# Patient Record
Sex: FEMALE | Race: AMERICAN INDIAN OR ALASKA NATIVE | NOT HISPANIC OR LATINO | Employment: UNEMPLOYED | URBAN - METROPOLITAN AREA
[De-identification: names, ages, dates, MRNs, and addresses within clinical notes are randomized per-mention and may not be internally consistent; named-entity substitution may affect disease eponyms.]

---

## 2017-03-22 PROBLEM — M23.303 DEGENERATION DISEASE OF MEDIAL MENISCUS OF RIGHT KNEE: Status: ACTIVE | Noted: 2017-03-22

## 2018-01-24 ENCOUNTER — HOSPITAL ENCOUNTER (EMERGENCY)
Facility: HOSPITAL | Age: 53
Discharge: HOME | End: 2018-01-24
Attending: EMERGENCY MEDICINE
Payer: MEDICAID

## 2018-01-24 VITALS
HEIGHT: 67 IN | WEIGHT: 205 LBS | HEART RATE: 95 BPM | OXYGEN SATURATION: 100 % | TEMPERATURE: 98 F | BODY MASS INDEX: 32.18 KG/M2 | SYSTOLIC BLOOD PRESSURE: 130 MMHG | RESPIRATION RATE: 18 BRPM | DIASTOLIC BLOOD PRESSURE: 60 MMHG

## 2018-01-24 DIAGNOSIS — J00 ACUTE NASOPHARYNGITIS: ICD-10-CM

## 2018-01-24 DIAGNOSIS — Z20.828 EXPOSURE TO INFLUENZA: Primary | ICD-10-CM

## 2018-01-24 DIAGNOSIS — R05.9 COUGH: ICD-10-CM

## 2018-01-24 LAB
FLUAV AG SPEC QL IA: NEGATIVE
FLUBV AG SPEC QL IA: NEGATIVE
SPECIMEN SOURCE: NORMAL

## 2018-01-24 PROCEDURE — 99283 EMERGENCY DEPT VISIT LOW MDM: CPT

## 2018-01-24 PROCEDURE — 25000003 PHARM REV CODE 250: Performed by: NURSE PRACTITIONER

## 2018-01-24 PROCEDURE — 87400 INFLUENZA A/B EACH AG IA: CPT

## 2018-01-24 RX ORDER — IBUPROFEN 800 MG/1
800 TABLET ORAL
Status: COMPLETED | OUTPATIENT
Start: 2018-01-24 | End: 2018-01-24

## 2018-01-24 RX ORDER — BENZONATATE 100 MG/1
100 CAPSULE ORAL EVERY 8 HOURS PRN
Qty: 20 CAPSULE | Refills: 0 | Status: SHIPPED | OUTPATIENT
Start: 2018-01-24 | End: 2018-01-29

## 2018-01-24 RX ORDER — OSELTAMIVIR PHOSPHATE 75 MG/1
75 CAPSULE ORAL 2 TIMES DAILY
Qty: 10 CAPSULE | Refills: 0 | Status: SHIPPED | OUTPATIENT
Start: 2018-01-24 | End: 2018-01-29

## 2018-01-24 RX ADMIN — IBUPROFEN 800 MG: 800 TABLET ORAL at 08:01

## 2018-01-25 NOTE — ED PROVIDER NOTES
Encounter Date: 1/24/2018       History     Chief Complaint   Patient presents with    Generalized Body Aches     with cough     The history is provided by the patient.   URI   The primary symptoms include fatigue, ear pain, sore throat, swollen glands, cough and myalgias. Primary symptoms do not include fever, headaches, wheezing, abdominal pain, nausea, vomiting, arthralgias or rash. Illness onset: 3 days ago. This is a new problem. The problem has been gradually worsening.   Both ears are affected.   The sore throat is not accompanied by trouble swallowing, drooling, hoarse voice or stridor.   The swelling is located in the anterior neck. The swelling is not associated with speech difficulty, neck pain or neck stiffness.   The cough is non-productive.   The myalgias are generalized. The myalgias are aching. The myalgias are not associated with weakness, tenderness or swelling.   The onset of the illness is associated with exposure to sick contacts (spouse with the flu). Symptoms associated with the illness include chills, plugged ear sensation, sinus pressure, congestion and rhinorrhea. The illness is not associated with facial pain.   Reports that she took Theraflu with mild relief.       Review of patient's allergies indicates:   Allergen Reactions    Ciprofloxacin Swelling    Codeine Swelling    Pcn [penicillins] Hives    Percocet [oxycodone-acetaminophen] Swelling     Past Medical History:   Diagnosis Date    GERD (gastroesophageal reflux disease)     Heart valve problem     Hypertension     Obesity (BMI 30-39.9) 10/20/2014    Thyroid disease      Past Surgical History:   Procedure Laterality Date    cesaean section  1991, 1993    CHOLECYSTECTOMY  2008    HYSTERECTOMY  age 27    for fibroids    SALPINGECTOMY  1992    for ectopic pregnancy    TOTAL THYROIDECTOMY  2012     Family History   Problem Relation Age of Onset    Hypertension Mother     Diabetes Mother     Hyperlipidemia Mother      Heart disease Mother     Heart disease Father     Hypertension Father     Diabetes Father     Heart disease Brother     Diabetes Brother      Social History   Substance Use Topics    Smoking status: Never Smoker    Smokeless tobacco: Never Used    Alcohol use No     Review of Systems   Constitutional: Positive for chills and fatigue. Negative for fever.   HENT: Positive for congestion, ear pain, rhinorrhea, sinus pressure and sore throat. Negative for dental problem, drooling, hoarse voice and trouble swallowing.    Eyes: Negative for pain, discharge, redness and visual disturbance.   Respiratory: Positive for cough. Negative for chest tightness, shortness of breath, wheezing and stridor.    Cardiovascular: Negative for chest pain, palpitations and leg swelling.   Gastrointestinal: Negative for abdominal pain, constipation, diarrhea, nausea and vomiting.   Genitourinary: Negative for difficulty urinating, dysuria, flank pain, frequency, hematuria and urgency.   Musculoskeletal: Positive for myalgias. Negative for arthralgias, back pain and neck pain.   Skin: Negative for color change, pallor and rash.   Neurological: Negative for seizures, speech difficulty, weakness and headaches.   Psychiatric/Behavioral: Negative.        Physical Exam     Initial Vitals [01/24/18 1946]   BP Pulse Resp Temp SpO2   137/67 96 20 97.2 °F (36.2 °C) 100 %      MAP       90.33         Physical Exam    Nursing note and vitals reviewed.  Constitutional: Vital signs are normal. No distress.   HENT:   Head: Normocephalic and atraumatic.   Right Ear: Tympanic membrane, external ear and ear canal normal.   Left Ear: Tympanic membrane, external ear and ear canal normal.   Nose: Rhinorrhea present. Right sinus exhibits no maxillary sinus tenderness and no frontal sinus tenderness. Left sinus exhibits no maxillary sinus tenderness and no frontal sinus tenderness.   Mouth/Throat: Oropharynx is clear and moist.   Clear post nasal drip  noted. Erythema bilateral nasal mucosa with clear nasal discharge noted.   Eyes: Conjunctivae, EOM and lids are normal. Pupils are equal, round, and reactive to light.   Neck: Normal range of motion. Neck supple.   Cardiovascular: Normal rate, regular rhythm, S1 normal, S2 normal and intact distal pulses.   Pulmonary/Chest: Effort normal and breath sounds normal. No respiratory distress. She has no wheezes. She has no rhonchi.   Abdominal: Soft. Bowel sounds are normal. She exhibits no distension. There is no tenderness.   Musculoskeletal: Normal range of motion.   Lymphadenopathy:     She has no cervical adenopathy.   Neurological: She is alert and oriented to person, place, and time. She has normal strength.   Skin: Skin is warm and dry. Capillary refill takes less than 2 seconds. No rash noted.   Psychiatric: She has a normal mood and affect. Her speech is normal and behavior is normal.         ED Course   Procedures  Labs Reviewed   INFLUENZA A AND B ANTIGEN        Medications   ibuprofen tablet 800 mg (800 mg Oral Given 1/24/18 2058)                          ED Course      Clinical Impression:   The primary encounter diagnosis was Exposure to influenza. Diagnoses of Acute nasopharyngitis and Cough were also pertinent to this visit.    Disposition:   Disposition: Discharged  Condition: Stable     The patient acknowledges that close follow up with medical provider is required. Instructed to follow up with PCP within 2 days. The patient agrees to comply with all instruction and directions given in the ER.     New Prescriptions    BENZONATATE (TESSALON) 100 MG CAPSULE    Take 1 capsule (100 mg total) by mouth every 8 (eight) hours as needed for Cough.    OSELTAMIVIR (TAMIFLU) 75 MG CAPSULE    Take 1 capsule (75 mg total) by mouth 2 (two) times daily.      Educated to promote fluids and rest. Tylenol or motrin as needed for pain and/or fever. Encourage frequent hand washing.                         Ena Price  NP  01/24/18 2128

## 2018-02-20 PROBLEM — R91.8 PULMONARY NODULES: Status: ACTIVE | Noted: 2018-02-20

## 2018-02-20 PROBLEM — R11.0 NAUSEA: Status: ACTIVE | Noted: 2018-02-20

## 2018-02-20 PROBLEM — E78.1 HYPERTRIGLYCERIDEMIA: Status: ACTIVE | Noted: 2018-02-20

## 2018-04-10 PROBLEM — K31.A0 INTESTINAL METAPLASIA OF GASTRIC MUCOSA: Status: ACTIVE | Noted: 2018-04-10

## 2018-04-10 PROBLEM — K29.41 ATROPHIC GASTRITIS WITH HEMORRHAGE: Status: ACTIVE | Noted: 2018-04-10

## 2018-06-25 PROBLEM — G89.29 CHRONIC PAIN OF RIGHT KNEE: Status: ACTIVE | Noted: 2018-06-25

## 2018-06-25 PROBLEM — M25.561 CHRONIC PAIN OF RIGHT KNEE: Status: ACTIVE | Noted: 2018-06-25

## 2019-04-16 PROBLEM — M70.50 PES ANSERINE BURSITIS: Status: ACTIVE | Noted: 2019-04-16

## 2019-04-16 PROBLEM — M79.602 PAIN IN BOTH UPPER EXTREMITIES: Status: ACTIVE | Noted: 2019-04-16

## 2019-04-16 PROBLEM — M79.601 PAIN IN BOTH UPPER EXTREMITIES: Status: ACTIVE | Noted: 2019-04-16

## 2019-09-24 PROBLEM — E53.8 B12 DEFICIENCY: Status: ACTIVE | Noted: 2019-09-24

## 2020-06-02 ENCOUNTER — OFFICE VISIT (OUTPATIENT)
Dept: URGENT CARE | Facility: CLINIC | Age: 55
End: 2020-06-02
Payer: COMMERCIAL

## 2020-06-02 VITALS
HEART RATE: 88 BPM | OXYGEN SATURATION: 98 % | DIASTOLIC BLOOD PRESSURE: 75 MMHG | WEIGHT: 211 LBS | TEMPERATURE: 98 F | HEIGHT: 67 IN | BODY MASS INDEX: 33.12 KG/M2 | SYSTOLIC BLOOD PRESSURE: 136 MMHG

## 2020-06-02 DIAGNOSIS — J02.9 ACUTE PHARYNGITIS, UNSPECIFIED ETIOLOGY: Primary | ICD-10-CM

## 2020-06-02 DIAGNOSIS — H66.91 RIGHT OTITIS MEDIA, UNSPECIFIED OTITIS MEDIA TYPE: ICD-10-CM

## 2020-06-02 PROCEDURE — 99214 PR OFFICE/OUTPT VISIT, EST, LEVL IV, 30-39 MIN: ICD-10-PCS | Mod: S$GLB,,, | Performed by: NURSE PRACTITIONER

## 2020-06-02 PROCEDURE — 99214 OFFICE O/P EST MOD 30 MIN: CPT | Mod: S$GLB,,, | Performed by: NURSE PRACTITIONER

## 2020-06-02 RX ORDER — AZITHROMYCIN 250 MG/1
TABLET, FILM COATED ORAL
Qty: 6 TABLET | Refills: 0 | Status: SHIPPED | OUTPATIENT
Start: 2020-06-02 | End: 2020-07-09

## 2020-06-02 RX ORDER — FLUCONAZOLE 150 MG/1
150 TABLET ORAL DAILY
Qty: 2 TABLET | Refills: 1 | Status: SHIPPED | OUTPATIENT
Start: 2020-06-02 | End: 2020-06-03

## 2020-06-03 NOTE — PROGRESS NOTES
"Subjective:       Patient ID: Marysol Cash is a 54 y.o. female.    Vitals:  height is 5' 7" (1.702 m) and weight is 95.7 kg (211 lb). Her oral temperature is 98.3 °F (36.8 °C). Her blood pressure is 136/75 and her pulse is 88. Her oxygen saturation is 98%.     Chief Complaint: Sinus Problem    Sinus Problem   This is a new problem. The current episode started yesterday. The problem has been gradually worsening since onset. There has been no fever. Her pain is at a severity of 4/10. The pain is mild. Associated symptoms include congestion, ear pain (right), sinus pressure and a sore throat. Pertinent negatives include no chills, coughing, headaches or shortness of breath. Past treatments include acetaminophen (sudafed). The treatment provided no relief.       Constitution: Negative for chills, fatigue and fever.   HENT: Positive for ear pain (right), congestion, postnasal drip, sinus pressure and sore throat.    Neck: Negative for painful lymph nodes.   Cardiovascular: Negative for chest pain, leg swelling and sob on exertion.   Eyes: Negative for double vision and blurred vision.   Respiratory: Negative for cough, shortness of breath and wheezing.    Gastrointestinal: Negative for nausea, vomiting and diarrhea.   Genitourinary: Negative for dysuria, frequency, urgency and history of kidney stones.   Musculoskeletal: Negative for joint pain, joint swelling, muscle cramps and muscle ache.   Skin: Negative for color change, pale, rash and bruising.   Allergic/Immunologic: Negative for seasonal allergies.   Neurological: Negative for dizziness, history of vertigo, light-headedness, passing out and headaches.   Hematologic/Lymphatic: Negative for swollen lymph nodes.   Psychiatric/Behavioral: Negative for nervous/anxious, sleep disturbance and depression. The patient is not nervous/anxious.        Objective:      Physical Exam   Constitutional: She is oriented to person, place, and time. She appears well-developed " and well-nourished. She is cooperative.  Non-toxic appearance. She does not have a sickly appearance. She does not appear ill. No distress.   HENT:   Head: Normocephalic and atraumatic.   Right Ear: Hearing, external ear and ear canal normal. No drainage, swelling or tenderness. No mastoid tenderness. Tympanic membrane is erythematous and bulging. Tympanic membrane is not scarred and not perforated. No decreased hearing is noted.   Left Ear: Hearing, tympanic membrane, external ear and ear canal normal.   Nose: Nose normal. No mucosal edema, rhinorrhea or nasal deformity. No epistaxis. Right sinus exhibits no maxillary sinus tenderness and no frontal sinus tenderness. Left sinus exhibits no maxillary sinus tenderness and no frontal sinus tenderness.   Mouth/Throat: Uvula is midline and mucous membranes are normal. No trismus in the jaw. Normal dentition. No uvula swelling. Posterior oropharyngeal erythema present. No oropharyngeal exudate or posterior oropharyngeal edema.   Eyes: Conjunctivae and lids are normal. No scleral icterus.   Neck: Trachea normal, full passive range of motion without pain and phonation normal. Neck supple. No neck rigidity. No edema and no erythema present.   Cardiovascular: Normal rate, regular rhythm, normal heart sounds, intact distal pulses and normal pulses.   Pulmonary/Chest: Effort normal and breath sounds normal. No respiratory distress. She has no decreased breath sounds. She has no wheezes. She has no rhonchi. She exhibits no tenderness.   Abdominal: Normal appearance.   Musculoskeletal: Normal range of motion. She exhibits no edema or deformity.   Neurological: She is alert and oriented to person, place, and time. She exhibits normal muscle tone. Coordination normal.   Skin: Skin is warm, dry, intact, not diaphoretic and not pale.   Psychiatric: She has a normal mood and affect. Her speech is normal and behavior is normal. Judgment and thought content normal. Cognition and memory  are normal.   Nursing note and vitals reviewed.        Assessment:       1. Acute pharyngitis, unspecified etiology    2. Right otitis media, unspecified otitis media type        Plan:         Acute pharyngitis, unspecified etiology    Right otitis media, unspecified otitis media type  -     azithromycin (ZITHROMAX) 250 MG tablet; Take 2 tablets (500 mg) on  Day 1,  followed by 1 tablet (250 mg) once daily on Days 2 through 5.  Dispense: 6 tablet; Refill: 0  -     fluconazole (DIFLUCAN) 150 MG Tab; Take 1 tablet (150 mg total) by mouth once daily. May repeat in 4 days if necessary. for 1 day  Dispense: 2 tablet; Refill: 1          Patient Instructions     1.  Take all medications as directed. If you have been prescribed antibiotics, make sure to complete them.   2.  Rest and keep yourself/patient well hydrated. For adults, it is recommended to drink at least 8-10 glasses of water daily.   3.  For patients above 6 months of age who are not allergic to and are not on anticoagulants, you can alternate Tylenol and Motrin every 4-6 hours for fever above 100.4F and/or pain.  For patients less than 6 months of age, allergic to or intolerant to NSAIDS, have gastritis, gastric ulcers, or history of GI bleeds, are pregnant, or are on anticoagulant therapy, you can take Tylenol every 4 hours as needed for fever above 100.4F and/or pain.   4. You should schedule a follow-up appointment with your Primary Care Provider/Pediatrician for recheck in 2-3 days or as directed at this visit.   5.  If your condition fails to improve in a timely manner, you should receive another evaluation by your Primary Care Provider/Pediatrician to discuss your concerns or return to urgent care for a recheck.  If your condition worsens at any time, you should report immediately to your nearest Emergency Department for further evaluation. **You must understand that you have received Urgent Care treatment only and that you may be released before all of  your medical problems are known or treated. You, the patient, are responsible to arrange for follow-up care as instructed.         Middle Ear Infection, Wait & See Antibiotic Treatment (Child Over 6 Months)  Your child has an infection of the middle ear (the space behind the eardrum). Sometimes the common cold causes this type of infection. This is because congestion can block the internal passage (eustachian tube) that drains fluid from the middle ear. When the middle ear fills with fluid, bacteria or viruses may grow there, causing an infection. Until recently, antibiotics were used to treat almost all cases of middle ear infection. Doctors now know that most cases of ear infection will get better without antibiotics.     The reasons for not using antibiotics include:  · Antibiotics don't relieve pain in the first 24 hours and only have a minimal effect on pain after that.  · Antibiotics often prescribed for ear infection may cause diarrhea or other side effects.  · Antibiotics don't help with viral infections.  · Antibiotics don't treat middle ear fluid.  · Frequent use of antibiotics cause bacteria to become resistant. This makes the bacteria harder to treat in the future.  · Certain antibiotics are very expensive.  For these reasons, you are being given a wait and see prescription. That means treating your child only with acetaminophen or ibuprofen and pain-relieving ear drops for the first 2 days to see if it improves. Only fill the antibiotic prescription if your child is not better or is getting worse 2 days after todays visit.  Home care  The following are general care guidelines:  · Fluids. Fever increases water loss from the body. For infants under age 1, continue regular formula or breast feedings. Between feedings give an oral rehydration solution. You can buy oral rehydration solution from grocery and drug stores. No prescription is needed. For children over 1 year old, give plenty of fluids like  water, juice, lemon-lime soda, ginger-carmen, lemonade, or popsicles. Sports drinks are also OK. Never give your child energy drinks containing caffeine.  · Eating. If your child doesnt want to eat solid foods, its OK for a few days, as long as the child drinks lots of fluid.  · Rest. Keep children with fever at home resting or playing quietly. Your child may return to  or school when the fever is gone and he or she is eating well and feeling better.  · Fever and pain. Your child may use acetaminophen to control pain. You may give a child over 6 months ibuprofen instead of acetaminophen. If your child has chronic liver or kidney disease or ever had a stomach ulcer or GI bleeding, talk with your doctor before using these medicines. Do not give Aspirin to anyone under 18 years of age who is ill with a fever. It may cause a potentially life-threatening condition called Reye syndrome.  · Ear drops. You may give your child pain-relieving ear drops. These should be used as directed.  · Antibiotics. Only fill the antibiotic prescription if your child is not better or is getting worse 2 days after todays visit. Once you start the antibiotic, finish all of the medicine prescribed, even though your child may feel better after the first few days.  Prevention  To reduce the chance of your child getting an ear infection, follow these tips:  · Breastfeed your child when possible.  · If you give your child a bottle, don't prop the bottle up.  · Keep your child away from secondhand smoke.  Follow-up care  Sometimes the infection does not respond fully to the first antibiotic. A different medicine may be needed. Therefore, make an appointment to have your childs ears rechecked in 2 weeks to be sure the infection has cleared.  Call 911  Call 911 if any of the following occur:  · Unusual fussiness, drowsiness, or confusion  · No wet diapers for 8 hours, no tears when crying, or a dry mouth  · Stiff neck  · Convulsion  (seizure)  When to seek medical advice  Call your healthcare provider right away if any of these occur:  · Symptoms get worse or don't start to get better after 2 days of treatment  · Fever (see Fever and children, below)  · Headache or neck pain  · New rash appears  · Frequent diarrhea or vomiting  · Fluid or bloody drainage from the ear     Fever and children  Always use a digital thermometer to check your childs temperature. Never use a mercury thermometer.  For infants and toddlers, be sure to use a rectal thermometer correctly. A rectal thermometer may accidentally poke a hole in (perforate) the rectum. It may also pass on germs from the stool. Always follow the product makers directions for proper use. If you dont feel comfortable taking a rectal temperature, use another method. When you talk to your childs healthcare provider, tell him or her which method you used to take your childs temperature.  Here are guidelines for fever temperature. Ear temperatures arent accurate before 6 months of age. Dont take an oral temperature until your child is at least 4 years old.  Infant under 3 months old:  · Ask your childs healthcare provider how you should take the temperature.  · Rectal or forehead (temporal artery) temperature of 100.4°F (38°C) or higher, or as directed by the provider  · Armpit temperature of 99°F (37.2°C) or higher, or as directed by the provider  Child age 3 to 36 months:  · Rectal, forehead (temporal artery), or ear temperature of 102°F (38.9°C) or higher, or as directed by the provider  · Armpit temperature of 101°F (38.3°C) or higher, or as directed by the provider  Child of any age:  · Repeated temperature of 104°F (40°C) or higher, or as directed by the provider  · Fever that lasts more than 24 hours in a child under 2 years old. Or a fever that lasts for 3 days in a child 2 years or older.   Date Last Reviewed: 10/1/2016  © 7811-3092 [employer]. [address] [place] [address],  JENNIE Noguera [address]. All rights reserved. This information is not intended as a substitute for professional medical care. Always follow your healthcare professional's instructions.

## 2020-06-03 NOTE — PATIENT INSTRUCTIONS
1.  Take all medications as directed. If you have been prescribed antibiotics, make sure to complete them.   2.  Rest and keep yourself/patient well hydrated. For adults, it is recommended to drink at least 8-10 glasses of water daily.   3.  For patients above 6 months of age who are not allergic to and are not on anticoagulants, you can alternate Tylenol and Motrin every 4-6 hours for fever above 100.4F and/or pain.  For patients less than 6 months of age, allergic to or intolerant to NSAIDS, have gastritis, gastric ulcers, or history of GI bleeds, are pregnant, or are on anticoagulant therapy, you can take Tylenol every 4 hours as needed for fever above 100.4F and/or pain.   4. You should schedule a follow-up appointment with your Primary Care Provider/Pediatrician for recheck in 2-3 days or as directed at this visit.   5.  If your condition fails to improve in a timely manner, you should receive another evaluation by your Primary Care Provider/Pediatrician to discuss your concerns or return to urgent care for a recheck.  If your condition worsens at any time, you should report immediately to your nearest Emergency Department for further evaluation. **You must understand that you have received Urgent Care treatment only and that you may be released before all of your medical problems are known or treated. You, the patient, are responsible to arrange for follow-up care as instructed.         Middle Ear Infection, Wait & See Antibiotic Treatment (Child Over 6 Months)  Your child has an infection of the middle ear (the space behind the eardrum). Sometimes the common cold causes this type of infection. This is because congestion can block the internal passage (eustachian tube) that drains fluid from the middle ear. When the middle ear fills with fluid, bacteria or viruses may grow there, causing an infection. Until recently, antibiotics were used to treat almost all cases of middle ear infection. Doctors now know that  most cases of ear infection will get better without antibiotics.     The reasons for not using antibiotics include:  · Antibiotics don't relieve pain in the first 24 hours and only have a minimal effect on pain after that.  · Antibiotics often prescribed for ear infection may cause diarrhea or other side effects.  · Antibiotics don't help with viral infections.  · Antibiotics don't treat middle ear fluid.  · Frequent use of antibiotics cause bacteria to become resistant. This makes the bacteria harder to treat in the future.  · Certain antibiotics are very expensive.  For these reasons, you are being given a wait and see prescription. That means treating your child only with acetaminophen or ibuprofen and pain-relieving ear drops for the first 2 days to see if it improves. Only fill the antibiotic prescription if your child is not better or is getting worse 2 days after todays visit.  Home care  The following are general care guidelines:  · Fluids. Fever increases water loss from the body. For infants under age 1, continue regular formula or breast feedings. Between feedings give an oral rehydration solution. You can buy oral rehydration solution from grocery and drug stores. No prescription is needed. For children over 1 year old, give plenty of fluids like water, juice, lemon-lime soda, ginger-carmen, lemonade, or popsicles. Sports drinks are also OK. Never give your child energy drinks containing caffeine.  · Eating. If your child doesnt want to eat solid foods, its OK for a few days, as long as the child drinks lots of fluid.  · Rest. Keep children with fever at home resting or playing quietly. Your child may return to  or school when the fever is gone and he or she is eating well and feeling better.  · Fever and pain. Your child may use acetaminophen to control pain. You may give a child over 6 months ibuprofen instead of acetaminophen. If your child has chronic liver or kidney disease or ever had a  stomach ulcer or GI bleeding, talk with your doctor before using these medicines. Do not give Aspirin to anyone under 18 years of age who is ill with a fever. It may cause a potentially life-threatening condition called Reye syndrome.  · Ear drops. You may give your child pain-relieving ear drops. These should be used as directed.  · Antibiotics. Only fill the antibiotic prescription if your child is not better or is getting worse 2 days after todays visit. Once you start the antibiotic, finish all of the medicine prescribed, even though your child may feel better after the first few days.  Prevention  To reduce the chance of your child getting an ear infection, follow these tips:  · Breastfeed your child when possible.  · If you give your child a bottle, don't prop the bottle up.  · Keep your child away from secondhand smoke.  Follow-up care  Sometimes the infection does not respond fully to the first antibiotic. A different medicine may be needed. Therefore, make an appointment to have your childs ears rechecked in 2 weeks to be sure the infection has cleared.  Call 911  Call 911 if any of the following occur:  · Unusual fussiness, drowsiness, or confusion  · No wet diapers for 8 hours, no tears when crying, or a dry mouth  · Stiff neck  · Convulsion (seizure)  When to seek medical advice  Call your healthcare provider right away if any of these occur:  · Symptoms get worse or don't start to get better after 2 days of treatment  · Fever (see Fever and children, below)  · Headache or neck pain  · New rash appears  · Frequent diarrhea or vomiting  · Fluid or bloody drainage from the ear     Fever and children  Always use a digital thermometer to check your childs temperature. Never use a mercury thermometer.  For infants and toddlers, be sure to use a rectal thermometer correctly. A rectal thermometer may accidentally poke a hole in (perforate) the rectum. It may also pass on germs from the stool. Always follow  the product makers directions for proper use. If you dont feel comfortable taking a rectal temperature, use another method. When you talk to your childs healthcare provider, tell him or her which method you used to take your childs temperature.  Here are guidelines for fever temperature. Ear temperatures arent accurate before 6 months of age. Dont take an oral temperature until your child is at least 4 years old.  Infant under 3 months old:  · Ask your childs healthcare provider how you should take the temperature.  · Rectal or forehead (temporal artery) temperature of 100.4°F (38°C) or higher, or as directed by the provider  · Armpit temperature of 99°F (37.2°C) or higher, or as directed by the provider  Child age 3 to 36 months:  · Rectal, forehead (temporal artery), or ear temperature of 102°F (38.9°C) or higher, or as directed by the provider  · Armpit temperature of 101°F (38.3°C) or higher, or as directed by the provider  Child of any age:  · Repeated temperature of 104°F (40°C) or higher, or as directed by the provider  · Fever that lasts more than 24 hours in a child under 2 years old. Or a fever that lasts for 3 days in a child 2 years or older.   Date Last Reviewed: 10/1/2016  © 7780-2741 [employer]. [address] [place] [address], [place] [address]. All rights reserved. This information is not intended as a substitute for professional medical care. Always follow your healthcare professional's instructions.

## 2020-12-28 ENCOUNTER — OFFICE VISIT (OUTPATIENT)
Dept: URGENT CARE | Facility: CLINIC | Age: 55
End: 2020-12-28
Payer: COMMERCIAL

## 2020-12-28 VITALS
HEART RATE: 81 BPM | OXYGEN SATURATION: 97 % | SYSTOLIC BLOOD PRESSURE: 113 MMHG | WEIGHT: 219 LBS | TEMPERATURE: 101 F | HEIGHT: 67 IN | BODY MASS INDEX: 34.37 KG/M2 | DIASTOLIC BLOOD PRESSURE: 69 MMHG

## 2020-12-28 DIAGNOSIS — U07.1 COVID-19 VIRUS DETECTED: ICD-10-CM

## 2020-12-28 DIAGNOSIS — J34.89 SINUS PRESSURE: ICD-10-CM

## 2020-12-28 DIAGNOSIS — U07.1 LAB TEST POSITIVE FOR DETECTION OF COVID-19 VIRUS: ICD-10-CM

## 2020-12-28 DIAGNOSIS — Z11.59 SCREENING FOR VIRAL DISEASE: Primary | ICD-10-CM

## 2020-12-28 DIAGNOSIS — R51.9 GENERALIZED HEADACHE: ICD-10-CM

## 2020-12-28 LAB
CTP QC/QA: YES
SARS-COV-2 RDRP RESP QL NAA+PROBE: POSITIVE

## 2020-12-28 PROCEDURE — 3008F PR BODY MASS INDEX (BMI) DOCUMENTED: ICD-10-PCS | Mod: CPTII,S$GLB,, | Performed by: NURSE PRACTITIONER

## 2020-12-28 PROCEDURE — 99214 PR OFFICE/OUTPT VISIT, EST, LEVL IV, 30-39 MIN: ICD-10-PCS | Mod: S$GLB,,, | Performed by: NURSE PRACTITIONER

## 2020-12-28 PROCEDURE — 3008F BODY MASS INDEX DOCD: CPT | Mod: CPTII,S$GLB,, | Performed by: NURSE PRACTITIONER

## 2020-12-28 PROCEDURE — 99214 OFFICE O/P EST MOD 30 MIN: CPT | Mod: S$GLB,,, | Performed by: NURSE PRACTITIONER

## 2020-12-28 PROCEDURE — U0002 COVID-19 LAB TEST NON-CDC: HCPCS | Mod: QW,S$GLB,, | Performed by: NURSE PRACTITIONER

## 2020-12-28 PROCEDURE — U0002: ICD-10-PCS | Mod: QW,S$GLB,, | Performed by: NURSE PRACTITIONER

## 2020-12-29 NOTE — PROGRESS NOTES
"Subjective:       Patient ID: Marysol Cash is a 55 y.o. female.    Vitals:  height is 5' 7" (1.702 m) and weight is 99.3 kg (219 lb). Her temperature is 100.8 °F (38.2 °C) (abnormal). Her blood pressure is 113/69 and her pulse is 81. Her oxygen saturation is 97%.     Chief Complaint: Sinus Problem    Sinus Problem  This is a new problem. Episode onset: FRIDAT 12/25/2020. The problem has been gradually worsening since onset. Associated symptoms include coughing, headaches, sinus pressure and a sore throat. Pertinent negatives include no chills, congestion, diaphoresis, ear pain or shortness of breath. Treatments tried: NYQUIL, TYLENOL. The treatment provided no relief.       Constitution: Negative for chills, sweating, fatigue and fever.   HENT: Positive for postnasal drip, sinus pain, sinus pressure and sore throat. Negative for ear pain, congestion and voice change.    Neck: Negative for painful lymph nodes.   Eyes: Negative for eye redness.   Respiratory: Positive for cough and sputum production. Negative for chest tightness, bloody sputum, COPD, shortness of breath, stridor, wheezing and asthma.    Gastrointestinal: Negative for nausea and vomiting.   Musculoskeletal: Positive for muscle ache.   Skin: Negative for rash.   Allergic/Immunologic: Positive for immunizations up-to-date. Negative for seasonal allergies, asthma and flu shot.   Neurological: Positive for headaches. Negative for dizziness.   Hematologic/Lymphatic: Negative for swollen lymph nodes.       Objective:      Physical Exam   Constitutional: She is oriented to person, place, and time. She appears well-developed. She is cooperative.  Non-toxic appearance. She does not appear ill. No distress.   HENT:   Head: Normocephalic and atraumatic.   Ears:   Right Ear: Hearing, tympanic membrane, external ear and ear canal normal.   Left Ear: Hearing, tympanic membrane, external ear and ear canal normal.   Nose: Nose normal. No mucosal edema, " rhinorrhea or nasal deformity. No epistaxis. Right sinus exhibits no maxillary sinus tenderness and no frontal sinus tenderness. Left sinus exhibits no maxillary sinus tenderness and no frontal sinus tenderness.   Mouth/Throat: Uvula is midline, oropharynx is clear and moist and mucous membranes are normal. No trismus in the jaw. Normal dentition. No uvula swelling. No oropharyngeal exudate, posterior oropharyngeal edema or posterior oropharyngeal erythema.   Eyes: Conjunctivae and lids are normal. No scleral icterus.   Neck: Trachea normal, full passive range of motion without pain and phonation normal. Neck supple. No neck rigidity. No edema and no erythema present.   Cardiovascular: Normal rate, regular rhythm, normal heart sounds and normal pulses.   Pulmonary/Chest: Effort normal and breath sounds normal. No respiratory distress. She has no decreased breath sounds. She has no rhonchi.   Abdominal: Normal appearance.   Musculoskeletal: Normal range of motion.         General: No deformity.   Neurological: She is alert and oriented to person, place, and time. She exhibits normal muscle tone. Coordination normal.   Skin: Skin is warm, dry, intact, not diaphoretic and not pale. Psychiatric: Her speech is normal and behavior is normal. Judgment and thought content normal.   Nursing note and vitals reviewed.        Assessment:       1. Screening for viral disease    2. Lab test positive for detection of COVID-19 virus    3. Sinus pressure    4. Generalized headache        Results for orders placed or performed in visit on 12/28/20   POCT COVID-19 Rapid Screening   Result Value Ref Range    POC Rapid COVID Positive (A) Negative     Acceptable Yes        Plan:         Screening for viral disease  -     POCT COVID-19 Rapid Screening    Lab test positive for detection of COVID-19 virus    Sinus pressure    Generalized headache

## 2021-05-06 ENCOUNTER — PATIENT MESSAGE (OUTPATIENT)
Dept: RESEARCH | Facility: HOSPITAL | Age: 56
End: 2021-05-06

## 2021-05-10 ENCOUNTER — PATIENT MESSAGE (OUTPATIENT)
Dept: RESEARCH | Facility: HOSPITAL | Age: 56
End: 2021-05-10

## 2021-06-13 ENCOUNTER — HOSPITAL ENCOUNTER (EMERGENCY)
Facility: HOSPITAL | Age: 56
Discharge: HOME | End: 2021-06-13
Attending: SURGERY
Payer: COMMERCIAL

## 2021-06-13 VITALS
DIASTOLIC BLOOD PRESSURE: 70 MMHG | HEIGHT: 66 IN | TEMPERATURE: 98 F | HEART RATE: 90 BPM | SYSTOLIC BLOOD PRESSURE: 140 MMHG | RESPIRATION RATE: 18 BRPM | BODY MASS INDEX: 36.77 KG/M2 | OXYGEN SATURATION: 99 % | WEIGHT: 228.81 LBS

## 2021-06-13 DIAGNOSIS — J02.9 SORE THROAT: Primary | ICD-10-CM

## 2021-06-13 LAB
GROUP A STREP, MOLECULAR: NEGATIVE
SARS-COV-2 RDRP RESP QL NAA+PROBE: NEGATIVE

## 2021-06-13 PROCEDURE — 63600175 PHARM REV CODE 636 W HCPCS: Performed by: SURGERY

## 2021-06-13 PROCEDURE — 96372 THER/PROPH/DIAG INJ SC/IM: CPT

## 2021-06-13 PROCEDURE — U0002 COVID-19 LAB TEST NON-CDC: HCPCS | Performed by: SURGERY

## 2021-06-13 PROCEDURE — 99284 EMERGENCY DEPT VISIT MOD MDM: CPT | Mod: 25

## 2021-06-13 PROCEDURE — 87651 STREP A DNA AMP PROBE: CPT | Performed by: SURGERY

## 2021-06-13 RX ORDER — METHYLPREDNISOLONE 4 MG/1
TABLET ORAL
Qty: 1 PACKAGE | Refills: 0 | Status: SHIPPED | OUTPATIENT
Start: 2021-06-13 | End: 2021-12-07 | Stop reason: ALTCHOICE

## 2021-06-13 RX ORDER — METHYLPREDNISOLONE 4 MG/1
TABLET ORAL
Qty: 1 PACKAGE | Refills: 0 | Status: SHIPPED | OUTPATIENT
Start: 2021-06-13 | End: 2021-06-13 | Stop reason: SDUPTHER

## 2021-06-13 RX ORDER — AZITHROMYCIN 250 MG/1
TABLET, FILM COATED ORAL
Qty: 6 TABLET | Refills: 0 | Status: SHIPPED | OUTPATIENT
Start: 2021-06-13 | End: 2021-06-13 | Stop reason: SDUPTHER

## 2021-06-13 RX ORDER — METHYLPREDNISOLONE SOD SUCC 125 MG
125 VIAL (EA) INJECTION
Status: COMPLETED | OUTPATIENT
Start: 2021-06-13 | End: 2021-06-13

## 2021-06-13 RX ORDER — AZITHROMYCIN 250 MG/1
TABLET, FILM COATED ORAL
Qty: 6 TABLET | Refills: 0 | Status: SHIPPED | OUTPATIENT
Start: 2021-06-13 | End: 2021-12-07 | Stop reason: ALTCHOICE

## 2021-06-13 RX ADMIN — METHYLPREDNISOLONE SODIUM SUCCINATE 125 MG: 125 INJECTION, POWDER, FOR SOLUTION INTRAMUSCULAR; INTRAVENOUS at 01:06

## 2022-05-09 PROBLEM — I89.0 LYMPHEDEMA: Status: ACTIVE | Noted: 2022-05-09

## 2022-10-28 NOTE — PATIENT INSTRUCTIONS
Your test was POSITIVE for COVID-19 (coronavirus).       Please isolate yourself at home.  You may leave home and/or return to work once the following conditions are met:    If you were not hospitalized and are not severely immunocompromised*:   More than 10 days since symptoms first appeared AND   More than 24 hours fever free without medications AND   Symptoms have improved     If you were hospitalized OR are severely immunocompromised*:   More than 20 days since symptoms first appeared   More than 24 hours fever free without medications   Symptoms have improved    If you had no symptoms but tested positive:   More than 10 days since the date of the first positive test (20 days if severely immunocompromised).   If you develop symptoms, then use the guidelines above.     *Definition of severely immunocompromised:  - Current chemotherapy for cancer  - Untreated HIV with CD4 count less than 200  - Combined primary immunodeficiency disorder  - Prednisone more than 20 mg per day for more than 14 days  - Post-transplant patients    Additional instructions:   Separate yourself from other people and animals in your home.   Call ahead before visiting your doctor.   Wear a facemask when around others.   Cover your coughs and sneezes.   Wash your hands often with soap and water; hand  can be used, too.   Avoid sharing personal household items.   Wipe down surfaces used daily.   Monitor your symptoms. Seek prompt medical attention if your illness is worsening (e.g., difficulty breathing).    Before seeking care, call your healthcare provider.   If you have a medical emergency and need to call 911, notify the dispatch personnel that you have, or are being evaluated for COVID-19. If possible, put on a facemask before emergency medical services arrive.        Contact Tracing    As one of the next steps, you will receive a call or text from the [place] Department of Health ([facility]) COVID-19 Tracing Team.  See the contact information below so you know not to ignore the health departments call. It is important that you contact them back immediately so they can help.      Contact Tracer Number:  [phone]  Caller ID for most carriers: [place] Dept Health     What is contact tracing?  · Contact tracing is a process that helps identify everyone who has been in close contact with an infected person. Contact tracers let those people know they may have been exposed and guide them on next steps. Confidentiality is important for everyone; no one will be told who may have exposed them to the virus.  · Your involvement is important. The more we know about where and how this virus is spreading, the better chance we have at stopping it from spreading further.  What does exposure mean?  · Exposure means you have been within 6 feet for more than 15 minutes with a person who has or had COVID-19.  What kind of questions do the contact tracers ask?  · A contact tracer will confirm your basic contact information including name, address, phone number, and next of kin, as well as asking about any symptoms you may have had. Theyll also ask you how you think you may have gotten sick, such as places where you may have been exposed to the virus, and people you were with. Those names will never be shared with anyone outside of that call, and will only be used to help trace and stop the spread of the virus.   I have privacy concerns. How will the state use my information?  · Your privacy about your health is important. All calls are completed using call centers that use the appropriate health privacy protection measures (HIPAA compliance), meaning that your patient information is safe. No one will ever ask you any questions related to immigration status. Your health comes first.   Do I have to participate?  · You do not have to participate, but we strongly encourage you to. Contact tracing can help us catch and control new outbreaks as  theyre developing to keep your friends and family safe.   What if I dont hear from anyone?  · If you dont receive a call within 24 hours, you can call the number above right away to inquire about your status. That line is open from 8:00 am - 8:00 p.m., 7 days a week.  Contact tracing saves lives! Together, we have the power to beat this virus and keep our loved ones and neighbors safe.    For more information see CDC link below.      https://www.cdc.gov/coronavirus/2019-ncov/hcp/guidance-prevent-spread.html#precautions        Sources:  CDC, [place] [facility] and [facility]           Sincerely,     Ramya Hill NP     Opt out

## 2022-11-09 PROBLEM — E66.01 CLASS 2 SEVERE OBESITY DUE TO EXCESS CALORIES WITH SERIOUS COMORBIDITY AND BODY MASS INDEX (BMI) OF 36.0 TO 36.9 IN ADULT: Status: ACTIVE | Noted: 2022-11-09

## 2022-12-07 PROBLEM — M79.602 PAIN IN BOTH UPPER EXTREMITIES: Status: RESOLVED | Noted: 2019-04-16 | Resolved: 2022-12-07

## 2022-12-07 PROBLEM — M79.601 PAIN IN BOTH UPPER EXTREMITIES: Status: RESOLVED | Noted: 2019-04-16 | Resolved: 2022-12-07

## 2023-06-30 ENCOUNTER — PATIENT OUTREACH (OUTPATIENT)
Dept: ADMINISTRATIVE | Facility: HOSPITAL | Age: 58
End: 2023-06-30
Payer: COMMERCIAL

## 2023-08-28 PROBLEM — C02.1: Status: ACTIVE | Noted: 2023-08-28

## 2023-12-18 ENCOUNTER — PATIENT MESSAGE (OUTPATIENT)
Dept: ADMINISTRATIVE | Facility: HOSPITAL | Age: 58
End: 2023-12-18
Payer: COMMERCIAL

## 2024-01-24 ENCOUNTER — PATIENT OUTREACH (OUTPATIENT)
Dept: ADMINISTRATIVE | Facility: HOSPITAL | Age: 59
End: 2024-01-24
Payer: COMMERCIAL

## 2024-01-24 DIAGNOSIS — Z12.11 SCREENING FOR COLORECTAL CANCER: Primary | ICD-10-CM

## 2024-01-24 DIAGNOSIS — Z12.12 SCREENING FOR COLORECTAL CANCER: Primary | ICD-10-CM

## 2024-04-10 PROBLEM — S11.90XA OPEN NECK WOUND: Status: ACTIVE | Noted: 2024-04-10

## 2024-04-10 PROBLEM — A41.9 SEVERE SEPSIS: Status: ACTIVE | Noted: 2024-04-10

## 2024-04-10 PROBLEM — R65.20 SEVERE SEPSIS: Status: ACTIVE | Noted: 2024-04-10

## 2024-04-10 PROBLEM — S11.90XA OPEN NECK WOUND: Status: RESOLVED | Noted: 2024-04-10 | Resolved: 2024-04-10

## 2024-04-11 PROBLEM — E87.6 HYPOKALEMIA: Status: ACTIVE | Noted: 2024-04-11

## 2024-04-11 PROBLEM — B37.0 ORAL THRUSH: Status: ACTIVE | Noted: 2024-04-11

## 2024-04-11 NOTE — ASSESSMENT & PLAN NOTE
Patient taking Lopressor 25 mg BID at home    Plan:  - Hypertensive on arrival  - Had been holding anti-hypertensives; likely resume in AM once bedside swallow passed

## 2024-04-11 NOTE — ASSESSMENT & PLAN NOTE
Patient taking Levothyroxine 175 mcg daily and Liothyronine 5 mcg daily    - FT4, FT3  - Continue Levothyroxine and Liothyronine  - If unable to pass bedside swallow may need IV formulary

## 2024-04-11 NOTE — ASSESSMENT & PLAN NOTE
Called and left message regarding missed appointment today with PA and provided call back number if they wish to reschedule.   Patient takes calcium carbonate, Vitamin D3 at home. Presented with symptomatic hypercalcemia 13.5 on admission with abdominal pain, nausea/vomiting    - Normal saline infusion 125 mg/hr  - Daily CMP to monitor calcium level  - Resume home medications when clinically indicated

## 2024-04-11 NOTE — ASSESSMENT & PLAN NOTE
Oncology History per Chart Review 8/2023:  Patient was evaluated by Dr. Rodrigo BOLDEN for lung lesion.  Biopsy of ventral surface of tongue done on 12/24/2021 has shown atypia with no diagnostic evidence of dysplasia.  Repeat biopsy after failed treatment on 01/24/2022 has shown moderately differentiated squamous cell carcinoma suspicious for perineural invasion.  CT soft tissue neck done on 01/31/2022 showed no evidence of metastatic disease to the neck. PET-CT done on 02/02/2022 showed no abnormal activity within the tongue, no signs of cervical lymph node or distant metastasis.  Declined medical oncology and radiation oncology consultation.  Status post left selective neck dissection levels 1, 2, 3, partial glossectomy with split-thickness skin graft done on 03/03/2022 by Dr. Clif Olson.  Surgical pathology has shown unifocal squamous cell carcinoma on the dorsal surface of the tongue on left side measuring 1 cm, moderately differentiated, 6 mm depth of invasion, no lymphovascular invasion, positive for perineural invasion, margins negative, 0 of 39 lymph nodes with metastasis, pT2 pN0 noted.  Postop course complicated by infection requiring I&D and antibiotics.  Declined adjuvant XRT due to concern for side effect profile [seen by Dr. Rangel].     Due to localized right neck swelling CT soft tissue neck with without contrast ordered on 06/21/2023 has shown 2.2 cm lesion deep to the right sternocleidomastoid suspicious for necrotic lymph node with few adjacent pathological appearing lymph nodes suspicious for metastasis. Right neck FNA done on 07/07/2023 were suspicious for metastatic squamous cell carcinoma.  PET-CT done on 08/21/2023 has shown large necrotic mass the angle of mandible posterior to submandibular gland on the right measuring 4.4 x 4.2 cm with SUV 9.78, just superior to the mass is a 2 cm hypermetabolic lymph node and no evidence of distant metastatic disease    Plan:    -- ENT consulted, plan to  offer biopsy if patient amenable but do not believe surgery an option at this point  -- Will offer palliative therapy, likely chemo possibly radiation  -- Will need to reach out to palliative medicine and oncology once biopsy results available

## 2024-04-11 NOTE — ASSESSMENT & PLAN NOTE
This patient does have evidence of infective focus  My overall impression is sepsis.  Source: Skin and Soft Tissue (location Neck)  Antibiotics given-   Antibiotics (72h ago, onward)      None          Latest lactate reviewed-  Recent Labs   Lab 04/10/24  1917   LACTATE 2.3*     Organ dysfunction indicated by Acute respiratory failure    Fluid challenge Ideal Body Weight- The patient's ideal body weight is Patient weight not recorded which will be used to calculate fluid bolus of 30 ml/kg for treatment of septic shock.      Post- resuscitation assessment Yes Perfusion exam was performed within 6 hours of septic shock presentation after bolus shows Adequate tissue perfusion assessed by non-invasive monitoring     Will Not start Pressors- Levophed for MAP of 65  Source control achieved by: Broad spectrum antibiotics    Plan:  Source likely skin/soft tissue origin secondary to purulent, necrotic neck wound  - Continue broad spectrum antibiotics with Vancomycin, Cefepime  - Received 1.7L Lactated Ringers sepsis fluids at OSH  - Will obtain wound culture  - ENT consulted, f/u recs  - Repeat Lactate  - Repeat and follow up blood cultures through maturation  - CBC daily to trend leukocytosis

## 2024-04-11 NOTE — HPI
Ms. 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 She initially presented to [facility] Emergency Department with six days of epigastric pain with associated nausea, vomiting, and difficulty eating due to pain. However on presentation, she was noted to have a large necrotic and purulent wound located on her right neck with complaints of associated difficulty speaking, swallowing and shortness of breath. She was noted to be hypoxic 88% on room air which improved with 2L NC. Labs were notable for leukocytosis 14, hypokalemia 2.4, hypochloremia 89, CO2 36, hypercalcemia 13.5, Phos 2.5. . Troponin 0.046. Lactate 2.3. CT Soft Tissue Neck was concerning for 9.5 x 8.2 x 10 cm large lobulated mass in the right anterolateral neck extending to the deep spaces of the neck and abutting the right prevertebral space and paravertebral musculature, left midline shift, multiple bilateral necrotic cervical lymph nodes. Case was discussed and [place] [facility] [place].    Of additional note, she recently transitioned her care to [place] and underwent stem-cell transplant approximately 2 weeks ago in [place], has not been seen by a  physician since 2023.     On transfer: she was afebrile, mildly hypertensive /109, HR 98, RR 20, satting 96% on room air. Complaining of mild headache and poor appetite associated with nausea; denies fever, chills, chest pain, palpitations, SOB, abdominal pain, dysuria. States wound has been draining for the past week initially thick white now more liquid. Mild dysphonia is apparently chronic from left vocal cord paralysis and at baseline. Some dysphagia with solids, none with pureed soft or liquids, denies  odynophagia. Dressing on neck CDI, ENT consulted will be here shortly to assess patient.

## 2024-04-11 NOTE — SUBJECTIVE & OBJECTIVE
Past Medical History:   Diagnosis Date    GERD (gastroesophageal reflux disease)     Heart valve problem     Hypertension     Obesity (BMI 30-39.9) 10/20/2014    Thyroid disease        Past Surgical History:   Procedure Laterality Date    cesaean section  1991, 1993    CHOLECYSTECTOMY  2008    COLONOSCOPY  09/2013    ESOPHAGOGASTRODUODENOSCOPY N/A 5/11/2020    Procedure: EGD (ESOPHAGOGASTRODUODENOSCOPY);  Surgeon: Nahed Zabala MD;  Location: [facility];  Service: Endoscopy;  Laterality: N/A;  covid 5/8/2020    HYSTERECTOMY  age 27    for fibroids    SALPINGECTOMY  1992    for ectopic pregnancy    TONGUE SURGERY  03/04/2022    Cancer removed on tongue and lymphs    TOTAL THYROIDECTOMY  2012    UPPER GASTROINTESTINAL ENDOSCOPY  09/2013    gastritis-hp neg       Review of patient's allergies indicates:   Allergen Reactions    Ciprofloxacin Swelling    Codeine Swelling    Pcn [penicillins] Hives    Percocet [oxycodone-acetaminophen] Swelling       Family History       Problem Relation (Age of Onset)    Diabetes Mother, Father, Brother    Heart disease Mother, Father, Brother    Hyperlipidemia Mother    Hypertension Mother, Father          Tobacco Use    Smoking status: Never    Smokeless tobacco: Never   Substance and Sexual Activity    Alcohol use: No     Alcohol/week: 0.0 standard drinks of alcohol    Drug use: No    Sexual activity: Yes     Partners: Male     Birth control/protection: See Surgical Hx      Review of Systems   Constitutional:  Positive for appetite change and fatigue. Negative for chills, diaphoresis and fever.   Respiratory:  Negative for apnea, cough, choking, chest tightness, shortness of breath, wheezing and stridor.    Cardiovascular:  Negative for chest pain and palpitations.   Gastrointestinal:  Positive for nausea. Negative for abdominal pain, blood in stool, constipation, diarrhea, rectal pain and vomiting.   Genitourinary:  Negative for dysuria.   Skin:  Positive for wound (draining  R side neck wound/mass).   Neurological:  Positive for speech difficulty (chronic dysphonia) and headaches.     Objective:     Vital Signs (Most Recent):    Vital Signs (24h Range):  Temp:  [97.8 °F (36.6 °C)] 97.8 °F (36.6 °C)  Pulse:  [] 97  Resp:  [16-26] 23  SpO2:  [89 %-99 %] 99 %  BP: (126-136)/(60-74) 136/62      There is no height or weight on file to calculate BMI.    No intake or output data in the 24 hours ending 04/10/24 9231       Physical Exam  Vitals and nursing note reviewed.   Constitutional:       General: She is not in acute distress.     Appearance: Normal appearance. She is well-developed. She is obese. She is ill-appearing. She is not toxic-appearing or diaphoretic.   HENT:      Head: Normocephalic.      Mouth/Throat:      Mouth: Mucous membranes are moist.      Pharynx: Oropharynx is clear. No oropharyngeal exudate.   Eyes:      Extraocular Movements: Extraocular movements intact.      Pupils: Pupils are equal, round, and reactive to light.   Neck:      Comments: Dressing to neck CDI, unable to visualize or palpate- will wait for ENT to assess  Cardiovascular:      Rate and Rhythm: Normal rate and regular rhythm.      Pulses: Normal pulses.      Heart sounds: No murmur heard.     No friction rub. No gallop.   Pulmonary:      Effort: Pulmonary effort is normal. No respiratory distress.      Breath sounds: No stridor. No wheezing, rhonchi or rales.   Abdominal:      General: Bowel sounds are normal. There is no distension.      Palpations: Abdomen is soft.      Tenderness: There is no abdominal tenderness.   Musculoskeletal:         General: No swelling.      Right lower leg: No edema.      Left lower leg: No edema.   Skin:     General: Skin is warm and dry.   Neurological:      General: No focal deficit present.      Mental Status: She is alert and oriented to person, place, and time. Mental status is at baseline.            Vents:     Lines/Drains/Airways       Peripheral Intravenous  Line  Duration                  Peripheral IV - Single Lumen 04/10/24 1716 20 G Left;Posterior Wrist <1 day         Peripheral IV - Single Lumen 04/10/24 1716 20 G Posterior;Right Wrist <1 day                  Significant Labs:    CBC/Anemia Profile:  Recent Labs   Lab 04/10/24  1637   WBC 14.40*   HGB 15.6   HCT 47.2   *   MCV 81*   RDW 14.2        Chemistries:  Recent Labs   Lab 04/10/24  1637 04/10/24  1741     --    K 2.4*  --    CL 89*  --    CO2 36*  --    BUN 14  --    CREATININE 0.8  --    CALCIUM 13.5*  --    ALBUMIN 3.3*  --    PROT 7.3  --    BILITOT 0.8  --    ALKPHOS 145*  --    ALT 41  --    AST 35  --    MG  --  1.6   PHOS  --  2.5*       All pertinent labs within the past 24 hours have been reviewed.    Significant Imaging: I have reviewed all pertinent imaging results/findings within the past 24 hours.

## 2024-04-12 ENCOUNTER — HOSPITAL ENCOUNTER (INPATIENT)
Facility: HOSPITAL | Age: 59
LOS: 25 days | Discharge: HOSPICE | DRG: 579 | End: 2024-05-07
Attending: INTERNAL MEDICINE | Admitting: INTERNAL MEDICINE
Payer: COMMERCIAL

## 2024-04-12 DIAGNOSIS — C44.42 SCC (SQUAMOUS CELL CARCINOMA), SCALP/NECK: ICD-10-CM

## 2024-04-12 DIAGNOSIS — R00.0 TACHYCARDIA: ICD-10-CM

## 2024-04-12 DIAGNOSIS — C02.1: ICD-10-CM

## 2024-04-12 DIAGNOSIS — S11.90XA OPEN NECK WOUND: ICD-10-CM

## 2024-04-12 DIAGNOSIS — C76.0 HEAD AND NECK CANCER: ICD-10-CM

## 2024-04-12 DIAGNOSIS — R11.0 NAUSEA: ICD-10-CM

## 2024-04-12 DIAGNOSIS — R52 PAIN: ICD-10-CM

## 2024-04-12 DIAGNOSIS — E43 SEVERE MALNUTRITION: ICD-10-CM

## 2024-04-12 DIAGNOSIS — Z51.5 PALLIATIVE CARE ENCOUNTER: Primary | ICD-10-CM

## 2024-04-12 DIAGNOSIS — Z71.89 ACP (ADVANCE CARE PLANNING): ICD-10-CM

## 2024-04-12 DIAGNOSIS — A41.9 SEPSIS: ICD-10-CM

## 2024-04-12 PROBLEM — E78.1 HYPERTRIGLYCERIDEMIA: Status: RESOLVED | Noted: 2018-02-20 | Resolved: 2024-04-12

## 2024-04-12 PROBLEM — J98.8 AIRWAY COMPROMISE: Status: ACTIVE | Noted: 2024-04-12

## 2024-04-12 LAB
ALBUMIN SERPL BCP-MCNC: 2.8 G/DL (ref 3.5–5.2)
ALP SERPL-CCNC: 139 U/L (ref 55–135)
ALT SERPL W/O P-5'-P-CCNC: 35 U/L (ref 10–44)
ANION GAP SERPL CALC-SCNC: 11 MMOL/L (ref 8–16)
AST SERPL-CCNC: 39 U/L (ref 10–40)
BILIRUB SERPL-MCNC: 0.8 MG/DL (ref 0.1–1)
BUN SERPL-MCNC: 6 MG/DL (ref 6–20)
CALCIUM SERPL-MCNC: 11 MG/DL (ref 8.7–10.5)
CHLORIDE SERPL-SCNC: 94 MMOL/L (ref 95–110)
CO2 SERPL-SCNC: 35 MMOL/L (ref 23–29)
CREAT SERPL-MCNC: 0.5 MG/DL (ref 0.5–1.4)
EST. GFR  (NO RACE VARIABLE): >60 ML/MIN/1.73 M^2
GLUCOSE SERPL-MCNC: 101 MG/DL (ref 70–110)
MAGNESIUM SERPL-MCNC: 1.4 MG/DL (ref 1.6–2.6)
PHOSPHATE SERPL-MCNC: 2.6 MG/DL (ref 2.7–4.5)
POTASSIUM SERPL-SCNC: 2.9 MMOL/L (ref 3.5–5.1)
PROT SERPL-MCNC: 6.3 G/DL (ref 6–8.4)
SODIUM SERPL-SCNC: 140 MMOL/L (ref 136–145)

## 2024-04-12 PROCEDURE — 63600175 PHARM REV CODE 636 W HCPCS: Performed by: STUDENT IN AN ORGANIZED HEALTH CARE EDUCATION/TRAINING PROGRAM

## 2024-04-12 PROCEDURE — 88307 TISSUE EXAM BY PATHOLOGIST: CPT | Performed by: DERMATOLOGY

## 2024-04-12 PROCEDURE — 0HB4XZX EXCISION OF NECK SKIN, EXTERNAL APPROACH, DIAGNOSTIC: ICD-10-PCS | Performed by: OTOLARYNGOLOGY

## 2024-04-12 PROCEDURE — 63600175 PHARM REV CODE 636 W HCPCS: Performed by: INTERNAL MEDICINE

## 2024-04-12 PROCEDURE — 25000003 PHARM REV CODE 250: Performed by: STUDENT IN AN ORGANIZED HEALTH CARE EDUCATION/TRAINING PROGRAM

## 2024-04-12 PROCEDURE — 25000003 PHARM REV CODE 250: Performed by: INTERNAL MEDICINE

## 2024-04-12 PROCEDURE — 99223 1ST HOSP IP/OBS HIGH 75: CPT | Mod: ,,, | Performed by: INTERNAL MEDICINE

## 2024-04-12 PROCEDURE — 20000000 HC ICU ROOM

## 2024-04-12 PROCEDURE — 88307 TISSUE EXAM BY PATHOLOGIST: CPT | Mod: 26,,, | Performed by: DERMATOLOGY

## 2024-04-12 PROCEDURE — 63600175 PHARM REV CODE 636 W HCPCS

## 2024-04-12 PROCEDURE — 84100 ASSAY OF PHOSPHORUS: CPT | Mod: 91 | Performed by: INTERNAL MEDICINE

## 2024-04-12 PROCEDURE — 83735 ASSAY OF MAGNESIUM: CPT | Mod: 91 | Performed by: INTERNAL MEDICINE

## 2024-04-12 PROCEDURE — 80053 COMPREHEN METABOLIC PANEL: CPT | Performed by: INTERNAL MEDICINE

## 2024-04-12 PROCEDURE — 80048 BASIC METABOLIC PNL TOTAL CA: CPT | Mod: 91,XB

## 2024-04-12 PROCEDURE — 83735 ASSAY OF MAGNESIUM: CPT | Mod: 91 | Performed by: STUDENT IN AN ORGANIZED HEALTH CARE EDUCATION/TRAINING PROGRAM

## 2024-04-12 RX ORDER — MAGNESIUM SULFATE HEPTAHYDRATE 40 MG/ML
2 INJECTION, SOLUTION INTRAVENOUS ONCE
Status: CANCELLED | OUTPATIENT
Start: 2024-04-12 | End: 2024-04-12

## 2024-04-12 RX ORDER — FAMOTIDINE 10 MG/ML
20 INJECTION INTRAVENOUS 2 TIMES DAILY
Status: DISCONTINUED | OUTPATIENT
Start: 2024-04-12 | End: 2024-04-14

## 2024-04-12 RX ORDER — POTASSIUM CHLORIDE 7.45 MG/ML
10 INJECTION INTRAVENOUS
Status: CANCELLED | OUTPATIENT
Start: 2024-04-12 | End: 2024-04-12

## 2024-04-12 RX ORDER — IBUPROFEN 200 MG
24 TABLET ORAL
Status: DISCONTINUED | OUTPATIENT
Start: 2024-04-12 | End: 2024-05-08 | Stop reason: HOSPADM

## 2024-04-12 RX ORDER — POTASSIUM CHLORIDE 7.45 MG/ML
10 INJECTION INTRAVENOUS
Status: COMPLETED | OUTPATIENT
Start: 2024-04-12 | End: 2024-04-12

## 2024-04-12 RX ORDER — ENOXAPARIN SODIUM 100 MG/ML
40 INJECTION SUBCUTANEOUS EVERY 24 HOURS
Status: DISCONTINUED | OUTPATIENT
Start: 2024-04-12 | End: 2024-05-08 | Stop reason: HOSPADM

## 2024-04-12 RX ORDER — PROCHLORPERAZINE EDISYLATE 5 MG/ML
2.5 INJECTION INTRAMUSCULAR; INTRAVENOUS EVERY 6 HOURS PRN
Status: DISCONTINUED | OUTPATIENT
Start: 2024-04-12 | End: 2024-04-22

## 2024-04-12 RX ORDER — FAMOTIDINE 10 MG/ML
20 INJECTION INTRAVENOUS 2 TIMES DAILY
Status: DISCONTINUED | OUTPATIENT
Start: 2024-04-12 | End: 2024-04-12

## 2024-04-12 RX ORDER — IBUPROFEN 200 MG
16 TABLET ORAL
Status: DISCONTINUED | OUTPATIENT
Start: 2024-04-12 | End: 2024-05-08 | Stop reason: HOSPADM

## 2024-04-12 RX ORDER — HYDROMORPHONE HYDROCHLORIDE 1 MG/ML
0.5 INJECTION, SOLUTION INTRAMUSCULAR; INTRAVENOUS; SUBCUTANEOUS EVERY 4 HOURS PRN
Status: DISCONTINUED | OUTPATIENT
Start: 2024-04-12 | End: 2024-04-19

## 2024-04-12 RX ORDER — GLUCAGON 1 MG
1 KIT INJECTION
Status: DISCONTINUED | OUTPATIENT
Start: 2024-04-12 | End: 2024-04-13

## 2024-04-12 RX ORDER — SODIUM CHLORIDE 0.9 % (FLUSH) 0.9 %
10 SYRINGE (ML) INJECTION EVERY 12 HOURS PRN
Status: DISCONTINUED | OUTPATIENT
Start: 2024-04-12 | End: 2024-04-12

## 2024-04-12 RX ORDER — NALOXONE HCL 0.4 MG/ML
0.02 VIAL (ML) INJECTION
Status: DISCONTINUED | OUTPATIENT
Start: 2024-04-12 | End: 2024-05-08 | Stop reason: HOSPADM

## 2024-04-12 RX ORDER — HYDROXYZINE PAMOATE 25 MG/1
25 CAPSULE ORAL EVERY 8 HOURS PRN
Status: DISCONTINUED | OUTPATIENT
Start: 2024-04-12 | End: 2024-05-08 | Stop reason: HOSPADM

## 2024-04-12 RX ORDER — GLUCAGON 1 MG
1 KIT INJECTION
Status: DISCONTINUED | OUTPATIENT
Start: 2024-04-12 | End: 2024-04-12

## 2024-04-12 RX ORDER — ONDANSETRON HYDROCHLORIDE 2 MG/ML
4 INJECTION, SOLUTION INTRAVENOUS EVERY 4 HOURS PRN
Status: DISCONTINUED | OUTPATIENT
Start: 2024-04-12 | End: 2024-04-22

## 2024-04-12 RX ORDER — FLUCONAZOLE 2 MG/ML
200 INJECTION, SOLUTION INTRAVENOUS
Status: DISCONTINUED | OUTPATIENT
Start: 2024-04-13 | End: 2024-04-22

## 2024-04-12 RX ORDER — SODIUM CHLORIDE 0.9 % (FLUSH) 0.9 %
10 SYRINGE (ML) INJECTION
Status: DISCONTINUED | OUTPATIENT
Start: 2024-04-12 | End: 2024-04-12

## 2024-04-12 RX ORDER — METRONIDAZOLE 500 MG/100ML
500 INJECTION, SOLUTION INTRAVENOUS
Status: DISCONTINUED | OUTPATIENT
Start: 2024-04-12 | End: 2024-04-22

## 2024-04-12 RX ORDER — INSULIN ASPART 100 [IU]/ML
0-5 INJECTION, SOLUTION INTRAVENOUS; SUBCUTANEOUS EVERY 6 HOURS PRN
Status: DISCONTINUED | OUTPATIENT
Start: 2024-04-12 | End: 2024-04-13

## 2024-04-12 RX ORDER — DEXAMETHASONE SODIUM PHOSPHATE 4 MG/ML
8 INJECTION, SOLUTION INTRA-ARTICULAR; INTRALESIONAL; INTRAMUSCULAR; INTRAVENOUS; SOFT TISSUE EVERY 8 HOURS
Status: COMPLETED | OUTPATIENT
Start: 2024-04-12 | End: 2024-04-13

## 2024-04-12 RX ORDER — FAMOTIDINE 20 MG/1
20 TABLET, FILM COATED ORAL 2 TIMES DAILY
Status: DISCONTINUED | OUTPATIENT
Start: 2024-04-12 | End: 2024-04-12

## 2024-04-12 RX ORDER — LIOTHYRONINE SODIUM 5 UG/1
5 TABLET ORAL
Status: DISCONTINUED | OUTPATIENT
Start: 2024-04-13 | End: 2024-04-28

## 2024-04-12 RX ORDER — ACETAMINOPHEN 650 MG/20.3ML
650 LIQUID ORAL EVERY 6 HOURS PRN
Status: DISCONTINUED | OUTPATIENT
Start: 2024-04-12 | End: 2024-04-14

## 2024-04-12 RX ORDER — MAGNESIUM SULFATE HEPTAHYDRATE 40 MG/ML
2 INJECTION, SOLUTION INTRAVENOUS
Status: COMPLETED | OUTPATIENT
Start: 2024-04-12 | End: 2024-04-12

## 2024-04-12 RX ORDER — MUPIROCIN 20 MG/G
OINTMENT TOPICAL 2 TIMES DAILY
Status: COMPLETED | OUTPATIENT
Start: 2024-04-12 | End: 2024-04-16

## 2024-04-12 RX ADMIN — CEFEPIME 2 G: 2 INJECTION, POWDER, FOR SOLUTION INTRAVENOUS at 05:04

## 2024-04-12 RX ADMIN — POTASSIUM BICARBONATE 50 MEQ: 978 TABLET, EFFERVESCENT ORAL at 06:04

## 2024-04-12 RX ADMIN — MAGNESIUM SULFATE HEPTAHYDRATE 2 G: 40 INJECTION, SOLUTION INTRAVENOUS at 07:04

## 2024-04-12 RX ADMIN — METRONIDAZOLE 500 MG: 500 INJECTION, SOLUTION INTRAVENOUS at 05:04

## 2024-04-12 RX ADMIN — HYDROMORPHONE HYDROCHLORIDE 0.5 MG: 1 INJECTION, SOLUTION INTRAMUSCULAR; INTRAVENOUS; SUBCUTANEOUS at 05:04

## 2024-04-12 RX ADMIN — HYDROMORPHONE HYDROCHLORIDE 0.5 MG: 1 INJECTION, SOLUTION INTRAMUSCULAR; INTRAVENOUS; SUBCUTANEOUS at 09:04

## 2024-04-12 RX ADMIN — ONDANSETRON 4 MG: 2 INJECTION INTRAMUSCULAR; INTRAVENOUS at 03:04

## 2024-04-12 RX ADMIN — DEXAMETHASONE SODIUM PHOSPHATE 8 MG: 4 INJECTION INTRA-ARTICULAR; INTRALESIONAL; INTRAMUSCULAR; INTRAVENOUS; SOFT TISSUE at 09:04

## 2024-04-12 RX ADMIN — FAMOTIDINE 20 MG: 10 INJECTION, SOLUTION INTRAVENOUS at 04:04

## 2024-04-12 RX ADMIN — PROCHLORPERAZINE EDISYLATE 2.5 MG: 5 INJECTION INTRAMUSCULAR; INTRAVENOUS at 10:04

## 2024-04-12 RX ADMIN — MAGNESIUM SULFATE HEPTAHYDRATE 2 G: 40 INJECTION, SOLUTION INTRAVENOUS at 05:04

## 2024-04-12 RX ADMIN — POTASSIUM CHLORIDE 10 MEQ: 7.46 INJECTION, SOLUTION INTRAVENOUS at 09:04

## 2024-04-12 RX ADMIN — MUPIROCIN: 20 OINTMENT TOPICAL at 08:04

## 2024-04-12 RX ADMIN — POTASSIUM CHLORIDE 10 MEQ: 7.46 INJECTION, SOLUTION INTRAVENOUS at 10:04

## 2024-04-12 RX ADMIN — HYDROXYZINE PAMOATE 25 MG: 25 CAPSULE ORAL at 09:04

## 2024-04-12 RX ADMIN — VANCOMYCIN HYDROCHLORIDE 2000 MG: 500 INJECTION, POWDER, LYOPHILIZED, FOR SOLUTION INTRAVENOUS at 08:04

## 2024-04-12 RX ADMIN — POTASSIUM CHLORIDE 10 MEQ: 7.46 INJECTION, SOLUTION INTRAVENOUS at 06:04

## 2024-04-12 RX ADMIN — POTASSIUM CHLORIDE 10 MEQ: 7.46 INJECTION, SOLUTION INTRAVENOUS at 08:04

## 2024-04-12 RX ADMIN — ENOXAPARIN SODIUM 40 MG: 40 INJECTION SUBCUTANEOUS at 05:04

## 2024-04-12 RX ADMIN — POTASSIUM CHLORIDE 10 MEQ: 7.46 INJECTION, SOLUTION INTRAVENOUS at 05:04

## 2024-04-12 RX ADMIN — ONDANSETRON 4 MG: 2 INJECTION INTRAMUSCULAR; INTRAVENOUS at 07:04

## 2024-04-12 NOTE — PLAN OF CARE
04/12/24 1620   Readmission   Why were you hospitalized in the last 30 days? Sepsis   Why were you readmitted? New medical problem  (open neck wound)   When you left the hospital how did you feel? not good   When you left the hospital where did you go? Other  (transferred to [facility])   Did patient/caregiver refused recommended DC plan? No   Tell me about what happened between when you left the hospital and the day you returned. transferred to [facility]   When did you start not feeling well? couple of weeks   Did you try to manage your symptoms your self? No   Did you call anyone? No   Did you try to see or did see a doctor or nurse before you came? Yes   Were you seen? Yes   Did you have  a follow-up appointment on discharge? No   Was this a planned readmission? No     Patient transferred from [facility] ICU to [facility].    Discharge Plan A and Plan B have been determined by review of patient's clinical status, future medical and therapeutic needs, and coverage/benefits for post-acute care in coordination with multidisciplinary team members.      Dorene Stoll RN     [phone]

## 2024-04-12 NOTE — PLAN OF CARE
MICU DAILY GOALS     Family/Goals of care/Code Status   Code Status: Full Code    24H Vital Sign Range  Temp:  [97.9 °F (36.6 °C)-98.4 °F (36.9 °C)]   Pulse:  []   Resp:  [13-41]   BP: (107-169)/()   SpO2:  [87 %-100 %]      Shift Events (include procedures and significant events)   Replaced K, Mg. Medications given per eMAR. Frequent cough  with thick sputum. VSS, on 2L of NC. Pt stays NPO. ENT biopsy @ bedside.    AWAKE RASS: Goal -    Actual -      Restraint necessity: Not necessary   BREATHE SBT: Not intubated    Coordinate A & B, analgesics/sedatives Pain: managed   SAT: Not intubated   Delirium CAM-ICU: Overall CAM-ICU: Negative   Early(intubated/ Progressive (non-intubated) Mobility MOVE Screen (INTUBATED ONLY): Not intubated    Activity: Activity Management: Ambulated -L4   Feeding/Nutrition Diet order: Diet/Nutrition Received: NPO,     Thrombus DVT prophylaxis: VTE Required Core Measure: Pharmacological prophylaxis initiated/maintained   HOB Elevation Head of Bed (HOB) Positioning: HOB at 20-30 degrees   Ulcer Prophylaxis GI: yes   Glucose control managed     Skin Skin assessed during: Q Shift Change    Sacrum intact/not altered? Yes  Heels intact/not altered? Yes  Surgical wound? No    CHECK ONE!   (no altered skin or altered skin) and sub boxes:  [] No Altered Skin Integrity Present    []Prevention Measures Documented    [x] Altered Skin Integrity Present or Discovered   [x] LDA present in EPIC, daily doc completed              [] LDA added if not in EPIC (describe wound).                    When describing wound, do not stage, use descriptive words only.    [] Wound Image Taken (required on admit,                   transfer/discharge and every Tuesday)    Wound Care Consulted? Yes    4 EYES:   Attending Nurse (1st set of eyes):     Second RN/Staff Member (2nd set of eyes):    Bowel Function no issues    Indwelling Catheter Necessity            De-escalation Antibiotics No        VS and  assessment per flow sheet, patient progressing towards goals as tolerated, plan of care reviewed with family, all concerns addressed, will continue to monitor.

## 2024-04-12 NOTE — H&P
[facility] - Cardiac Medical ICU  Critical Care Medicine  H&P    Patient Name: Marysol Cash  MRN: [id number]  Admission Date: 4/12/2024  Hospital Length of Stay: 0 days  Code Status: Full Code  Attending Provider: Isaura Sethi MD  Primary Care Provider: James Coronel MD   Principal Problem: Sepsis    Subjective:     HPI:  Ms. 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 She initially presented to [facility] with six days of epigastric pain with associated nausea, vomiting, and difficulty eating due to pain. However on presentation, she was noted to have a large necrotic and purulent wound located on her right neck with complaints of associated difficulty speaking, swallowing and shortness of breath. She was noted to be hypoxic 88% on room air which improved with 2L NC. Labs were notable for leukocytosis 14, hypokalemia 2.4, hypochloremia 89, CO2 36, hypercalcemia 13.5, Phos 2.5. . Troponin 0.046. Lactate 2.3. CT Soft Tissue Neck was concerning for 9.5 x 8.2 x 10 cm large lobulated mass in the right anterolateral neck extending to the deep spaces of the neck and abutting the right prevertebral space and paravertebral musculature, left midline shift, multiple bilateral necrotic cervical lymph nodes. Case was discussed and decision was made to transfer to [facility] [place] higher level of care.    Of additional note, she recently transitioned her care to [place] and underwent stem-cell transplant approximately 2 weeks ago in [place], has not been seen by a [place] physician since 2023.     On transfer: she was afebrile, mildly hypertensive /109, HR 98, RR 20, satting 96% on room air. Complaining of mild headache and poor appetite associated with  Next visit: 10/5/20      ECHO:  Normal left ventricular cavity size. Upper normal left ventricular wall thickness.  Normal left ventricular systolic function. No regional wall motion abnormalities.  Left ventricular ejection fraction, 63 %. LV Global longitudinal strain 19%.  Normal size atrium and right ventricle, with normal RV systolic function.  Moderate aortic valve sclerosis without stenosis, with decreased but adequate cusp separation.  No aortic valve stenosis or regurgitation.  Mild mitral annular calcification. Mild mitral valve sclerosis without stenosis.  Mild to moderate MV regurgitation. Trace to mild TV regurgitation. Mild TV regurgitation.  Normal estimated pulmonary artery systolic pressure 18 mmHg.  Compared with 05/30/2019:  Valvular disease is similar.  Left ventricular systolic function is similar.  Borderline left ventricular diastolic dysfunction is not seen on this study.      LOV:4/17/20  ASSESSMENT/PLAN:   1. Valvular disease   - Echo 5/30/2019  Moderate aortic valve sclerosis without stenosis, with decreased but adequate cusp separation.  No AV regurgitation.  Mild mitral annular calcification. Moderate mitral valve sclerosis without stenosis.  Mild-moderate MV regurgitation. Trace TV regurgitation. Mild PV regurgitation.  Normal estimated pulmonary artery systolic pressure 27 mmHg.  Compared with 4/11/2016:  Borderline evidence for left ventricular diastolic dysfunction now present.  Mitral valve regurgitation appears mildly worse.  Aortic valve is without significant change.  Pulmonary pressure mildly up from 19 mmHg, however still normal.  - Denies symptoms including chest pain, dyspnea on exertion, PND, orthopnea, syncope.  - Cardiac murmur, however no significant AV stenosis or MV regurgitation by exam.  - Good deal of aerobic exercising and strength exercises without symptoms.  - RECOMMEND:  Echocardiogram in 6 months.   Continue healthy lifestyle.  2.  Bilateral arm/hand numbness  -  I doubt this has any relationship to cardiac issues. Positional. Unchanged  - RECOMMEND:  Follow-up through PCP.  3. Dyslipidemia  - Last Lipid panel 4/1/2019: Total Cholesterol: 248, LDL: 134, HDL: 94, triglycerides: 102   - No statin therapy at this time.   - Mildly elevated LDL however excellent HDL.  - No history of known CAD. No diabetes.  - Followed by PCP.  - RECOMMEND:  Low-cholesterol diet. Continue excellent exercise program.  Would favor LDL less than 130 with diet alone. Do not recommend statin at this time.      OVERALL RECOMMENDATIONS:  Continue healthy lifestyle with low-cholesterol diet and aerobic exercising.  Follow-up lipids and bilateral arm/hand numbness through PCP.  Return to clinic in 6 months.  Echo prior to next visit.   nausea; denies fever, chills, chest pain, palpitations, SOB, abdominal pain, dysuria. States wound has been draining for the past week initially thick white now more liquid. Mild dysphonia is apparently chronic from left vocal cord paralysis and at baseline. Some dysphagia with solids, none with pureed soft or liquids, denies odynophagia. Dressing on neck CDI, ENT consulted will be here shortly to assess patient.     Hospital/ICU Course:  No notes on file    Past Medical History:   Diagnosis Date    GERD (gastroesophageal reflux disease)     Heart valve problem     Hypertension     Obesity (BMI 30-39.9) 10/20/2014    Thyroid disease        Past Surgical History:   Procedure Laterality Date    cesaean section  1991, 1993    CHOLECYSTECTOMY  2008    COLONOSCOPY  09/2013    ESOPHAGOGASTRODUODENOSCOPY N/A 5/11/2020    Procedure: EGD (ESOPHAGOGASTRODUODENOSCOPY);  Surgeon: Nahed Zabala MD;  Location: [facility];  Service: Endoscopy;  Laterality: N/A;  covid 5/8/2020    HYSTERECTOMY  age 27    for fibroids    SALPINGECTOMY  1992    for ectopic pregnancy    TONGUE SURGERY  03/04/2022    Cancer removed on tongue and lymphs    TOTAL THYROIDECTOMY  2012    UPPER GASTROINTESTINAL ENDOSCOPY  09/2013    gastritis-hp neg       Review of patient's allergies indicates:   Allergen Reactions    Ciprofloxacin Swelling    Codeine Swelling    Pcn [penicillins] Hives    Percocet [oxycodone-acetaminophen] Swelling       Family History       Problem Relation (Age of Onset)    Diabetes Mother, Father, Brother    Heart disease Mother, Father, Brother    Hyperlipidemia Mother    Hypertension Mother, Father          Tobacco Use    Smoking status: Never    Smokeless tobacco: Never   Substance and Sexual Activity    Alcohol use: No     Alcohol/week: 0.0 standard drinks of alcohol    Drug use: No    Sexual activity: Yes     Partners: Male     Birth control/protection: See Surgical Hx      Review of Systems   Constitutional:  Positive for  appetite change and fatigue. Negative for chills, diaphoresis and fever.   Respiratory:  Negative for apnea, cough, choking, chest tightness, shortness of breath, wheezing and stridor.    Cardiovascular:  Negative for chest pain and palpitations.   Gastrointestinal:  Positive for nausea. Negative for abdominal pain, blood in stool, constipation, diarrhea, rectal pain and vomiting.   Genitourinary:  Negative for dysuria.   Skin:  Positive for wound (draining R side neck wound/mass).   Neurological:  Positive for speech difficulty (chronic dysphonia) and headaches.     Objective:     Vital Signs (Most Recent):    Vital Signs (24h Range):  Temp:  [97.8 °F (36.6 °C)] 97.8 °F (36.6 °C)  Pulse:  [] 97  Resp:  [16-26] 23  SpO2:  [89 %-99 %] 99 %  BP: (126-136)/(60-74) 136/62      There is no height or weight on file to calculate BMI.    No intake or output data in the 24 hours ending 04/10/24 9594       Physical Exam  Vitals and nursing note reviewed.   Constitutional:       General: She is not in acute distress.     Appearance: Normal appearance. She is well-developed. She is obese. She is ill-appearing. She is not toxic-appearing or diaphoretic.   HENT:      Head: Normocephalic.      Mouth/Throat:      Mouth: Mucous membranes are moist.      Pharynx: Oropharynx is clear. No oropharyngeal exudate.   Eyes:      Extraocular Movements: Extraocular movements intact.      Pupils: Pupils are equal, round, and reactive to light.   Neck:      Comments: Dressing to neck CDI, unable to visualize or palpate- will wait for ENT to assess  Cardiovascular:      Rate and Rhythm: Normal rate and regular rhythm.      Pulses: Normal pulses.      Heart sounds: No murmur heard.     No friction rub. No gallop.   Pulmonary:      Effort: Pulmonary effort is normal. No respiratory distress.      Breath sounds: No stridor. No wheezing, rhonchi or rales.   Abdominal:      General: Bowel sounds are normal. There is no distension.       Palpations: Abdomen is soft.      Tenderness: There is no abdominal tenderness.   Musculoskeletal:         General: No swelling.      Right lower leg: No edema.      Left lower leg: No edema.   Skin:     General: Skin is warm and dry.   Neurological:      General: No focal deficit present.      Mental Status: She is alert and oriented to person, place, and time. Mental status is at baseline.            Vents:     Lines/Drains/Airways       Peripheral Intravenous Line  Duration                  Peripheral IV - Single Lumen 04/10/24 1716 20 G Left;Posterior Wrist <1 day         Peripheral IV - Single Lumen 04/10/24 1716 20 G Posterior;Right Wrist <1 day                  Significant Labs:    CBC/Anemia Profile:  Recent Labs   Lab 04/10/24  1637   WBC 14.40*   HGB 15.6   HCT 47.2   *   MCV 81*   RDW 14.2        Chemistries:  Recent Labs   Lab 04/10/24  1637 04/10/24  1741     --    K 2.4*  --    CL 89*  --    CO2 36*  --    BUN 14  --    CREATININE 0.8  --    CALCIUM 13.5*  --    ALBUMIN 3.3*  --    PROT 7.3  --    BILITOT 0.8  --    ALKPHOS 145*  --    ALT 41  --    AST 35  --    MG  --  1.6   PHOS  --  2.5*       All pertinent labs within the past 24 hours have been reviewed.    Significant Imaging: I have reviewed all pertinent imaging results/findings within the past 24 hours.    [facility]  Recent Labs   Lab 04/12/24  0950   BE 15.10*     Assessment/Plan:     Cardiac/Vascular  HTN (hypertension)  Patient taking Lopressor 25 mg BID at home    Plan:  - Hypertensive on arrival  - Had been holding anti-hypertensives; likely resume in AM once bedside swallow passed    Renal/  Hypokalemia  Potassium 2.4 on arrival. Suspect 2/2 decreased PO intake.    --IV and PO replacement  --Replete Mg as needed, goal Mg>2  --BMP and Mg daily    ID  * Sepsis  This patient does have evidence of infective focus  My overall impression is sepsis.  Source: Skin and Soft Tissue (location Neck)  Antibiotics given-   Antibiotics (72h  ago, onward)      None          Latest lactate reviewed-  Recent Labs   Lab 04/10/24  1917   LACTATE 2.3*     Organ dysfunction indicated by Acute respiratory failure    Fluid challenge Ideal Body Weight- The patient's ideal body weight is Patient weight not recorded which will be used to calculate fluid bolus of 30 ml/kg for treatment of septic shock.      Post- resuscitation assessment Yes Perfusion exam was performed within 6 hours of septic shock presentation after bolus shows Adequate tissue perfusion assessed by non-invasive monitoring     Will Not start Pressors- Levophed for MAP of 65  Source control achieved by: Broad spectrum antibiotics    Plan:  Source likely skin/soft tissue origin secondary to purulent, necrotic neck wound  - Continue broad spectrum antibiotics with Vancomycin, Cefepime  - Received 1.7L Lactated Ringers sepsis fluids at OSH  - Will obtain wound culture  - ENT consulted, f/u recs  - Repeat Lactate  - Repeat and follow up blood cultures through maturation  - CBC daily to trend leukocytosis    Oncology  Malignant neoplasm of tip and lateral border of tongue  Oncology History per Chart Review 8/2023:  Patient was evaluated by Dr. Rodrigo BOLDEN for lung lesion.  Biopsy of ventral surface of tongue done on 12/24/2021 has shown atypia with no diagnostic evidence of dysplasia.  Repeat biopsy after failed treatment on 01/24/2022 has shown moderately differentiated squamous cell carcinoma suspicious for perineural invasion.  CT soft tissue neck done on 01/31/2022 showed no evidence of metastatic disease to the neck. PET-CT done on 02/02/2022 showed no abnormal activity within the tongue, no signs of cervical lymph node or distant metastasis.  Declined medical oncology and radiation oncology consultation.  Status post left selective neck dissection levels 1, 2, 3, partial glossectomy with split-thickness skin graft done on 03/03/2022 by Dr. Clif Olson.  Surgical pathology has shown unifocal  squamous cell carcinoma on the dorsal surface of the tongue on left side measuring 1 cm, moderately differentiated, 6 mm depth of invasion, no lymphovascular invasion, positive for perineural invasion, margins negative, 0 of 39 lymph nodes with metastasis, pT2 pN0 noted.  Postop course complicated by infection requiring I&D and antibiotics.  Declined adjuvant XRT due to concern for side effect profile [seen by Dr. Rangel].     Due to localized right neck swelling CT soft tissue neck with without contrast ordered on 06/21/2023 has shown 2.2 cm lesion deep to the right sternocleidomastoid suspicious for necrotic lymph node with few adjacent pathological appearing lymph nodes suspicious for metastasis. Right neck FNA done on 07/07/2023 were suspicious for metastatic squamous cell carcinoma.  PET-CT done on 08/21/2023 has shown large necrotic mass the angle of mandible posterior to submandibular gland on the right measuring 4.4 x 4.2 cm with SUV 9.78, just superior to the mass is a 2 cm hypermetabolic lymph node and no evidence of distant metastatic disease    Plan:    -- ENT consulted, plan to offer biopsy if patient amenable but do not believe surgery an option at this point  -- Will offer palliative therapy, likely chemo possibly radiation  -- Will need to reach out to palliative medicine and oncology once biopsy results available    Endocrine  Postprocedural hypoparathyroidism  Patient takes calcium carbonate, Vitamin D3 at home. Presented with symptomatic hypercalcemia 13.5 on admission with abdominal pain, nausea/vomiting    - Normal saline infusion 125 mg/hr  - Daily CMP to monitor calcium level  - Resume home medications when clinically indicated    Postsurgical hypothyroidism  Patient taking Levothyroxine 175 mcg daily and Liothyronine 5 mcg daily    - FT4, FT3  - Continue Levothyroxine and Liothyronine  - If unable to pass bedside swallow may need IV formulary    GI  GERD (gastroesophageal reflux disease)  Pt  "complaining of reflux, requesting medication for treatment    Plan:  -- Will start on famotidine, was tolerating at OSH  -- Consider deescalating to calcium carbonate if well controlled    Orthopedic  Open neck wound  Pt with hx of head and neck cancer, s/p left selective neck dissection levels 1, 2, 3, partial glossectomy. Presenting with complaints of nausea and epigastric abdominal pain. Also reports worsening drainage from right neck.   Pt with extensive hx of H/N cancer. See Malignant Neoplasm of tongue.   Endorses continued drainage from the neck for months with recent worsening of symptoms for past 2 weeks. Pt returned from [place] where she received antibiotics, "detox" and hyperbaric chamber as treatment.   Denies fevers, chills, diaphoresis, cough, sputum production. Endorses difficulty with swallowing 2/2 to oral trush.   Upon ED evaluation, patient noted to have draining wound to right neck.   - CT soft tissue Large bulky lobulated appearing mass right lateral and anterolateral neck extending to the D spaces of the neck having overall dimensions of approximately 9.5 x 8.2 x 10 cm with large central collection of air which appears to extend superficially to the skin surface right mid neck anterior laterally.  Overall appearance is very concerning for large conglomerate jose mass although superimposed abscess remains a possibility given concern for such.  Associated mass effect results in displacement of midline structures to the patient's left.   Vitals afebrile, tachycardic  (125) and low normotensive BP (131/74) on admission.  Lactic acid 2.0>2.3>1.5  CXR no acute airspace disease noted.   EKG on admission sinus tachycardia  Troponin 0.046 >0.029>0.031  UA pH 7.0, sg >1.030, trace proteins noted    In [facility] MICU, patient handling secretions well, protecting airway.    - Blood Cx pending  - Received IV Vanc/Cefepime while in ED. Case discussed with ENT-otolaryngology, patient accepted for transfer at " [facility], for evaluation of abscess, and debridement. Pt awaiting bed placement.   - 4/12/2024: Afebrile, VS stable. Labs with improvement in leukocytosis. Reports mild improvement in pain symptoms.   - Continue vanc/cefepime/flagyl for broad spectrum coverage, will tailor to wound culture/blood cultures results, may need to consult ID for assistance  - ENT consulted, f/u recs  - NPO except meds/sips of water until ENT clearance       Critical Care Daily Checklist:    A: Awake: RASS Goal/Actual Goal:    Actual:     B: Spontaneous Breathing Trial Performed?     C: SAT & SBT Coordinated?  N/a                      D: Delirium: CAM-ICU Overall CAM-ICU: Negative   E: Early Mobility Performed? No   F: Feeding Goal:    Status:     Current Diet Order   Procedures    Diet NPO Except for: Sips with Medication     Order Specific Question:   Except for     Answer:   Sips with Medication      AS: Analgesia/Sedation Tylenol, dilaudid   T: Thromboembolic Prophylaxis Lovenox   H: HOB > 300 Yes   U: Stress Ulcer Prophylaxis (if needed) Famotidine   G: Glucose Control none   B: Bowel Function  PRN Miralax   I: Indwelling Catheter (Lines & Wallis) Necessity PIV   D: De-escalation of Antimicrobials/Pharmacotherapies Vanc, cefepime, flagyl    Plan for the day/ETD ENT eval    Code Status:  Family/Goals of Care: Full Code  Ongoing       Critical secondary to Patient has a condition that poses threat to life and bodily function: Airway watch given necrotizing mass to R neck.  Likely able to stepdown in AM once evaluated and cleared by ENT.     Critical care was time spent personally by me on the following activities: development of treatment plan with patient or surrogate and bedside caregivers, discussions with consultants, evaluation of patient's response to treatment, examination of patient, ordering and performing treatments and interventions, ordering and review of laboratory studies, ordering and review of radiographic studies, pulse  oximetry, re-evaluation of patient's condition. This critical care time did not overlap with that of any other provider or involve time for any procedures.     Samia Gill MD  [employer]  PGY III  Critical Care Medicine  [facility] - Cardiac Medical ICU

## 2024-04-12 NOTE — CARE UPDATE
Patient evaluated by ENT, discussed with staff. No stridor nor adventitious breath sounds on exam though pt has airway edema noted on ENT bedside scope. Will keep overnight and step-down in AM given recent transfer and recs from ENT, appreciate assistance. Biopsy pending, though very high suspicion this is a malignancy.    Samia Gill MD  [employer] IM PGY III

## 2024-04-12 NOTE — ASSESSMENT & PLAN NOTE
Potassium 2.4 on arrival. Suspect 2/2 decreased PO intake.    --IV and PO replacement  --Replete Mg as needed, goal Mg>2  --BMP and Mg daily

## 2024-04-12 NOTE — PROGRESS NOTES
Pharmacokinetic Initial Assessment: IV Vancomycin    Assessment/Plan:    - Continuing vancomycin from OSH  - Pt was on vancomycin 2000 mg Q12H, with a trough of 13 mcg/mL  - Anticipate accumulation closer to 15 mcg/mL with ongoing dosing  - Continue vanc 2000 mg Q12H  - Surveillance trough is ordered for 4/13 at 18:30    Pharmacy will continue to follow and monitor vancomycin.      Please contact pharmacy at extension [phone] with any questions regarding this assessment.     Thank you for the consult,   Diana Martino, PharmD, [facility]  Neurocritical Care Pharmacist  [phone]         Patient brief summary:  Marysol Cash is a 58 y.o. female initiated on antimicrobial therapy with IV Vancomycin for treatment of suspected skin & soft tissue infection    Drug Allergies:   Review of patient's allergies indicates:   Allergen Reactions    Ciprofloxacin Swelling    Codeine Swelling    Opioids - morphine analogues     Pcn [penicillins] Hives     Tolerated cefepime 04/2024    Percocet [oxycodone-acetaminophen] Swelling       Actual Body Weight:   83.6 kg    Renal Function:   Estimated Creatinine Clearance: 113.8 mL/min (based on SCr of 0.6 mg/dL).,     Dialysis Method (if applicable):  N/A    CBC (last 72 hours):  Recent Labs   Lab Result Units 04/10/24  1637 04/11/24  1623 04/12/24  0432   WBC K/uL 14.40* 14.33* 12.94*   Hemoglobin g/dL 15.6 13.3 13.6   Hematocrit % 47.2 40.9 41.9   Platelets K/uL 464* 349 344   Gran % % 71.7 71.1 67.2   Lymph % % 19.5 19.5 23.4   Mono % % 8.2 8.6 8.6   Eosinophil % % 0.0 0.0 0.0   Basophil % % 0.3 0.2 0.3   Differential Method  Automated Automated Automated       Metabolic Panel (last 72 hours):  Recent Labs   Lab Result Units 04/10/24  1637 04/10/24  1741 04/10/24  1954 04/11/24  0100 04/11/24  1210 04/11/24  1622 04/12/24  0002 04/12/24  0432   Sodium mmol/L 138  --   --  139 139 139 137 139   Potassium mmol/L 2.4*  --   --  2.4* 2.5* 2.3* 2.4* 2.8*   Chloride mmol/L 89*  --   --  93* 94*  94* 94* 95   CO2 mmol/L 36*  --   --  35* 38* 35* 35* 34*   Glucose mg/dL 157*  --   --  134* 147* 113* 111* 105   Glucose, UA   --   --  Negative  --   --   --   --   --    BUN mg/dL 14  --   --  9 7 7 6 6   Creatinine mg/dL 0.8  --   --  0.6 0.6 0.6 0.6 0.6   Albumin g/dL 3.3*  --   --   --   --  2.9*  --   --    Total Bilirubin mg/dL 0.8  --   --   --   --  0.7  --   --    Alkaline Phosphatase U/L 145*  --   --   --   --  123  --   --    AST U/L 35  --   --   --   --  24  --   --    ALT U/L 41  --   --   --   --  32  --   --    Magnesium mg/dL  --  1.6  --   --   --  2.0 1.6 1.6   Phosphorus mg/dL  --  2.5*  --   --   --  1.6* 2.7 2.5*       Drug levels (last 3 results):  Recent Labs   Lab Result Units 04/12/24  0628   Vancomycin-Trough ug/mL 13.0       Microbiologic Results:  Microbiology Results (last 7 days)       ** No results found for the last 168 hours. **

## 2024-04-12 NOTE — CONSULTS
[facility] - Cardiac Medical ICU  Otorhinolaryngology-Head & Neck Surgery  Consult Note    Patient Name: Marysol Cash  MRN: [id number]  Code Status: Full Code  Admission Date: 4/12/2024  Hospital Length of Stay: 0 days  Attending Physician: Isaura Sethi MD  Primary Care Provider: James Coronel MD    Patient information was obtained from patient, past medical records, and ER records.     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 Developed some neck swelling in 2023 and underwent PET/CT which showed concern for metastatic lymphadenopathy. FNA was done 7/2023 which showed metastatic carcinoma. Patient was seen by heme/onc around that time with discussion of initiating chemo/radiation. However, patient declined and instead has been receiving alternative treatment in [place] including stem cell infusions. Patient reports she developed the wound of her R neck in 7/2023 and it has slowly progressed. Developed worsening weakness, pain, and difficulty swallowing which prompted her presentation to ER. Patient denies any difficulty breathing. 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 Reports voice has worsened to her since onset of neck wound/swellIing. CT scan was done.     Medications:  Continuous Infusions:  Scheduled Meds:   ceFEPime IV (PEDS and ADULTS)  2 g Intravenous Q8H    enoxparin  40 mg Subcutaneous Q24H (prophylaxis, 1700)    famotidine (PF)  20 mg Intravenous BID    [START ON 4/13/2024] fluconazole (DIFLUCAN) IV (PEDS and ADULTS)  200 mg Intravenous Q24H    [START ON 4/13/2024] levothyroxine  175 mcg Oral  Before breakfast    [START ON 2024] liothyronine  5 mcg Oral Before breakfast    metronidazole  500 mg Intravenous Q8H    mupirocin   Nasal BID    potassium chloride  10 mEq Intravenous Q1H    vancomycin (VANCOCIN) IV (PEDS and ADULTS)  2,000 mg Intravenous Q12H     PRN Meds:acetaminophen, dextrose 10%, dextrose 10%, glucagon (human recombinant), glucose, glucose, HYDROmorphone, hydrOXYzine pamoate, naloxone, ondansetron, Pharmacy to dose Vancomycin consult **AND** vancomycin - pharmacy to dose     Current Facility-Administered Medications on File Prior to Encounter   Medication    [COMPLETED] magnesium sulfate 2g in water 50mL IVPB (premix)    [COMPLETED] potassium bicarbonate disintegrating tablet 25 mEq    [COMPLETED] potassium bicarbonate disintegrating tablet 25 mEq    [COMPLETED] potassium chloride 10 mEq in 100 mL IVPB    [] potassium chloride 10 mEq in 100 mL IVPB    [COMPLETED] potassium chloride 10 mEq in 100 mL IVPB    [COMPLETED] potassium phosphate 30 mmol in dextrose 5 % (D5W) 500 mL infusion    [DISCONTINUED] ceFEPIme (MAXIPIME) 2 g in dextrose 5 % in water (D5W) 100 mL IVPB (MB+)    [DISCONTINUED] dextrose 10% bolus 125 mL 125 mL    [DISCONTINUED] dextrose 10% bolus 250 mL 250 mL    [DISCONTINUED] fluconazole (DIFLUCAN) IVPB 200 mg    [DISCONTINUED] glucagon (human recombinant) injection 1 mg    [DISCONTINUED] glucose chewable tablet 16 g    [DISCONTINUED] glucose chewable tablet 24 g    [DISCONTINUED] HYDROmorphone (PF) injection 0.5 mg    [DISCONTINUED] hydrOXYzine pamoate capsule 25 mg    [DISCONTINUED] levothyroxine tablet 175 mcg    [DISCONTINUED] liothyronine tablet 5 mcg    [DISCONTINUED] metronidazole IVPB 500 mg    [DISCONTINUED] metronidazole IVPB 500 mg    [DISCONTINUED] morphine injection 4 mg    [DISCONTINUED] mupirocin 2 % ointment    [DISCONTINUED] naloxone 0.4 mg/mL injection 0.02 mg    [DISCONTINUED] nystatin 100,000 unit/mL suspension 500,000 Units    [DISCONTINUED]  ondansetron injection 4 mg    [DISCONTINUED] pantoprazole injection 40 mg    [DISCONTINUED] potassium, sodium phosphates 280-160-250 mg packet 1 packet    [DISCONTINUED] sodium chloride 0.9% flush 10 mL    [DISCONTINUED] vancomycin - pharmacy to dose    [DISCONTINUED] vancomycin 2 g in dextrose 5 % 500 mL IVPB     Current Outpatient Medications on File Prior to Encounter   Medication Sig    calcium carbonate (CALCIUM ANTACID) 300 mg (750 mg) Chew Take 2 tablets by mouth 2 (two) times daily.    cholecalciferol, vitamin D3, (VITAMIN D3) 25 mcg (1,000 unit) capsule Take 1,000 Units by mouth once daily.    flaxseed 1,000 mg Cap Take by mouth Daily.    HYDROcodone-acetaminophen (NORCO) 5-325 mg per tablet     levothyroxine (SYNTHROID) 175 MCG tablet Take 1 tablet (175 mcg total) by mouth before breakfast. Take on an empty stomach. Wait 4 hours after taking LT4 to take any multivitamins or nutritional supplements and wait 1 hour to take other medications.    liothyronine (CYTOMEL) 5 MCG Tab Take 1 tablet (5 mcg total) by mouth before breakfast. Take on an empty stomach. Wait 4 hours after taking T3 to take any multivitamins or nutritional supplements and wait 1 hour to take other medications.    metoprolol tartrate (LOPRESSOR) 25 MG tablet Take 1 tablet (25 mg total) by mouth 2 (two) times daily.    multivitamin capsule Take 1 capsule by mouth once daily.    ondansetron (ZOFRAN-ODT) 4 MG TbDL DISSOLVE 1 TABLET (4 MG TOTAL) BY MOUTH EVERY 8 (EIGHT) HOURS AS NEEDED.       Review of patient's allergies indicates:   Allergen Reactions    Ciprofloxacin Swelling    Codeine Swelling    Opioids - morphine analogues     Pcn [penicillins] Hives     Tolerated cefepime 04/2024    Percocet [oxycodone-acetaminophen] Swelling       Past Medical History:   Diagnosis Date    GERD (gastroesophageal reflux disease)     Heart valve problem     Hypertension     Obesity (BMI 30-39.9) 10/20/2014    Thyroid disease      Past Surgical History:    Procedure Laterality Date    cesaean section  1991, 1993    CHOLECYSTECTOMY  2008    COLONOSCOPY  09/2013    ESOPHAGOGASTRODUODENOSCOPY N/A 5/11/2020    Procedure: EGD (ESOPHAGOGASTRODUODENOSCOPY);  Surgeon: Nahed Zabala MD;  [facility]: [facility];  Service: Endoscopy;  Laterality: N/A;  covid 5/8/2020    HYSTERECTOMY  age 27    for fibroids    SALPINGECTOMY  1992    for ectopic pregnancy    TONGUE SURGERY  03/04/2022    Cancer removed on tongue and lymphs    TOTAL THYROIDECTOMY  2012    UPPER GASTROINTESTINAL ENDOSCOPY  09/2013    gastritis-hp neg     Family History       Problem Relation (Age of Onset)    Diabetes Mother, Father, Brother    Heart disease Mother, Father, Brother    Hyperlipidemia Mother    Hypertension Mother, Father          Tobacco Use    Smoking status: Never    Smokeless tobacco: Never   Substance and Sexual Activity    Alcohol use: No     Alcohol/week: 0.0 standard drinks of alcohol    Drug use: No    Sexual activity: Yes     Partners: Male     Birth control/protection: See Surgical Hx     Review of Systems   Constitutional:  Positive for appetite change and fatigue.   HENT:  Positive for trouble swallowing and voice change.    Respiratory:  Negative for shortness of breath, wheezing and stridor.    Musculoskeletal:  Positive for neck pain and neck stiffness.     Objective:     Vital Signs (Most Recent):  Temp: 98.4 °F (36.9 °C) (04/12/24 1301)  Pulse: 98 (04/12/24 1301)  Resp: (!) 41 (04/12/24 1301)  BP: (!) 155/109 (04/12/24 1301)  SpO2: 96 % (04/12/24 1301) Vital Signs (24h Range):  Temp:  [97.7 °F (36.5 °C)-98.4 °F (36.9 °C)] 98.4 °F (36.9 °C)  Pulse:  [] 98  Resp:  [13-41] 41  SpO2:  [92 %-100 %] 96 %  BP: (107-169)/() 155/109     Weight: 85.3 kg (188 lb 1.6 oz)  Body mass index is 30.36 kg/m².    Date 04/12/24 0700 - 04/13/24 0659   Shift 2690-5847 3941-1448 0990-4645 24 Hour Total   INTAKE   P.O. 0   0   Shift Total(mL/kg) 0(0)   0(0)   OUTPUT   Urine(mL/kg/hr)  0(0)   0   Shift Total(mL/kg) 0(0)   0(0)   Weight (kg) 85.3 85.3 85.3 85.3        Physical Exam  Constitutional:       Appearance: She is ill-appearing.   HENT:      Mouth/Throat:      Mouth: Mucous membranes are moist.   Eyes:      Extraocular Movements: Extraocular movements intact.   Neck:      Comments: Near circumferential neck swelling with overlying skin discoloration, large fistulous tract at midline extending to right side of neck, minimal drainage   No tenderness to palpation, appears insensate   Pulmonary:      Breath sounds: No stridor.       Procedure: Flexible laryngoscopy  After explaining the procedure and obtaining verbal consent, the flexible laryngoscope was inserted into the nare and advanced to visualize the nasal cavity, nasopharynx, the posterior oropharynx, hypopharynx, and the larynx with the findings noted. The scope was removed and the procedure terminated. The patient tolerated this procedure well without apparent complication.     OVERALL FINDINGS  Overall diffuse edema of the R pharyngeal wall including epiglottis, arytenoid, AE fold, obscuring view of TVCs. Unable to clearly visualize R piriform sinus. L AE fold, epiglottis, and piriform sinus with mostly normal appearance. Able to scope past swelling to visualize TVC which appear grossly normal but unable to clearly assess mobility.   Patient appears mostly insensate.      Significant Labs:  CBC:   Recent Labs   Lab 04/12/24  0432   WBC 12.94*   RBC 5.01   HGB 13.6   HCT 41.9      MCV 84   MCH 27.1   MCHC 32.5     CMP:   Recent Labs   Lab 04/11/24  1622 04/12/24  0002 04/12/24  0432   *   < > 105   CALCIUM 11.4*   < > 10.8*   ALBUMIN 2.9*  --   --    PROT 6.0  --   --       < > 139   K 2.3*   < > 2.8*   CO2 35*   < > 34*   CL 94*   < > 95   BUN 7   < > 6   CREATININE 0.6   < > 0.6   ALKPHOS 123  --   --    ALT 32  --   --    AST 24  --   --    BILITOT 0.7  --   --     < > = values in this interval not displayed.        Significant Diagnostics:  CT: I have reviewed all pertinent results/findings within the past 24 hours and my personal findings are:  Large multilobulated mass of R neck which appears to involve circumferential carotid artery and encroach on the prevertebral space.     Procedure:   Biopsy of R neck:     After verbal consent was obtained and all risks discussed, a 3 mm punch biopsy was used to sample the neck at a site which appeared abnormal. Approximately 3 samples were obtained. These were excised with scissors. Hemostasis was achieved with silver nitrate cautery. Of note, patient did not require anesthetic as patient is insensate.     Assessment/Plan:     Open neck wound  58 y.o F with history of R TVC paralysis 2/2 thyroidectomy as well as SCC of tongue s/p L partial glossectomy and selective neck dissection by outside ENT in 2022 with recurrent metastatic disease per FNA of R lymph node in 2023, now with significant progression resulting in open neck wound and unresectable disease based on CT scan. Biopsy taken at bedside today to confirm. FFL showed R diffuse laryngeal and pharyngeal edema and fullness obscuring true vocal cords. Patient does not appear to be in respiratory distress and does not complain of difficulty breathing despite FFL findings.     Had an extensive discussion with family regarding CT findings and that it is at this point considered unresectable disease. Discussed benefit of having PEG placement for nutritional status considering insensate larynx and poor PO intake. Discussed with family possible need for tracheostomy in light of labile airway and possible progression of disease with difficulty establishing safe airway in case of emergency. At this time, the patient and family would like to consider these interventions.     - Recommend palliative consult  - Recommend heme/onc consult for any potential palliative treatments   - F/U biopsy results   - Call/page ENT with  questions/concerns      VTE Risk Mitigation (From admission, onward)           Ordered     enoxaparin injection 40 mg  Every 24 hours         04/12/24 1536     IP VTE HIGH RISK PATIENT  Once         04/12/24 1324     Place sequential compression device  Until discontinued         04/12/24 1324     Place sequential compression device  Until discontinued         04/12/24 1324                    Berna Carlton MD  Otorhinolaryngology-Head & Neck Surgery  [facility] - Cardiac Medical ICU

## 2024-04-12 NOTE — SUBJECTIVE & OBJECTIVE
Medications:  Continuous Infusions:  Scheduled Meds:   ceFEPime IV (PEDS and ADULTS)  2 g Intravenous Q8H    enoxparin  40 mg Subcutaneous Q24H (prophylaxis, 1700)    famotidine (PF)  20 mg Intravenous BID    [START ON 2024] fluconazole (DIFLUCAN) IV (PEDS and ADULTS)  200 mg Intravenous Q24H    [START ON 2024] levothyroxine  175 mcg Oral Before breakfast    [START ON 2024] liothyronine  5 mcg Oral Before breakfast    metronidazole  500 mg Intravenous Q8H    mupirocin   Nasal BID    potassium chloride  10 mEq Intravenous Q1H    vancomycin (VANCOCIN) IV (PEDS and ADULTS)  2,000 mg Intravenous Q12H     PRN Meds:acetaminophen, dextrose 10%, dextrose 10%, glucagon (human recombinant), glucose, glucose, HYDROmorphone, hydrOXYzine pamoate, naloxone, ondansetron, Pharmacy to dose Vancomycin consult **AND** vancomycin - pharmacy to dose     Current Facility-Administered Medications on File Prior to Encounter   Medication    [COMPLETED] magnesium sulfate 2g in water 50mL IVPB (premix)    [COMPLETED] potassium bicarbonate disintegrating tablet 25 mEq    [COMPLETED] potassium bicarbonate disintegrating tablet 25 mEq    [COMPLETED] potassium chloride 10 mEq in 100 mL IVPB    [] potassium chloride 10 mEq in 100 mL IVPB    [COMPLETED] potassium chloride 10 mEq in 100 mL IVPB    [COMPLETED] potassium phosphate 30 mmol in dextrose 5 % (D5W) 500 mL infusion    [DISCONTINUED] ceFEPIme (MAXIPIME) 2 g in dextrose 5 % in water (D5W) 100 mL IVPB (MB+)    [DISCONTINUED] dextrose 10% bolus 125 mL 125 mL    [DISCONTINUED] dextrose 10% bolus 250 mL 250 mL    [DISCONTINUED] fluconazole (DIFLUCAN) IVPB 200 mg    [DISCONTINUED] glucagon (human recombinant) injection 1 mg    [DISCONTINUED] glucose chewable tablet 16 g    [DISCONTINUED] glucose chewable tablet 24 g    [DISCONTINUED] HYDROmorphone (PF) injection 0.5 mg    [DISCONTINUED] hydrOXYzine pamoate capsule 25 mg    [DISCONTINUED] levothyroxine tablet 175 mcg     [DISCONTINUED] liothyronine tablet 5 mcg    [DISCONTINUED] metronidazole IVPB 500 mg    [DISCONTINUED] metronidazole IVPB 500 mg    [DISCONTINUED] morphine injection 4 mg    [DISCONTINUED] mupirocin 2 % ointment    [DISCONTINUED] naloxone 0.4 mg/mL injection 0.02 mg    [DISCONTINUED] nystatin 100,000 unit/mL suspension 500,000 Units    [DISCONTINUED] ondansetron injection 4 mg    [DISCONTINUED] pantoprazole injection 40 mg    [DISCONTINUED] potassium, sodium phosphates 280-160-250 mg packet 1 packet    [DISCONTINUED] sodium chloride 0.9% flush 10 mL    [DISCONTINUED] vancomycin - pharmacy to dose    [DISCONTINUED] vancomycin 2 g in dextrose 5 % 500 mL IVPB     Current Outpatient Medications on File Prior to Encounter   Medication Sig    calcium carbonate (CALCIUM ANTACID) 300 mg (750 mg) Chew Take 2 tablets by mouth 2 (two) times daily.    cholecalciferol, vitamin D3, (VITAMIN D3) 25 mcg (1,000 unit) capsule Take 1,000 Units by mouth once daily.    flaxseed 1,000 mg Cap Take by mouth Daily.    HYDROcodone-acetaminophen (NORCO) 5-325 mg per tablet     levothyroxine (SYNTHROID) 175 MCG tablet Take 1 tablet (175 mcg total) by mouth before breakfast. Take on an empty stomach. Wait 4 hours after taking LT4 to take any multivitamins or nutritional supplements and wait 1 hour to take other medications.    liothyronine (CYTOMEL) 5 MCG Tab Take 1 tablet (5 mcg total) by mouth before breakfast. Take on an empty stomach. Wait 4 hours after taking T3 to take any multivitamins or nutritional supplements and wait 1 hour to take other medications.    metoprolol tartrate (LOPRESSOR) 25 MG tablet Take 1 tablet (25 mg total) by mouth 2 (two) times daily.    multivitamin capsule Take 1 capsule by mouth once daily.    ondansetron (ZOFRAN-ODT) 4 MG TbDL DISSOLVE 1 TABLET (4 MG TOTAL) BY MOUTH EVERY 8 (EIGHT) HOURS AS NEEDED.       Review of patient's allergies indicates:   Allergen Reactions    Ciprofloxacin Swelling    Codeine Swelling     Opioids - morphine analogues     Pcn [penicillins] Hives     Tolerated cefepime 04/2024    Percocet [oxycodone-acetaminophen] Swelling       Past Medical History:   Diagnosis Date    GERD (gastroesophageal reflux disease)     Heart valve problem     Hypertension     Obesity (BMI 30-39.9) 10/20/2014    Thyroid disease      Past Surgical History:   Procedure Laterality Date    cesaean section  1991, 1993    CHOLECYSTECTOMY  2008    COLONOSCOPY  09/2013    ESOPHAGOGASTRODUODENOSCOPY N/A 5/11/2020    Procedure: EGD (ESOPHAGOGASTRODUODENOSCOPY);  Surgeon: Nahed Zabala MD;  Location: [facility];  Service: Endoscopy;  Laterality: N/A;  covid 5/8/2020    HYSTERECTOMY  age 27    for fibroids    SALPINGECTOMY  1992    for ectopic pregnancy    TONGUE SURGERY  03/04/2022    Cancer removed on tongue and lymphs    TOTAL THYROIDECTOMY  2012    UPPER GASTROINTESTINAL ENDOSCOPY  09/2013    gastritis-hp neg     Family History       Problem Relation (Age of Onset)    Diabetes Mother, Father, Brother    Heart disease Mother, Father, Brother    Hyperlipidemia Mother    Hypertension Mother, Father          Tobacco Use    Smoking status: Never    Smokeless tobacco: Never   Substance and Sexual Activity    Alcohol use: No     Alcohol/week: 0.0 standard drinks of alcohol    Drug use: No    Sexual activity: Yes     Partners: Male     Birth control/protection: See Surgical Hx     Review of Systems   Constitutional:  Positive for appetite change and fatigue.   HENT:  Positive for trouble swallowing and voice change.    Respiratory:  Negative for shortness of breath, wheezing and stridor.    Musculoskeletal:  Positive for neck pain and neck stiffness.     Objective:     Vital Signs (Most Recent):  Temp: 98.4 °F (36.9 °C) (04/12/24 1301)  Pulse: 98 (04/12/24 1301)  Resp: (!) 41 (04/12/24 1301)  BP: (!) 155/109 (04/12/24 1301)  SpO2: 96 % (04/12/24 1301) Vital Signs (24h Range):  Temp:  [97.7 °F (36.5 °C)-98.4 °F (36.9 °C)] 98.4 °F  (36.9 °C)  Pulse:  [] 98  Resp:  [13-41] 41  SpO2:  [92 %-100 %] 96 %  BP: (107-169)/() 155/109     Weight: 85.3 kg (188 lb 1.6 oz)  Body mass index is 30.36 kg/m².    Date 04/12/24 0700 - 04/13/24 0659   Shift 5110-0204 4725-1935 6425-5378 24 Hour Total   INTAKE   P.O. 0   0   Shift Total(mL/kg) 0(0)   0(0)   OUTPUT   Urine(mL/kg/hr) 0(0)   0   Shift Total(mL/kg) 0(0)   0(0)   Weight (kg) 85.3 85.3 85.3 85.3        Physical Exam  Constitutional:       Appearance: She is ill-appearing.   HENT:      Mouth/Throat:      Mouth: Mucous membranes are moist.   Eyes:      Extraocular Movements: Extraocular movements intact.   Neck:      Comments: Near circumferential neck swelling with overlying skin discoloration, large fistulous tract at midline extending to right side of neck, minimal drainage   No tenderness to palpation, appears insensate   Pulmonary:      Breath sounds: No stridor.       Procedure: Flexible laryngoscopy  After explaining the procedure and obtaining verbal consent, the flexible laryngoscope was inserted into the nare and advanced to visualize the nasal cavity, nasopharynx, the posterior oropharynx, hypopharynx, and the larynx with the findings noted. The scope was removed and the procedure terminated. The patient tolerated this procedure well without apparent complication.     OVERALL FINDINGS  Overall diffuse edema of the R pharyngeal wall including epiglottis, arytenoid, AE fold, obscuring view of TVCs. Unable to clearly visualize R piriform sinus. L AE fold, epiglottis, and piriform sinus with mostly normal appearance. Able to scope past swelling to visualize TVC which appear grossly normal but unable to clearly assess mobility.   Patient appears mostly insensate.      Significant Labs:  CBC:   Recent Labs   Lab 04/12/24 0432   WBC 12.94*   RBC 5.01   HGB 13.6   HCT 41.9      MCV 84   MCH 27.1   MCHC 32.5     CMP:   Recent Labs   Lab 04/11/24  1622 04/12/24  0002 04/12/24 0432    *   < > 105   CALCIUM 11.4*   < > 10.8*   ALBUMIN 2.9*  --   --    PROT 6.0  --   --       < > 139   K 2.3*   < > 2.8*   CO2 35*   < > 34*   CL 94*   < > 95   BUN 7   < > 6   CREATININE 0.6   < > 0.6   ALKPHOS 123  --   --    ALT 32  --   --    AST 24  --   --    BILITOT 0.7  --   --     < > = values in this interval not displayed.       Significant Diagnostics:  CT: I have reviewed all pertinent results/findings within the past 24 hours and my personal findings are:  Large multilobulated mass of R neck which appears to involve circumferential carotid artery and encroach on the prevertebral space.     Procedure:   Biopsy of R neck:     After verbal consent was obtained and all risks discussed, a 3 mm punch biopsy was used to sample the neck at a site which appeared abnormal. Approximately 3 samples were obtained. These were excised with scissors. Hemostasis was achieved with silver nitrate cautery. Of note, patient did not require anesthetic as patient is insensate.

## 2024-04-12 NOTE — ASSESSMENT & PLAN NOTE
Pt complaining of reflux, requesting medication for treatment    Plan:  -- Will start on famotidine, was tolerating at OSH  -- Consider deescalating to calcium carbonate if well controlled

## 2024-04-12 NOTE — ASSESSMENT & PLAN NOTE
"Pt with hx of head and neck cancer, s/p left selective neck dissection levels 1, 2, 3, partial glossectomy. Presenting with complaints of nausea and epigastric abdominal pain. Also reports worsening drainage from right neck.   Pt with extensive hx of H/N cancer. See Malignant Neoplasm of tongue.   Endorses continued drainage from the neck for months with recent worsening of symptoms for past 2 weeks. Pt returned from [place] where she received antibiotics, "detox" and hyperbaric chamber as treatment.   Denies fevers, chills, diaphoresis, cough, sputum production. Endorses difficulty with swallowing 2/2 to oral trush.   Upon ED evaluation, patient noted to have draining wound to right neck.   - CT soft tissue Large bulky lobulated appearing mass right lateral and anterolateral neck extending to the D spaces of the neck having overall dimensions of approximately 9.5 x 8.2 x 10 cm with large central collection of air which appears to extend superficially to the skin surface right mid neck anterior laterally.  Overall appearance is very concerning for large conglomerate jose mass although superimposed abscess remains a possibility given concern for such.  Associated mass effect results in displacement of midline structures to the patient's left.   Vitals afebrile, tachycardic  (125) and low normotensive BP (131/74) on admission.  Lactic acid 2.0>2.3>1.5  CXR no acute airspace disease noted.   EKG on admission sinus tachycardia  Troponin 0.046 >0.029>0.031  UA pH 7.0, sg >1.030, trace proteins noted    In [facility] MICU, patient handling secretions well, protecting airway.    - Blood Cx pending  - Received IV Vanc/Cefepime while in ED. Case discussed with ENT-otolaryngology, patient accepted for transfer at [facility], for evaluation of abscess, and debridement. Pt awaiting bed placement.   - 4/12/2024: Afebrile, VS stable. Labs with improvement in leukocytosis. Reports mild improvement in pain symptoms.   - Continue " vanc/cefepime/flagyl for broad spectrum coverage, will tailor to wound culture/blood cultures results, may need to consult ID for assistance  - ENT consulted, f/u recs  - NPO except meds/sips of water until ENT clearance

## 2024-04-12 NOTE — HPI
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 Developed some neck swelling in 2023 and underwent PET/CT which showed concern for metastatic lymphadenopathy. FNA was done 7/2023 which showed metastatic carcinoma. Patient was seen by heme/onc around that time with discussion of initiating chemo/radiation. However, patient declined and instead has been receiving alternative treatment in [place] including stem cell infusions. Patient reports she developed the wound of her R neck in 7/2023 and it has slowly progressed. Developed worsening weakness, pain, and difficulty swallowing which prompted her presentation to ER. Patient denies any difficulty breathing. 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 Reports voice has worsened to her since onset of neck wound/swellIing. CT scan was done.

## 2024-04-12 NOTE — PLAN OF CARE
[facility] - Cardiac Medical ICU  Initial Discharge Assessment       Primary Care Provider: James Coronel MD    Admission Diagnosis: Open neck wound [S11.90XA]    Admission Date: 4/12/2024  Expected Discharge Date: 4/16/2024    Transition of Care Barriers: None    Payor: BLUE CROSS BLUE SHIELD / Plan: BCBS [place] HMO / Product Type: HMO /     Extended Emergency Contact Information  Primary Emergency Contact: gabbygenaro  Mobile Phone: [phone]  Relation: Spouse  Secondary Emergency Contact: Katie Thornton  Mobile Phone: [phone]  Relation: Daughter  Preferred language: English   needed? No    Discharge Plan A: Home with family  Discharge Plan B: Home with family      [facility]  [address]  [place] [address]  Phone: [phone] Fax: [phone]    [id number] - [place], [place] - [address]  [address] [facility] [address]  Phone: [phone] Fax: [phone]      Transferred from:     Past Medical History:   Diagnosis Date    GERD (gastroesophageal reflux disease)     Heart valve problem     Hypertension     Obesity (BMI 30-39.9) 10/20/2014    Thyroid disease          CM met with patient and Genaro Cash (spouse) [phone], Katie Thornton (daughter) [phone]  in room for Discharge Planning Assessment.  Patient was unable to answer questions due ultrasound at bedside.  Per Genaro, patient/Genaro live in a raised 1-story home with 17 step(s) to enter. Genaro advised they are temporarily staying with daughter Katie/son-in-law at [address] [place] [address] while their residence is being repaired.   Per Genaro, patient was independent with ADLS and used no DME for ambulation.  Per Genaro, patient in not on dialysis and does not take Coumadin.  PCP and pharmacy verified. Patient does not have home health, and has been hospitalized in past 30 days. Patient will have help from Genaro Cash (spouse) [phone], Katie Thornton  (daughter) [phone]  upon discharge.   Discharge Planning Booklet given to patient/family and discussed.  All questions addressed.  CM will follow for needs.    Discharge Plan A and Plan B have been determined by review of patient's clinical status, future medical and therapeutic needs, and coverage/benefits for post-acute care in coordination with multidisciplinary team members.      Initial Assessment (most recent)       Adult Discharge Assessment - 04/12/24 1611          Discharge Assessment    Assessment Type Discharge Planning Assessment     Confirmed/corrected address, phone number and insurance Yes     Confirmed Demographics Correct on Facesheet     Source of Information family     When was your last doctors appointment? 12/11/23     Does patient/caregiver understand observation status Yes     Communicated ANDRES with patient/caregiver Date not available/Unable to determine     Reason For Admission Sepsis     People in Home spouse     Facility Arrived From: [facility]     Do you expect to return to your current living situation? Yes     Do you have help at home or someone to help you manage your care at home? Yes     Who are your caregiver(s) and their phone number(s)? Genaro Cash (spouse) [phone], Katie Thornton (daughter) [phone]     Prior to hospitilization cognitive status: Alert/Oriented     Current cognitive status: Alert/Oriented     Walking or Climbing Stairs Difficulty no     Dressing/Bathing Difficulty no     Equipment Currently Used at Home none     Readmission within 30 days? Yes     Patient currently being followed by outpatient case management? No     Do you currently have service(s) that help you manage your care at home? No     Do you take prescription medications? Yes     Do you have prescription coverage? Yes     Coverage [facility] SHIELD - BCBS OF [facility]     Do you have any problems affording any of your prescribed medications? No     Is the patient taking medications  as prescribed? yes     Who is going to help you get home at discharge? Genaro Cash (spouse) [phone], Katie Thornton (daughter) [phone]     How do you get to doctors appointments? family or friend will provide     Are you on dialysis? No     Do you take coumadin? No     Discharge Plan A Home with family     Discharge Plan B Home with family     DME Needed Upon Discharge  other (see comments)   TBD    Discharge Plan discussed with: Spouse/sig other;Adult children     Name(s) and Number(s) Genaro Cash (spouse) [phone], Katie Thornton (daughter) [phone]     Transition of Care Barriers None        Physical Activity    On average, how many days per week do you engage in moderate to strenuous exercise (like a brisk walk)? 0 days     On average, how many minutes do you engage in exercise at this level? 0 min        Financial Resource Strain    How hard is it for you to pay for the very basics like food, housing, medical care, and heating? Not very hard        Housing Stability    In the last 12 months, was there a time when you were not able to pay the mortgage or rent on time? No     In the last 12 months, how many places have you lived? 1     In the last 12 months, was there a time when you did not have a steady place to sleep or slept in a shelter (including now)? No        Transportation Needs    In the past 12 months, has lack of transportation kept you from medical appointments or from getting medications? No     In the past 12 months, has lack of transportation kept you from meetings, work, or from getting things needed for daily living? No        Food Insecurity    Within the past 12 months, you worried that your food would run out before you got the money to buy more. Never true     Within the past 12 months, the food you bought just didn't last and you didn't have money to get more. Never true        Stress    Do you feel stress - tense, restless, nervous, or anxious, or unable to sleep at  night because your mind is troubled all the time - these days? Rather much        Social Connections    In a typical week, how many times do you talk on the phone with family, friends, or neighbors? More than three times a week     How often do you get together with friends or relatives? More than three times a week     How often do you attend Anabaptist or Spiritism services? More than 4 times per year     Do you belong to any clubs or organizations such as Anabaptist groups, unions, fraternal or athletic groups, or school groups? No     How often do you attend meetings of the clubs or organizations you belong to? Never     Are you , , , , never , or living with a partner?         Alcohol Use    Q1: How often do you have a drink containing alcohol? Never     Q2: How many drinks containing alcohol do you have on a typical day when you are drinking? Patient does not drink     Q3: How often do you have six or more drinks on one occasion? Never        OTHER    Name(s) of People in Home Genaro Cash (spouse) [phone], Katei Thornton (daughter) [phone]                     Readmission Assessment (most recent)       Readmission Assessment - 04/12/24 1620          Readmission    Why were you hospitalized in the last 30 days? Sepsis     Why were you readmitted? New medical problem   open neck wound    When you left the hospital how did you feel? not good     When you left the hospital where did you go? Other   transferred to [facility]    Did patient/caregiver refused recommended DC plan? No     Tell me about what happened between when you left the hospital and the day you returned. transferred to [facility]     When did you start not feeling well? couple of weeks     Did you try to manage your symptoms your self? No     Did you call anyone? No     Did you try to see or did see a doctor or nurse before you came? Yes     Were you seen? Yes     Did you  have  a follow-up appointment on discharge? No     Was this a planned readmission? No                             PCP:  James Coronel MD  [phone]        Pharmacy:    [facility]  [address] [facility] [address]  Phone: [phone] Fax: [phone]    [id number] - [place], [place] - [address] [address] [facility]  [address] [facility] [address]  Phone: [phone] Fax: [phone]        Emergency Contacts:  Extended Emergency Contact Information  Primary Emergency Contact: cielocecilclare  Mobile Phone: [phone]  Relation: Spouse  Secondary Emergency Contact: Katie Thornton  Mobile Phone: [phone]  Relation: Daughter  Preferred language: English   needed? No      Insurance:    Payor: BLUE CROSS BLUE SHIELD / Plan: [place] HMO / Product Type: HMO /     Dorene Stoll RN     [phone]      04/12/2024  4:31 PM

## 2024-04-12 NOTE — ASSESSMENT & PLAN NOTE
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 Biopsy taken at bedside today to confirm. FFL showed R diffuse laryngeal and pharyngeal edema and fullness obscuring true vocal cords. Patient does not appear to be in respiratory distress and does not complain of difficulty breathing despite FFL findings.     Had an extensive discussion with family regarding CT findings and that it is at this point considered unresectable disease. Discussed benefit of having PEG placement for nutritional status considering insensate larynx and poor PO intake. Discussed with family possible need for tracheostomy in light of labile airway and possible progression of disease with difficulty establishing safe airway in case of emergency. At this time, the patient and family would like to consider these interventions.     - Recommend palliative consult  - Recommend heme/onc consult for any potential palliative treatments   - F/U biopsy results   - Call/page ENT with questions/concerns

## 2024-04-12 NOTE — NURSING
Received pt from EMS into room 6097. Pt ambulated to bed. Cardiac monitoring applied. VSS. NC @ 2L. Pt AAO x4, no acute distress noted. See assessment per flowsheet.

## 2024-04-13 PROBLEM — L03.221 CELLULITIS AND ABSCESS OF NECK: Status: ACTIVE | Noted: 2024-04-13

## 2024-04-13 PROBLEM — L02.11 CELLULITIS AND ABSCESS OF NECK: Status: ACTIVE | Noted: 2024-04-13

## 2024-04-13 PROBLEM — C44.42 SCC (SQUAMOUS CELL CARCINOMA), SCALP/NECK: Status: ACTIVE | Noted: 2024-04-13

## 2024-04-13 PROBLEM — J98.8 PARTIAL OBSTRUCTION OF AIRWAY: Status: ACTIVE | Noted: 2024-04-13

## 2024-04-13 LAB
ALBUMIN SERPL BCP-MCNC: 2.7 G/DL (ref 3.5–5.2)
ALP SERPL-CCNC: 134 U/L (ref 55–135)
ALT SERPL W/O P-5'-P-CCNC: 33 U/L (ref 10–44)
ANION GAP SERPL CALC-SCNC: 10 MMOL/L (ref 8–16)
ANION GAP SERPL CALC-SCNC: 8 MMOL/L (ref 8–16)
AST SERPL-CCNC: 28 U/L (ref 10–40)
BASOPHILS # BLD AUTO: 0.01 K/UL (ref 0–0.2)
BASOPHILS NFR BLD: 0.1 % (ref 0–1.9)
BILIRUB SERPL-MCNC: 0.8 MG/DL (ref 0.1–1)
BUN SERPL-MCNC: 6 MG/DL (ref 6–20)
BUN SERPL-MCNC: 8 MG/DL (ref 6–20)
CALCIUM SERPL-MCNC: 11 MG/DL (ref 8.7–10.5)
CALCIUM SERPL-MCNC: 11.2 MG/DL (ref 8.7–10.5)
CHLORIDE SERPL-SCNC: 94 MMOL/L (ref 95–110)
CHLORIDE SERPL-SCNC: 95 MMOL/L (ref 95–110)
CO2 SERPL-SCNC: 35 MMOL/L (ref 23–29)
CO2 SERPL-SCNC: 35 MMOL/L (ref 23–29)
CREAT SERPL-MCNC: 0.6 MG/DL (ref 0.5–1.4)
CREAT SERPL-MCNC: 0.7 MG/DL (ref 0.5–1.4)
DIFFERENTIAL METHOD BLD: ABNORMAL
EOSINOPHIL # BLD AUTO: 0 K/UL (ref 0–0.5)
EOSINOPHIL NFR BLD: 0 % (ref 0–8)
ERYTHROCYTE [DISTWIDTH] IN BLOOD BY AUTOMATED COUNT: 14.6 % (ref 11.5–14.5)
EST. GFR  (NO RACE VARIABLE): >60 ML/MIN/1.73 M^2
EST. GFR  (NO RACE VARIABLE): >60 ML/MIN/1.73 M^2
GLUCOSE SERPL-MCNC: 118 MG/DL (ref 70–110)
GLUCOSE SERPL-MCNC: 181 MG/DL (ref 70–110)
HCT VFR BLD AUTO: 40.5 % (ref 37–48.5)
HGB BLD-MCNC: 13.2 G/DL (ref 12–16)
IMM GRANULOCYTES # BLD AUTO: 0.09 K/UL (ref 0–0.04)
IMM GRANULOCYTES NFR BLD AUTO: 0.8 % (ref 0–0.5)
LYMPHOCYTES # BLD AUTO: 1 K/UL (ref 1–4.8)
LYMPHOCYTES NFR BLD: 8.5 % (ref 18–48)
MAGNESIUM SERPL-MCNC: 2 MG/DL (ref 1.6–2.6)
MAGNESIUM SERPL-MCNC: 2.2 MG/DL (ref 1.6–2.6)
MCH RBC QN AUTO: 27.2 PG (ref 27–31)
MCHC RBC AUTO-ENTMCNC: 32.6 G/DL (ref 32–36)
MCV RBC AUTO: 84 FL (ref 82–98)
MONOCYTES # BLD AUTO: 0.1 K/UL (ref 0.3–1)
MONOCYTES NFR BLD: 0.9 % (ref 4–15)
NEUTROPHILS # BLD AUTO: 10.5 K/UL (ref 1.8–7.7)
NEUTROPHILS NFR BLD: 89.7 % (ref 38–73)
NRBC BLD-RTO: 0 /100 WBC
OHS QRS DURATION: 82 MS
OHS QTC CALCULATION: 479 MS
PHOSPHATE SERPL-MCNC: 2.4 MG/DL (ref 2.7–4.5)
PLATELET # BLD AUTO: 335 K/UL (ref 150–450)
PMV BLD AUTO: 10.3 FL (ref 9.2–12.9)
POCT GLUCOSE: 120 MG/DL (ref 70–110)
POCT GLUCOSE: 148 MG/DL (ref 70–110)
POCT GLUCOSE: 158 MG/DL (ref 70–110)
POCT GLUCOSE: 203 MG/DL (ref 70–110)
POTASSIUM SERPL-SCNC: 2.8 MMOL/L (ref 3.5–5.1)
POTASSIUM SERPL-SCNC: 3 MMOL/L (ref 3.5–5.1)
PROT SERPL-MCNC: 6 G/DL (ref 6–8.4)
RBC # BLD AUTO: 4.85 M/UL (ref 4–5.4)
SODIUM SERPL-SCNC: 138 MMOL/L (ref 136–145)
SODIUM SERPL-SCNC: 139 MMOL/L (ref 136–145)
WBC # BLD AUTO: 11.66 K/UL (ref 3.9–12.7)

## 2024-04-13 PROCEDURE — 25000003 PHARM REV CODE 250: Performed by: INTERNAL MEDICINE

## 2024-04-13 PROCEDURE — 83735 ASSAY OF MAGNESIUM: CPT | Performed by: STUDENT IN AN ORGANIZED HEALTH CARE EDUCATION/TRAINING PROGRAM

## 2024-04-13 PROCEDURE — 80053 COMPREHEN METABOLIC PANEL: CPT | Performed by: STUDENT IN AN ORGANIZED HEALTH CARE EDUCATION/TRAINING PROGRAM

## 2024-04-13 PROCEDURE — 97535 SELF CARE MNGMENT TRAINING: CPT

## 2024-04-13 PROCEDURE — 84100 ASSAY OF PHOSPHORUS: CPT | Performed by: STUDENT IN AN ORGANIZED HEALTH CARE EDUCATION/TRAINING PROGRAM

## 2024-04-13 PROCEDURE — 20600001 HC STEP DOWN PRIVATE ROOM

## 2024-04-13 PROCEDURE — 25000003 PHARM REV CODE 250: Performed by: STUDENT IN AN ORGANIZED HEALTH CARE EDUCATION/TRAINING PROGRAM

## 2024-04-13 PROCEDURE — 94761 N-INVAS EAR/PLS OXIMETRY MLT: CPT

## 2024-04-13 PROCEDURE — 85025 COMPLETE CBC W/AUTO DIFF WBC: CPT | Performed by: STUDENT IN AN ORGANIZED HEALTH CARE EDUCATION/TRAINING PROGRAM

## 2024-04-13 PROCEDURE — 63600175 PHARM REV CODE 636 W HCPCS: Performed by: INTERNAL MEDICINE

## 2024-04-13 PROCEDURE — 63600175 PHARM REV CODE 636 W HCPCS: Performed by: STUDENT IN AN ORGANIZED HEALTH CARE EDUCATION/TRAINING PROGRAM

## 2024-04-13 PROCEDURE — 63600175 PHARM REV CODE 636 W HCPCS

## 2024-04-13 PROCEDURE — 92610 EVALUATE SWALLOWING FUNCTION: CPT

## 2024-04-13 PROCEDURE — 25000003 PHARM REV CODE 250

## 2024-04-13 RX ORDER — INSULIN ASPART 100 [IU]/ML
0-10 INJECTION, SOLUTION INTRAVENOUS; SUBCUTANEOUS EVERY 6 HOURS PRN
Status: DISCONTINUED | OUTPATIENT
Start: 2024-04-13 | End: 2024-04-14

## 2024-04-13 RX ORDER — METOPROLOL TARTRATE 25 MG/1
25 TABLET, FILM COATED ORAL 2 TIMES DAILY
Status: DISCONTINUED | OUTPATIENT
Start: 2024-04-13 | End: 2024-04-13

## 2024-04-13 RX ORDER — LEVOTHYROXINE SODIUM 20 UG/ML
125 INJECTION, SOLUTION INTRAVENOUS DAILY
Status: DISCONTINUED | OUTPATIENT
Start: 2024-04-14 | End: 2024-04-14

## 2024-04-13 RX ORDER — POTASSIUM CHLORIDE 7.45 MG/ML
10 INJECTION INTRAVENOUS
Status: COMPLETED | OUTPATIENT
Start: 2024-04-13 | End: 2024-04-13

## 2024-04-13 RX ORDER — METOPROLOL TARTRATE 1 MG/ML
10 INJECTION, SOLUTION INTRAVENOUS 2 TIMES DAILY
Status: DISCONTINUED | OUTPATIENT
Start: 2024-04-13 | End: 2024-04-13

## 2024-04-13 RX ORDER — METOPROLOL TARTRATE 1 MG/ML
5 INJECTION, SOLUTION INTRAVENOUS EVERY 6 HOURS
Status: DISCONTINUED | OUTPATIENT
Start: 2024-04-13 | End: 2024-04-23

## 2024-04-13 RX ORDER — GLUCAGON 1 MG
1 KIT INJECTION
Status: DISCONTINUED | OUTPATIENT
Start: 2024-04-13 | End: 2024-04-14

## 2024-04-13 RX ORDER — METOPROLOL TARTRATE 1 MG/ML
5 INJECTION, SOLUTION INTRAVENOUS EVERY 8 HOURS
Status: DISCONTINUED | OUTPATIENT
Start: 2024-04-13 | End: 2024-04-13

## 2024-04-13 RX ADMIN — POTASSIUM CHLORIDE 10 MEQ: 7.46 INJECTION, SOLUTION INTRAVENOUS at 11:04

## 2024-04-13 RX ADMIN — VANCOMYCIN HYDROCHLORIDE 2000 MG: 500 INJECTION, POWDER, LYOPHILIZED, FOR SOLUTION INTRAVENOUS at 07:04

## 2024-04-13 RX ADMIN — ONDANSETRON 4 MG: 2 INJECTION INTRAMUSCULAR; INTRAVENOUS at 07:04

## 2024-04-13 RX ADMIN — METRONIDAZOLE 500 MG: 500 INJECTION, SOLUTION INTRAVENOUS at 02:04

## 2024-04-13 RX ADMIN — LEVOTHYROXINE SODIUM 175 MCG: 75 TABLET ORAL at 06:04

## 2024-04-13 RX ADMIN — POTASSIUM CHLORIDE 10 MEQ: 7.46 INJECTION, SOLUTION INTRAVENOUS at 09:04

## 2024-04-13 RX ADMIN — ENOXAPARIN SODIUM 40 MG: 40 INJECTION SUBCUTANEOUS at 05:04

## 2024-04-13 RX ADMIN — HYDROMORPHONE HYDROCHLORIDE 0.5 MG: 1 INJECTION, SOLUTION INTRAMUSCULAR; INTRAVENOUS; SUBCUTANEOUS at 03:04

## 2024-04-13 RX ADMIN — FAMOTIDINE 20 MG: 10 INJECTION, SOLUTION INTRAVENOUS at 08:04

## 2024-04-13 RX ADMIN — MUPIROCIN: 20 OINTMENT TOPICAL at 08:04

## 2024-04-13 RX ADMIN — FLUCONAZOLE 200 MG: 2 INJECTION, SOLUTION INTRAVENOUS at 11:04

## 2024-04-13 RX ADMIN — POTASSIUM CHLORIDE 10 MEQ: 7.46 INJECTION, SOLUTION INTRAVENOUS at 06:04

## 2024-04-13 RX ADMIN — POTASSIUM PHOSPHATE, MONOBASIC AND POTASSIUM PHOSPHATE, DIBASIC 20 MMOL: 224; 236 INJECTION, SOLUTION, CONCENTRATE INTRAVENOUS at 12:04

## 2024-04-13 RX ADMIN — POTASSIUM CHLORIDE 10 MEQ: 7.46 INJECTION, SOLUTION INTRAVENOUS at 08:04

## 2024-04-13 RX ADMIN — HYDROMORPHONE HYDROCHLORIDE 0.5 MG: 1 INJECTION, SOLUTION INTRAMUSCULAR; INTRAVENOUS; SUBCUTANEOUS at 12:04

## 2024-04-13 RX ADMIN — DEXAMETHASONE SODIUM PHOSPHATE 8 MG: 4 INJECTION INTRA-ARTICULAR; INTRALESIONAL; INTRAMUSCULAR; INTRAVENOUS; SOFT TISSUE at 06:04

## 2024-04-13 RX ADMIN — POTASSIUM CHLORIDE 10 MEQ: 7.46 INJECTION, SOLUTION INTRAVENOUS at 02:04

## 2024-04-13 RX ADMIN — POTASSIUM CHLORIDE 10 MEQ: 7.46 INJECTION, SOLUTION INTRAVENOUS at 01:04

## 2024-04-13 RX ADMIN — DEXAMETHASONE SODIUM PHOSPHATE 8 MG: 4 INJECTION INTRA-ARTICULAR; INTRALESIONAL; INTRAMUSCULAR; INTRAVENOUS; SOFT TISSUE at 01:04

## 2024-04-13 RX ADMIN — CEFEPIME 2 G: 2 INJECTION, POWDER, FOR SOLUTION INTRAVENOUS at 10:04

## 2024-04-13 RX ADMIN — POTASSIUM CHLORIDE 10 MEQ: 7.46 INJECTION, SOLUTION INTRAVENOUS at 10:04

## 2024-04-13 RX ADMIN — POTASSIUM CHLORIDE 10 MEQ: 7.46 INJECTION, SOLUTION INTRAVENOUS at 12:04

## 2024-04-13 RX ADMIN — METOROPROLOL TARTRATE 5 MG: 5 INJECTION, SOLUTION INTRAVENOUS at 05:04

## 2024-04-13 RX ADMIN — CEFEPIME 2 G: 2 INJECTION, POWDER, FOR SOLUTION INTRAVENOUS at 06:04

## 2024-04-13 RX ADMIN — ONDANSETRON 4 MG: 2 INJECTION INTRAMUSCULAR; INTRAVENOUS at 12:04

## 2024-04-13 RX ADMIN — LIOTHYRONINE SODIUM 5 MCG: 5 TABLET ORAL at 06:04

## 2024-04-13 RX ADMIN — PROCHLORPERAZINE EDISYLATE 2.5 MG: 5 INJECTION INTRAMUSCULAR; INTRAVENOUS at 03:04

## 2024-04-13 RX ADMIN — HYDROMORPHONE HYDROCHLORIDE 0.5 MG: 1 INJECTION, SOLUTION INTRAMUSCULAR; INTRAVENOUS; SUBCUTANEOUS at 07:04

## 2024-04-13 RX ADMIN — METRONIDAZOLE 500 MG: 500 INJECTION, SOLUTION INTRAVENOUS at 10:04

## 2024-04-13 RX ADMIN — METRONIDAZOLE 500 MG: 500 INJECTION, SOLUTION INTRAVENOUS at 05:04

## 2024-04-13 RX ADMIN — CEFEPIME 2 G: 2 INJECTION, POWDER, FOR SOLUTION INTRAVENOUS at 02:04

## 2024-04-13 NOTE — ASSESSMENT & PLAN NOTE
Oncology History per Chart Review 8/2023:  Patient was evaluated by Dr. Rodrigo BOLDEN for lung lesion.  Biopsy of ventral surface of tongue done on 12/24/2021 has shown atypia with no diagnostic evidence of dysplasia.  Repeat biopsy after failed treatment on 01/24/2022 has shown moderately differentiated squamous cell carcinoma suspicious for perineural invasion.  CT soft tissue neck done on 01/31/2022 showed no evidence of metastatic disease to the neck. PET-CT done on 02/02/2022 showed no abnormal activity within the tongue, no signs of cervical lymph node or distant metastasis.  Declined medical oncology and radiation oncology consultation.  Status post left selective neck dissection levels 1, 2, 3, partial glossectomy with split-thickness skin graft done on 03/03/2022 by Dr. Clif Olson.  Surgical pathology has shown unifocal squamous cell carcinoma on the dorsal surface of the tongue on left side measuring 1 cm, moderately differentiated, 6 mm depth of invasion, no lymphovascular invasion, positive for perineural invasion, margins negative, 0 of 39 lymph nodes with metastasis, pT2 pN0 noted.  Postop course complicated by infection requiring I&D and antibiotics.  Declined adjuvant XRT due to concern for side effect profile [seen by Dr. aRngel].     Due to localized right neck swelling CT soft tissue neck with without contrast ordered on 06/21/2023 has shown 2.2 cm lesion deep to the right sternocleidomastoid suspicious for necrotic lymph node with few adjacent pathological appearing lymph nodes suspicious for metastasis. Right neck FNA done on 07/07/2023 were suspicious for metastatic squamous cell carcinoma.  PET-CT done on 08/21/2023 has shown large necrotic mass the angle of mandible posterior to submandibular gland on the right measuring 4.4 x 4.2 cm with SUV 9.78, just superior to the mass is a 2 cm hypermetabolic lymph node and no evidence of distant metastatic disease    Plan:    -- ENT consulted, plan to  offer biopsy if patient amenable but do not believe surgery an option at this point  -- Palliative on board, has seen patient  -- Oncology consulted 4/13

## 2024-04-13 NOTE — ASSESSMENT & PLAN NOTE
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 Biopsy taken at bedside today to confirm. FFL showed R diffuse laryngeal and pharyngeal edema and fullness obscuring true vocal cords. Patient does not appear to be in respiratory distress and does not complain of difficulty breathing despite FFL findings. Recommended consideration of trach and PEG to patient and family - discussion detailed in original consult note    -Recommend palliative care consult to discuss goals of care and treatment and code status   -Regarding tracheostomy - would be very challenging technically, due to extent of disease and anatomic distortion. It would present significant wound care issues given proximity of open neck wound to tracheostomy site and significant risk given location of major blood vessels (innominate artery) - in addition to usually morbidity of chronic tracheostomy dependency  -Recommend heme/onc consult  -F/u biopsy  -No acute ENT intervention  -Floor vs. ICU per MICU

## 2024-04-13 NOTE — RESIDENT HANDOFF
Handoff     Primary Team: [facility] CRITICAL CARE MEDICINE Room Number: 6097/6097 A     Patient Name: Marysol Cash MRN: [id number]     Date of Birth: [id number] Allergies: Ciprofloxacin, Codeine, Opioids - morphine analogues, Pcn [penicillins], and Percocet [oxycodone-acetaminophen]     Age: 58 y.o. Admit Date: 4/12/2024     Sex: female  BMI: Body mass index is 30.07 kg/m².     Code Status: Full Code        Illness Level (current clinical status): Watcher - Yes - necrotizing R neck mass    Reason for Admission: Sepsis    Brief HPI (pertinent PMH and diagnosis or differential diagnosis): Ms. 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 She initially presented to [facility] Emergency Department with six days of epigastric pain with associated nausea, vomiting, and difficulty eating due to pain. However on presentation, she was noted to have a large necrotic and purulent wound located on her right neck with complaints of associated difficulty speaking, swallowing and shortness of breath. She was noted to be hypoxic 88% on room air which improved with 2L NC. Labs were notable for leukocytosis 14, hypokalemia 2.4, hypochloremia 89, CO2 36, hypercalcemia 13.5, Phos 2.5. . Troponin 0.046. Lactate 2.3. CT Soft Tissue Neck was concerning for 9.5 x 8.2 x 10 cm large lobulated mass in the right anterolateral neck extending to the deep spaces of the neck and abutting the right prevertebral space and paravertebral musculature, left midline shift, multiple bilateral necrotic cervical lymph nodes. Case was discussed and decision was made to transfer to [facility] for higher level of care.     Of additional note, she recently transitioned her care to [place] and underwent stem-cell  transplant approximately 2 weeks ago in [place], has not been seen by a [place] physician since 2023.      On transfer: she was afebrile, mildly hypertensive /109, HR 98, RR 20, satting 96% on room air. Complaining of mild headache and poor appetite associated with nausea; denies fever, chills, chest pain, palpitations, SOB, abdominal pain, dysuria. States wound has been draining for the past week initially thick white now more liquid. Mild dysphonia is apparently chronic from left vocal cord paralysis and at baseline. Some dysphagia with solids, none with pureed soft or liquids, denies odynophagia. Dressing on neck CDI, ENT consulted will be here shortly to assess patient.     Hospital Course (updated, brief assessment by system or problem, significant events): Evaluated 4/12 in MICU by ENT, who do not feel patient is a candidate for surgical intervention. Palliative care and oncology consulted. Failed swallow study, SLP recommending NPO 4/13. Patient continues to protect airway. Stepped down from MICU on 4/13.     Tasks (specific, using if-then statements): If hgb < 7.0, transfuse 1 unit PRBC  If clears swallow study, resume PO medication    Contingency Plan (special circumstances anticipated and plan): If airway compromise, consult MICU for intubation    Estimated Discharge Date: 4/15/24    Discharge Disposition: Hospice/Home

## 2024-04-13 NOTE — PROGRESS NOTES
[facility] - Cardiac Medical ICU  Critical Care Medicine  Progress Note    Patient Name: Marysol Cash  MRN: [id number]  Admission Date: 4/12/2024  Hospital Length of Stay: 1 days  Code Status: Full Code  Attending Provider: Isaura Sethi MD  Primary Care Provider: James Coronel MD   Principal Problem: Sepsis    Subjective:     HPI:  Ms. 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 She initially presented to [facility] Emergency Department with six days of epigastric pain with associated nausea, vomiting, and difficulty eating due to pain. However on presentation, she was noted to have a large necrotic and purulent wound located on her right neck with complaints of associated difficulty speaking, swallowing and shortness of breath. She was noted to be hypoxic 88% on room air which improved with 2L NC. Labs were notable for leukocytosis 14, hypokalemia 2.4, hypochloremia 89, CO2 36, hypercalcemia 13.5, Phos 2.5. . Troponin 0.046. Lactate 2.3. CT Soft Tissue Neck was concerning for 9.5 x 8.2 x 10 cm large lobulated mass in the right anterolateral neck extending to the deep spaces of the neck and abutting the right prevertebral space and paravertebral musculature, left midline shift, multiple bilateral necrotic cervical lymph nodes. Case was discussed and decision was made to [facility] higher level of care.    Of additional note, she recently transitioned her care to [place] and underwent stem-cell transplant approximately 2 weeks ago in [place], has not been seen by a [place] physician since 2023.     On transfer: she was afebrile, mildly hypertensive /109, HR 98, RR 20, satting 96% on room air. Complaining of mild headache and poor appetite  associated with nausea; denies fever, chills, chest pain, palpitations, SOB, abdominal pain, dysuria. States wound has been draining for the past week initially thick white now more liquid. Mild dysphonia is apparently chronic from left vocal cord paralysis and at baseline. Some dysphagia with solids, none with pureed soft or liquids, denies odynophagia. Dressing on neck CDI, ENT consulted will be here shortly to assess patient.     Hospital/ICU Course:  Evaluated 4/12 in MICU by ENT, who do not feel patient is a candidate for surgical intervention. Palliative care and oncology consulted. Failed swallow study, SLP recommending NPO 4/13. Patient continues to protect airway. Stepped down from MICU on 4/13.    Interval History/Significant Events: NAEO    Review of Systems   Constitutional:  Negative for chills and fever.   Respiratory:  Negative for chest tightness and shortness of breath.    Cardiovascular:  Negative for chest pain and leg swelling.   Gastrointestinal:  Positive for vomiting (vomited Effer-K). Negative for nausea.   Skin:  Positive for wound.   Neurological:  Negative for dizziness, light-headedness and headaches.     Objective:     Vital Signs (Most Recent):  Temp: 97.7 °F (36.5 °C) (04/13/24 0701)  Pulse: 86 (04/13/24 1557)  Resp: 16 (04/13/24 1535)  BP: (!) 186/84 (04/13/24 1300)  SpO2: (!) 92 % (04/13/24 1300) Vital Signs (24h Range):  Temp:  [97.7 °F (36.5 °C)-98.7 °F (37.1 °C)] 97.7 °F (36.5 °C)  Pulse:  [] 86  Resp:  [10-38] 16  SpO2:  [89 %-98 %] 92 %  BP: (131-187)/(59-86) 186/84   Weight: 84.5 kg (186 lb 4.6 oz)  Body mass index is 30.07 kg/m².      Intake/Output Summary (Last 24 hours) at 4/13/2024 1607  Last data filed at 4/13/2024 1200  Gross per 24 hour   Intake 2729.11 ml   Output 2350 ml   Net 379.11 ml          Physical Exam  Vitals and nursing note reviewed.   Constitutional:       Appearance: She is ill-appearing.   HENT:      Mouth/Throat:      Mouth: Mucous membranes are  moist.      Pharynx: No oropharyngeal exudate or posterior oropharyngeal erythema.   Neck:      Comments: Dressing in place over neck wound, c/d/i  Cardiovascular:      Rate and Rhythm: Normal rate and regular rhythm.      Heart sounds: Normal heart sounds.   Pulmonary:      Effort: Pulmonary effort is normal.      Breath sounds: Normal breath sounds. No stridor.   Abdominal:      Palpations: Abdomen is soft.      Tenderness: There is no abdominal tenderness.   Skin:     General: Skin is warm and dry.   Neurological:      General: No focal deficit present.      Mental Status: She is alert and oriented to person, place, and time.            Vents:     Lines/Drains/Airways       Peripheral Intravenous Line  Duration                  Peripheral IV - Single Lumen 04/10/24 1716 20 G Left;Posterior Wrist 2 days         Peripheral IV - Single Lumen 04/10/24 1716 20 G Posterior;Right Wrist 2 days         Peripheral IV - Single Lumen 04/12/24 1900 18 G Anterior;Left Forearm <1 day         Peripheral IV - Single Lumen 04/12/24 1902 18 G Anterior;Right Forearm <1 day                  Significant Labs:    CBC/Anemia Profile:  Recent Labs   Lab 04/11/24  1623 04/12/24  0432 04/13/24  0305   WBC 14.33* 12.94* 11.66   HGB 13.3 13.6 13.2   HCT 40.9 41.9 40.5    344 335   MCV 84 84 84   RDW 14.4 14.3 14.6*        Chemistries:  Recent Labs   Lab 04/11/24  1622 04/12/24  0002 04/12/24  0432 04/12/24  1627 04/12/24  2349 04/13/24  0305      < > 139 140 139 138   K 2.3*   < > 2.8* 2.9* 3.0* 2.8*   CL 94*   < > 95 94* 94* 95   CO2 35*   < > 34* 35* 35* 35*   BUN 7   < > 6 6 6 8   CREATININE 0.6   < > 0.6 0.5 0.6 0.7   CALCIUM 11.4*   < > 10.8* 11.0* 11.0* 11.2*   ALBUMIN 2.9*  --   --  2.8*  --  2.7*   PROT 6.0  --   --  6.3  --  6.0   BILITOT 0.7  --   --  0.8  --  0.8   ALKPHOS 123  --   --  139*  --  134   ALT 32  --   --  35  --  33   AST 24  --   --  39  --  28   MG 2.0   < > 1.6 1.4* 2.2 2.0   PHOS 1.6*   < > 2.5* 2.6*   --  2.4*    < > = values in this interval not displayed.       All pertinent labs within the past 24 hours have been reviewed.    Significant Imaging:  I have reviewed all pertinent imaging results/findings within the past 24 hours.    ABG  Recent Labs   Lab 04/12/24  0950   BE 15.10*     Assessment/Plan:     Cardiac/Vascular  HTN (hypertension)  Patient taking Lopressor 25 mg BID at home    Plan:  - Hypertensive on arrival  - Oral metoprolol switched to IV, 5 mg Q6H    Renal/  Hypokalemia  Potassium 2.4 on arrival. Suspect 2/2 decreased PO intake.    --IV and PO replacement  --Replete Mg as needed, goal Mg>2  --BMP and Mg daily    ID  * Sepsis  This patient does have evidence of infective focus  My overall impression is sepsis.  Source: Skin and Soft Tissue (location Neck)  Antibiotics given-   Antibiotics (72h ago, onward)      None          Latest lactate reviewed-  Recent Labs   Lab 04/10/24  1917   LACTATE 2.3*     Organ dysfunction indicated by Acute respiratory failure    Fluid challenge Ideal Body Weight- The patient's ideal body weight is Patient weight not recorded which will be used to calculate fluid bolus of 30 ml/kg for treatment of septic shock.      Post- resuscitation assessment Yes Perfusion exam was performed within 6 hours of septic shock presentation after bolus shows Adequate tissue perfusion assessed by non-invasive monitoring     Will Not start Pressors- Levophed for MAP of 65  Source control achieved by: Broad spectrum antibiotics    Plan:  Source likely skin/soft tissue origin secondary to purulent, necrotic neck wound  - Continue broad spectrum antibiotics with Vancomycin, Cefepime  - Received 1.7L Lactated Ringers sepsis fluids at OSH  - Will obtain wound culture  - ENT consulted, f/u recs  - Repeat Lactate  - Repeat and follow up blood cultures through maturation  - CBC daily to trend leukocytosis    Oncology  Malignant neoplasm of tip and lateral border of tongue  Oncology History  per Chart Review 8/2023:  Patient was evaluated by Dr. Rodrigo BOLDEN for lung lesion.  Biopsy of ventral surface of tongue done on 12/24/2021 has shown atypia with no diagnostic evidence of dysplasia.  Repeat biopsy after failed treatment on 01/24/2022 has shown moderately differentiated squamous cell carcinoma suspicious for perineural invasion.  CT soft tissue neck done on 01/31/2022 showed no evidence of metastatic disease to the neck. PET-CT done on 02/02/2022 showed no abnormal activity within the tongue, no signs of cervical lymph node or distant metastasis.  Declined medical oncology and radiation oncology consultation.  Status post left selective neck dissection levels 1, 2, 3, partial glossectomy with split-thickness skin graft done on 03/03/2022 by Dr. Clif Olson.  Surgical pathology has shown unifocal squamous cell carcinoma on the dorsal surface of the tongue on left side measuring 1 cm, moderately differentiated, 6 mm depth of invasion, no lymphovascular invasion, positive for perineural invasion, margins negative, 0 of 39 lymph nodes with metastasis, pT2 pN0 noted.  Postop course complicated by infection requiring I&D and antibiotics.  Declined adjuvant XRT due to concern for side effect profile [seen by Dr. Rangel].     Due to localized right neck swelling CT soft tissue neck with without contrast ordered on 06/21/2023 has shown 2.2 cm lesion deep to the right sternocleidomastoid suspicious for necrotic lymph node with few adjacent pathological appearing lymph nodes suspicious for metastasis. Right neck FNA done on 07/07/2023 were suspicious for metastatic squamous cell carcinoma.  PET-CT done on 08/21/2023 has shown large necrotic mass the angle of mandible posterior to submandibular gland on the right measuring 4.4 x 4.2 cm with SUV 9.78, just superior to the mass is a 2 cm hypermetabolic lymph node and no evidence of distant metastatic disease    Plan:    -- ENT consulted, plan to offer biopsy if  "patient amenable but do not believe surgery an option at this point  -- Palliative on board, has seen patient  -- Oncology consulted 4/13    Endocrine  Postprocedural hypoparathyroidism  Patient takes calcium carbonate, Vitamin D3 at home. Presented with symptomatic hypercalcemia 13.5 on admission with abdominal pain, nausea/vomiting    - Normal saline infusion 125 mg/hr  - Daily CMP to monitor calcium level  - Resume home medications when clinically indicated    Postsurgical hypothyroidism  Patient taking Levothyroxine 175 mcg daily and Liothyronine 5 mcg daily    - Continue Levothyroxine IV, 125 mcg daily      GI  GERD (gastroesophageal reflux disease)  Pt complaining of reflux, requesting medication for treatment    Plan:  -- Famotidine  -- Consider deescalating to calcium carbonate if well controlled    Orthopedic  Open neck wound  Pt with hx of head and neck cancer, s/p left selective neck dissection levels 1, 2, 3, partial glossectomy. Presenting with complaints of nausea and epigastric abdominal pain. Also reports worsening drainage from right neck.   Pt with extensive hx of H/N cancer. See Malignant Neoplasm of tongue.   Endorses continued drainage from the neck for months with recent worsening of symptoms for past 2 weeks. Pt returned from [place] where she received antibiotics, "detox" and hyperbaric chamber as treatment.   Denies fevers, chills, diaphoresis, cough, sputum production. Endorses difficulty with swallowing 2/2 to oral trush.   Upon ED evaluation, patient noted to have draining wound to right neck.   - CT soft tissue Large bulky lobulated appearing mass right lateral and anterolateral neck extending to the D spaces of the neck having overall dimensions of approximately 9.5 x 8.2 x 10 cm with large central collection of air which appears to extend superficially to the skin surface right mid neck anterior laterally.  Overall appearance is very concerning for large conglomerate jose mass although " superimposed abscess remains a possibility given concern for such.  Associated mass effect results in displacement of midline structures to the patient's left.   Vitals afebrile, tachycardic  (125) and low normotensive BP (131/74) on admission.  Lactic acid 2.0>2.3>1.5  CXR no acute airspace disease noted.   EKG on admission sinus tachycardia  Troponin 0.046 >0.029>0.031  UA pH 7.0, sg >1.030, trace proteins noted    In [facility] MICU, patient handling secretions well, protecting airway.    - Blood Cx pending  - Received IV Vanc/Cefepime while in ED. Case discussed with ENT-otolaryngology, patient accepted for transfer at [facility], for evaluation of abscess, and debridement. Pt awaiting bed placement.   - 4/12/2024: Afebrile, VS stable. Labs with improvement in leukocytosis. Reports mild improvement in pain symptoms.   - Continue vanc/cefepime/flagyl for broad spectrum coverage, will tailor to wound culture/blood cultures results, may need to consult ID for assistance  - ENT consulted, f/u recs  - NPO except meds/sips of water until ENT clearance       Critical Care Daily Checklist:    A: Awake: RASS Goal/Actual Goal:    Actual:     B: Spontaneous Breathing Trial Performed?     C: SAT & SBT Coordinated?  N/A                      D: Delirium: CAM-ICU Overall CAM-ICU: Negative   E: Early Mobility Performed? Yes   F: Feeding Goal:    Status:     Current Diet Order   Procedures    Diet NPO Except for: Sips with Medication     Order Specific Question:   Except for     Answer:   Sips with Medication      AS: Analgesia/Sedation Tylenol, dilaudid   T: Thromboembolic Prophylaxis Lovenox   H: HOB > 300 Yes   U: Stress Ulcer Prophylaxis (if needed) Famotidine   G: Glucose Control none   B: Bowel Function Stool Occurrence: 1   I: Indwelling Catheter (Lines & Wallis) Necessity PIV   D: De-escalation of Antimicrobials/Pharmacotherapies Vancomycin, cefepime, flagyl    Plan for the day/ETD Step down    Code Status:  Family/Goals of  Care: Full Code  Ongoing       Critical secondary to Patient has a condition that poses threat to life and bodily function: airway watch in setting of R neck mass.      Critical care was time spent personally by me on the following activities: development of treatment plan with patient or surrogate and bedside caregivers, discussions with consultants, evaluation of patient's response to treatment, examination of patient, ordering and performing treatments and interventions, ordering and review of laboratory studies, ordering and review of radiographic studies, pulse oximetry, re-evaluation of patient's condition. This critical care time did not overlap with that of any other provider or involve time for any procedures.     Ky Salamanca MD  Critical Care Medicine  [facility] - Cardiac Medical ICU

## 2024-04-13 NOTE — ASSESSMENT & PLAN NOTE
Patient taking Lopressor 25 mg BID at home    Plan:  - Hypertensive on arrival  - Oral metoprolol switched to IV, 5 mg Q6H

## 2024-04-13 NOTE — PROGRESS NOTES
[facility]  Otorhinolaryngology-Head & Neck Surgery  Progress Note    Subjective:     Post-Op Info:  * No surgery found *      Hospital Day: 2     Interval History: NAEO. Denies issues breathing. Reports improved voice.     Medications:  Continuous Infusions:  Current Facility-Administered Medications   Medication Dose Route Frequency Provider Last Rate Last Admin    acetaminophen oral solution 650 mg  650 mg Oral Q6H PRN Samia Gill MD        ceFEPIme (MAXIPIME) 2 g in dextrose 5 % in water (D5W) 100 mL IVPB (MB+)  2 g Intravenous Q8H Samia Gill MD   Stopped at 04/13/24 0239    dexAMETHasone injection 8 mg  8 mg Intravenous Q8H Samia Gill MD   8 mg at 04/13/24 0652    dextrose 10% bolus 125 mL 125 mL  12.5 g Intravenous PRN Samia Gill MD        dextrose 10% bolus 250 mL 250 mL  25 g Intravenous PRN Samia Gill MD        enoxaparin injection 40 mg  40 mg Subcutaneous Q24H (prophylaxis, 1700) Samia Gill MD   40 mg at 04/12/24 1712    famotidine (PF) injection 20 mg  20 mg Intravenous BID Samia Gill MD   20 mg at 04/13/24 0817    fluconazole (DIFLUCAN) IVPB 200 mg  200 mg Intravenous Q24H Samia Gill MD        glucagon (human recombinant) injection 1 mg  1 mg Intramuscular PRN Samia Gill MD        glucose chewable tablet 16 g  16 g Oral PRN Samia Gill MD        glucose chewable tablet 24 g  24 g Oral PRN Samia Gill MD        HYDROmorphone injection 0.5 mg  0.5 mg Intravenous Q4H PRN Samia Gill MD   0.5 mg at 04/13/24 0353    hydrOXYzine pamoate capsule 25 mg  25 mg Oral Q8H PRN Samia Gill MD   25 mg at 04/12/24 2110    insulin aspart U-100 pen 0-5 Units  0-5 Units Subcutaneous Q6H PRN Samia Gill MD        levothyroxine tablet 175 mcg  175 mcg Oral Before breakfast Samia Gill MD   175 mcg at 04/13/24 0652    liothyronine  tablet 5 mcg  5 mcg Oral Before breakfast Samia Gill MD   5 mcg at 04/13/24 0652    metronidazole IVPB 500 mg  500 mg Intravenous Q8H Samia Gill MD   Stopped at 04/13/24 0308    mupirocin 2 % ointment   Nasal BID Samia Gill MD   Given at 04/13/24 0817    naloxone 0.4 mg/mL injection 0.02 mg  0.02 mg Intravenous PRN Samia Gill MD        ondansetron injection 4 mg  4 mg Intravenous Q4H PRN Samia Gill MD   4 mg at 04/13/24 0026    potassium chloride 10 mEq in 100 mL IVPB  10 mEq Intravenous Q1H Minerva Cardenas  mL/hr at 04/13/24 0802 10 mEq at 04/13/24 0802    prochlorperazine injection Soln 2.5 mg  2.5 mg Intravenous Q6H PRN Minerva Cardenas DO   2.5 mg at 04/13/24 0353    vancomycin - pharmacy to dose   Intravenous pharmacy to manage frequency Samia Gill MD        vancomycin 2 g in dextrose 5 % 500 mL IVPB  2,000 mg Intravenous Q12H Isaura Sethi MD   Stopped at 04/13/24 0908     Scheduled Meds:  Current Facility-Administered Medications   Medication Dose Route Frequency Provider Last Rate Last Admin    acetaminophen oral solution 650 mg  650 mg Oral Q6H PRN Samia Gill MD        ceFEPIme (MAXIPIME) 2 g in dextrose 5 % in water (D5W) 100 mL IVPB (MB+)  2 g Intravenous Q8H Samia Gill MD   Stopped at 04/13/24 0239    dexAMETHasone injection 8 mg  8 mg Intravenous Q8H Samia Gill MD   8 mg at 04/13/24 0652    dextrose 10% bolus 125 mL 125 mL  12.5 g Intravenous PRN Samia Gill MD        dextrose 10% bolus 250 mL 250 mL  25 g Intravenous PRN Samia Gill MD        enoxaparin injection 40 mg  40 mg Subcutaneous Q24H (prophylaxis, 1700) Samia Gill MD   40 mg at 04/12/24 1712    famotidine (PF) injection 20 mg  20 mg Intravenous BID Samai Gill MD   20 mg at 04/13/24 0817    fluconazole (DIFLUCAN) IVPB 200 mg  200 mg Intravenous Q24H Samia Gill MD        glucagon  (human recombinant) injection 1 mg  1 mg Intramuscular PRN Samia Gill MD        glucose chewable tablet 16 g  16 g Oral PRN Samia Gill MD        glucose chewable tablet 24 g  24 g Oral PRN Samia Gill MD        HYDROmorphone injection 0.5 mg  0.5 mg Intravenous Q4H PRN Samia Gill MD   0.5 mg at 04/13/24 0353    hydrOXYzine pamoate capsule 25 mg  25 mg Oral Q8H PRN Samia Gill MD   25 mg at 04/12/24 2110    insulin aspart U-100 pen 0-5 Units  0-5 Units Subcutaneous Q6H PRN Samia Gill MD        levothyroxine tablet 175 mcg  175 mcg Oral Before breakfast Samia Gill MD   175 mcg at 04/13/24 0652    liothyronine tablet 5 mcg  5 mcg Oral Before breakfast Samia Gill MD   5 mcg at 04/13/24 0652    metronidazole IVPB 500 mg  500 mg Intravenous Q8H Samia Gill MD   Stopped at 04/13/24 0308    mupirocin 2 % ointment   Nasal BID Samia Gill MD   Given at 04/13/24 0817    naloxone 0.4 mg/mL injection 0.02 mg  0.02 mg Intravenous PRN Samia Gill MD        ondansetron injection 4 mg  4 mg Intravenous Q4H PRN Samia Gill MD   4 mg at 04/13/24 0026    potassium chloride 10 mEq in 100 mL IVPB  10 mEq Intravenous Q1H Minerva Cardenas  mL/hr at 04/13/24 0802 10 mEq at 04/13/24 0802    prochlorperazine injection Soln 2.5 mg  2.5 mg Intravenous Q6H PRN Minerva Cardenas DO   2.5 mg at 04/13/24 0353    vancomycin - pharmacy to dose   Intravenous pharmacy to manage frequency Samia Gill MD        vancomycin 2 g in dextrose 5 % 500 mL IVPB  2,000 mg Intravenous Q12H Isaura Sethi MD   Stopped at 04/13/24 0908     PRN Meds:  Current Facility-Administered Medications   Medication Dose Route Frequency Provider Last Rate Last Admin    acetaminophen oral solution 650 mg  650 mg Oral Q6H PRN Samia Gill MD        ceFEPIme (MAXIPIME) 2 g in dextrose 5 % in water (D5W) 100 mL IVPB (MB+)  2 g  Intravenous Q8H Samia Gill MD   Stopped at 04/13/24 0239    dexAMETHasone injection 8 mg  8 mg Intravenous Q8H Samia Gill MD   8 mg at 04/13/24 0652    dextrose 10% bolus 125 mL 125 mL  12.5 g Intravenous PRN Samia Gill MD        dextrose 10% bolus 250 mL 250 mL  25 g Intravenous PRN Samia Gill MD        enoxaparin injection 40 mg  40 mg Subcutaneous Q24H (prophylaxis, 1700) Samia Gill MD   40 mg at 04/12/24 1712    famotidine (PF) injection 20 mg  20 mg Intravenous BID Samia Gill MD   20 mg at 04/13/24 0817    fluconazole (DIFLUCAN) IVPB 200 mg  200 mg Intravenous Q24H Samia Gill MD        glucagon (human recombinant) injection 1 mg  1 mg Intramuscular PRN Samia Gill MD        glucose chewable tablet 16 g  16 g Oral PRN Samia Gill MD        glucose chewable tablet 24 g  24 g Oral PRN Samia Gill MD        HYDROmorphone injection 0.5 mg  0.5 mg Intravenous Q4H PRN Samia Gill MD   0.5 mg at 04/13/24 0353    hydrOXYzine pamoate capsule 25 mg  25 mg Oral Q8H PRN Samia Gill MD   25 mg at 04/12/24 2110    insulin aspart U-100 pen 0-5 Units  0-5 Units Subcutaneous Q6H PRN Samia Gill MD        levothyroxine tablet 175 mcg  175 mcg Oral Before breakfast Samia iGll MD   175 mcg at 04/13/24 0652    liothyronine tablet 5 mcg  5 mcg Oral Before breakfast Samia Gill MD   5 mcg at 04/13/24 0652    metronidazole IVPB 500 mg  500 mg Intravenous Q8H Samia Gill MD   Stopped at 04/13/24 0308    mupirocin 2 % ointment   Nasal BID Samia Gill MD   Given at 04/13/24 0817    naloxone 0.4 mg/mL injection 0.02 mg  0.02 mg Intravenous PRN Samia Gill MD        ondansetron injection 4 mg  4 mg Intravenous Q4H PRN Samia Gill MD   4 mg at 04/13/24 0026    potassium chloride 10 mEq in 100 mL IVPB  10 mEq Intravenous Q1H  Minerva Cardenas  mL/hr at 04/13/24 0802 10 mEq at 04/13/24 0802    prochlorperazine injection Soln 2.5 mg  2.5 mg Intravenous Q6H PRN Minerva Cardenas DO   2.5 mg at 04/13/24 0353    vancomycin - pharmacy to dose   Intravenous pharmacy to manage frequency Samia Gill MD        vancomycin 2 g in dextrose 5 % 500 mL IVPB  2,000 mg Intravenous Q12H Isaura Sethi MD   Stopped at 04/13/24 0908        Review of patient's allergies indicates:   Allergen Reactions    Ciprofloxacin Swelling    Codeine Swelling    Opioids - morphine analogues     Pcn [penicillins] Hives     Tolerated cefepime 04/2024    Percocet [oxycodone-acetaminophen] Swelling     Objective:     Vital Signs (24h Range):  Temp:  [98 °F (36.7 °C)-98.7 °F (37.1 °C)] 98.1 °F (36.7 °C)  Pulse:  [] 101  Resp:  [14-41] 14  SpO2:  [87 %-99 %] 90 %  BP: (124-162)/() 134/62       Lines/Drains/Airways       Peripheral Intravenous Line  Duration                  Peripheral IV - Single Lumen 04/10/24 1716 20 G Left;Posterior Wrist 2 days         Peripheral IV - Single Lumen 04/10/24 1716 20 G Posterior;Right Wrist 2 days         Peripheral IV - Single Lumen 04/12/24 1900 18 G Anterior;Left Forearm <1 day         Peripheral IV - Single Lumen 04/12/24 1902 18 G Anterior;Right Forearm <1 day                     Physical Exam     NAD  Normal WOB, no stridor or stertor on forced respiration  Breathy/strained voiced, with falsetto c/w vocal cord paralysis  Bulky neck dressing coverage wound, not removed    Significant Labs:  CBC:   Recent Labs   Lab 04/13/24  0305   WBC 11.66   RBC 4.85   HGB 13.2   HCT 40.5      MCV 84   MCH 27.2   MCHC 32.6       Significant Diagnostics:  I have reviewed all pertinent imaging results/findings within the past 24 hours.  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 Biopsy taken at bedside today to confirm. FFL showed R diffuse laryngeal and pharyngeal edema and fullness obscuring true vocal cords. Patient does not appear to be in respiratory distress and does not complain of difficulty breathing despite FFL findings. Recommended consideration of trach and PEG to patient and family - discussion detailed in original consult note    -Recommend palliative care consult to discuss goals of care and treatment and code status   -Regarding tracheostomy - would be very challenging technically, due to extent of disease and anatomic distortion. It would present significant wound care issues given proximity of open neck wound to tracheostomy site and significant risk given location of major blood vessels (innominate artery) - in addition to usually morbidity of chronic tracheostomy dependency  -Recommend heme/onc consult  -F/u biopsy  -No acute ENT intervention  -Floor vs. ICU per MICU          Eleuterio Tierney MD  Otorhinolaryngology-Head & Neck Surgery  [facility]

## 2024-04-13 NOTE — PLAN OF CARE
MICU DAILY GOALS     Family/Goals of care/Code Status   Code Status: Full Code    24H Vital Sign Range  Temp:  [97.7 °F (36.5 °C)-98.7 °F (37.1 °C)]   Pulse:  []   Resp:  [10-38]   BP: (131-187)/(59-86)   SpO2:  [85 %-98 %]      Shift Events (include procedures and significant events)   Pt failed swallow study this am; to remain NPO; Wound care consulted and changed dressing to Rt neck wound ; tolerated  well;see progress note; pain controlled with current regimen; Stepdown orders received;awaiting bed.    AWAKE RASS: Goal -    Actual - RASS (Navarro Agitation-Sedation Scale): alert and calm    Restraint necessity: Not necessary   BREATHE SBT: Not intubated    Coordinate A & B, analgesics/sedatives Pain: managed   SAT: Not intubated   Delirium CAM-ICU: Overall CAM-ICU: Negative   Early(intubated/ Progressive (non-intubated) Mobility MOVE Screen (INTUBATED ONLY): Not intubated    Activity: Activity Management: Up to bedside commode - L3   Feeding/Nutrition Diet order: Diet/Nutrition Received: NPO,     Thrombus DVT prophylaxis: VTE Required Core Measure: Pharmacological prophylaxis initiated/maintained   HOB Elevation Head of Bed (HOB) Positioning: HOB elevated   Ulcer Prophylaxis GI: yes   Glucose control managed Glycemic Management: blood glucose monitored   Skin Skin assessed during: Daily Assessment    Sacrum intact/not altered? Yes  Heels intact/not altered? Yes  Surgical wound? Yes    CHECK ONE!   (no altered skin or altered skin) and sub boxes:  [] No Altered Skin Integrity Present    []Prevention Measures Documented    [] Altered Skin Integrity Present or Discovered   [x] LDA present in EPIC, daily doc completed              [] LDA added if not in EPIC (describe wound).                    When describing wound, do not stage, use descriptive words only.    [] Wound Image Taken (required on admit,                   transfer/discharge and every Tuesday)    Wound Care Consulted? Yes    4 EYES:  Attending  Nurse (1st set of eyes): Viviane/RN    Second RN/Staff Member (2nd set of eyes):    Bowel Function no issues    Indwelling Catheter Necessity         No   De-escalation Antibiotics Yes        VS and assessment per flow sheet, patient progressing towards goals as tolerated, plan of care reviewed with Marysol Cash/family, all concerns addressed, will continue to monitor.

## 2024-04-13 NOTE — SUBJECTIVE & OBJECTIVE
Interval History: NAEO. Denies issues breathing. Reports improved voice.     Medications:  Continuous Infusions:  Current Facility-Administered Medications   Medication Dose Route Frequency Provider Last Rate Last Admin    acetaminophen oral solution 650 mg  650 mg Oral Q6H PRN Samia Gill MD        ceFEPIme (MAXIPIME) 2 g in dextrose 5 % in water (D5W) 100 mL IVPB (MB+)  2 g Intravenous Q8H Samia Gill MD   Stopped at 04/13/24 0239    dexAMETHasone injection 8 mg  8 mg Intravenous Q8H Samia Gill MD   8 mg at 04/13/24 0652    dextrose 10% bolus 125 mL 125 mL  12.5 g Intravenous PRN Samia Gill MD        dextrose 10% bolus 250 mL 250 mL  25 g Intravenous PRN Samia Gill MD        enoxaparin injection 40 mg  40 mg Subcutaneous Q24H (prophylaxis, 1700) Samia Gill MD   40 mg at 04/12/24 1712    famotidine (PF) injection 20 mg  20 mg Intravenous BID Samia Gill MD   20 mg at 04/13/24 0817    fluconazole (DIFLUCAN) IVPB 200 mg  200 mg Intravenous Q24H Samia Gill MD        glucagon (human recombinant) injection 1 mg  1 mg Intramuscular PRN Samia Gill MD        glucose chewable tablet 16 g  16 g Oral PRN Samia Gill MD        glucose chewable tablet 24 g  24 g Oral PRN Samia Gill MD        HYDROmorphone injection 0.5 mg  0.5 mg Intravenous Q4H PRN Samia Gill MD   0.5 mg at 04/13/24 0353    hydrOXYzine pamoate capsule 25 mg  25 mg Oral Q8H PRN Samia Gill MD   25 mg at 04/12/24 2110    insulin aspart U-100 pen 0-5 Units  0-5 Units Subcutaneous Q6H PRN Samia Gill MD        levothyroxine tablet 175 mcg  175 mcg Oral Before breakfast Samia Gill MD   175 mcg at 04/13/24 0652    liothyronine tablet 5 mcg  5 mcg Oral Before breakfast Samia Gill MD   5 mcg at 04/13/24 0652    metronidazole IVPB 500 mg  500 mg Intravenous Q8H Samia Gill MD    Stopped at 04/13/24 0308    mupirocin 2 % ointment   Nasal BID Samia Gill MD   Given at 04/13/24 0817    naloxone 0.4 mg/mL injection 0.02 mg  0.02 mg Intravenous PRN Samia Gill MD        ondansetron injection 4 mg  4 mg Intravenous Q4H PRN Samia Gill MD   4 mg at 04/13/24 0026    potassium chloride 10 mEq in 100 mL IVPB  10 mEq Intravenous Q1H Minerva Cardenas  mL/hr at 04/13/24 0802 10 mEq at 04/13/24 0802    prochlorperazine injection Soln 2.5 mg  2.5 mg Intravenous Q6H PRN Minerva Cardenas DO   2.5 mg at 04/13/24 0353    vancomycin - pharmacy to dose   Intravenous pharmacy to manage frequency Samia Gill MD        vancomycin 2 g in dextrose 5 % 500 mL IVPB  2,000 mg Intravenous Q12H Isaura Sethi MD   Stopped at 04/13/24 0908     Scheduled Meds:  Current Facility-Administered Medications   Medication Dose Route Frequency Provider Last Rate Last Admin    acetaminophen oral solution 650 mg  650 mg Oral Q6H PRN Samia Gill MD        ceFEPIme (MAXIPIME) 2 g in dextrose 5 % in water (D5W) 100 mL IVPB (MB+)  2 g Intravenous Q8H Samia Gill MD   Stopped at 04/13/24 0239    dexAMETHasone injection 8 mg  8 mg Intravenous Q8H Samia Gill MD   8 mg at 04/13/24 0652    dextrose 10% bolus 125 mL 125 mL  12.5 g Intravenous PRN Samia Gill MD        dextrose 10% bolus 250 mL 250 mL  25 g Intravenous PRN Samia Gill MD        enoxaparin injection 40 mg  40 mg Subcutaneous Q24H (prophylaxis, 1700) Samia Gill MD   40 mg at 04/12/24 1712    famotidine (PF) injection 20 mg  20 mg Intravenous BID Samia Gill MD   20 mg at 04/13/24 0817    fluconazole (DIFLUCAN) IVPB 200 mg  200 mg Intravenous Q24H Samia Gill MD        glucagon (human recombinant) injection 1 mg  1 mg Intramuscular PRN Samia Gill MD        glucose chewable tablet 16 g  16 g Oral PRN Samia Gill MD         glucose chewable tablet 24 g  24 g Oral PRN Samia Gill MD        HYDROmorphone injection 0.5 mg  0.5 mg Intravenous Q4H PRN Samia Gill MD   0.5 mg at 04/13/24 0353    hydrOXYzine pamoate capsule 25 mg  25 mg Oral Q8H PRN Samia Gill MD   25 mg at 04/12/24 2110    insulin aspart U-100 pen 0-5 Units  0-5 Units Subcutaneous Q6H PRN Samia Gill MD        levothyroxine tablet 175 mcg  175 mcg Oral Before breakfast Samia Gill MD   175 mcg at 04/13/24 0652    liothyronine tablet 5 mcg  5 mcg Oral Before breakfast Samia Gill MD   5 mcg at 04/13/24 0652    metronidazole IVPB 500 mg  500 mg Intravenous Q8H Samia Gill MD   Stopped at 04/13/24 0308    mupirocin 2 % ointment   Nasal BID Samia Gill MD   Given at 04/13/24 0817    naloxone 0.4 mg/mL injection 0.02 mg  0.02 mg Intravenous PRN Samia Gill MD        ondansetron injection 4 mg  4 mg Intravenous Q4H PRN Samia Gill MD   4 mg at 04/13/24 0026    potassium chloride 10 mEq in 100 mL IVPB  10 mEq Intravenous Q1H Minerva Cardenas  mL/hr at 04/13/24 0802 10 mEq at 04/13/24 0802    prochlorperazine injection Soln 2.5 mg  2.5 mg Intravenous Q6H PRN Minerva Cardenas DO   2.5 mg at 04/13/24 0353    vancomycin - pharmacy to dose   Intravenous pharmacy to manage frequency Samia Gill MD        vancomycin 2 g in dextrose 5 % 500 mL IVPB  2,000 mg Intravenous Q12H Isaura Sethi MD   Stopped at 04/13/24 0908     PRN Meds:  Current Facility-Administered Medications   Medication Dose Route Frequency Provider Last Rate Last Admin    acetaminophen oral solution 650 mg  650 mg Oral Q6H PRN Samia Gill MD        ceFEPIme (MAXIPIME) 2 g in dextrose 5 % in water (D5W) 100 mL IVPB (MB+)  2 g Intravenous Q8H Samia Gill MD   Stopped at 04/13/24 0239    dexAMETHasone injection 8 mg  8 mg Intravenous Q8H Samia Gill MD   8 mg at 04/13/24  0652    dextrose 10% bolus 125 mL 125 mL  12.5 g Intravenous PRN Samia Gill MD        dextrose 10% bolus 250 mL 250 mL  25 g Intravenous PRN Samia Gill MD        enoxaparin injection 40 mg  40 mg Subcutaneous Q24H (prophylaxis, 1700) Samia Gill MD   40 mg at 04/12/24 1712    famotidine (PF) injection 20 mg  20 mg Intravenous BID Samia Gill MD   20 mg at 04/13/24 0817    fluconazole (DIFLUCAN) IVPB 200 mg  200 mg Intravenous Q24H Samia Gill MD        glucagon (human recombinant) injection 1 mg  1 mg Intramuscular PRN Samia Gill MD        glucose chewable tablet 16 g  16 g Oral PRN Samia Gill MD        glucose chewable tablet 24 g  24 g Oral PRN Samia Gill MD        HYDROmorphone injection 0.5 mg  0.5 mg Intravenous Q4H PRN Samia Gill MD   0.5 mg at 04/13/24 0353    hydrOXYzine pamoate capsule 25 mg  25 mg Oral Q8H PRN Samia Gill MD   25 mg at 04/12/24 2110    insulin aspart U-100 pen 0-5 Units  0-5 Units Subcutaneous Q6H PRN Samia Gill MD        levothyroxine tablet 175 mcg  175 mcg Oral Before breakfast Samia Gill MD   175 mcg at 04/13/24 0652    liothyronine tablet 5 mcg  5 mcg Oral Before breakfast Samia Gill MD   5 mcg at 04/13/24 0652    metronidazole IVPB 500 mg  500 mg Intravenous Q8H Samia Gill MD   Stopped at 04/13/24 0308    mupirocin 2 % ointment   Nasal BID Samia Gill MD   Given at 04/13/24 0817    naloxone 0.4 mg/mL injection 0.02 mg  0.02 mg Intravenous PRN Samia Gill MD        ondansetron injection 4 mg  4 mg Intravenous Q4H PRN Samia Gill MD   4 mg at 04/13/24 0026    potassium chloride 10 mEq in 100 mL IVPB  10 mEq Intravenous Q1H Minerva Cardenas  mL/hr at 04/13/24 0802 10 mEq at 04/13/24 0802    prochlorperazine injection Soln 2.5 mg  2.5 mg Intravenous Q6H PRN Minerva Cardenas DO   2.5 mg at  04/13/24 0353    vancomycin - pharmacy to dose   Intravenous pharmacy to manage frequency Callum-Samia Raymundo MD        vancomycin 2 g in dextrose 5 % 500 mL IVPB  2,000 mg Intravenous Q12H Isaura Sethi MD   Stopped at 04/13/24 0908        Review of patient's allergies indicates:   Allergen Reactions    Ciprofloxacin Swelling    Codeine Swelling    Opioids - morphine analogues     Pcn [penicillins] Hives     Tolerated cefepime 04/2024    Percocet [oxycodone-acetaminophen] Swelling     Objective:     Vital Signs (24h Range):  Temp:  [98 °F (36.7 °C)-98.7 °F (37.1 °C)] 98.1 °F (36.7 °C)  Pulse:  [] 101  Resp:  [14-41] 14  SpO2:  [87 %-99 %] 90 %  BP: (124-162)/() 134/62       Lines/Drains/Airways       Peripheral Intravenous Line  Duration                  Peripheral IV - Single Lumen 04/10/24 1716 20 G Left;Posterior Wrist 2 days         Peripheral IV - Single Lumen 04/10/24 1716 20 G Posterior;Right Wrist 2 days         Peripheral IV - Single Lumen 04/12/24 1900 18 G Anterior;Left Forearm <1 day         Peripheral IV - Single Lumen 04/12/24 1902 18 G Anterior;Right Forearm <1 day                     Physical Exam     NAD  Normal WOB, no stridor or stertor on forced respiration  Breathy/strained voiced, with falsetto c/w vocal cord paralysis  Bulky neck dressing coverage wound, not removed    Significant Labs:  CBC:   Recent Labs   Lab 04/13/24  0305   WBC 11.66   RBC 4.85   HGB 13.2   HCT 40.5      MCV 84   MCH 27.2   MCHC 32.6       Significant Diagnostics:  I have reviewed all pertinent imaging results/findings within the past 24 hours.

## 2024-04-13 NOTE — ASSESSMENT & PLAN NOTE
Pt complaining of reflux, requesting medication for treatment    Plan:  -- Famotidine  -- Consider deescalating to calcium carbonate if well controlled

## 2024-04-13 NOTE — SUBJECTIVE & OBJECTIVE
Interval History/Significant Events: NAEO    Review of Systems   Constitutional:  Negative for chills and fever.   Respiratory:  Negative for chest tightness and shortness of breath.    Cardiovascular:  Negative for chest pain and leg swelling.   Gastrointestinal:  Positive for vomiting (vomited Effer-K). Negative for nausea.   Skin:  Positive for wound.   Neurological:  Negative for dizziness, light-headedness and headaches.     Objective:     Vital Signs (Most Recent):  Temp: 97.7 °F (36.5 °C) (04/13/24 0701)  Pulse: 86 (04/13/24 1557)  Resp: 16 (04/13/24 1535)  BP: (!) 186/84 (04/13/24 1300)  SpO2: (!) 92 % (04/13/24 1300) Vital Signs (24h Range):  Temp:  [97.7 °F (36.5 °C)-98.7 °F (37.1 °C)] 97.7 °F (36.5 °C)  Pulse:  [] 86  Resp:  [10-38] 16  SpO2:  [89 %-98 %] 92 %  BP: (131-187)/(59-86) 186/84   Weight: 84.5 kg (186 lb 4.6 oz)  Body mass index is 30.07 kg/m².      Intake/Output Summary (Last 24 hours) at 4/13/2024 1607  Last data filed at 4/13/2024 1200  Gross per 24 hour   Intake 2729.11 ml   Output 2350 ml   Net 379.11 ml          Physical Exam  Vitals and nursing note reviewed.   Constitutional:       Appearance: She is ill-appearing.   HENT:      Mouth/Throat:      Mouth: Mucous membranes are moist.      Pharynx: No oropharyngeal exudate or posterior oropharyngeal erythema.   Neck:      Comments: Dressing in place over neck wound, c/d/i  Cardiovascular:      Rate and Rhythm: Normal rate and regular rhythm.      Heart sounds: Normal heart sounds.   Pulmonary:      Effort: Pulmonary effort is normal.      Breath sounds: Normal breath sounds. No stridor.   Abdominal:      Palpations: Abdomen is soft.      Tenderness: There is no abdominal tenderness.   Skin:     General: Skin is warm and dry.   Neurological:      General: No focal deficit present.      Mental Status: She is alert and oriented to person, place, and time.            Vents:     Lines/Drains/Airways       Peripheral Intravenous Line   Duration                  Peripheral IV - Single Lumen 04/10/24 1716 20 G Left;Posterior Wrist 2 days         Peripheral IV - Single Lumen 04/10/24 1716 20 G Posterior;Right Wrist 2 days         Peripheral IV - Single Lumen 04/12/24 1900 18 G Anterior;Left Forearm <1 day         Peripheral IV - Single Lumen 04/12/24 1902 18 G Anterior;Right Forearm <1 day                  Significant Labs:    CBC/Anemia Profile:  Recent Labs   Lab 04/11/24  1623 04/12/24  0432 04/13/24  0305   WBC 14.33* 12.94* 11.66   HGB 13.3 13.6 13.2   HCT 40.9 41.9 40.5    344 335   MCV 84 84 84   RDW 14.4 14.3 14.6*        Chemistries:  Recent Labs   Lab 04/11/24  1622 04/12/24  0002 04/12/24  0432 04/12/24  1627 04/12/24  2349 04/13/24  0305      < > 139 140 139 138   K 2.3*   < > 2.8* 2.9* 3.0* 2.8*   CL 94*   < > 95 94* 94* 95   CO2 35*   < > 34* 35* 35* 35*   BUN 7   < > 6 6 6 8   CREATININE 0.6   < > 0.6 0.5 0.6 0.7   CALCIUM 11.4*   < > 10.8* 11.0* 11.0* 11.2*   ALBUMIN 2.9*  --   --  2.8*  --  2.7*   PROT 6.0  --   --  6.3  --  6.0   BILITOT 0.7  --   --  0.8  --  0.8   ALKPHOS 123  --   --  139*  --  134   ALT 32  --   --  35  --  33   AST 24  --   --  39  --  28   MG 2.0   < > 1.6 1.4* 2.2 2.0   PHOS 1.6*   < > 2.5* 2.6*  --  2.4*    < > = values in this interval not displayed.       All pertinent labs within the past 24 hours have been reviewed.    Significant Imaging:  I have reviewed all pertinent imaging results/findings within the past 24 hours.

## 2024-04-13 NOTE — CONSULTS
Palliative medicine consult received to introduce services and initiate goals of care. Prognosis not established at this time as biopsy results are pending, therefore not evaluated by Oncology yet.    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 Per chart review, she pursued alternative treatment in [place], including stem cell infusions. She was transferred from [facility] with open neck wound and unresectable disease, s/p biopsy 4/12/24 by ENT, anticipating step-down from the MICU after airway watch. Also anticipating trach/PEG for life prolongation and exploring options for likely palliative cancer-directed therapies pending biopsy results. Unlikely to achieve cure from disease due to extensive nature.    Thank you for the consult. Our palliative medicine team will visit Ms. Cash and her family on Monday, 4/15/24 to introduce our services, offer to complete available ACP documentation and initiate goals of care conversation, which will become more effective as prognosis is better established.

## 2024-04-13 NOTE — PLAN OF CARE
Problem: SLP  Goal: SLP Goal  Description: Speech Language Pathology Goals  Goals expected to be met by 4/20  1. Modified Barium Swallow Study  2. Ongoing swallow assessment  Outcome: Ongoing, Progressing    SLP Clinical Swallow Evaluation completed. See note for details.

## 2024-04-13 NOTE — HOSPITAL COURSE
Evaluated 4/12 in MICU by ENT, who do not feel patient is a candidate for surgical intervention. Palliative care and oncology consulted. Failed swallow study, SLP recommending NPO 4/13. Patient continues to protect airway. Stepped down from MICU on 4/13.

## 2024-04-13 NOTE — ASSESSMENT & PLAN NOTE
Patient taking Levothyroxine 175 mcg daily and Liothyronine 5 mcg daily    - Continue Levothyroxine IV, 125 mcg daily

## 2024-04-13 NOTE — PT/OT/SLP EVAL
Speech Language Pathology Evaluation  Bedside Swallow    Patient Name:  Marysol Cash   MRN:  [id number]  Admitting Diagnosis: Sepsis    Recommendations:                 General Recommendations:  Dysphagia therapy and Modified barium swallow study  Diet recommendations:  NPO, NPO   Aspiration Precautions: May need to consider Alternate means of nutrition/hydration, Frequent oral care, Ice chips sparingly, necessary Meds crushed in puree, and Strict aspiration precautions   Recs communicated with team via secure chat   General Precautions: Standard, aspiration, NPO, fall  Communication strategies:  none    Assessment:     Marysol Cash is a 58 y.o. female with an SLP diagnosis of Dysphagia.  She presents with s/s aspiration.    History:     Past Medical History:   Diagnosis Date    GERD (gastroesophageal reflux disease)     Heart valve problem     Hypertension     Obesity (BMI 30-39.9) 10/20/2014    Thyroid disease        Past Surgical History:   Procedure Laterality Date    cesaean section  1991, 1993    CHOLECYSTECTOMY  2008    COLONOSCOPY  09/2013    ESOPHAGOGASTRODUODENOSCOPY N/A 5/11/2020    Procedure: EGD (ESOPHAGOGASTRODUODENOSCOPY);  Surgeon: Nahed Zabala MD;  Location: [facility];  Service: Endoscopy;  Laterality: N/A;  covid 5/8/2020    HYSTERECTOMY  age 27    for fibroids    SALPINGECTOMY  1992    for ectopic pregnancy    TONGUE SURGERY  03/04/2022    Cancer removed on tongue and lymphs    TOTAL THYROIDECTOMY  2012    UPPER GASTROINTESTINAL ENDOSCOPY  09/2013    gastritis-hp neg       Modified Barium Swallow: 2/6/20  Pt demonstrated a functional swallow for PO intake as noted above. Recommend a regular diet with thin liquids and standard aspiration precautions. Pt's reported symptoms appear to be related to potential GI-dysfunctions; additionally, mild levels of stasis observed with small trials of thin liquids and puree with retrograde movement could be indicative of GI-related disorders.  Recommend follow up with GI for additional testing to identify presence/severity of GI-related dysfunctions and to determine the most appropriate treatment plan. No additional skilled speech services required at this time.       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 Biopsy taken at bedside today to confirm. FFL showed R diffuse laryngeal and pharyngeal edema and fullness obscuring true vocal cords. Patient does not appear to be in respiratory distress and does not complain of difficulty breathing despite FFL findings. Recommended consideration of trach and PEG to patient and family - discussion detailed in original consult note     -Recommend palliative care consult to discuss goals of care and treatment and code status         -Regarding tracheostomy - would be very challenging technically, due to extent of disease and anatomic distortion. It would present significant wound care issues given proximity of open neck wound to tracheostomy site and significant risk given location of major blood vessels (innominate artery) - in addition to usually morbidity of chronic tracheostomy dependency  -Recommend heme/onc consult  -F/u biopsy  -No acute ENT intervention  -Floor vs. ICU per MICU    Subjective   Awake & alert. Large right neck mass covered in gauze bandage.  & NSG at bedside.     Pain/Comfort:  Pain Rating 1: 0/10  Pain Rating Post-Intervention 2: 0/10    Respiratory Status: Room air    Objective:     Oral Musculature Evaluation  Oral Musculature:  (no movement of tongue)  Dentition: present and adequate  Secretion  Management:  (Prodcutive coughing throughout assessment)  Oral Labial Strength and Mobility: WFL  Lingual Strength and Mobility: impaired strength, impaired depression, impaired protrusion, impaired anterior elevation, impaired left lateral movement, impaired right lateral movement  Volitional Cough: adequate cough  Volitional Swallow: not elicited  Voice Prior to PO Intake: intermittently clear/wet    Pt found with intermittent productive coughing up of mucous. SLP asked NSG to set up yankeur for pt which was beneficial. Pt with intermittently wet voicing prior to coughing up mucous episodes. Pt was able to elicit clear voicing intermittently immediately after clearance of mucous.     Bedside Swallow Eval:   Consistencies Assessed:  Thin liquids ice chips x3, via tsp x2, via cup sips x2  Nectar thick liquids via tsp x2, via cup sip x1  Puree 1 tsp x4      Oral Phase:   Slow oral transit time    Pharyngeal Phase:   coughing/choking  multiple spontaneous swallows with all trials    Pt tolerated ice chips & small bite of puree with no overt s/s aspiration. With with coughing following last cup sip of thin & following cup sip of nectar. SLP recs MBSS to objectively assess swallowing & determine if pt is safe for a diet. SLP educated pt &  & answered all questions regarding recs, precautions, details & reasons for MBSS, risks & s/s aspiration, role of SLP & POC, etc. All verbalized understanding.   expressed hesitation about pt going under radiation for testing. SLP educated & reassured.    Goals:   Multidisciplinary Problems       SLP Goals          Problem: SLP    Goal Priority Disciplines Outcome   SLP Goal     SLP Ongoing, Progressing   Description: Speech Language Pathology Goals  Goals expected to be met by 4/20  1. Modified Barium Swallow Study  2. Ongoing swallow assessment                       Plan:     Patient to be seen:  4 x/week   Plan of Care expires:  05/12/24  Plan of Care reviewed with:   patient, spouse   SLP Follow-Up:  Yes       Discharge recommendations:  High Intensity Therapy     Time Tracking:     SLP Treatment Date:   04/13/24  Speech Start Time:  1150  Speech Stop Time:  1219     Speech Total Time (min):  29 min    Billable Minutes: Eval Swallow and Oral Function 15 and Self Care/Home Management Training 14    04/13/2024

## 2024-04-14 LAB
ALBUMIN SERPL BCP-MCNC: 2.7 G/DL (ref 3.5–5.2)
ALP SERPL-CCNC: 124 U/L (ref 55–135)
ALT SERPL W/O P-5'-P-CCNC: 25 U/L (ref 10–44)
ANION GAP SERPL CALC-SCNC: 9 MMOL/L (ref 8–16)
AST SERPL-CCNC: 21 U/L (ref 10–40)
BASOPHILS # BLD AUTO: 0.04 K/UL (ref 0–0.2)
BASOPHILS NFR BLD: 0.2 % (ref 0–1.9)
BILIRUB SERPL-MCNC: 0.6 MG/DL (ref 0.1–1)
BUN SERPL-MCNC: 11 MG/DL (ref 6–20)
CALCIUM SERPL-MCNC: 12.1 MG/DL (ref 8.7–10.5)
CHLORIDE SERPL-SCNC: 96 MMOL/L (ref 95–110)
CO2 SERPL-SCNC: 32 MMOL/L (ref 23–29)
CREAT SERPL-MCNC: 0.7 MG/DL (ref 0.5–1.4)
DIFFERENTIAL METHOD BLD: ABNORMAL
EOSINOPHIL # BLD AUTO: 0 K/UL (ref 0–0.5)
EOSINOPHIL NFR BLD: 0 % (ref 0–8)
ERYTHROCYTE [DISTWIDTH] IN BLOOD BY AUTOMATED COUNT: 14.8 % (ref 11.5–14.5)
EST. GFR  (NO RACE VARIABLE): >60 ML/MIN/1.73 M^2
GLUCOSE SERPL-MCNC: 135 MG/DL (ref 70–110)
HCT VFR BLD AUTO: 42.5 % (ref 37–48.5)
HGB BLD-MCNC: 13.7 G/DL (ref 12–16)
IMM GRANULOCYTES # BLD AUTO: 0.21 K/UL (ref 0–0.04)
IMM GRANULOCYTES NFR BLD AUTO: 1 % (ref 0–0.5)
LYMPHOCYTES # BLD AUTO: 1.9 K/UL (ref 1–4.8)
LYMPHOCYTES NFR BLD: 8.9 % (ref 18–48)
MAGNESIUM SERPL-MCNC: 1.7 MG/DL (ref 1.6–2.6)
MCH RBC QN AUTO: 27.3 PG (ref 27–31)
MCHC RBC AUTO-ENTMCNC: 32.2 G/DL (ref 32–36)
MCV RBC AUTO: 85 FL (ref 82–98)
MONOCYTES # BLD AUTO: 1 K/UL (ref 0.3–1)
MONOCYTES NFR BLD: 4.6 % (ref 4–15)
NEUTROPHILS # BLD AUTO: 18.1 K/UL (ref 1.8–7.7)
NEUTROPHILS NFR BLD: 85.3 % (ref 38–73)
NRBC BLD-RTO: 0 /100 WBC
PHOSPHATE SERPL-MCNC: 3.3 MG/DL (ref 2.7–4.5)
PLATELET # BLD AUTO: 369 K/UL (ref 150–450)
PMV BLD AUTO: 10.2 FL (ref 9.2–12.9)
POCT GLUCOSE: 116 MG/DL (ref 70–110)
POCT GLUCOSE: 128 MG/DL (ref 70–110)
POCT GLUCOSE: 137 MG/DL (ref 70–110)
POTASSIUM SERPL-SCNC: 3.1 MMOL/L (ref 3.5–5.1)
PROT SERPL-MCNC: 5.9 G/DL (ref 6–8.4)
RBC # BLD AUTO: 5.02 M/UL (ref 4–5.4)
SODIUM SERPL-SCNC: 137 MMOL/L (ref 136–145)
VANCOMYCIN TROUGH SERPL-MCNC: 36.4 UG/ML (ref 10–22)
WBC # BLD AUTO: 21.18 K/UL (ref 3.9–12.7)

## 2024-04-14 PROCEDURE — 84100 ASSAY OF PHOSPHORUS: CPT | Performed by: STUDENT IN AN ORGANIZED HEALTH CARE EDUCATION/TRAINING PROGRAM

## 2024-04-14 PROCEDURE — 63600175 PHARM REV CODE 636 W HCPCS: Performed by: STUDENT IN AN ORGANIZED HEALTH CARE EDUCATION/TRAINING PROGRAM

## 2024-04-14 PROCEDURE — 25000003 PHARM REV CODE 250

## 2024-04-14 PROCEDURE — 20600001 HC STEP DOWN PRIVATE ROOM

## 2024-04-14 PROCEDURE — 94761 N-INVAS EAR/PLS OXIMETRY MLT: CPT

## 2024-04-14 PROCEDURE — 80053 COMPREHEN METABOLIC PANEL: CPT | Performed by: STUDENT IN AN ORGANIZED HEALTH CARE EDUCATION/TRAINING PROGRAM

## 2024-04-14 PROCEDURE — 63600175 PHARM REV CODE 636 W HCPCS

## 2024-04-14 PROCEDURE — 83735 ASSAY OF MAGNESIUM: CPT | Performed by: STUDENT IN AN ORGANIZED HEALTH CARE EDUCATION/TRAINING PROGRAM

## 2024-04-14 PROCEDURE — 25000003 PHARM REV CODE 250: Performed by: STUDENT IN AN ORGANIZED HEALTH CARE EDUCATION/TRAINING PROGRAM

## 2024-04-14 PROCEDURE — 25000003 PHARM REV CODE 250: Performed by: INTERNAL MEDICINE

## 2024-04-14 PROCEDURE — 85025 COMPLETE CBC W/AUTO DIFF WBC: CPT | Performed by: STUDENT IN AN ORGANIZED HEALTH CARE EDUCATION/TRAINING PROGRAM

## 2024-04-14 PROCEDURE — 80202 ASSAY OF VANCOMYCIN: CPT | Performed by: INTERNAL MEDICINE

## 2024-04-14 PROCEDURE — 99223 1ST HOSP IP/OBS HIGH 75: CPT | Mod: ,,, | Performed by: STUDENT IN AN ORGANIZED HEALTH CARE EDUCATION/TRAINING PROGRAM

## 2024-04-14 PROCEDURE — 63600175 PHARM REV CODE 636 W HCPCS: Performed by: INTERNAL MEDICINE

## 2024-04-14 RX ORDER — DEXTROSE MONOHYDRATE, SODIUM CHLORIDE, AND POTASSIUM CHLORIDE 50; 2.98; 4.5 G/1000ML; G/1000ML; G/1000ML
INJECTION, SOLUTION INTRAVENOUS CONTINUOUS
Status: DISPENSED | OUTPATIENT
Start: 2024-04-14 | End: 2024-04-15

## 2024-04-14 RX ORDER — POTASSIUM CHLORIDE 7.45 MG/ML
10 INJECTION INTRAVENOUS
Status: DISCONTINUED | OUTPATIENT
Start: 2024-04-14 | End: 2024-04-14

## 2024-04-14 RX ORDER — MAGNESIUM SULFATE HEPTAHYDRATE 40 MG/ML
2 INJECTION, SOLUTION INTRAVENOUS ONCE
Status: COMPLETED | OUTPATIENT
Start: 2024-04-14 | End: 2024-04-14

## 2024-04-14 RX ORDER — ACETAMINOPHEN 650 MG/20.3ML
650 LIQUID ORAL EVERY 4 HOURS PRN
Status: DISCONTINUED | OUTPATIENT
Start: 2024-04-14 | End: 2024-05-08 | Stop reason: HOSPADM

## 2024-04-14 RX ADMIN — PROCHLORPERAZINE EDISYLATE 2.5 MG: 5 INJECTION INTRAMUSCULAR; INTRAVENOUS at 01:04

## 2024-04-14 RX ADMIN — METOROPROLOL TARTRATE 5 MG: 5 INJECTION, SOLUTION INTRAVENOUS at 12:04

## 2024-04-14 RX ADMIN — PROCHLORPERAZINE EDISYLATE 2.5 MG: 5 INJECTION INTRAMUSCULAR; INTRAVENOUS at 07:04

## 2024-04-14 RX ADMIN — CEFEPIME 2 G: 2 INJECTION, POWDER, FOR SOLUTION INTRAVENOUS at 09:04

## 2024-04-14 RX ADMIN — MUPIROCIN: 20 OINTMENT TOPICAL at 08:04

## 2024-04-14 RX ADMIN — ONDANSETRON 4 MG: 2 INJECTION INTRAMUSCULAR; INTRAVENOUS at 04:04

## 2024-04-14 RX ADMIN — METRONIDAZOLE 500 MG: 500 INJECTION, SOLUTION INTRAVENOUS at 01:04

## 2024-04-14 RX ADMIN — POTASSIUM CHLORIDE 10 MEQ: 7.46 INJECTION, SOLUTION INTRAVENOUS at 10:04

## 2024-04-14 RX ADMIN — FLUCONAZOLE 200 MG: 2 INJECTION, SOLUTION INTRAVENOUS at 11:04

## 2024-04-14 RX ADMIN — POTASSIUM CHLORIDE 10 MEQ: 7.46 INJECTION, SOLUTION INTRAVENOUS at 09:04

## 2024-04-14 RX ADMIN — MUPIROCIN: 20 OINTMENT TOPICAL at 09:04

## 2024-04-14 RX ADMIN — HYDROMORPHONE HYDROCHLORIDE 0.5 MG: 1 INJECTION, SOLUTION INTRAMUSCULAR; INTRAVENOUS; SUBCUTANEOUS at 10:04

## 2024-04-14 RX ADMIN — LIOTHYRONINE SODIUM 5 MCG: 5 TABLET ORAL at 06:04

## 2024-04-14 RX ADMIN — ENOXAPARIN SODIUM 40 MG: 40 INJECTION SUBCUTANEOUS at 06:04

## 2024-04-14 RX ADMIN — CEFEPIME 2 G: 2 INJECTION, POWDER, FOR SOLUTION INTRAVENOUS at 04:04

## 2024-04-14 RX ADMIN — HYDROMORPHONE HYDROCHLORIDE 0.5 MG: 1 INJECTION, SOLUTION INTRAMUSCULAR; INTRAVENOUS; SUBCUTANEOUS at 04:04

## 2024-04-14 RX ADMIN — CEFEPIME 2 G: 2 INJECTION, POWDER, FOR SOLUTION INTRAVENOUS at 12:04

## 2024-04-14 RX ADMIN — POTASSIUM CHLORIDE 10 MEQ: 7.46 INJECTION, SOLUTION INTRAVENOUS at 08:04

## 2024-04-14 RX ADMIN — DEXTROSE MONOHYDRATE, SODIUM CHLORIDE, AND POTASSIUM CHLORIDE: 50; 4.5; 2.98 INJECTION, SOLUTION INTRAVENOUS at 01:04

## 2024-04-14 RX ADMIN — VANCOMYCIN HYDROCHLORIDE 2000 MG: 500 INJECTION, POWDER, LYOPHILIZED, FOR SOLUTION INTRAVENOUS at 06:04

## 2024-04-14 RX ADMIN — METOROPROLOL TARTRATE 5 MG: 5 INJECTION, SOLUTION INTRAVENOUS at 01:04

## 2024-04-14 RX ADMIN — HYDROMORPHONE HYDROCHLORIDE 0.5 MG: 1 INJECTION, SOLUTION INTRAMUSCULAR; INTRAVENOUS; SUBCUTANEOUS at 12:04

## 2024-04-14 RX ADMIN — METRONIDAZOLE 500 MG: 500 INJECTION, SOLUTION INTRAVENOUS at 04:04

## 2024-04-14 RX ADMIN — MAGNESIUM SULFATE HEPTAHYDRATE 2 G: 40 INJECTION, SOLUTION INTRAVENOUS at 07:04

## 2024-04-14 RX ADMIN — HYDROMORPHONE HYDROCHLORIDE 0.5 MG: 1 INJECTION, SOLUTION INTRAMUSCULAR; INTRAVENOUS; SUBCUTANEOUS at 06:04

## 2024-04-14 RX ADMIN — HYDROMORPHONE HYDROCHLORIDE 0.5 MG: 1 INJECTION, SOLUTION INTRAMUSCULAR; INTRAVENOUS; SUBCUTANEOUS at 08:04

## 2024-04-14 RX ADMIN — METRONIDAZOLE 500 MG: 500 INJECTION, SOLUTION INTRAVENOUS at 09:04

## 2024-04-14 RX ADMIN — METOROPROLOL TARTRATE 5 MG: 5 INJECTION, SOLUTION INTRAVENOUS at 06:04

## 2024-04-14 RX ADMIN — FAMOTIDINE 20 MG: 10 INJECTION, SOLUTION INTRAVENOUS at 08:04

## 2024-04-14 RX ADMIN — ONDANSETRON 4 MG: 2 INJECTION INTRAMUSCULAR; INTRAVENOUS at 10:04

## 2024-04-14 NOTE — CONSULTS
[facility] - Cardiac Medical ICU  Wound Care    Patient Name:  Marysol Cash   MRN:  [id number]  Date: 4/13/2024  Diagnosis: Sepsis    History:     Past Medical History:   Diagnosis Date    GERD (gastroesophageal reflux disease)     Heart valve problem     Hypertension     Obesity (BMI 30-39.9) 10/20/2014    Thyroid disease        Social History     Socioeconomic History    Marital status:    Tobacco Use    Smoking status: Never    Smokeless tobacco: Never   Substance and Sexual Activity    Alcohol use: No     Alcohol/week: 0.0 standard drinks of alcohol    Drug use: No    Sexual activity: Yes     Partners: Male     Birth control/protection: See Surgical Hx     Social Determinants of Health     Financial Resource Strain: Low Risk  (4/12/2024)    Overall Financial Resource Strain (CARDIA)     Difficulty of Paying Living Expenses: Not very hard   Food Insecurity: No Food Insecurity (4/12/2024)    Hunger Vital Sign     Worried About Running Out of Food in the Last Year: Never true     Ran Out of Food in the Last Year: Never true   Transportation Needs: No Transportation Needs (4/12/2024)    PRAPARE - Transportation     Lack of Transportation (Medical): No     Lack of Transportation (Non-Medical): No   Physical Activity: Inactive (4/12/2024)    Exercise Vital Sign     Days of Exercise per Week: 0 days     Minutes of Exercise per Session: 0 min   Stress: Stress Concern Present (4/12/2024)    [employer] - Occupational Stress Questionnaire     Feeling of Stress : Rather much   Social Connections: Moderately Integrated (4/12/2024)    Social Connection and Isolation Panel [NHANES]     Frequency of Communication with Friends and Family: More than three times a week     Frequency of Social Gatherings with Friends and Family: More than three times a week     Attends Druze Services: More than 4 times per year     Active Member of Clubs or Organizations: No     Attends Club or Organization  Meetings: Never     Marital Status:    Housing Stability: Low Risk  (4/12/2024)    Housing Stability Vital Sign     Unable to Pay for Housing in the Last Year: No     Number of Places Lived in the Last Year: 1     Unstable Housing in the Last Year: No       Precautions:     Allergies as of 04/10/2024 - Reviewed 04/10/2024   Allergen Reaction Noted    Ciprofloxacin Swelling 07/08/2014    Codeine Swelling 07/08/2014    Pcn [penicillins] Hives 07/08/2014    Percocet [oxycodone-acetaminophen] Swelling 07/08/2014       WOC Assessment Details/Treatment   Patient seen for wound care consultation. RN consult for Right Neck wound  Reviewed chart for this encounter.   See Flow Sheet for findings.    Pt resting in bed in NAD.  AAOx3.   in room.  Pt agrees to wound care to right neck wound.  Removed gauze dressing.  Small serous/yellow drainage noted on dressing.  Wound noted moist with small serous drainage across wound bed.  Deep open areas noted distally.  Unable to visualize wound base.  Cleaned wound with Vashe antimicrobial wound cleanser.  Applied Aquacel Ag to wound for antimicrobial coverage absorption of drainage, and odor control.  Covered with AG foam dressing.  Pt tolerated dressing change with minimal discomfort.  Primary Nurse present fot dressing change.      RECOMMENDATIONS:  Clean wound with Vashe antimicrobial wound cleanser.  Apply Aquacel Ag rope to wound base.  Cover with Foam dressing.  Change dressing Daily and PRN soilage.      Discussed POC with patient and primary nurse.   See EMR for orders & patient education.    Discussed nutrition and the role of protein in wound healing with the patient. Instructed patient to optimize protein for wound healing.    Bedside nursing to continue care & monitoring.  Bedside nursing to maintain pressure injury prevention interventions.     04/13/24 1445   WOCN Assessment   WOCN Total Time (mins) 40   Visit Date 04/13/24   Visit Time 1445   Consult Type  New   WOCN Speciality Wound   Wound surgical   Intervention assessed;changed;applied;chart review;coordination of care;orders   Teaching on-going   Skin Interventions   Device Skin Pressure Protection absorbent pad utilized/changed;adhesive use limited;positioning supports utilized;pressure points protected   Pressure Reduction Devices pressure-redistributing mattress utilized   Pressure Reduction Techniques frequent weight shift encouraged   Skin Protection adhesive use limited   Positioning   Body Position position changed independently   Head of Bed (HOB) Positioning HOB elevated   Positioning/Transfer Devices pillows   Pressure Injury Prevention    Check Moisture Management Pad Done   Check Medical Devices Done        Altered Skin Integrity 04/11/24 1901 Right Throat #1   Date First Assessed/Time First Assessed: 04/11/24 1901   Altered Skin Integrity Present on Admission - Did Patient arrive to the hospital with altered skin?: yes  Side: Right  Location: Throat  Wound Number: #1   Wound Image     Dressing Appearance Moist drainage   Drainage Amount Small   Drainage Characteristics/Odor Serous;Yellow   Appearance Pink;Red;Tan;Yellow;Slough;Moist;Ecchymotic   Tissue loss description Full thickness   Periwound Area Denuded;Ecchymotic;Excoriated;Moist;Redness   Wound Edges Jagged;Irregular   Wound Length (cm) 13 cm   Wound Width (cm) 1 cm   Wound Depth (cm) 5 cm   Wound Volume (cm^3) 65 cm^3   Wound Surface Area (cm^2) 13 cm^2   Care Cleansed with:;Antimicrobial agent;Wound cleanser   Dressing Silver;Hydrofiber;Foam   Periwound Care Absorptive dressing applied;Cleansed with pH balanced cleanser   Dressing Change Due 04/15/24     Taina Pete RN, BSN, CWON  04/13/2024

## 2024-04-14 NOTE — SUBJECTIVE & OBJECTIVE
Interval History: Failed bedside swallow per SLP. MBSS and palliative tomorrow. Denies issues breathing    Medications:  Continuous Infusions:  Current Facility-Administered Medications   Medication Dose Route Frequency Provider Last Rate Last Admin    acetaminophen oral solution 650 mg  650 mg Oral Q6H PRN Samia Gill MD        ceFEPIme (MAXIPIME) 2 g in dextrose 5 % in water (D5W) 100 mL IVPB (MB+)  2 g Intravenous Q8H Samia Gill MD   Stopped at 04/14/24 0520    dextrose 10% bolus 125 mL 125 mL  12.5 g Intravenous PRN Samia Gill MD        dextrose 10% bolus 250 mL 250 mL  25 g Intravenous PRN Samia Gill MD        enoxaparin injection 40 mg  40 mg Subcutaneous Q24H (prophylaxis, 1700) Samia Gill MD   40 mg at 04/13/24 1737    famotidine (PF) injection 20 mg  20 mg Intravenous BID Samia Gill MD   20 mg at 04/13/24 2012    fluconazole (DIFLUCAN) IVPB 200 mg  200 mg Intravenous Q24H Samia Gill MD   Stopped at 04/13/24 1241    glucagon (human recombinant) injection 1 mg  1 mg Intramuscular PRN Dakota King MD        glucose chewable tablet 16 g  16 g Oral PRN Samia Gill MD        glucose chewable tablet 24 g  24 g Oral PRN Samia Gill MD        HYDROmorphone injection 0.5 mg  0.5 mg Intravenous Q4H PRN Samia Gill MD   0.5 mg at 04/14/24 0452    hydrOXYzine pamoate capsule 25 mg  25 mg Oral Q8H PRN Samia Gill MD   25 mg at 04/12/24 2110    insulin aspart U-100 pen 0-10 Units  0-10 Units Subcutaneous Q6H PRN Dakota King MD        liothyronine tablet 5 mcg  5 mcg Oral Before breakfast Samia Gill MD   5 mcg at 04/14/24 0605    magnesium sulfate 2g in water 50mL IVPB (premix)  2 g Intravenous Once Minerva Cardenas DO 25 mL/hr at 04/14/24 0734 2 g at 04/14/24 0734    metoprolol injection 5 mg  5 mg Intravenous Q6H Ky Salamanca MD   5 mg at 04/14/24 0604    metronidazole IVPB 500 mg   500 mg Intravenous Q8H Samia Gill MD   Stopped at 04/14/24 0549    mupirocin 2 % ointment   Nasal BID Samia Gill MD   Given at 04/13/24 2012    naloxone 0.4 mg/mL injection 0.02 mg  0.02 mg Intravenous PRN Samia Gill MD        ondansetron injection 4 mg  4 mg Intravenous Q4H PRN Samia Gill MD   4 mg at 04/14/24 0452    potassium bicarbonate disintegrating tablet 40 mEq  40 mEq Oral Once Ky Salamanca MD        potassium chloride 10 mEq in 100 mL IVPB  10 mEq Intravenous Q1H Minerva Cardenas DO        prochlorperazine injection Soln 2.5 mg  2.5 mg Intravenous Q6H PRN Minerva Cardenas DO   2.5 mg at 04/14/24 0100    vancomycin - pharmacy to dose   Intravenous pharmacy to manage frequency Samia Gill MD         Scheduled Meds:  Current Facility-Administered Medications   Medication Dose Route Frequency Provider Last Rate Last Admin    acetaminophen oral solution 650 mg  650 mg Oral Q6H PRN Samia Gill MD        ceFEPIme (MAXIPIME) 2 g in dextrose 5 % in water (D5W) 100 mL IVPB (MB+)  2 g Intravenous Q8H Samia Gill MD   Stopped at 04/14/24 0520    dextrose 10% bolus 125 mL 125 mL  12.5 g Intravenous PRN Samia Gill MD        dextrose 10% bolus 250 mL 250 mL  25 g Intravenous PRN Samia Gill MD        enoxaparin injection 40 mg  40 mg Subcutaneous Q24H (prophylaxis, 1700) Samia Gill MD   40 mg at 04/13/24 1737    famotidine (PF) injection 20 mg  20 mg Intravenous BID Samia Gill MD   20 mg at 04/13/24 2012    fluconazole (DIFLUCAN) IVPB 200 mg  200 mg Intravenous Q24H Samia Gill MD   Stopped at 04/13/24 1241    glucagon (human recombinant) injection 1 mg  1 mg Intramuscular PRN Dakota King MD        glucose chewable tablet 16 g  16 g Oral PRN Samia Gill MD        glucose chewable tablet 24 g  24 g Oral PRN Samia Gill MD        HYDROmorphone injection 0.5 mg  0.5  mg Intravenous Q4H PRN Samia Gill MD   0.5 mg at 04/14/24 0452    hydrOXYzine pamoate capsule 25 mg  25 mg Oral Q8H PRN Samia Gill MD   25 mg at 04/12/24 2110    insulin aspart U-100 pen 0-10 Units  0-10 Units Subcutaneous Q6H PRN Dakota King MD        liothyronine tablet 5 mcg  5 mcg Oral Before breakfast Samia Gill MD   5 mcg at 04/14/24 0605    magnesium sulfate 2g in water 50mL IVPB (premix)  2 g Intravenous Once Minerva Cardenas DO 25 mL/hr at 04/14/24 0734 2 g at 04/14/24 0734    metoprolol injection 5 mg  5 mg Intravenous Q6H Ky Salamanca MD   5 mg at 04/14/24 0604    metronidazole IVPB 500 mg  500 mg Intravenous Q8H Samia Gill MD   Stopped at 04/14/24 0549    mupirocin 2 % ointment   Nasal BID Samia Gill MD   Given at 04/13/24 2012    naloxone 0.4 mg/mL injection 0.02 mg  0.02 mg Intravenous PRN Samia Gill MD        ondansetron injection 4 mg  4 mg Intravenous Q4H PRN Samia Gill MD   4 mg at 04/14/24 0452    potassium bicarbonate disintegrating tablet 40 mEq  40 mEq Oral Once Ky Salamanca MD        potassium chloride 10 mEq in 100 mL IVPB  10 mEq Intravenous Q1H Minerva Cardenas DO        prochlorperazine injection Soln 2.5 mg  2.5 mg Intravenous Q6H PRN Minerva Cardenas DO   2.5 mg at 04/14/24 0100    vancomycin - pharmacy to dose   Intravenous pharmacy to manage frequency Samia Gill MD         PRN Meds:  Current Facility-Administered Medications   Medication Dose Route Frequency Provider Last Rate Last Admin    acetaminophen oral solution 650 mg  650 mg Oral Q6H PRN Samia Gill MD        ceFEPIme (MAXIPIME) 2 g in dextrose 5 % in water (D5W) 100 mL IVPB (MB+)  2 g Intravenous Q8H Samia Gill MD   Stopped at 04/14/24 0520    dextrose 10% bolus 125 mL 125 mL  12.5 g Intravenous PRN Samia Gill MD        dextrose 10% bolus 250 mL 250 mL  25 g Intravenous PRN Samia Gill MD         enoxaparin injection 40 mg  40 mg Subcutaneous Q24H (prophylaxis, 1700) Samia Gill MD   40 mg at 04/13/24 1737    famotidine (PF) injection 20 mg  20 mg Intravenous BID Samia Gill MD   20 mg at 04/13/24 2012    fluconazole (DIFLUCAN) IVPB 200 mg  200 mg Intravenous Q24H Samia Gill MD   Stopped at 04/13/24 1241    glucagon (human recombinant) injection 1 mg  1 mg Intramuscular PRN Dakota King MD        glucose chewable tablet 16 g  16 g Oral PRN Samia Gill MD        glucose chewable tablet 24 g  24 g Oral PRN Samia Gill MD        HYDROmorphone injection 0.5 mg  0.5 mg Intravenous Q4H PRN Samia Gill MD   0.5 mg at 04/14/24 0452    hydrOXYzine pamoate capsule 25 mg  25 mg Oral Q8H PRN Samia Gill MD   25 mg at 04/12/24 2110    insulin aspart U-100 pen 0-10 Units  0-10 Units Subcutaneous Q6H PRN Dakota King MD        liothyronine tablet 5 mcg  5 mcg Oral Before breakfast Samia Gill MD   5 mcg at 04/14/24 0605    magnesium sulfate 2g in water 50mL IVPB (premix)  2 g Intravenous Once Ronald, Minerva, DO 25 mL/hr at 04/14/24 0734 2 g at 04/14/24 0734    metoprolol injection 5 mg  5 mg Intravenous Q6H Ky Salamanca MD   5 mg at 04/14/24 0604    metronidazole IVPB 500 mg  500 mg Intravenous Q8H Samia Gill MD   Stopped at 04/14/24 0549    mupirocin 2 % ointment   Nasal BID Samia Gill MD   Given at 04/13/24 2012    naloxone 0.4 mg/mL injection 0.02 mg  0.02 mg Intravenous PRN Samia Gill MD        ondansetron injection 4 mg  4 mg Intravenous Q4H PRN Samia Gill E, MD   4 mg at 04/14/24 0452    potassium bicarbonate disintegrating tablet 40 mEq  40 mEq Oral Once Ky Salamanca MD        potassium chloride 10 mEq in 100 mL IVPB  10 mEq Intravenous Q1H Minerva Cardenas DO        prochlorperazine injection Soln 2.5 mg  2.5 mg Intravenous Q6H PRN Minerva Cardenas DO   2.5 mg at 04/14/24 0100     vancomycin - pharmacy to dose   Intravenous pharmacy to manage frequency Callum-Samia Raymundo MD            Review of patient's allergies indicates:   Allergen Reactions    Ciprofloxacin Swelling    Codeine Swelling    Opioids - morphine analogues     Pcn [penicillins] Hives     Tolerated cefepime 04/2024    Percocet [oxycodone-acetaminophen] Swelling     Objective:     Vital Signs (24h Range):  Temp:  [97.9 °F (36.6 °C)-98.1 °F (36.7 °C)] 97.9 °F (36.6 °C)  Pulse:  [] 91  Resp:  [6-38] 16  SpO2:  [85 %-98 %] 92 %  BP: (131-187)/(59-86) 158/78       Lines/Drains/Airways       Drain  Duration             Female External Urinary Catheter w/ Suction 04/13/24 0600 1 day              Peripheral Intravenous Line  Duration                  Peripheral IV - Single Lumen 04/10/24 1716 20 G Left;Posterior Wrist 3 days         Peripheral IV - Single Lumen 04/12/24 1900 18 G Anterior;Left Forearm 1 day         Peripheral IV - Single Lumen 04/14/24 0515 18 G Posterior;Right Forearm <1 day                     Physical Exam     NAD  Normal WOB, no stridor or stertor on forced respiration  Breathy/strained voiced, with falsetto c/w vocal cord paralysis  Bulky neck dressing coverage wound, removed for exam  Near circumferential neck swelling with overlying skin discoloration, large fistulous tract at midline extending to right side of neck, minimal drainage   No tenderness to palpation, appears insensate     Significant Labs:  All pertinent labs from the last 24 hours have been reviewed.    Significant Diagnostics:  I have reviewed all pertinent imaging results/findings within the past 24 hours.

## 2024-04-14 NOTE — NURSING TRANSFER
Nursing Transfer Note      4/14/2024   2:16 PM    Nurse giving handoff:Viviane/MABEL  Nurse receiving handoff:Joe    Reason patient is being transferred: Stepdown orders    Transfer To: [id number]    Transfer via wheelchair    Transfer with cardiac monitoring    Transported by Viviane/RN    Transfer Vital Signs:  Blood Pressure:17/6/78  Heart Rate:93  O2:100 room air  Temperature:97.7  Respirations:20    Telemetry: Rhythm NSR  Order for Tele Monitor?  Yes    Additional Lines:     4eyes on Skin: yes    Medicines sent: No    Any special needs or follow-up needed: Rebeca león study 4/15/24    Patient belongings transferred with patient: Yes    Chart send with patient: Yes    Notified: spouse at side    Patient reassessed at: 4/14/24@15:40  1  Upon arrival to floor: cardiac monitor applied, patient oriented to room, call bell in reach, and bed in lowest position

## 2024-04-14 NOTE — CONSULTS
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 She developed some right neck swelling in 2023 and underwent PET/CT which showed concern for metastatic lymphadenopathy. FNA was done 7/2023 which showed metastatic carcinoma. Patient was seen by heme/onc around that time with discussion of initiating chemo/radiation. However, patient declined and instead has been receiving alternative treatment in [place] including stem cell infusions. Patient reports she developed the wound of her R neck in 7/2023 and it has slowly progressed. Developed worsening weakness, pain, and difficulty swallowing which prompted her presentation to ER. Patient denies any difficulty breathing. 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 Reports voice has worsened to her since onset of neck wound/swellIing.    Recently, CT scan was done which revealed large bulky lobulated appearing mass right lateral and anterolateral neck extending to the D spaces of the neck having overall dimensions of approximately 9.5 x 8.2 x 10 cm with large central collection of air which appears to extend superficially to the skin surface right mid neck anterior laterally. Overall appearance is very concerning for large conglomerate jose mass although superimposed abscess remains a possibility given concern for such. Associated mass effect results in displacement of midline structures to the patient's left.     She recently had flexible laryngoscopy with biopsy of abnormal tissue.    We have been consulted in light of possible locally advanced head and neck squamous  cancer      Review of patient's allergies indicates:   Allergen Reactions    Ciprofloxacin Swelling    Codeine Swelling    Opioids - morphine analogues     Pcn [penicillins] Hives     Tolerated cefepime 04/2024    Percocet [oxycodone-acetaminophen] Swelling     Past Medical History:   Diagnosis Date    GERD (gastroesophageal reflux disease)     Heart valve problem     Hypertension     Obesity (BMI 30-39.9) 10/20/2014    Thyroid disease      Past Surgical History:   Procedure Laterality Date    cesaean section  1991, 1993    CHOLECYSTECTOMY  2008    COLONOSCOPY  09/2013    ESOPHAGOGASTRODUODENOSCOPY N/A 5/11/2020    Procedure: EGD (ESOPHAGOGASTRODUODENOSCOPY);  Surgeon: Nahed Zabala MD;  Location: [facility];  Service: Endoscopy;  Laterality: N/A;  covid 5/8/2020    HYSTERECTOMY  age 27    for fibroids    SALPINGECTOMY  1992    for ectopic pregnancy    TONGUE SURGERY  03/04/2022    Cancer removed on tongue and lymphs    TOTAL THYROIDECTOMY  2012    UPPER GASTROINTESTINAL ENDOSCOPY  09/2013    gastritis-hp neg     Family History   Problem Relation Name Age of Onset    Hypertension Mother      Diabetes Mother      Hyperlipidemia Mother      Heart disease Mother      Heart disease Father      Hypertension Father      Diabetes Father      Heart disease Brother      Diabetes Brother       Social History     Tobacco Use    Smoking status: Never    Smokeless tobacco: Never   Substance Use Topics    Alcohol use: No     Alcohol/week: 0.0 standard drinks of alcohol    Drug use: No     Review of Systems   Constitutional:  Positive for malaise/fatigue.   Eyes: Negative.    Respiratory: Negative.     Cardiovascular: Negative.    Gastrointestinal: Negative.    Genitourinary: Negative.    Musculoskeletal: Negative.    Skin: Negative.      OBJECTIVE:     Vital Signs:  Temp:  [97.7 °F (36.5 °C)-98.1 °F (36.7 °C)]   Pulse:  [84-96]   Resp:  [6-22]   BP: (150-166)/(68-79)   SpO2:  [90 %-95 %]     Physical Exam  Neck:       Comments: Swollen right neck  Cardiovascular:      Rate and Rhythm: Normal rate.      Pulses: Normal pulses.   Pulmonary:      Effort: Pulmonary effort is normal. No respiratory distress.      Breath sounds: No wheezing.   Abdominal:      General: Bowel sounds are normal. There is no distension.      Palpations: Abdomen is soft.      Tenderness: There is no abdominal tenderness.   Musculoskeletal:      Right lower leg: No edema.      Left lower leg: No edema.   Neurological:      Mental Status: She is oriented to person, place, and time. Mental status is at baseline.       Laboratory:  CBC:   Recent Labs   Lab 04/14/24  0509   WBC 21.18*   RBC 5.02   HGB 13.7   HCT 42.5      MCV 85   MCH 27.3   MCHC 32.2     CMP:   Recent Labs   Lab 04/14/24  0509   *   CALCIUM 12.1*   ALBUMIN 2.7*   PROT 5.9*      K 3.1*   CO2 32*   CL 96   BUN 11   CREATININE 0.7   ALKPHOS 124   ALT 25   AST 21   BILITOT 0.6         Diagnostic Results:  Labs: Reviewed  CT: Reviewed    ASSESSMENT/PLAN:     Ms. Gregg is a 57 yo F with concern for locally advanced head and neck cancer who had previously declined chemoradiation and presenting now with right neck swelling.  CT imaging concern for unresectable /locally advanced disease.  She is status post biopsy of abnormal appearing tissue by ENT.    We have been consulted in light of recent findings.    Had a long discussion with patient/family at bedside.  Given the extent of disease, she would be a candidate for chemoradiation - discussed risks including but not limited airway compromise, invasion to nearby great vessels leading to profuse bleeding, and nonhealing ulcer.   Patient remains steadfast in her decision not to proceed with chemotherapy or radiation      Recommendations:   - at this time, patient does not want to pursue chemotherapy/radiation.  - recommend obtaining CT chest, abdomen and pelvis for metastatic evaluation if agreeable.  - consider radiation oncology  consult.    - follow up pathology of recent biopsy  - feel free to re-consult if/when patient amenable to plan.      Discussed with Dr. Wilson      We will sign off at this time. Let us know if any questions.      Andre Mercado MD  Hematology/Oncology PGY-IV

## 2024-04-14 NOTE — SUBJECTIVE & OBJECTIVE
Interval History/Significant Events: NAEO    Review of Systems   HENT:  Positive for sore throat and trouble swallowing.    All other systems reviewed and are negative.    Objective:     Vital Signs (Most Recent):  Temp: 97.7 °F (36.5 °C) (04/14/24 0800)  Pulse: 82 (04/14/24 1300)  Resp: 17 (04/14/24 1300)  BP: (!) 176/78 (04/14/24 1200)  SpO2: 95 % (04/14/24 1300) Vital Signs (24h Range):  Temp:  [97.7 °F (36.5 °C)-98.1 °F (36.7 °C)] 97.7 °F (36.5 °C)  Pulse:  [] 82  Resp:  [6-38] 17  SpO2:  [85 %-95 %] 95 %  BP: (150-176)/(68-79) 176/78   Weight: 85.3 kg (188 lb 0.8 oz)  Body mass index is 30.35 kg/m².      Intake/Output Summary (Last 24 hours) at 4/14/2024 1413  Last data filed at 4/14/2024 1300  Gross per 24 hour   Intake 1828.05 ml   Output 1800 ml   Net 28.05 ml          Physical Exam  Vitals and nursing note reviewed.   HENT:      Mouth/Throat:      Mouth: Mucous membranes are moist. No oral lesions.      Tongue: No lesions.      Pharynx: Oropharynx is clear.      Tonsils: No tonsillar exudate.   Eyes:      Extraocular Movements: Extraocular movements intact.      Conjunctiva/sclera: Conjunctivae normal.   Neck:      Comments: Dressing in place over entire circumference of neck. Dressing c/d/i  Cardiovascular:      Rate and Rhythm: Normal rate and regular rhythm.   Pulmonary:      Effort: Pulmonary effort is normal.      Breath sounds: Normal breath sounds.   Abdominal:      General: There is no distension.      Palpations: Abdomen is soft.      Tenderness: There is no abdominal tenderness. There is no guarding.   Skin:     General: Skin is warm and dry.   Neurological:      General: No focal deficit present.      Mental Status: She is oriented to person, place, and time.            Vents:     Lines/Drains/Airways       Drain  Duration             Female External Urinary Catheter w/ Suction 04/13/24 0600 1 day              Peripheral Intravenous Line  Duration                  Peripheral IV - Single  Lumen 04/12/24 1900 18 G Anterior;Left Forearm 1 day         Peripheral IV - Single Lumen 04/14/24 0515 18 G Posterior;Right Forearm <1 day                  Significant Labs:    CBC/Anemia Profile:  Recent Labs   Lab 04/13/24  0305 04/14/24  0509   WBC 11.66 21.18*   HGB 13.2 13.7   HCT 40.5 42.5    369   MCV 84 85   RDW 14.6* 14.8*        Chemistries:  Recent Labs   Lab 04/12/24  1627 04/12/24  2349 04/13/24  0305 04/14/24  0509    139 138 137   K 2.9* 3.0* 2.8* 3.1*   CL 94* 94* 95 96   CO2 35* 35* 35* 32*   BUN 6 6 8 11   CREATININE 0.5 0.6 0.7 0.7   CALCIUM 11.0* 11.0* 11.2* 12.1*   ALBUMIN 2.8*  --  2.7* 2.7*   PROT 6.3  --  6.0 5.9*   BILITOT 0.8  --  0.8 0.6   ALKPHOS 139*  --  134 124   ALT 35  --  33 25   AST 39  --  28 21   MG 1.4* 2.2 2.0 1.7   PHOS 2.6*  --  2.4* 3.3       All pertinent labs within the past 24 hours have been reviewed.    Significant Imaging:  I have reviewed all pertinent imaging results/findings within the past 24 hours.

## 2024-04-14 NOTE — PROGRESS NOTES
[facility] - Cardiac Medical ICU  Critical Care Medicine  Progress Note    Patient Name: Marysol Cash  MRN: [id number]  Admission Date: 4/12/2024  Hospital Length of Stay: 2 days  Code Status: Full Code  Attending Provider: Isaura Sethi MD  Primary Care Provider: James Coronel MD   Principal Problem: Sepsis    Subjective:     HPI:  Ms. 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 She initially presented to [facility] Emergency Department with six days of epigastric pain with associated nausea, vomiting, and difficulty eating due to pain. However on presentation, she was noted to have a large necrotic and purulent wound located on her right neck with complaints of associated difficulty speaking, swallowing and shortness of breath. She was noted to be hypoxic 88% on room air which improved with 2L NC. Labs were notable for leukocytosis 14, hypokalemia 2.4, hypochloremia 89, CO2 36, hypercalcemia 13.5, Phos 2.5. . Troponin 0.046. Lactate 2.3. CT Soft Tissue Neck was concerning for 9.5 x 8.2 x 10 cm large lobulated mass in the right anterolateral neck extending to the deep spaces of the neck and abutting the right prevertebral space and paravertebral musculature, left midline shift, multiple bilateral necrotic cervical lymph nodes. Case was discussed and decision was made to transfer to [facility] for higher level of care.    Of additional note, she recently transitioned her care to [place] and underwent stem-cell transplant approximately 2 weeks ago in [place], has not been seen by a [place] physician since 2023.     On transfer: she was afebrile, mildly hypertensive /109, HR 98, RR 20, satting 96% on room air. Complaining of mild headache and poor appetite  associated with nausea; denies fever, chills, chest pain, palpitations, SOB, abdominal pain, dysuria. States wound has been draining for the past week initially thick white now more liquid. Mild dysphonia is apparently chronic from left vocal cord paralysis and at baseline. Some dysphagia with solids, none with pureed soft or liquids, denies odynophagia. Dressing on neck CDI, ENT consulted will be here shortly to assess patient.     Hospital/ICU Course:  Evaluated 4/12 in MICU by ENT, who do not feel patient is a candidate for surgical intervention. Palliative care and oncology consulted. Failed swallow study, SLP recommending NPO 4/13. Patient continues to protect airway. Stepped down from MICU on 4/13.    Interval History/Significant Events: NAEO    Review of Systems   HENT:  Positive for sore throat and trouble swallowing.    All other systems reviewed and are negative.    Objective:     Vital Signs (Most Recent):  Temp: 97.7 °F (36.5 °C) (04/14/24 0800)  Pulse: 82 (04/14/24 1300)  Resp: 17 (04/14/24 1300)  BP: (!) 176/78 (04/14/24 1200)  SpO2: 95 % (04/14/24 1300) Vital Signs (24h Range):  Temp:  [97.7 °F (36.5 °C)-98.1 °F (36.7 °C)] 97.7 °F (36.5 °C)  Pulse:  [] 82  Resp:  [6-38] 17  SpO2:  [85 %-95 %] 95 %  BP: (150-176)/(68-79) 176/78   Weight: 85.3 kg (188 lb 0.8 oz)  Body mass index is 30.35 kg/m².      Intake/Output Summary (Last 24 hours) at 4/14/2024 1413  Last data filed at 4/14/2024 1300  Gross per 24 hour   Intake 1828.05 ml   Output 1800 ml   Net 28.05 ml          Physical Exam  Vitals and nursing note reviewed.   HENT:      Mouth/Throat:      Mouth: Mucous membranes are moist. No oral lesions.      Tongue: No lesions.      Pharynx: Oropharynx is clear.      Tonsils: No tonsillar exudate.   Eyes:      Extraocular Movements: Extraocular movements intact.      Conjunctiva/sclera: Conjunctivae normal.   Neck:      Comments: Dressing in place over entire circumference of neck. Dressing  c/d/i  Cardiovascular:      Rate and Rhythm: Normal rate and regular rhythm.   Pulmonary:      Effort: Pulmonary effort is normal.      Breath sounds: Normal breath sounds.   Abdominal:      General: There is no distension.      Palpations: Abdomen is soft.      Tenderness: There is no abdominal tenderness. There is no guarding.   Skin:     General: Skin is warm and dry.   Neurological:      General: No focal deficit present.      Mental Status: She is oriented to person, place, and time.            Vents:     Lines/Drains/Airways       Drain  Duration             Female External Urinary Catheter w/ Suction 04/13/24 0600 1 day              Peripheral Intravenous Line  Duration                  Peripheral IV - Single Lumen 04/12/24 1900 18 G Anterior;Left Forearm 1 day         Peripheral IV - Single Lumen 04/14/24 0515 18 G Posterior;Right Forearm <1 day                  Significant Labs:    CBC/Anemia Profile:  Recent Labs   Lab 04/13/24  0305 04/14/24  0509   WBC 11.66 21.18*   HGB 13.2 13.7   HCT 40.5 42.5    369   MCV 84 85   RDW 14.6* 14.8*        Chemistries:  Recent Labs   Lab 04/12/24  1627 04/12/24  2349 04/13/24  0305 04/14/24  0509    139 138 137   K 2.9* 3.0* 2.8* 3.1*   CL 94* 94* 95 96   CO2 35* 35* 35* 32*   BUN 6 6 8 11   CREATININE 0.5 0.6 0.7 0.7   CALCIUM 11.0* 11.0* 11.2* 12.1*   ALBUMIN 2.8*  --  2.7* 2.7*   PROT 6.3  --  6.0 5.9*   BILITOT 0.8  --  0.8 0.6   ALKPHOS 139*  --  134 124   ALT 35  --  33 25   AST 39  --  28 21   MG 1.4* 2.2 2.0 1.7   PHOS 2.6*  --  2.4* 3.3       All pertinent labs within the past 24 hours have been reviewed.    Significant Imaging:  I have reviewed all pertinent imaging results/findings within the past 24 hours.    ABG  Recent Labs   Lab 04/12/24  0950   BE 15.10*     Assessment/Plan:     Cardiac/Vascular  HTN (hypertension)  Patient taking Lopressor 25 mg BID at home    Plan:  - Hypertensive on arrival  - Oral metoprolol switched to IV, 5 mg  Q6H    Renal/  Hypokalemia  Potassium 2.4 on arrival. Suspect 2/2 decreased PO intake.    --IV and PO replacement  --Replete Mg as needed, goal Mg>2  --BMP and Mg daily    ID  * Sepsis  This patient does have evidence of infective focus  My overall impression is sepsis.  Source: Skin and Soft Tissue (location Neck)  Antibiotics given-   Antibiotics (72h ago, onward)      None          Latest lactate reviewed-  Recent Labs   Lab 04/10/24  1917   LACTATE 2.3*     Organ dysfunction indicated by Acute respiratory failure    Fluid challenge Ideal Body Weight- The patient's ideal body weight is Patient weight not recorded which will be used to calculate fluid bolus of 30 ml/kg for treatment of septic shock.      Post- resuscitation assessment Yes Perfusion exam was performed within 6 hours of septic shock presentation after bolus shows Adequate tissue perfusion assessed by non-invasive monitoring     Will Not start Pressors- Levophed for MAP of 65  Source control achieved by: Broad spectrum antibiotics    Plan:  Source likely skin/soft tissue origin secondary to purulent, necrotic neck wound  - Continue broad spectrum antibiotics with Vancomycin, Cefepime  - Received 1.7L Lactated Ringers sepsis fluids at OSH  - Will obtain wound culture  - ENT consulted, f/u recs  - Repeat Lactate  - Repeat and follow up blood cultures through maturation  - CBC daily to trend leukocytosis    Oncology  Malignant neoplasm of tip and lateral border of tongue  Oncology History per Chart Review 8/2023:  Patient was evaluated by Dr. Wallace ENT for lung lesion.  Biopsy of ventral surface of tongue done on 12/24/2021 has shown atypia with no diagnostic evidence of dysplasia.  Repeat biopsy after failed treatment on 01/24/2022 has shown moderately differentiated squamous cell carcinoma suspicious for perineural invasion.  CT soft tissue neck done on 01/31/2022 showed no evidence of metastatic disease to the neck. PET-CT done on 02/02/2022 showed  no abnormal activity within the tongue, no signs of cervical lymph node or distant metastasis.  Declined medical oncology and radiation oncology consultation.  Status post left selective neck dissection levels 1, 2, 3, partial glossectomy with split-thickness skin graft done on 03/03/2022 by Dr. Clif Olson.  Surgical pathology has shown unifocal squamous cell carcinoma on the dorsal surface of the tongue on left side measuring 1 cm, moderately differentiated, 6 mm depth of invasion, no lymphovascular invasion, positive for perineural invasion, margins negative, 0 of 39 lymph nodes with metastasis, pT2 pN0 noted.  Postop course complicated by infection requiring I&D and antibiotics.  Declined adjuvant XRT due to concern for side effect profile [seen by Dr. Rangel].     Due to localized right neck swelling CT soft tissue neck with without contrast ordered on 06/21/2023 has shown 2.2 cm lesion deep to the right sternocleidomastoid suspicious for necrotic lymph node with few adjacent pathological appearing lymph nodes suspicious for metastasis. Right neck FNA done on 07/07/2023 were suspicious for metastatic squamous cell carcinoma.  PET-CT done on 08/21/2023 has shown large necrotic mass the angle of mandible posterior to submandibular gland on the right measuring 4.4 x 4.2 cm with SUV 9.78, just superior to the mass is a 2 cm hypermetabolic lymph node and no evidence of distant metastatic disease    Plan:    -- ENT consulted, plan to offer biopsy if patient amenable but do not believe surgery an option at this point  -- Palliative on board, has seen patient  -- Oncology consulted 4/13    Endocrine  Postprocedural hypoparathyroidism  Patient takes calcium carbonate, Vitamin D3 at home. Presented with symptomatic hypercalcemia 13.5 on admission with abdominal pain, nausea/vomiting    - Normal saline infusion 125 mg/hr  - Daily CMP to monitor calcium level  - Resume home medications when clinically  "indicated    Postsurgical hypothyroidism  Patient taking Levothyroxine 175 mcg daily and Liothyronine 5 mcg daily    - Continue Levothyroxine IV, 125 mcg daily      GI  GERD (gastroesophageal reflux disease)  Pt complaining of reflux, requesting medication for treatment    Plan:  -- Famotidine  -- Consider deescalating to calcium carbonate if well controlled    Orthopedic  Open neck wound  Pt with hx of head and neck cancer, s/p left selective neck dissection levels 1, 2, 3, partial glossectomy. Presenting with complaints of nausea and epigastric abdominal pain. Also reports worsening drainage from right neck.   Pt with extensive hx of H/N cancer. See Malignant Neoplasm of tongue.   Endorses continued drainage from the neck for months with recent worsening of symptoms for past 2 weeks. Pt returned from [place] where she received antibiotics, "detox" and hyperbaric chamber as treatment.   Denies fevers, chills, diaphoresis, cough, sputum production. Endorses difficulty with swallowing 2/2 to oral trush.   Upon ED evaluation, patient noted to have draining wound to right neck.   - CT soft tissue Large bulky lobulated appearing mass right lateral and anterolateral neck extending to the D spaces of the neck having overall dimensions of approximately 9.5 x 8.2 x 10 cm with large central collection of air which appears to extend superficially to the skin surface right mid neck anterior laterally.  Overall appearance is very concerning for large conglomerate jose mass although superimposed abscess remains a possibility given concern for such.  Associated mass effect results in displacement of midline structures to the patient's left.   Vitals afebrile, tachycardic  (125) and low normotensive BP (131/74) on admission.  Lactic acid 2.0>2.3>1.5  CXR no acute airspace disease noted.   EKG on admission sinus tachycardia  Troponin 0.046 >0.029>0.031  UA pH 7.0, sg >1.030, trace proteins noted    In [facility], patient " handling secretions well, protecting airway.    - Blood Cx pending  - Received IV Vanc/Cefepime while in ED. Case discussed with ENT-otolaryngology, patient accepted for transfer at [facility], for evaluation of abscess, and debridement. Pt awaiting bed placement.   - 4/12/2024: Afebrile, VS stable. Labs with improvement in leukocytosis. Reports mild improvement in pain symptoms.   - Continue vanc/cefepime/flagyl for broad spectrum coverage, will tailor to wound culture/blood cultures results, may need to consult ID for assistance  - ENT consulted, f/u recs  - failed swallow study, NPO until repeat evaluation by SLP       Critical Care Daily Checklist:    A: Awake: RASS Goal/Actual Goal:    Actual:     B: Spontaneous Breathing Trial Performed?     C: SAT & SBT Coordinated?  N/A                      D: Delirium: CAM-ICU Overall CAM-ICU: Negative   E: Early Mobility Performed? Yes   F: Feeding Goal:    Status:     Current Diet Order   Procedures    Diet NPO Except for: Sips with Medication     Order Specific Question:   Except for     Answer:   Sips with Medication      AS: Analgesia/Sedation Tylenol, dilaudid   T: Thromboembolic Prophylaxis Lovenox   H: HOB > 300 Yes   U: Stress Ulcer Prophylaxis (if needed) none   G: Glucose Control none   B: Bowel Function Stool Occurrence: 1   I: Indwelling Catheter (Lines & [place]is) Necessity PIV x 2   D: De-escalation of Antimicrobials/Pharmacotherapies Vancomycin, cefepime, flagyl    Plan for the day/ETD Step down    Code Status:  Family/Goals of Care: Full Code  Ongoing       Critical secondary to Patient has a condition that poses threat to life and bodily function: airway watch in setting of R neck mass.       Critical care was time spent personally by me on the following activities: development of treatment plan with patient or surrogate and bedside caregivers, discussions with consultants, evaluation of patient's response to treatment, examination of patient, ordering and  performing treatments and interventions, ordering and review of laboratory studies, ordering and review of radiographic studies, pulse oximetry, re-evaluation of patient's condition. This critical care time did not overlap with that of any other provider or involve time for any procedures.     Ky Salamanca MD  Critical Care Medicine  [facility] - Cardiac Medical ICU

## 2024-04-14 NOTE — PROGRESS NOTES
Pharmacokinetic Assessment Follow Up: IV Vancomycin    Vancomycin serum concentration assessment/plan:  Trough resulted as 36.4 mcg/mL; Goal 10-20 mcg/mL,  neck cellulitis   Renal function stable; Cr 0.7 mg/dL, UOP 0.9 mL/kg/hr over the past 24 hrs   Given supratherapeutic trough level, will transition patient from scheduled regimen to pulse dosing to assess for clearance of vancomycin  Hold Vancomycin today; re-dose when random level is less than 20 mcg/mL  Next level to be drawn on 4/15 with AM labs    Drug levels (last 3 results):  Recent Labs   Lab Result Units 04/12/24  0628 04/14/24  0509   Vancomycin-Trough ug/mL 13.0 36.4*       Pharmacy will continue to follow and monitor vancomycin.    Please contact pharmacy at extension [phone] for questions regarding this assessment.    Thank you for the consult,   Winnie Apple       Patient brief summary:  Marysol Cash is a 58 y.o. female initiated on antimicrobial therapy with IV Vancomycin for treatment of  neck cellulitis    The patient's current regimen is pulse dosing     Drug Allergies:   Review of patient's allergies indicates:   Allergen Reactions    Ciprofloxacin Swelling    Codeine Swelling    Opioids - morphine analogues     Pcn [penicillins] Hives     Tolerated cefepime 04/2024    Percocet [oxycodone-acetaminophen] Swelling       Actual Body Weight:   84.5 kg    Renal Function:   Estimated Creatinine Clearance: 97.1 mL/min (based on SCr of 0.7 mg/dL).,     Dialysis Method (if applicable):  N/A    CBC (last 72 hours):  Recent Labs   Lab Result Units 04/11/24  1623 04/12/24  0432 04/13/24  0305 04/14/24  0509   WBC K/uL 14.33* 12.94* 11.66 21.18*   Hemoglobin g/dL 13.3 13.6 13.2 13.7   Hematocrit % 40.9 41.9 40.5 42.5   Platelets K/uL 349 344 335 369   Gran % % 71.1 67.2 89.7* 85.3*   Lymph % % 19.5 23.4 8.5* 8.9*   Mono % % 8.6 8.6 0.9* 4.6   Eosinophil % % 0.0 0.0 0.0 0.0   Basophil % % 0.2 0.3 0.1 0.2   Differential Method  Automated Automated  Automated Automated       Metabolic Panel (last 72 hours):  Recent Labs   Lab Result Units 04/11/24  1210 04/11/24  1622 04/12/24  0002 04/12/24  0432 04/12/24  1627 04/12/24  2349 04/13/24  0305 04/14/24  0509   Sodium mmol/L 139 139 137 139 140 139 138 137   Potassium mmol/L 2.5* 2.3* 2.4* 2.8* 2.9* 3.0* 2.8* 3.1*   Chloride mmol/L 94* 94* 94* 95 94* 94* 95 96   CO2 mmol/L 38* 35* 35* 34* 35* 35* 35* 32*   Glucose mg/dL 147* 113* 111* 105 101 118* 181* 135*   BUN mg/dL 7 7 6 6 6 6 8 11   Creatinine mg/dL 0.6 0.6 0.6 0.6 0.5 0.6 0.7 0.7   Albumin g/dL  --  2.9*  --   --  2.8*  --  2.7* 2.7*   Total Bilirubin mg/dL  --  0.7  --   --  0.8  --  0.8 0.6   Alkaline Phosphatase U/L  --  123  --   --  139*  --  134 124   AST U/L  --  24  --   --  39  --  28 21   ALT U/L  --  32  --   --  35  --  33 25   Magnesium mg/dL  --  2.0 1.6 1.6 1.4* 2.2 2.0 1.7   Phosphorus mg/dL  --  1.6* 2.7 2.5* 2.6*  --  2.4* 3.3       Vancomycin Administrations:  vancomycin given in the last 96 hours                     vancomycin 2 g in dextrose 5 % 500 mL IVPB ()  Restarted 04/14/24 0613     2,000 mg New Bag  0613      Restarted 04/13/24 1909     2,000 mg New Bag  1909     2,000 mg New Bag  0708      Restarted 04/12/24 2115      Restarted  2035     2,000 mg New Bag  2006    vancomycin 2 g in dextrose 5 % 500 mL IVPB (mg) 2,000 mg New Bag 04/12/24 0750     2,000 mg New Bag 04/11/24 1958    vancomycin 2 g in dextrose 5 % 500 mL IVPB (mg) 2,000 mg New Bag 04/11/24 0713    vancomycin 2 g in dextrose 5 % 500 mL IVPB (mg) 2,000 mg New Bag 04/10/24 1918                    Microbiologic Results:  Microbiology Results (last 7 days)       ** No results found for the last 168 hours. **

## 2024-04-14 NOTE — PLAN OF CARE
Problem: Adult Inpatient Plan of Care  Goal: Plan of Care Review  Outcome: Ongoing, Progressing  Goal: Patient-Specific Goal (Individualized)  Outcome: Ongoing, Progressing  Goal: Absence of Hospital-Acquired Illness or Injury  Outcome: Ongoing, Progressing  Goal: Optimal Comfort and Wellbeing  Outcome: Ongoing, Progressing  Goal: Readiness for Transition of Care  Outcome: Ongoing, Progressing     Problem: Adjustment to Illness (Sepsis/Septic Shock)  Goal: Optimal Coping  Outcome: Ongoing, Progressing     Problem: Bleeding (Sepsis/Septic Shock)  Goal: Absence of Bleeding  Outcome: Ongoing, Progressing     Problem: Glycemic Control Impaired (Sepsis/Septic Shock)  Goal: Blood Glucose Level Within Desired Range  Outcome: Ongoing, Progressing     Problem: Infection Progression (Sepsis/Septic Shock)  Goal: Absence of Infection Signs and Symptoms  Outcome: Ongoing, Progressing     Problem: Nutrition Impaired (Sepsis/Septic Shock)  Goal: Optimal Nutrition Intake  Outcome: Ongoing, Progressing     Problem: Impaired Wound Healing  Goal: Optimal Wound Healing  Outcome: Ongoing, Progressing     Problem: Skin Injury Risk Increased  Goal: Skin Health and Integrity  Outcome: Ongoing, Progressing     Problem: Coping Ineffective  Goal: Effective Coping  Outcome: Ongoing, Progressing

## 2024-04-14 NOTE — ASSESSMENT & PLAN NOTE
"Pt with hx of head and neck cancer, s/p left selective neck dissection levels 1, 2, 3, partial glossectomy. Presenting with complaints of nausea and epigastric abdominal pain. Also reports worsening drainage from right neck.   Pt with extensive hx of H/N cancer. See Malignant Neoplasm of tongue.   Endorses continued drainage from the neck for months with recent worsening of symptoms for past 2 weeks. Pt returned from [place] where she received antibiotics, "detox" and hyperbaric chamber as treatment.   Denies fevers, chills, diaphoresis, cough, sputum production. Endorses difficulty with swallowing 2/2 to oral trush.   Upon ED evaluation, patient noted to have draining wound to right neck.   - CT soft tissue Large bulky lobulated appearing mass right lateral and anterolateral neck extending to the D spaces of the neck having overall dimensions of approximately 9.5 x 8.2 x 10 cm with large central collection of air which appears to extend superficially to the skin surface right mid neck anterior laterally.  Overall appearance is very concerning for large conglomerate jose mass although superimposed abscess remains a possibility given concern for such.  Associated mass effect results in displacement of midline structures to the patient's left.   Vitals afebrile, tachycardic  (125) and low normotensive BP (131/74) on admission.  Lactic acid 2.0>2.3>1.5  CXR no acute airspace disease noted.   EKG on admission sinus tachycardia  Troponin 0.046 >0.029>0.031  UA pH 7.0, sg >1.030, trace proteins noted    In [facility] MICU, patient handling secretions well, protecting airway.    - Blood Cx pending  - Received IV Vanc/Cefepime while in ED. Case discussed with ENT-otolaryngology, patient accepted for transfer at [facility], for evaluation of abscess, and debridement. Pt awaiting bed placement.   - 4/12/2024: Afebrile, VS stable. Labs with improvement in leukocytosis. Reports mild improvement in pain symptoms.   - Continue " vanc/cefepime/flagyl for broad spectrum coverage, will tailor to wound culture/blood cultures results, may need to consult ID for assistance  - ENT consulted, f/u recs  - failed swallow study, NPO until repeat evaluation by SLP

## 2024-04-14 NOTE — PROGRESS NOTES
[facility]  Otorhinolaryngology-Head & Neck Surgery  Progress Note    Subjective:     Post-Op Info:  * No surgery found *      Hospital Day: 3     Interval History: Failed bedside swallow per SLP. MBSS and palliative tomorrow. Denies issues breathing    Medications:  Continuous Infusions:  Current Facility-Administered Medications   Medication Dose Route Frequency Provider Last Rate Last Admin    acetaminophen oral solution 650 mg  650 mg Oral Q6H PRN Samia Gill MD        ceFEPIme (MAXIPIME) 2 g in dextrose 5 % in water (D5W) 100 mL IVPB (MB+)  2 g Intravenous Q8H Samia Gill MD   Stopped at 04/14/24 0520    dextrose 10% bolus 125 mL 125 mL  12.5 g Intravenous PRN Samia Gill MD        dextrose 10% bolus 250 mL 250 mL  25 g Intravenous PRN Samia Gill MD        enoxaparin injection 40 mg  40 mg Subcutaneous Q24H (prophylaxis, 1700) Samia Gill MD   40 mg at 04/13/24 1737    famotidine (PF) injection 20 mg  20 mg Intravenous BID Samia Gill MD   20 mg at 04/13/24 2012    fluconazole (DIFLUCAN) IVPB 200 mg  200 mg Intravenous Q24H Samia Gill MD   Stopped at 04/13/24 1241    glucagon (human recombinant) injection 1 mg  1 mg Intramuscular PRN Dakota King MD        glucose chewable tablet 16 g  16 g Oral PRN Samia Gill MD        glucose chewable tablet 24 g  24 g Oral PRN Samia Gill MD        HYDROmorphone injection 0.5 mg  0.5 mg Intravenous Q4H PRN Samia Gill MD   0.5 mg at 04/14/24 0452    hydrOXYzine pamoate capsule 25 mg  25 mg Oral Q8H PRN Samia Gill MD   25 mg at 04/12/24 2110    insulin aspart U-100 pen 0-10 Units  0-10 Units Subcutaneous Q6H PRN Dakota King MD        liothyronine tablet 5 mcg  5 mcg Oral Before breakfast Samia Gill MD   5 mcg at 04/14/24 0605    magnesium sulfate 2g in water 50mL IVPB (premix)  2 g Intravenous Once Minerva Cardenas DO  25 mL/hr at 04/14/24 0734 2 g at 04/14/24 0734    metoprolol injection 5 mg  5 mg Intravenous Q6H Ky Salamanca MD   5 mg at 04/14/24 0604    metronidazole IVPB 500 mg  500 mg Intravenous Q8H Samia Gill MD   Stopped at 04/14/24 0549    mupirocin 2 % ointment   Nasal BID Samia Gill MD   Given at 04/13/24 2012    naloxone 0.4 mg/mL injection 0.02 mg  0.02 mg Intravenous PRN Samia Gill MD        ondansetron injection 4 mg  4 mg Intravenous Q4H PRN Samia Gill MD   4 mg at 04/14/24 0452    potassium bicarbonate disintegrating tablet 40 mEq  40 mEq Oral Once Ky Salamanca MD        potassium chloride 10 mEq in 100 mL IVPB  10 mEq Intravenous Q1H Minerva Cardenas DO        prochlorperazine injection Soln 2.5 mg  2.5 mg Intravenous Q6H PRN Minerva Cardenas DO   2.5 mg at 04/14/24 0100    vancomycin - pharmacy to dose   Intravenous pharmacy to manage frequency Samia Gill MD         Scheduled Meds:  Current Facility-Administered Medications   Medication Dose Route Frequency Provider Last Rate Last Admin    acetaminophen oral solution 650 mg  650 mg Oral Q6H PRN Samia Gill MD        ceFEPIme (MAXIPIME) 2 g in dextrose 5 % in water (D5W) 100 mL IVPB (MB+)  2 g Intravenous Q8H Samia Gill MD   Stopped at 04/14/24 0520    dextrose 10% bolus 125 mL 125 mL  12.5 g Intravenous PRN Samia Gill MD        dextrose 10% bolus 250 mL 250 mL  25 g Intravenous PRN Samia Gill MD        enoxaparin injection 40 mg  40 mg Subcutaneous Q24H (prophylaxis, 1700) Samia Gill MD   40 mg at 04/13/24 1737    famotidine (PF) injection 20 mg  20 mg Intravenous BID Samia Gill MD   20 mg at 04/13/24 2012    fluconazole (DIFLUCAN) IVPB 200 mg  200 mg Intravenous Q24H Samia Gill MD   Stopped at 04/13/24 1241    glucagon (human recombinant) injection 1 mg  1 mg Intramuscular PRN Dakota King MD        glucose chewable  tablet 16 g  16 g Oral PRN Samia Gill MD        glucose chewable tablet 24 g  24 g Oral PRN Samia Gill MD        HYDROmorphone injection 0.5 mg  0.5 mg Intravenous Q4H PRN Samia Gill MD   0.5 mg at 04/14/24 0452    hydrOXYzine pamoate capsule 25 mg  25 mg Oral Q8H PRN Samia Gill MD   25 mg at 04/12/24 2110    insulin aspart U-100 pen 0-10 Units  0-10 Units Subcutaneous Q6H PRN Dakota King MD        liothyronine tablet 5 mcg  5 mcg Oral Before breakfast Samia Gill MD   5 mcg at 04/14/24 0605    magnesium sulfate 2g in water 50mL IVPB (premix)  2 g Intravenous Once Minerva Cardenas DO 25 mL/hr at 04/14/24 0734 2 g at 04/14/24 0734    metoprolol injection 5 mg  5 mg Intravenous Q6H Ky Salamanca MD   5 mg at 04/14/24 0604    metronidazole IVPB 500 mg  500 mg Intravenous Q8H Samia Gill MD   Stopped at 04/14/24 0549    mupirocin 2 % ointment   Nasal BID Samia Gill MD   Given at 04/13/24 2012    naloxone 0.4 mg/mL injection 0.02 mg  0.02 mg Intravenous PRN Samia Gill MD        ondansetron injection 4 mg  4 mg Intravenous Q4H PRN Samia Gill MD   4 mg at 04/14/24 0452    potassium bicarbonate disintegrating tablet 40 mEq  40 mEq Oral Once Ky Salamanca MD        potassium chloride 10 mEq in 100 mL IVPB  10 mEq Intravenous Q1H Minerva Cardenas, DO        prochlorperazine injection Soln 2.5 mg  2.5 mg Intravenous Q6H PRN Minerva Cardenas DO   2.5 mg at 04/14/24 0100    vancomycin - pharmacy to dose   Intravenous pharmacy to manage frequency Samia Gill MD         PRN Meds:  Current Facility-Administered Medications   Medication Dose Route Frequency Provider Last Rate Last Admin    acetaminophen oral solution 650 mg  650 mg Oral Q6H PRN Samia Gill MD        ceFEPIme (MAXIPIME) 2 g in dextrose 5 % in water (D5W) 100 mL IVPB (MB+)  2 g Intravenous Q8H Samia Gill MD   Stopped at 04/14/24 0520     dextrose 10% bolus 125 mL 125 mL  12.5 g Intravenous PRN Samia Gill MD        dextrose 10% bolus 250 mL 250 mL  25 g Intravenous PRN Samia Gill MD        enoxaparin injection 40 mg  40 mg Subcutaneous Q24H (prophylaxis, 1700) Samia Gill MD   40 mg at 04/13/24 1737    famotidine (PF) injection 20 mg  20 mg Intravenous BID Samia Gill MD   20 mg at 04/13/24 2012    fluconazole (DIFLUCAN) IVPB 200 mg  200 mg Intravenous Q24H Samia Gill MD   Stopped at 04/13/24 1241    glucagon (human recombinant) injection 1 mg  1 mg Intramuscular PRN Dakota King MD        glucose chewable tablet 16 g  16 g Oral PRN Samia Gill MD        glucose chewable tablet 24 g  24 g Oral PRN Samia Gill MD        HYDROmorphone injection 0.5 mg  0.5 mg Intravenous Q4H PRN Samia Gill MD   0.5 mg at 04/14/24 0452    hydrOXYzine pamoate capsule 25 mg  25 mg Oral Q8H PRN Samia Gill MD   25 mg at 04/12/24 2110    insulin aspart U-100 pen 0-10 Units  0-10 Units Subcutaneous Q6H PRN Dakota King MD        liothyronine tablet 5 mcg  5 mcg Oral Before breakfast Samia Gill MD   5 mcg at 04/14/24 0605    magnesium sulfate 2g in water 50mL IVPB (premix)  2 g Intravenous Once Minerva Cardenas DO 25 mL/hr at 04/14/24 0734 2 g at 04/14/24 0734    metoprolol injection 5 mg  5 mg Intravenous Q6H Ky Salamanca MD   5 mg at 04/14/24 0604    metronidazole IVPB 500 mg  500 mg Intravenous Q8H Samia Gill MD   Stopped at 04/14/24 0549    mupirocin 2 % ointment   Nasal BID Samia Gill MD   Given at 04/13/24 2012    naloxone 0.4 mg/mL injection 0.02 mg  0.02 mg Intravenous PRN Samia Gill MD        ondansetron injection 4 mg  4 mg Intravenous Q4H PRN Samia Gill MD   4 mg at 04/14/24 0452    potassium bicarbonate disintegrating tablet 40 mEq  40 mEq Oral Once Ky Salamanca MD        potassium chloride 10 mEq in  100 mL IVPB  10 mEq Intravenous Q1H Minerva Cardenas DO        prochlorperazine injection Soln 2.5 mg  2.5 mg Intravenous Q6H PRN Minerva Cardenas DO   2.5 mg at 04/14/24 0100    vancomycin - pharmacy to dose   Intravenous pharmacy to manage frequency Callum-Samia Raymundo MD            Review of patient's allergies indicates:   Allergen Reactions    Ciprofloxacin Swelling    Codeine Swelling    Opioids - morphine analogues     Pcn [penicillins] Hives     Tolerated cefepime 04/2024    Percocet [oxycodone-acetaminophen] Swelling     Objective:     Vital Signs (24h Range):  Temp:  [97.9 °F (36.6 °C)-98.1 °F (36.7 °C)] 97.9 °F (36.6 °C)  Pulse:  [] 91  Resp:  [6-38] 16  SpO2:  [85 %-98 %] 92 %  BP: (131-187)/(59-86) 158/78       Lines/Drains/Airways       Drain  Duration             Female External Urinary Catheter w/ Suction 04/13/24 0600 1 day              Peripheral Intravenous Line  Duration                  Peripheral IV - Single Lumen 04/10/24 1716 20 G Left;Posterior Wrist 3 days         Peripheral IV - Single Lumen 04/12/24 1900 18 G Anterior;Left Forearm 1 day         Peripheral IV - Single Lumen 04/14/24 0515 18 G Posterior;Right Forearm <1 day                     Physical Exam     NAD  Normal WOB, no stridor or stertor on forced respiration  Breathy/strained voiced, with falsetto c/w vocal cord paralysis  Bulky neck dressing coverage wound, removed for exam  Near circumferential neck swelling with overlying skin discoloration, large fistulous tract at midline extending to right side of neck, minimal drainage   No tenderness to palpation, appears insensate     Significant Labs:  All pertinent labs from the last 24 hours have been reviewed.    Significant Diagnostics:  I have reviewed all pertinent imaging results/findings within the past 24 hours.  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 Biopsy taken at bedside today to confirm. FFL showed R diffuse laryngeal and pharyngeal edema and fullness obscuring true vocal cords. Patient does not appear to be in respiratory distress and does not complain of difficulty breathing despite FFL findings. Recommended consideration of trach and PEG to patient and family - discussion detailed in original consult note    -Recommend palliative care consult to discuss goals of care and treatment and code status   -Regarding tracheostomy - would be very challenging technically, due to extent of disease and anatomic distortion. It would present significant wound care issues given proximity of open neck wound to tracheostomy site and significant risk given location of major blood vessels (innominate artery) - in addition to usually morbidity of chronic tracheostomy dependency  -Recommend heme/onc consult  -F/u biopsy  -No acute ENT intervention  -Floor vs. ICU per MICU          Eleuterio Tierney MD  Otorhinolaryngology-Head & Neck Surgery  [facility]

## 2024-04-15 PROBLEM — R52 PAIN: Status: ACTIVE | Noted: 2024-04-15

## 2024-04-15 PROBLEM — Z51.5 PALLIATIVE CARE ENCOUNTER: Status: ACTIVE | Noted: 2024-04-15

## 2024-04-15 LAB
ALBUMIN SERPL BCP-MCNC: 2.8 G/DL (ref 3.5–5.2)
ALP SERPL-CCNC: 118 U/L (ref 55–135)
ALT SERPL W/O P-5'-P-CCNC: 28 U/L (ref 10–44)
ANION GAP SERPL CALC-SCNC: 10 MMOL/L (ref 8–16)
AST SERPL-CCNC: 27 U/L (ref 10–40)
BASOPHILS # BLD AUTO: 0.03 K/UL (ref 0–0.2)
BASOPHILS NFR BLD: 0.1 % (ref 0–1.9)
BILIRUB SERPL-MCNC: 0.6 MG/DL (ref 0.1–1)
BUN SERPL-MCNC: 15 MG/DL (ref 6–20)
CALCIUM SERPL-MCNC: 13.1 MG/DL (ref 8.7–10.5)
CHLORIDE SERPL-SCNC: 101 MMOL/L (ref 95–110)
CO2 SERPL-SCNC: 32 MMOL/L (ref 23–29)
CREAT SERPL-MCNC: 0.8 MG/DL (ref 0.5–1.4)
DIFFERENTIAL METHOD BLD: ABNORMAL
EOSINOPHIL # BLD AUTO: 0 K/UL (ref 0–0.5)
EOSINOPHIL NFR BLD: 0 % (ref 0–8)
ERYTHROCYTE [DISTWIDTH] IN BLOOD BY AUTOMATED COUNT: 15 % (ref 11.5–14.5)
EST. GFR  (NO RACE VARIABLE): >60 ML/MIN/1.73 M^2
GLUCOSE SERPL-MCNC: 97 MG/DL (ref 70–110)
HCT VFR BLD AUTO: 43.4 % (ref 37–48.5)
HGB BLD-MCNC: 14 G/DL (ref 12–16)
IMM GRANULOCYTES # BLD AUTO: 0.18 K/UL (ref 0–0.04)
IMM GRANULOCYTES NFR BLD AUTO: 0.8 % (ref 0–0.5)
LYMPHOCYTES # BLD AUTO: 3 K/UL (ref 1–4.8)
LYMPHOCYTES NFR BLD: 13 % (ref 18–48)
MAGNESIUM SERPL-MCNC: 1.8 MG/DL (ref 1.6–2.6)
MCH RBC QN AUTO: 27.5 PG (ref 27–31)
MCHC RBC AUTO-ENTMCNC: 32.3 G/DL (ref 32–36)
MCV RBC AUTO: 85 FL (ref 82–98)
MONOCYTES # BLD AUTO: 1.7 K/UL (ref 0.3–1)
MONOCYTES NFR BLD: 7.5 % (ref 4–15)
NEUTROPHILS # BLD AUTO: 17.9 K/UL (ref 1.8–7.7)
NEUTROPHILS NFR BLD: 78.6 % (ref 38–73)
NRBC BLD-RTO: 0 /100 WBC
PHOSPHATE SERPL-MCNC: 3.1 MG/DL (ref 2.7–4.5)
PLATELET # BLD AUTO: 374 K/UL (ref 150–450)
PMV BLD AUTO: 10.6 FL (ref 9.2–12.9)
POTASSIUM SERPL-SCNC: 3.3 MMOL/L (ref 3.5–5.1)
PROT SERPL-MCNC: 5.9 G/DL (ref 6–8.4)
RBC # BLD AUTO: 5.1 M/UL (ref 4–5.4)
SODIUM SERPL-SCNC: 143 MMOL/L (ref 136–145)
VANCOMYCIN SERPL-MCNC: 16.5 UG/ML
WBC # BLD AUTO: 22.79 K/UL (ref 3.9–12.7)

## 2024-04-15 PROCEDURE — 25000003 PHARM REV CODE 250

## 2024-04-15 PROCEDURE — 97535 SELF CARE MNGMENT TRAINING: CPT

## 2024-04-15 PROCEDURE — 94761 N-INVAS EAR/PLS OXIMETRY MLT: CPT

## 2024-04-15 PROCEDURE — 83735 ASSAY OF MAGNESIUM: CPT | Performed by: STUDENT IN AN ORGANIZED HEALTH CARE EDUCATION/TRAINING PROGRAM

## 2024-04-15 PROCEDURE — 85025 COMPLETE CBC W/AUTO DIFF WBC: CPT | Performed by: STUDENT IN AN ORGANIZED HEALTH CARE EDUCATION/TRAINING PROGRAM

## 2024-04-15 PROCEDURE — 20600001 HC STEP DOWN PRIVATE ROOM

## 2024-04-15 PROCEDURE — 80053 COMPREHEN METABOLIC PANEL: CPT | Performed by: STUDENT IN AN ORGANIZED HEALTH CARE EDUCATION/TRAINING PROGRAM

## 2024-04-15 PROCEDURE — 92526 ORAL FUNCTION THERAPY: CPT

## 2024-04-15 PROCEDURE — 25000003 PHARM REV CODE 250: Performed by: STUDENT IN AN ORGANIZED HEALTH CARE EDUCATION/TRAINING PROGRAM

## 2024-04-15 PROCEDURE — 84100 ASSAY OF PHOSPHORUS: CPT | Performed by: STUDENT IN AN ORGANIZED HEALTH CARE EDUCATION/TRAINING PROGRAM

## 2024-04-15 PROCEDURE — 36415 COLL VENOUS BLD VENIPUNCTURE: CPT | Performed by: INTERNAL MEDICINE

## 2024-04-15 PROCEDURE — 63600175 PHARM REV CODE 636 W HCPCS: Performed by: STUDENT IN AN ORGANIZED HEALTH CARE EDUCATION/TRAINING PROGRAM

## 2024-04-15 PROCEDURE — 99223 1ST HOSP IP/OBS HIGH 75: CPT | Mod: ,,,

## 2024-04-15 PROCEDURE — 99222 1ST HOSP IP/OBS MODERATE 55: CPT | Mod: ,,, | Performed by: INTERNAL MEDICINE

## 2024-04-15 PROCEDURE — 63600175 PHARM REV CODE 636 W HCPCS

## 2024-04-15 PROCEDURE — 99223 1ST HOSP IP/OBS HIGH 75: CPT | Mod: ,,, | Performed by: STUDENT IN AN ORGANIZED HEALTH CARE EDUCATION/TRAINING PROGRAM

## 2024-04-15 PROCEDURE — 25500020 PHARM REV CODE 255: Performed by: STUDENT IN AN ORGANIZED HEALTH CARE EDUCATION/TRAINING PROGRAM

## 2024-04-15 PROCEDURE — 80202 ASSAY OF VANCOMYCIN: CPT | Performed by: INTERNAL MEDICINE

## 2024-04-15 RX ORDER — METOCLOPRAMIDE HYDROCHLORIDE 5 MG/ML
10 INJECTION INTRAMUSCULAR; INTRAVENOUS EVERY 6 HOURS PRN
Status: DISCONTINUED | OUTPATIENT
Start: 2024-04-15 | End: 2024-04-22

## 2024-04-15 RX ORDER — CALCITONIN SALMON 200 [USP'U]/ML
4 INJECTION, SOLUTION INTRAMUSCULAR; SUBCUTANEOUS 2 TIMES DAILY
Status: DISPENSED | OUTPATIENT
Start: 2024-04-15 | End: 2024-04-17

## 2024-04-15 RX ORDER — KETOROLAC TROMETHAMINE 15 MG/ML
15 INJECTION, SOLUTION INTRAMUSCULAR; INTRAVENOUS EVERY 6 HOURS PRN
Status: DISPENSED | OUTPATIENT
Start: 2024-04-15 | End: 2024-04-18

## 2024-04-15 RX ORDER — SENNOSIDES 8.6 MG/1
8.6 TABLET ORAL 2 TIMES DAILY
Status: DISCONTINUED | OUTPATIENT
Start: 2024-04-15 | End: 2024-04-28

## 2024-04-15 RX ORDER — DEXTROSE MONOHYDRATE, SODIUM CHLORIDE, AND POTASSIUM CHLORIDE 50; 2.98; 4.5 G/1000ML; G/1000ML; G/1000ML
INJECTION, SOLUTION INTRAVENOUS CONTINUOUS
Status: DISPENSED | OUTPATIENT
Start: 2024-04-15 | End: 2024-04-16

## 2024-04-15 RX ADMIN — METOCLOPRAMIDE 10 MG: 5 INJECTION, SOLUTION INTRAMUSCULAR; INTRAVENOUS at 05:04

## 2024-04-15 RX ADMIN — ENOXAPARIN SODIUM 40 MG: 40 INJECTION SUBCUTANEOUS at 05:04

## 2024-04-15 RX ADMIN — HYDROXYZINE PAMOATE 25 MG: 25 CAPSULE ORAL at 10:04

## 2024-04-15 RX ADMIN — MUPIROCIN: 20 OINTMENT TOPICAL at 09:04

## 2024-04-15 RX ADMIN — DEXTROSE MONOHYDRATE, SODIUM CHLORIDE, AND POTASSIUM CHLORIDE: 50; 4.5; 2.98 INJECTION, SOLUTION INTRAVENOUS at 05:04

## 2024-04-15 RX ADMIN — CEFEPIME 2 G: 2 INJECTION, POWDER, FOR SOLUTION INTRAVENOUS at 02:04

## 2024-04-15 RX ADMIN — METOROPROLOL TARTRATE 5 MG: 5 INJECTION, SOLUTION INTRAVENOUS at 12:04

## 2024-04-15 RX ADMIN — KETOROLAC TROMETHAMINE 15 MG: 15 INJECTION, SOLUTION INTRAMUSCULAR; INTRAVENOUS at 05:04

## 2024-04-15 RX ADMIN — CALCITONIN SALMON 342 UNITS: 200 INJECTION, SOLUTION INTRAMUSCULAR; SUBCUTANEOUS at 10:04

## 2024-04-15 RX ADMIN — FLUCONAZOLE 200 MG: 2 INJECTION, SOLUTION INTRAVENOUS at 10:04

## 2024-04-15 RX ADMIN — METOROPROLOL TARTRATE 5 MG: 5 INJECTION, SOLUTION INTRAVENOUS at 05:04

## 2024-04-15 RX ADMIN — CEFEPIME 2 G: 2 INJECTION, POWDER, FOR SOLUTION INTRAVENOUS at 09:04

## 2024-04-15 RX ADMIN — IOHEXOL 100 ML: 350 INJECTION, SOLUTION INTRAVENOUS at 04:04

## 2024-04-15 RX ADMIN — METRONIDAZOLE 500 MG: 500 INJECTION, SOLUTION INTRAVENOUS at 10:04

## 2024-04-15 RX ADMIN — HYDROMORPHONE HYDROCHLORIDE 0.5 MG: 1 INJECTION, SOLUTION INTRAMUSCULAR; INTRAVENOUS; SUBCUTANEOUS at 09:04

## 2024-04-15 RX ADMIN — ONDANSETRON 4 MG: 2 INJECTION INTRAMUSCULAR; INTRAVENOUS at 11:04

## 2024-04-15 RX ADMIN — SENNOSIDES 8.6 MG: 8.6 TABLET, FILM COATED ORAL at 09:04

## 2024-04-15 RX ADMIN — ACETAMINOPHEN 650 MG: 650 SOLUTION ORAL at 10:04

## 2024-04-15 RX ADMIN — CEFEPIME 2 G: 2 INJECTION, POWDER, FOR SOLUTION INTRAVENOUS at 04:04

## 2024-04-15 RX ADMIN — METOROPROLOL TARTRATE 5 MG: 5 INJECTION, SOLUTION INTRAVENOUS at 11:04

## 2024-04-15 RX ADMIN — METOROPROLOL TARTRATE 5 MG: 5 INJECTION, SOLUTION INTRAVENOUS at 06:04

## 2024-04-15 RX ADMIN — PROCHLORPERAZINE EDISYLATE 2.5 MG: 5 INJECTION INTRAMUSCULAR; INTRAVENOUS at 12:04

## 2024-04-15 RX ADMIN — METRONIDAZOLE 500 MG: 500 INJECTION, SOLUTION INTRAVENOUS at 04:04

## 2024-04-15 RX ADMIN — MUPIROCIN: 20 OINTMENT TOPICAL at 10:04

## 2024-04-15 RX ADMIN — ONDANSETRON 4 MG: 2 INJECTION INTRAMUSCULAR; INTRAVENOUS at 04:04

## 2024-04-15 RX ADMIN — VANCOMYCIN HYDROCHLORIDE 1750 MG: 500 INJECTION, POWDER, LYOPHILIZED, FOR SOLUTION INTRAVENOUS at 11:04

## 2024-04-15 RX ADMIN — METRONIDAZOLE 500 MG: 500 INJECTION, SOLUTION INTRAVENOUS at 02:04

## 2024-04-15 RX ADMIN — LIOTHYRONINE SODIUM 5 MCG: 5 TABLET ORAL at 06:04

## 2024-04-15 RX ADMIN — HYDROMORPHONE HYDROCHLORIDE 0.5 MG: 1 INJECTION, SOLUTION INTRAMUSCULAR; INTRAVENOUS; SUBCUTANEOUS at 04:04

## 2024-04-15 NOTE — SUBJECTIVE & OBJECTIVE
Past Medical History:   Diagnosis Date    GERD (gastroesophageal reflux disease)     Heart valve problem     Hypertension     Obesity (BMI 30-39.9) 10/20/2014    Thyroid disease        Past Surgical History:   Procedure Laterality Date    cesaean section  1991, 1993    CHOLECYSTECTOMY  2008    COLONOSCOPY  09/2013    ESOPHAGOGASTRODUODENOSCOPY N/A 5/11/2020    Procedure: EGD (ESOPHAGOGASTRODUODENOSCOPY);  Surgeon: Nahed Zabala MD;  Location: [facility];  Service: Endoscopy;  Laterality: N/A;  covid 5/8/2020    HYSTERECTOMY  age 27    for fibroids    SALPINGECTOMY  1992    for ectopic pregnancy    TONGUE SURGERY  03/04/2022    Cancer removed on tongue and lymphs    TOTAL THYROIDECTOMY  2012    UPPER GASTROINTESTINAL ENDOSCOPY  09/2013    gastritis-hp neg       Review of patient's allergies indicates:   Allergen Reactions    Ciprofloxacin Swelling    Codeine Swelling    Opioids - morphine analogues     Pcn [penicillins] Hives     Tolerated cefepime 04/2024    Percocet [oxycodone-acetaminophen] Swelling       Medications:  Continuous Infusions:  Current Facility-Administered Medications   Medication Dose Route Frequency Provider Last Rate Last Admin    acetaminophen oral solution 650 mg  650 mg Oral Q4H PRN Marnie River MD   650 mg at 04/15/24 1002    calcitonin injection 342 Units  4 Units/kg Intramuscular BID Marnie River MD   342 Units at 04/15/24 1002    ceFEPIme (MAXIPIME) 2 g in dextrose 5 % in water (D5W) 100 mL IVPB (MB+)  2 g Intravenous Q8H Samia Gill  mL/hr at 04/15/24 1427 2 g at 04/15/24 1427    dextrose 10% bolus 125 mL 125 mL  12.5 g Intravenous PRN Samia Gill MD        dextrose 10% bolus 250 mL 250 mL  25 g Intravenous PRN Samia Gill MD        dextrose 5 % and 0.45 % NaCl with KCl 40 mEq infusion   Intravenous Continuous Marnie River MD        enoxaparin injection 40 mg  40 mg Subcutaneous Q24H (prophylaxis, 1700) Samia Gill  MD   40 mg at 04/14/24 1857    fluconazole (DIFLUCAN) IVPB 200 mg  200 mg Intravenous Q24H Samia Gill MD   Stopped at 04/15/24 1130    glucose chewable tablet 16 g  16 g Oral PRN Samia Gill MD        glucose chewable tablet 24 g  24 g Oral PRN Samia Gill MD        HYDROmorphone injection 0.5 mg  0.5 mg Intravenous Q4H PRN Samia Gill MD   0.5 mg at 04/15/24 0944    hydrOXYzine pamoate capsule 25 mg  25 mg Oral Q8H PRN Samia Gill MD   25 mg at 04/15/24 1002    ketorolac injection 15 mg  15 mg Intravenous Q6H PRN Marnie River MD        liothyronine tablet 5 mcg  5 mcg Oral Before breakfast Samia Gill MD   5 mcg at 04/15/24 0658    metoclopramide injection 10 mg  10 mg Intravenous Q6H PRN Marnie River MD        metoprolol injection 5 mg  5 mg Intravenous Q6H Ky Salamanca MD   5 mg at 04/15/24 1130    metronidazole IVPB 500 mg  500 mg Intravenous Q8H Samia Gill  mL/hr at 04/15/24 1427 500 mg at 04/15/24 1427    mupirocin 2 % ointment   Nasal BID Samia Gill MD   Given at 04/15/24 1002    naloxone 0.4 mg/mL injection 0.02 mg  0.02 mg Intravenous PRN Samia Gill MD        ondansetron injection 4 mg  4 mg Intravenous Q4H PRN Samia Gill MD   4 mg at 04/15/24 1131    prochlorperazine injection Soln 2.5 mg  2.5 mg Intravenous Q6H PRN Minerva Cardenas DO   2.5 mg at 04/15/24 1230    senna tablet 8.6 mg  8.6 mg Oral BID Marnie River MD        vancomycin - pharmacy to dose   Intravenous pharmacy to manage frequency Samia Gill MD        vancomycin 1.75 g in 5 % dextrose 500 mL IVPB  20 mg/kg Intravenous Q24H Marnie River MD   Stopped at 04/15/24 1331     Scheduled Meds:  Current Facility-Administered Medications   Medication Dose Route Frequency Provider Last Rate Last Admin    acetaminophen oral solution 650 mg  650 mg Oral Q4H PRN Marnie River MD   650 mg at 04/15/24 1002     calcitonin injection 342 Units  4 Units/kg Intramuscular BID Marnie River MD   342 Units at 04/15/24 1002    ceFEPIme (MAXIPIME) 2 g in dextrose 5 % in water (D5W) 100 mL IVPB (MB+)  2 g Intravenous Q8H Samia Gill  mL/hr at 04/15/24 1427 2 g at 04/15/24 1427    dextrose 10% bolus 125 mL 125 mL  12.5 g Intravenous PRN Samia Gill MD        dextrose 10% bolus 250 mL 250 mL  25 g Intravenous PRN Samia Gill MD        dextrose 5 % and 0.45 % NaCl with KCl 40 mEq infusion   Intravenous Continuous Marnie River MD        enoxaparin injection 40 mg  40 mg Subcutaneous Q24H (prophylaxis, 1700) Samia Gill MD   40 mg at 04/14/24 1857    fluconazole (DIFLUCAN) IVPB 200 mg  200 mg Intravenous Q24H Samia Gill MD   Stopped at 04/15/24 1130    glucose chewable tablet 16 g  16 g Oral PRN Samia Gill MD        glucose chewable tablet 24 g  24 g Oral PRN Samia Gill MD        HYDROmorphone injection 0.5 mg  0.5 mg Intravenous Q4H PRN Samia Gill MD   0.5 mg at 04/15/24 0944    hydrOXYzine pamoate capsule 25 mg  25 mg Oral Q8H PRN Samia Gill MD   25 mg at 04/15/24 1002    ketorolac injection 15 mg  15 mg Intravenous Q6H PRN Marnie River MD        liothyronine tablet 5 mcg  5 mcg Oral Before breakfast Samia Gill MD   5 mcg at 04/15/24 0658    metoclopramide injection 10 mg  10 mg Intravenous Q6H PRN Marnie River MD        metoprolol injection 5 mg  5 mg Intravenous Q6H Ky Salamanca MD   5 mg at 04/15/24 1130    metronidazole IVPB 500 mg  500 mg Intravenous Q8H CallumSamia Keating  mL/hr at 04/15/24 1427 500 mg at 04/15/24 1427    mupirocin 2 % ointment   Nasal BID Samia Gill MD   Given at 04/15/24 1002    naloxone 0.4 mg/mL injection 0.02 mg  0.02 mg Intravenous PRN Samia Gill MD        ondansetron injection 4 mg  4 mg Intravenous Q4H PRN Samia Gill,  MD   4 mg at 04/15/24 1131    prochlorperazine injection Soln 2.5 mg  2.5 mg Intravenous Q6H PRN Asha CardenasaDO   2.5 mg at 04/15/24 1230    senna tablet 8.6 mg  8.6 mg Oral BID Marnie River MD        vancomycin - pharmacy to dose   Intravenous pharmacy to manage frequency Samia Gill MD        vancomycin 1.75 g in 5 % dextrose 500 mL IVPB  20 mg/kg Intravenous Q24H Marnie River MD   Stopped at 04/15/24 1331     PRN Meds:  Current Facility-Administered Medications   Medication Dose Route Frequency Provider Last Rate Last Admin    acetaminophen oral solution 650 mg  650 mg Oral Q4H PRN Marnie River MD   650 mg at 04/15/24 1002    calcitonin injection 342 Units  4 Units/kg Intramuscular BID Marnie River MD   342 Units at 04/15/24 1002    ceFEPIme (MAXIPIME) 2 g in dextrose 5 % in water (D5W) 100 mL IVPB (MB+)  2 g Intravenous Q8H Samia Glil  mL/hr at 04/15/24 1427 2 g at 04/15/24 1427    dextrose 10% bolus 125 mL 125 mL  12.5 g Intravenous PRN Samia Gill MD        dextrose 10% bolus 250 mL 250 mL  25 g Intravenous PRN Samia Gill MD        dextrose 5 % and 0.45 % NaCl with KCl 40 mEq infusion   Intravenous Continuous Marnie River MD        enoxaparin injection 40 mg  40 mg Subcutaneous Q24H (prophylaxis, 1700) Samia Gill MD   40 mg at 04/14/24 1857    fluconazole (DIFLUCAN) IVPB 200 mg  200 mg Intravenous Q24H Samia Gill MD   Stopped at 04/15/24 1130    glucose chewable tablet 16 g  16 g Oral PRN Samia Gill MD        glucose chewable tablet 24 g  24 g Oral PRN Samia Gill MD        HYDROmorphone injection 0.5 mg  0.5 mg Intravenous Q4H PRN Samia Gill MD   0.5 mg at 04/15/24 0944    hydrOXYzine pamoate capsule 25 mg  25 mg Oral Q8H PRN Samia Gill MD   25 mg at 04/15/24 1002    ketorolac injection 15 mg  15 mg Intravenous Q6H PRN Marnie River MD        liothyronine  tablet 5 mcg  5 mcg Oral Before breakfast Samia Gill MD   5 mcg at 04/15/24 0658    metoclopramide injection 10 mg  10 mg Intravenous Q6H PRN Marnie River MD        metoprolol injection 5 mg  5 mg Intravenous Q6H Ky Salamanca MD   5 mg at 04/15/24 1130    metronidazole IVPB 500 mg  500 mg Intravenous Q8H Samia Gill  mL/hr at 04/15/24 1427 500 mg at 04/15/24 1427    mupirocin 2 % ointment   Nasal BID Samia Gill MD   Given at 04/15/24 1002    naloxone 0.4 mg/mL injection 0.02 mg  0.02 mg Intravenous PRN Samia Gill MD        ondansetron injection 4 mg  4 mg Intravenous Q4H PRN Samia Gill MD   4 mg at 04/15/24 1131    prochlorperazine injection Soln 2.5 mg  2.5 mg Intravenous Q6H PRN Minerva Cardenas DO   2.5 mg at 04/15/24 1230    senna tablet 8.6 mg  8.6 mg Oral BID Marnie River MD        vancomycin - pharmacy to dose   Intravenous pharmacy to manage frequency Samia Gill MD        vancomycin 1.75 g in 5 % dextrose 500 mL IVPB  20 mg/kg Intravenous Q24H Marnie River MD   Stopped at 04/15/24 1331       Family History       Problem Relation (Age of Onset)    Diabetes Mother, Father, Brother    Heart disease Mother, Father, Brother    Hyperlipidemia Mother    Hypertension Mother, Father          Tobacco Use    Smoking status: Never    Smokeless tobacco: Never   Substance and Sexual Activity    Alcohol use: No     Alcohol/week: 0.0 standard drinks of alcohol    Drug use: No    Sexual activity: Yes     Partners: Male     Birth control/protection: See Surgical Hx       Review of Systems   Constitutional:  Positive for activity change and appetite change.   HENT:  Positive for facial swelling.    Skin:  Positive for wound (to right neck).     Objective:     Vital Signs (Most Recent):  Temp: 98.1 °F (36.7 °C) (04/15/24 1222)  Pulse: (!) 117 (04/15/24 1513)  Resp: 20 (04/15/24 1222)  BP: (!) 158/87 (04/15/24 1222)  SpO2: 97 % (04/15/24  1513) Vital Signs (24h Range):  Temp:  [97.6 °F (36.4 °C)-98.5 °F (36.9 °C)] 98.1 °F (36.7 °C)  Pulse:  [] 117  Resp:  [18-20] 20  SpO2:  [93 %-97 %] 97 %  BP: (108-158)/(70-87) 158/87     Weight: 85.3 kg (188 lb 0.8 oz)  Body mass index is 30.35 kg/m².       Physical Exam  Musculoskeletal:      Cervical back: Edema present.   Neurological:      Mental Status: She is alert and oriented to person, place, and time.            Review of Symptoms      Symptom Assessment (ESAS 0-10 Scale)  Pain:  5  Dyspnea:  0  Anxiety:  0  Nausea:  0  Depression:  0  Anorexia:  0  Fatigue:  0  Insomnia:  0  Restlessness:  0  Agitation:  0         Pain Assessment:    Location(s): neck    Neck       Location: right        Quality: Aching and burning        Quantity: 7/10 in intensity        Chronicity: Onset 10 month(s) ago, gradually worsening        Aggravating Factors: None        Alleviating Factors: Opiates        Associated Symptoms: None    ECOG Performance Status stGstrstastdstest:st st1st Living Arrangements:  Lives with spouse    Psychosocial/Cultural:   See Palliative Psychosocial Note: Yes  Lives with  and adult daughter.   **Primary  to Follow**  Palliative Care  Consult: Yes        Advance Care Planning   Advance Directives:   Living Will: No    Do Not Resuscitate Status: No      Decision Making:  Patient answered questions and Family answered questions  Goals of Care: The patient and family endorses that what is most important right now is to focus on symptom/pain control and quality of life, even if it means sacrificing a little time    Accordingly, we have decided that the best plan to meet the patient's goals includes continuing with limited treatment, while exploring other options for treatment.          Significant Labs: BMP:   Recent Labs   Lab 04/15/24  0317   GLU 97      K 3.3*      CO2 32*   BUN 15   CREATININE 0.8   CALCIUM 13.1*   MG 1.8     CBC:   Recent Labs   Lab  04/14/24  0509 04/15/24  0317   WBC 21.18* 22.79*   HGB 13.7 14.0   HCT 42.5 43.4    374     CBC:   Recent Labs   Lab 04/15/24  0317   WBC 22.79*   HGB 14.0   HCT 43.4   MCV 85        BMP:  Recent Labs   Lab 04/15/24  0317   GLU 97      K 3.3*      CO2 32*   BUN 15   CREATININE 0.8   CALCIUM 13.1*   MG 1.8     LFT:  Lab Results   Component Value Date    AST 27 04/15/2024    ALKPHOS 118 04/15/2024    BILITOT 0.6 04/15/2024     Albumin:   Albumin   Date Value Ref Range Status   04/15/2024 2.8 (L) 3.5 - 5.2 g/dL Final     Protein:   Total Protein   Date Value Ref Range Status   04/15/2024 5.9 (L) 6.0 - 8.4 g/dL Final     Lactic acid:   Lab Results   Component Value Date    LACTATE 1.5 04/11/2024    LACTATE 2.3 (H) 04/10/2024       Significant Imaging: I have reviewed all pertinent imaging results/findings within the past 24 hours.

## 2024-04-15 NOTE — PROGRESS NOTES
Pharmacokinetic Assessment Follow Up: IV Vancomycin    Vancomycin serum concentration assessment(s):    Vanc random-16.5 mcg/mL, drawn appropriately     Vancomycin Regimen Plan:    Will restart vanc at much lower dose of 1.75g IV q24h.   Renal function stable, will check a trough tomorrow at 0945.  Goal 10-15 mcg/mL for SSTI.     Drug levels (last 3 results):  Recent Labs   Lab Result Units 04/14/24  0509 04/15/24  0317   Vancomycin, Random ug/mL  --  16.5   Vancomycin-Trough ug/mL 36.4*  --        Pharmacy will continue to follow and monitor vancomycin.    Thank you for the consult,   Molly Birmingham, PharmD, [facility]  [phone]     Patient brief summary:  Marysol Cash is a 58 y.o. female initiated on antimicrobial therapy with IV Vancomycin for treatment of skin & soft tissue infection      Actual Body Weight:   84kg    Renal Function:   Estimated Creatinine Clearance: 85.4 mL/min (based on SCr of 0.8 mg/dL).,       CBC (last 72 hours):  Recent Labs   Lab Result Units 04/13/24  0305 04/14/24  0509 04/15/24  0317   WBC K/uL 11.66 21.18* 22.79*   Hemoglobin g/dL 13.2 13.7 14.0   Hematocrit % 40.5 42.5 43.4   Platelets K/uL 335 369 374   Gran % % 89.7* 85.3* 78.6*   Lymph % % 8.5* 8.9* 13.0*   Mono % % 0.9* 4.6 7.5   Eosinophil % % 0.0 0.0 0.0   Basophil % % 0.1 0.2 0.1   Differential Method  Automated Automated Automated       Metabolic Panel (last 72 hours):  Recent Labs   Lab Result Units 04/12/24  1627 04/12/24  2349 04/13/24  0305 04/14/24  0509 04/15/24  0317   Sodium mmol/L 140 139 138 137 143   Potassium mmol/L 2.9* 3.0* 2.8* 3.1* 3.3*   Chloride mmol/L 94* 94* 95 96 101   CO2 mmol/L 35* 35* 35* 32* 32*   Glucose mg/dL 101 118* 181* 135* 97   BUN mg/dL 6 6 8 11 15   Creatinine mg/dL 0.5 0.6 0.7 0.7 0.8   Albumin g/dL 2.8*  --  2.7* 2.7* 2.8*   Total Bilirubin mg/dL 0.8  --  0.8 0.6 0.6   Alkaline Phosphatase U/L 139*  --  134 124 118   AST U/L 39  --  28 21 27   ALT U/L 35  --  33 25 28   Magnesium mg/dL 1.4*  2.2 2.0 1.7 1.8   Phosphorus mg/dL 2.6*  --  2.4* 3.3 3.1       Vancomycin Administrations:  vancomycin given in the last 96 hours                     vancomycin 2 g in dextrose 5 % 500 mL IVPB ()  Restarted 04/14/24 0613     2,000 mg New Bag  0613      Restarted 04/13/24 1909     2,000 mg New Bag  1909     2,000 mg New Bag  0708      Restarted 04/12/24 2115      Restarted  2035     2,000 mg New Bag  2006    vancomycin 2 g in dextrose 5 % 500 mL IVPB (mg) 2,000 mg New Bag 04/12/24 0750     2,000 mg New Bag 04/11/24 1958                    Microbiologic Results:  Microbiology Results (last 7 days)       ** No results found for the last 168 hours. **

## 2024-04-15 NOTE — NURSING
Received patient from MICU in stable condition RA.  Left PIV swollen, red and painful, removed IV, applied ice, and elevation.  Dilaudid administered X1, Compazine administered X1, relief noted.  VSS, NDN, respirations even, and unlabored.  Patient denies needs, instructed to call for needs.  Bed in lowest position, call light within reach, spouse at bedside.  Report given to oncoming nurse.

## 2024-04-15 NOTE — ASSESSMENT & PLAN NOTE
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 She declined chemo and radiation throughout length of diagnosis and is now with significant progression resulting in open neck wound and unresectable disease based on CT scan. R laryngeal and pharyngeal edema and obscuring vocal cords. Patient does not appear to be in respiratory distress and does not complain of difficulty breathing despite FFL findings. ENT consulted and recommended consideration of trach and PEG, however stated that tracheostomy would be very technically challenging and with significant concern for infection. Heme/Onc consult and recommends  imaging for staging and palliative radiation and chemo.     Reason for Consult. Per communication with Dr. River, GOC and ACP conversations requested, as pt with metastatic lymphadenopathy from SCC of tongue and declining chemo and radiation therapies.     ACP/GOC Met with patient and  today at bedside.  Initial visit patient asked for me to return, she had just taken pain medication and sleepy.  Upon 2nd visit I was able to discuss understanding of patient and family of current disease process.  Patient's  did most of the talking and explained that they have been participating in holistic, nontraditional" therapies since they have found out about her metastatic lymph node disease last July. These therapies included, brushing, raking, infrared lights, and lymphatic drainage massage to name a few. She admitted that she had been noticing wound on neck getting worse, but was just caring for it at home.  Unclear if family currently understands the degree of severity associated with diagnosis, although multiple charting from Heme-Onc and ENT stating their conversations with family.       Wife and  did share that they would not " want to do any chemotherapy or radiation therapy and seem very adamant about this.  shared that he understands chemo or radiation may only prolong her life by several years and have lots of complications.  Of note, wife and  did mention possibility of her getting a PEG tube if she could not eat.  However, before further goals of care and meaning could be discussed the patient with bouts of nausea and vomiting.  I will revisit tomorrow to continue goals of care discussion and further understanding of their options going forward.    MPOA Pt's  Genaro Cash [phone]    Symptoms   -Pain: r/t R neck swelling and infection. Currently controlled with 0.5mg IV Dilaudid, 24h OME = 70. Will continue to monitor usage and adjust accordingly. Antbx per ID.  -Nausea/Vomiting: Pt with Ondansetron and Prochlorperazine prn. Requiring both to control n/v today. Prior to today, without n/v x 2 days. Will continue to monitor.  -Dysphagia: Pt with increased difficulty swallowing d/t neck swelling. Follow recs per SLP.    Recommendations  -Consider adding Senna BID, as pt without BM x2 days, OME=70 (increase from previous) and n/v today.   -Monitor for airway distress, as pt with tracheal deviation on CT.  -Will continue to discuss GOC and plan.

## 2024-04-15 NOTE — PLAN OF CARE
Problem: Adult Inpatient Plan of Care  Goal: Plan of Care Review  Outcome: Ongoing, Progressing  Goal: Patient-Specific Goal (Individualized)  Outcome: Ongoing, Progressing  Goal: Absence of Hospital-Acquired Illness or Injury  Outcome: Ongoing, Progressing  Goal: Optimal Comfort and Wellbeing  Outcome: Ongoing, Progressing  Goal: Readiness for Transition of Care  Outcome: Ongoing, Progressing     Problem: Adjustment to Illness (Sepsis/Septic Shock)  Goal: Optimal Coping  Outcome: Ongoing, Progressing     Problem: Bleeding (Sepsis/Septic Shock)  Goal: Absence of Bleeding  Outcome: Ongoing, Progressing     Problem: Glycemic Control Impaired (Sepsis/Septic Shock)  Goal: Blood Glucose Level Within Desired Range  Outcome: Ongoing, Progressing     Problem: Infection Progression (Sepsis/Septic Shock)  Goal: Absence of Infection Signs and Symptoms  Outcome: Ongoing, Progressing     Problem: Nutrition Impaired (Sepsis/Septic Shock)  Goal: Optimal Nutrition Intake  Outcome: Ongoing, Progressing     Problem: Coping Ineffective  Goal: Effective Coping  Outcome: Ongoing, Progressing

## 2024-04-15 NOTE — CONSULTS
[facility] - Telemetry Stepdown  Infectious Disease  Consult Note    Patient Name: Marysol Cash  MRN: [id number]  Admission Date: 4/12/2024  Hospital Length of Stay: 3 days  Attending Physician: Marnie River MD  Primary Care Provider: James Coronel MD     Isolation Status: No active isolations    Patient information was obtained from patient, spouse/SO, past medical records, and ER records.      Inpatient consult to Infectious Diseases  Consult performed by: Jacinda Blackwell MD  Consult ordered by: Marnie River MD        Assessment/Plan:     Oncology  Malignant neoplasm of tip and lateral border of tongue  See open neck wound    Orthopedic  Open neck wound  H/o SCC base of tongue s/p resection and subsequent recurrence which has metastasized to her neck; previously declined chemotherapy and radiation and has been undergoing stem cell infusions and hyperbaric chamber therapy in [place]   Admitted with pain/difficulty swallowing 2/2 large, necrotizing neck mass  CT showing large 9.5 x 8.2 x 10 cm bulky lobulated appearing mass right lateral and anterolateral neck extending to the D spaces of the neck with large central collection of air which appears to extend superficially to the skin surface right mid neck anterior laterally. Overall appears to be a jose mass but superimposed abscess cannot be excluded; also has multiple necrotic nodes BL   Seen by ENT who advised the mass is not resectable; recommended considering trache/PEG although trache would be technically difficulty due to the size and location of the mass; recommended heme/onc and palliative care  Afebrile and HDS; leukocytosis with WBC 22  BCx NTD; no previous superficial or deep wound cultures   Large mass on right side of neck with dehiscence; mild surrounding erythema and minimal drainage    Recommend:  - switch IV antibiotics to cefadroxil 1g daily and doxycycline 100mg BID; treat for 1 additional week  - most of mass likely necrotic  tumor as opposed to abscess; Pt is not a candidate for resection so even if there was a superimposed abscess, unlikely to achieve source control without debridement/resection      Thank you for your consult. I will sign off. Please contact us if you have any additional questions.    Jacinda Blackwell MD  Infectious Disease  [facility] - Telemetry Stepdown    Subjective:     Principal Problem: Sepsis    HPI: 58 year old female with SCC of the tongue S/P resection in 03/2022, suspected recurrence of SCC of tongue in R neck, left vocal cord paralysis, GERD, HTN, obesity, post-surgical hypothyroidism admitted with neck wound/infection. Presented with nausea/vomiting and reduced appetite for about 1 week. Had intermittent difficulty with PO intake over several weeks 2/2 neck mass and pain. Neck mass has been present for almost 1 year and started draining several months ago when she started undergoing lymphatic drainage ( at bedside and Pt report she would get massages). Pt previously elected not to have chemotherapy or radiation for her neck mass and has been undergoing stem cell infusions with hyperbaric chamber [place]. Given a 2 week course of antibiotics for the neck wound by the doctor in [place] which she completed on March 29; unsure the name of the antibiotic. Denies any other travel apart from this. Lives at home with , daughter, son-in-law, and 2x grandkids. No pets at home. Afebrile and HDS on presentation. CT neck showing a 9.5 x 8.2 x 10 cm large lobulated mass in the right anterolateral neck extending to the deep spaces of the neck and abutting the right prevertebral space and paravertebral musculature, with left midline shift, multiple bilateral necrotic cervical lymph nodes. Seen by ENT who advised her disease is unresectable and that trachestomy would be technically difficult.     Infectious diseases consulted for neck wound/antibiotic management.      Past Medical History:   Diagnosis Date    GERD (gastroesophageal reflux disease)     Heart valve problem     Hypertension     Obesity (BMI 30-39.9) 10/20/2014    Thyroid disease      Past Surgical History:   Procedure Laterality Date    cesaean section  1991, 1993    CHOLECYSTECTOMY  2008    COLONOSCOPY  09/2013    ESOPHAGOGASTRODUODENOSCOPY N/A 5/11/2020    Procedure: EGD (ESOPHAGOGASTRODUODENOSCOPY);  Surgeon: Nahed Zabala MD;  [facility];  Service: Endoscopy;  Laterality: N/A;  covid 5/8/2020    HYSTERECTOMY  age 27    for fibroids    SALPINGECTOMY  1992    for ectopic pregnancy    TONGUE SURGERY  03/04/2022    Cancer removed on tongue and lymphs    TOTAL THYROIDECTOMY  2012    UPPER GASTROINTESTINAL ENDOSCOPY  09/2013    gastritis-hp neg     Review of patient's allergies indicates:   Allergen Reactions    Ciprofloxacin Swelling    Codeine Swelling    Opioids - morphine analogues     Pcn [penicillins] Hives     Tolerated cefepime 04/2024    Percocet [oxycodone-acetaminophen] Swelling     Medications:  Current Facility-Administered Medications   Medication Dose Route Frequency Provider Last Rate Last Admin    acetaminophen oral solution 650 mg  650 mg Oral Q4H PRN Marnie River MD   650 mg at 04/15/24 1002    calcitonin injection 342 Units  4 Units/kg Intramuscular BID Marnie River MD   342 Units at 04/15/24 1002    ceFEPIme (MAXIPIME) 2 g in dextrose 5 % in water (D5W) 100 mL IVPB (MB+)  2 g Intravenous Q8H Samia Gill MD   Stopped at 04/15/24 0433    dextrose 10% bolus 125 mL 125 mL  12.5 g Intravenous PRN Samia Gill MD        dextrose 10% bolus 250 mL 250 mL  25 g Intravenous PRN Samia Gill MD        dextrose 5 % and 0.45 % NaCl with KCl 40 mEq infusion   Intravenous Continuous Ky Salamanca  mL/hr at 04/14/24 1500 Rate Verify at 04/14/24 1500    enoxaparin injection 40 mg  40 mg Subcutaneous Q24H (prophylaxis, 1700) Samia Gill  MD   40 mg at 04/14/24 1857    fluconazole (DIFLUCAN) IVPB 200 mg  200 mg Intravenous Q24H Samia Gill  mL/hr at 04/15/24 1030 200 mg at 04/15/24 1030    glucose chewable tablet 16 g  16 g Oral PRN Samia Gill MD        glucose chewable tablet 24 g  24 g Oral PRN Samia Gill MD        HYDROmorphone injection 0.5 mg  0.5 mg Intravenous Q4H PRN Samia Gill MD   0.5 mg at 04/15/24 0944    hydrOXYzine pamoate capsule 25 mg  25 mg Oral Q8H PRN Samia Gill MD   25 mg at 04/15/24 1002    liothyronine tablet 5 mcg  5 mcg Oral Before breakfast Samia Gill MD   5 mcg at 04/15/24 0658    metoprolol injection 5 mg  5 mg Intravenous Q6H Ky Salamanca MD   5 mg at 04/15/24 0658    metronidazole IVPB 500 mg  500 mg Intravenous Q8H Samia Gill MD   Stopped at 04/15/24 0503    mupirocin 2 % ointment   Nasal BID Samia Gill MD   Given at 04/15/24 1002    naloxone 0.4 mg/mL injection 0.02 mg  0.02 mg Intravenous PRN Samia Gill MD        ondansetron injection 4 mg  4 mg Intravenous Q4H PRN Samia Gill MD   4 mg at 04/15/24 0402    prochlorperazine injection Soln 2.5 mg  2.5 mg Intravenous Q6H PRN Minerva Cardenas DO   2.5 mg at 04/14/24 1906    vancomycin - pharmacy to dose   Intravenous pharmacy to manage frequency Samia Gill MD        vancomycin 1.75 g in 5 % dextrose 500 mL IVPB  20 mg/kg Intravenous Q24H Marnie River MD         Antibiotics (From admission, onward)      Start     Stop Route Frequency Ordered    04/15/24 1045  vancomycin 1.75 g in 5 % dextrose 500 mL IVPB         -- IV Every 24 hours (non-standard times) 04/15/24 0940    04/12/24 2100  mupirocin 2 % ointment         04/16/24 2059 Nasl 2 times daily 04/12/24 1324    04/12/24 1830  ceFEPIme (MAXIPIME) 2 g in dextrose 5 % in water (D5W) 100 mL IVPB (MB+)         04/16/24 1259 IV Every 8 hours (non-standard times) 04/12/24 1324    04/12/24  "1800  metronidazole IVPB 500 mg         -- IV Every 8 hours (non-standard times) 04/12/24 1324    04/12/24 1315  vancomycin - pharmacy to dose  (vancomycin IVPB (PEDS and ADULTS))        Placed in "And" Linked Group    -- IV pharmacy to manage frequency 04/12/24 1324          Antifungals (From admission, onward)      Start     Stop Route Frequency Ordered    04/13/24 1045  fluconazole (DIFLUCAN) IVPB 200 mg         -- IV Every 24 hours (non-standard times) 04/12/24 1324          Antivirals (From admission, onward)      None             Immunization History   Administered Date(s) Administered    Tdap 05/31/2016       Family History       Problem Relation (Age of Onset)    Diabetes Mother, Father, Brother    Heart disease Mother, Father, Brother    Hyperlipidemia Mother    Hypertension Mother, Father          Social History     Socioeconomic History    Marital status:    Tobacco Use    Smoking status: Never    Smokeless tobacco: Never   Substance and Sexual Activity    Alcohol use: No     Alcohol/week: 0.0 standard drinks of alcohol    Drug use: No    Sexual activity: Yes     Partners: Male     Birth control/protection: See Surgical Hx     Social Determinants of Health     Financial Resource Strain: Low Risk  (4/12/2024)    Overall Financial Resource Strain (CARDIA)     Difficulty of Paying Living Expenses: Not very hard   Food Insecurity: No Food Insecurity (4/12/2024)    Hunger Vital Sign     Worried About Running Out of Food in the Last Year: Never true     Ran Out of Food in the Last Year: Never true   Transportation Needs: No Transportation Needs (4/12/2024)    PRAPARE - Transportation     Lack of Transportation (Medical): No     Lack of Transportation (Non-Medical): No   Physical Activity: Inactive (4/12/2024)    Exercise Vital Sign     Days of Exercise per Week: 0 days     Minutes of Exercise per Session: 0 min   Stress: Stress Concern Present (4/12/2024)    [employer] - " Occupational Stress Questionnaire     Feeling of Stress : Rather much   Social Connections: Moderately Integrated (4/12/2024)    Social Connection and Isolation Panel [NHANES]     Frequency of Communication with Friends and Family: More than three times a week     Frequency of Social Gatherings with Friends and Family: More than three times a week     Attends [employer] Services: More than 4 times per year     Active Member of Clubs or Organizations: No     Attends Club or Organization Meetings: Never     Marital Status:    Housing Stability: Low Risk  (4/12/2024)    Housing Stability Vital Sign     Unable to Pay for Housing in the Last Year: No     Number of Places Lived in the Last Year: 1     Unstable Housing in the Last Year: No     Review of Systems   Constitutional:  Negative for chills and fever.   HENT:  Positive for trouble swallowing and voice change. Negative for congestion, rhinorrhea and sore throat.    Respiratory:  Positive for shortness of breath. Negative for cough and wheezing.    Cardiovascular:  Negative for chest pain and palpitations.   Gastrointestinal:  Negative for abdominal pain, diarrhea, nausea and vomiting.   Genitourinary:  Negative for dysuria, frequency and hematuria.   Musculoskeletal:  Positive for neck pain.   Skin:  Positive for wound.   Neurological:  Negative for dizziness and headaches.   Psychiatric/Behavioral:  Negative for agitation and confusion.      Objective:     Vital Signs (Most Recent):  Temp: 98.5 °F (36.9 °C) (04/15/24 0756)  Pulse: 93 (04/15/24 0756)  Resp: 18 (04/15/24 0944)  BP: 129/79 (04/15/24 0756)  SpO2: 95 % (04/15/24 0756) Vital Signs (24h Range):  Temp:  [97.6 °F (36.4 °C)-98.5 °F (36.9 °C)] 98.5 °F (36.9 °C)  Pulse:  [82-98] 93  Resp:  [12-25] 18  SpO2:  [89 %-96 %] 95 %  BP: (108-176)/(70-79) 129/79     Weight: 85.3 kg (188 lb 0.8 oz)  Body mass index is 30.35 kg/m².    Estimated Creatinine Clearance: 85.4 mL/min (based on SCr of 0.8 mg/dL).      Physical Exam  Vitals and nursing note reviewed.   Constitutional:       General: She is not in acute distress.     Appearance: She is ill-appearing.      Comments: Hoarse voice   HENT:      Head: Normocephalic and atraumatic.      Mouth/Throat:      Mouth: Mucous membranes are dry.   Eyes:      Conjunctiva/sclera: Conjunctivae normal.   Neck:        Comments: Large, necrotic mass on right side of neck with several openings; some mild surrounding erythema and purulent drainage  Cardiovascular:      Rate and Rhythm: Regular rhythm. Tachycardia present.      Pulses: Normal pulses.      Heart sounds: No murmur heard.  Pulmonary:      Effort: Pulmonary effort is normal.      Breath sounds: Normal breath sounds. No wheezing, rhonchi or rales.      Comments: On 2L NC  Abdominal:      General: Bowel sounds are normal. There is no distension.      Palpations: Abdomen is soft.      Tenderness: There is no abdominal tenderness. There is no guarding.   Skin:     General: Skin is warm.      Capillary Refill: Capillary refill takes less than 2 seconds.      Findings: No erythema or rash.   Neurological:      General: No focal deficit present.      Mental Status: She is alert and oriented to person, place, and time.          Significant Labs: Blood Culture:   Recent Labs   Lab 04/10/24  1713 04/10/24  1715   LABBLOO No Growth to date  No Growth to date  No Growth to date  No Growth to date  No Growth to date No Growth to date  No Growth to date  No Growth to date  No Growth to date  No Growth to date     CBC:   Recent Labs   Lab 04/14/24  0509 04/15/24  0317   WBC 21.18* 22.79*   HGB 13.7 14.0   HCT 42.5 43.4    374     CMP:   Recent Labs   Lab 04/14/24  0509 04/15/24  0317    143   K 3.1* 3.3*   CL 96 101   CO2 32* 32*   * 97   BUN 11 15   CREATININE 0.7 0.8   CALCIUM 12.1* 13.1*   PROT 5.9* 5.9*   ALBUMIN 2.7* 2.8*   BILITOT 0.6 0.6   ALKPHOS 124 118   AST 21 27   ALT 25 28   ANIONGAP 9 10  "    Lactic Acid: No results for input(s): "LACTATE" in the last 48 hours.  POCT Glucose:   Recent Labs   Lab 04/14/24  0058 04/14/24  0512 04/14/24  1228   POCTGLUCOSE 128* 137* 116*     All pertinent labs within the past 24 hours have been reviewed.  Recent Lab Results         04/15/24  0317   04/14/24  1228        Albumin 2.8                  ALT 28         Anion Gap 10         AST 27         Baso # 0.03         Basophil % 0.1         BILIRUBIN TOTAL 0.6  Comment: For infants and newborns, interpretation of results should be based  on gestational age, weight and in agreement with clinical  observations.    Premature Infant recommended reference ranges:  Up to 24 hours.............<8.0 mg/dL  Up to 48 hours............<12.0 mg/dL  3-5 days..................<15.0 mg/dL  6-29 days.................<15.0 mg/dL           BUN 15         Calcium 13.1  Comment: *Critical value notification by [facility]_ with confirmation of receipt to   FREDY CEDILLO RN___ at Date__4/15__Time__0521__           Chloride 101         CO2 32         Creatinine 0.8         Differential Method Automated         eGFR >60.0         Eos # 0.0         Eos % 0.0         Glucose 97         Gran # (ANC) 17.9         Gran % 78.6         Hematocrit 43.4         Hemoglobin 14.0         Immature Grans (Abs) 0.18  Comment: Mild elevation in immature granulocytes is non specific and   can be seen in a variety of conditions including stress response,   acute inflammation, trauma and pregnancy. Correlation with other   laboratory and clinical findings is essential.           Immature Granulocytes 0.8         Lymph # 3.0         Lymph % 13.0         Magnesium  1.8         MCH 27.5         MCHC 32.3         MCV 85         Mono # 1.7         Mono % 7.5         MPV 10.6         nRBC 0         Phosphorus Level 3.1         Platelet Count 374         POCT Glucose   116       Potassium 3.3         PROTEIN TOTAL 5.9         RBC 5.10         RDW 15.0         Sodium " 143         Vancomycin, Random 16.5         WBC 22.79               Significant Imaging: I have reviewed all pertinent imaging results/findings within the past 24 hours.

## 2024-04-15 NOTE — CONSULTS
[facility] - Telemetry Stepdown  Palliative Medicine  Consult Note    Patient Name: Marysol Cash  MRN: [id number]  Admission Date: 4/12/2024  Hospital Length of Stay: 3 days  Code Status: Full Code   Attending Provider: Marnie River MD  Consulting Provider: FRANKO Lance  Primary Care Physician: James Coronel MD  Principal Problem:Sepsis    Patient information was obtained from patient, spouse/SO, and primary team.      Consults  Assessment/Plan:     Palliative Care  Palliative care encounter  Impression  58y.o F with history of R vocal cord paralysis 2/2 thyroidectomy, SCC of tongue s/p L partial glossectomy and selective neck dissection(2022), with recurrent metastatic disease per R lymph node bx in 2023. She declined chemo and radiation throughout length of diagnosis and is now with significant progression resulting in open neck wound and unresectable disease based on CT scan. R laryngeal and pharyngeal edema and obscuring vocal cords. Patient does not appear to be in respiratory distress and does not complain of difficulty breathing despite FFL findings. ENT consulted and recommended consideration of trach and PEG, however stated that tracheostomy would be very technically challenging and with significant concern for infection. Heme/Onc consult and recommends  imaging for staging and palliative radiation and chemo.     Reason for Consult. Per communication with Dr. River, GOC and ACP conversations requested, as pt with metastatic lymphadenopathy from SCC of tongue and declining chemo and radiation therapies.     ACP/GOC Met with patient and  today at bedside.  Initial visit patient asked for me to return, she had just taken pain medication and sleepy.  Upon 2nd visit I was able to discuss understanding of patient and family of current disease process. Pt's sister at  and shares that they are 2 of 7 siblings, all still in the area. Pt shares she has 4 children and that they are around for  "support as well.         Patient's  did most of the talking and explained that they have been participating in holistic, nontraditional" therapies since they have found out about her metastatic lymph node disease last July. These therapies included, brushing, raking, infrared lights, and lymphatic drainage massage to name a few. She admitted that she had been noticing wound on neck getting worse, but was just caring for it at home.  Unclear if family currently understands the degree of severity associated with diagnosis, although multiple charting from Heme-Onc and ENT stating their conversations with family.       Wife and  did share that they would not want to do any chemotherapy or radiation therapy and seem very adamant about this.  shared that he understands chemo or radiation may only prolong her life by several years and have lots of complications.  Of note, wife and  did mention possibility of her getting a PEG tube if she could not eat.  However, before further goals of care and meaning could be discussed the patient with bouts of nausea and vomiting.  I will revisit tomorrow to continue goals of care discussion and further understanding of their options going forward.    MPOA Pt's  Genaro Cash [phone]    Symptoms   -Pain: r/t R neck swelling and infection. Currently controlled with 0.5mg IV Dilaudid, 24h OME = 70. Will continue to monitor usage and adjust accordingly. Antbx per ID.  -Nausea/Vomiting: Pt with Ondansetron and Prochlorperazine prn. Requiring both to control n/v today. Prior to today, without n/v x 2 days. Will continue to monitor.  -Dysphagia: Pt with increased difficulty swallowing d/t neck swelling. Follow recs per SLP.    Recommendations  -Consider adding Senna BID, as pt without BM x2 days, OME=70 (increase from previous) and n/v today.   -Monitor for airway distress, as pt with tracheal deviation on CT.  -Will continue to discuss GOC and " plan.        Thank you for your consult. I will follow-up with patient. Please contact us if you have any additional questions.    Subjective:     HPI:   Per Chart Review: Marysol Cash is a 58 year-old female with a PMHx of SCC of the tongue s/p radical neck dissection 03/2022 (had refused chemo and radiation) with suspected recurrence of SCC of tongue in right neck, left vocal cord paralysis, GERD, Hypertension, obesity, post-surgical hypothyroidism and hypoparathyroidism. She initially presented to [facility] Emergency Department with six days of epigastric pain with associated nausea, vomiting, and difficulty eating due to pain. However on presentation, she was noted to have a large necrotic and purulent wound located on her right neck with complaints of associated difficulty speaking, swallowing and shortness of breath. She was noted to be hypoxic 88% on room air which improved with 2L NC. Labs were notable for leukocytosis 14, hypokalemia 2.4, hypochloremia 89, CO2 36, hypercalcemia 13.5, Phos 2.5. . Troponin 0.046. Lactate 2.3. CT Soft Tissue Neck was concerning for 9.5 x 8.2 x 10 cm large lobulated mass in the right anterolateral neck extending to the deep spaces of the neck and abutting the right prevertebral space and paravertebral musculature, left midline shift, multiple bilateral necrotic cervical lymph nodes. Case was discussed and [place] was [place] [facility] [place].     Of additional note, she recently transitioned her care to [place] and underwent stem-cell transplant approximately 2 weeks ago in [place], has not been seen by a [place] physician since 2023.      On transfer: she was afebrile, mildly hypertensive /109, HR 98, RR 20, satting 96% on room air. Complaining of mild headache and poor appetite associated with nausea; denies fever, chills, chest pain, palpitations, SOB, abdominal pain, dysuria. States wound has been draining for the past week initially thick white  now more liquid. Mild dysphonia is apparently chronic from left vocal cord paralysis and at baseline. Some dysphagia with solids, none with pureed soft or liquids, denies odynophagia. Dressing on neck CDI, ENT consulted will be here shortly to assess patient.      Hospital/ICU Course:  Evaluated 4/12 in MICU by ENT, who do not feel patient is a candidate for surgical intervention. Palliative care and oncology consulted. Failed swallow study, SLP recommending NPO 4/13. Patient continues to protect airway. Stepped down from MICU on 4/13.    Hospital Course:  No notes on file      Past Medical History:   Diagnosis Date    GERD (gastroesophageal reflux disease)     Heart valve problem     Hypertension     Obesity (BMI 30-39.9) 10/20/2014    Thyroid disease        Past Surgical History:   Procedure Laterality Date    cesaean section  1991, 1993    CHOLECYSTECTOMY  2008    COLONOSCOPY  09/2013    ESOPHAGOGASTRODUODENOSCOPY N/A 5/11/2020    Procedure: EGD (ESOPHAGOGASTRODUODENOSCOPY);  Surgeon: Nahed Zabala MD;  Location: [facility];  Service: Endoscopy;  Laterality: N/A;  covid 5/8/2020    HYSTERECTOMY  age 27    for fibroids    SALPINGECTOMY  1992    for ectopic pregnancy    TONGUE SURGERY  03/04/2022    Cancer removed on tongue and lymphs    TOTAL THYROIDECTOMY  2012    UPPER GASTROINTESTINAL ENDOSCOPY  09/2013    gastritis-hp neg       Review of patient's allergies indicates:   Allergen Reactions    Ciprofloxacin Swelling    Codeine Swelling    Opioids - morphine analogues     Pcn [penicillins] Hives     Tolerated cefepime 04/2024    Percocet [oxycodone-acetaminophen] Swelling       Medications:  Continuous Infusions:  Current Facility-Administered Medications   Medication Dose Route Frequency Provider Last Rate Last Admin    acetaminophen oral solution 650 mg  650 mg Oral Q4H PRN Marnie River MD   650 mg at 04/15/24 1002    calcitonin injection 342 Units  4 Units/kg Intramuscular BID Marnie River MD    342 Units at 04/15/24 1002    ceFEPIme (MAXIPIME) 2 g in dextrose 5 % in water (D5W) 100 mL IVPB (MB+)  2 g Intravenous Q8H Samia Gill  mL/hr at 04/15/24 1427 2 g at 04/15/24 1427    dextrose 10% bolus 125 mL 125 mL  12.5 g Intravenous PRN Samia Gill MD        dextrose 10% bolus 250 mL 250 mL  25 g Intravenous PRN Samia Gill MD        dextrose 5 % and 0.45 % NaCl with KCl 40 mEq infusion   Intravenous Continuous Marnie River MD        enoxaparin injection 40 mg  40 mg Subcutaneous Q24H (prophylaxis, 1700) Samia Gill MD   40 mg at 04/14/24 1857    fluconazole (DIFLUCAN) IVPB 200 mg  200 mg Intravenous Q24H Samia Gill MD   Stopped at 04/15/24 1130    glucose chewable tablet 16 g  16 g Oral PRN Samia Gill MD        glucose chewable tablet 24 g  24 g Oral PRN Samia Gill MD        HYDROmorphone injection 0.5 mg  0.5 mg Intravenous Q4H PRN Samia Gill MD   0.5 mg at 04/15/24 0944    hydrOXYzine pamoate capsule 25 mg  25 mg Oral Q8H PRN Samia Gill MD   25 mg at 04/15/24 1002    ketorolac injection 15 mg  15 mg Intravenous Q6H PRN Marnie River MD        liothyronine tablet 5 mcg  5 mcg Oral Before breakfast Samia Gill MD   5 mcg at 04/15/24 0658    metoclopramide injection 10 mg  10 mg Intravenous Q6H PRN Marnie River MD        metoprolol injection 5 mg  5 mg Intravenous Q6H Ky Salamanca MD   5 mg at 04/15/24 1130    metronidazole IVPB 500 mg  500 mg Intravenous Q8H Samia Gill  mL/hr at 04/15/24 1427 500 mg at 04/15/24 1427    mupirocin 2 % ointment   Nasal BID Samia Gill MD   Given at 04/15/24 1002    naloxone 0.4 mg/mL injection 0.02 mg  0.02 mg Intravenous PRN Samia Gill MD        ondansetron injection 4 mg  4 mg Intravenous Q4H PRN Samia Gill MD   4 mg at 04/15/24 1131    prochlorperazine injection Soln 2.5 mg  2.5 mg  Intravenous Q6H PRN Minerva CardenasDO   2.5 mg at 04/15/24 1230    senna tablet 8.6 mg  8.6 mg Oral BID Marnie River MD        vancomycin - pharmacy to dose   Intravenous pharmacy to manage frequency Samia Gill MD        vancomycin 1.75 g in 5 % dextrose 500 mL IVPB  20 mg/kg Intravenous Q24H Marnie River MD   Stopped at 04/15/24 1331     Scheduled Meds:  Current Facility-Administered Medications   Medication Dose Route Frequency Provider Last Rate Last Admin    acetaminophen oral solution 650 mg  650 mg Oral Q4H PRN Marnie River MD   650 mg at 04/15/24 1002    calcitonin injection 342 Units  4 Units/kg Intramuscular BID Marnie River MD   342 Units at 04/15/24 1002    ceFEPIme (MAXIPIME) 2 g in dextrose 5 % in water (D5W) 100 mL IVPB (MB+)  2 g Intravenous Q8H Samia Gill  mL/hr at 04/15/24 1427 2 g at 04/15/24 1427    dextrose 10% bolus 125 mL 125 mL  12.5 g Intravenous PRN Samia Gill MD        dextrose 10% bolus 250 mL 250 mL  25 g Intravenous PRN Samia Gill MD        dextrose 5 % and 0.45 % NaCl with KCl 40 mEq infusion   Intravenous Continuous Marnie River MD        enoxaparin injection 40 mg  40 mg Subcutaneous Q24H (prophylaxis, 1700) Samia Gill MD   40 mg at 04/14/24 1857    fluconazole (DIFLUCAN) IVPB 200 mg  200 mg Intravenous Q24H Samia Gill MD   Stopped at 04/15/24 1130    glucose chewable tablet 16 g  16 g Oral PRN Samia Gill MD        glucose chewable tablet 24 g  24 g Oral PRN Samia Gill MD        HYDROmorphone injection 0.5 mg  0.5 mg Intravenous Q4H PRN Samia Gill MD   0.5 mg at 04/15/24 0944    hydrOXYzine pamoate capsule 25 mg  25 mg Oral Q8H PRN Samia Gill MD   25 mg at 04/15/24 1002    ketorolac injection 15 mg  15 mg Intravenous Q6H PRN Marnie River MD        liothyronine tablet 5 mcg  5 mcg Oral Before breakfast Samia Gill MD   5  mcg at 04/15/24 0658    metoclopramide injection 10 mg  10 mg Intravenous Q6H PRN Marnie River MD        metoprolol injection 5 mg  5 mg Intravenous Q6H Ky Salamanca MD   5 mg at 04/15/24 1130    metronidazole IVPB 500 mg  500 mg Intravenous Q8H Samia Gill  mL/hr at 04/15/24 1427 500 mg at 04/15/24 1427    mupirocin 2 % ointment   Nasal BID Samia Gill MD   Given at 04/15/24 1002    naloxone 0.4 mg/mL injection 0.02 mg  0.02 mg Intravenous PRN Samia Gill MD        ondansetron injection 4 mg  4 mg Intravenous Q4H PRN Samia Gill MD   4 mg at 04/15/24 1131    prochlorperazine injection Soln 2.5 mg  2.5 mg Intravenous Q6H PRN Minerva Cardenas DO   2.5 mg at 04/15/24 1230    senna tablet 8.6 mg  8.6 mg Oral BID Marnie River MD        vancomycin - pharmacy to dose   Intravenous pharmacy to manage frequency Samia Gill MD        vancomycin 1.75 g in 5 % dextrose 500 mL IVPB  20 mg/kg Intravenous Q24H Marnie River MD   Stopped at 04/15/24 1331     PRN Meds:  Current Facility-Administered Medications   Medication Dose Route Frequency Provider Last Rate Last Admin    acetaminophen oral solution 650 mg  650 mg Oral Q4H PRN Marnie River MD   650 mg at 04/15/24 1002    calcitonin injection 342 Units  4 Units/kg Intramuscular BID Marnie River MD   342 Units at 04/15/24 1002    ceFEPIme (MAXIPIME) 2 g in dextrose 5 % in water (D5W) 100 mL IVPB (MB+)  2 g Intravenous Q8H Samia Gill  mL/hr at 04/15/24 1427 2 g at 04/15/24 1427    dextrose 10% bolus 125 mL 125 mL  12.5 g Intravenous PRN Samia Gill MD        dextrose 10% bolus 250 mL 250 mL  25 g Intravenous PRN Samia Gill MD        dextrose 5 % and 0.45 % NaCl with KCl 40 mEq infusion   Intravenous Continuous Marnie River MD        enoxaparin injection 40 mg  40 mg Subcutaneous Q24H (prophylaxis, 1700) Samia Gill MD   40 mg at 04/14/24 5840     fluconazole (DIFLUCAN) IVPB 200 mg  200 mg Intravenous Q24H Samia Gill MD   Stopped at 04/15/24 1130    glucose chewable tablet 16 g  16 g Oral PRN Samia Gill MD        glucose chewable tablet 24 g  24 g Oral PRN Samia Gill MD        HYDROmorphone injection 0.5 mg  0.5 mg Intravenous Q4H PRN Samia Gill MD   0.5 mg at 04/15/24 0944    hydrOXYzine pamoate capsule 25 mg  25 mg Oral Q8H PRN Samia Gill MD   25 mg at 04/15/24 1002    ketorolac injection 15 mg  15 mg Intravenous Q6H PRN Marnie River MD        liothyronine tablet 5 mcg  5 mcg Oral Before breakfast Samia Gill MD   5 mcg at 04/15/24 0658    metoclopramide injection 10 mg  10 mg Intravenous Q6H PRN Marnie River MD        metoprolol injection 5 mg  5 mg Intravenous Q6H Ky Salamanca MD   5 mg at 04/15/24 1130    metronidazole IVPB 500 mg  500 mg Intravenous Q8H Samia Gill  mL/hr at 04/15/24 1427 500 mg at 04/15/24 1427    mupirocin 2 % ointment   Nasal BID Samia Gill MD   Given at 04/15/24 1002    naloxone 0.4 mg/mL injection 0.02 mg  0.02 mg Intravenous PRN Samia Gill MD        ondansetron injection 4 mg  4 mg Intravenous Q4H PRN Samia Gill MD   4 mg at 04/15/24 1131    prochlorperazine injection Soln 2.5 mg  2.5 mg Intravenous Q6H PRN Minerva Cardenas DO   2.5 mg at 04/15/24 1230    senna tablet 8.6 mg  8.6 mg Oral BID Marnie River MD        vancomycin - pharmacy to dose   Intravenous pharmacy to manage frequency Samia Gill MD        vancomycin 1.75 g in 5 % dextrose 500 mL IVPB  20 mg/kg Intravenous Q24H Marnie River MD   Stopped at 04/15/24 1331       Family History       Problem Relation (Age of Onset)    Diabetes Mother, Father, Brother    Heart disease Mother, Father, Brother    Hyperlipidemia Mother    Hypertension Mother, Father          Tobacco Use    Smoking status: Never    Smokeless tobacco:  Never   Substance and Sexual Activity    Alcohol use: No     Alcohol/week: 0.0 standard drinks of alcohol    Drug use: No    Sexual activity: Yes     Partners: Male     Birth control/protection: See Surgical Hx       Review of Systems   Constitutional:  Positive for activity change and appetite change.   HENT:  Positive for facial swelling.    Skin:  Positive for wound (to right neck).     Objective:     Vital Signs (Most Recent):  Temp: 98.1 °F (36.7 °C) (04/15/24 1222)  Pulse: (!) 117 (04/15/24 1513)  Resp: 20 (04/15/24 1222)  BP: (!) 158/87 (04/15/24 1222)  SpO2: 97 % (04/15/24 1513) Vital Signs (24h Range):  Temp:  [97.6 °F (36.4 °C)-98.5 °F (36.9 °C)] 98.1 °F (36.7 °C)  Pulse:  [] 117  Resp:  [18-20] 20  SpO2:  [93 %-97 %] 97 %  BP: (108-158)/(70-87) 158/87     Weight: 85.3 kg (188 lb 0.8 oz)  Body mass index is 30.35 kg/m².       Physical Exam  Musculoskeletal:      Cervical back: Edema present.   Neurological:      Mental Status: She is alert and oriented to person, place, and time.            Review of Symptoms      Symptom Assessment (ESAS 0-10 Scale)  Pain:  5  Dyspnea:  0  Anxiety:  0  Nausea:  0  Depression:  0  Anorexia:  0  Fatigue:  0  Insomnia:  0  Restlessness:  0  Agitation:  0         Pain Assessment:    Location(s): neck    Neck       Location: right        Quality: Aching and burning        Quantity: 7/10 in intensity        Chronicity: Onset 10 month(s) ago, gradually worsening        Aggravating Factors: None        Alleviating Factors: Opiates        Associated Symptoms: None    ECOG Performance Status stGstrstastdstest:st st1st Living Arrangements:  Lives with spouse    Psychosocial/Cultural:   See Palliative Psychosocial Note: Yes  Lives with  and adult daughter.   **Primary  to Follow**  Palliative Care  Consult: Yes        Advance Care Planning  Advance Directives:   Living Will: No    Do Not Resuscitate Status: No      Decision Making:  Patient answered questions  and Family answered questions  Goals of Care: The patient and family endorses that what is most important right now is to focus on symptom/pain control and quality of life, even if it means sacrificing a little time    Accordingly, we have decided that the best plan to meet the patient's goals includes continuing with limited treatment, while exploring other options for treatment.          Significant Labs: BMP:   Recent Labs   Lab 04/15/24  0317   GLU 97      K 3.3*      CO2 32*   BUN 15   CREATININE 0.8   CALCIUM 13.1*   MG 1.8     CBC:   Recent Labs   Lab 04/14/24  0509 04/15/24  0317   WBC 21.18* 22.79*   HGB 13.7 14.0   HCT 42.5 43.4    374     CBC:   Recent Labs   Lab 04/15/24  0317   WBC 22.79*   HGB 14.0   HCT 43.4   MCV 85        BMP:  Recent Labs   Lab 04/15/24  0317   GLU 97      K 3.3*      CO2 32*   BUN 15   CREATININE 0.8   CALCIUM 13.1*   MG 1.8     LFT:  Lab Results   Component Value Date    AST 27 04/15/2024    ALKPHOS 118 04/15/2024    BILITOT 0.6 04/15/2024     Albumin:   Albumin   Date Value Ref Range Status   04/15/2024 2.8 (L) 3.5 - 5.2 g/dL Final     Protein:   Total Protein   Date Value Ref Range Status   04/15/2024 5.9 (L) 6.0 - 8.4 g/dL Final     Lactic acid:   Lab Results   Component Value Date    LACTATE 1.5 04/11/2024    LACTATE 2.3 (H) 04/10/2024       Significant Imaging: I have reviewed all pertinent imaging results/findings within the past 24 hours.      I spent a total of 75 minutes on the day of the visit. This includes face to face time in discussion of goals of care, symptom assessment, coordination of care and emotional support.  This also includes non-face to face time preparing to see the patient (eg, review of tests/imaging), obtaining and/or reviewing separately obtained history, documenting clinical information in the electronic or other health record, independently interpreting results and communicating results to the  patient/family/caregiver, or care coordinator.    Cristina Nix, CNS  Palliative Medicine  [facility] - Telemetry Stepdown

## 2024-04-15 NOTE — CONSULTS
[facility]    Referring provider: Greg Lane MD    Assessment:  Marysol Cash is a 58 y.o. 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 Very bulky disease, > 15 cm. No prior RT or chemo.   ENT has concerns about ability to trach given extent of disease and anatomic distortion.  CT chest is pending. On my read she has concern for mediastinal and hilar disease, as well spinal metastases   ECOG: (2) Ambulatory and capable of self care, unable to carry out work activity, up and about > 50% or waking hours      Plan:  Path is pending. History is strongly suggestive of a N3bM1 recurrence of her oral tongue cancer. Given extent of disease and unresectable nature, available local options are limited to palliative treatments.   The goals of treatment and the palliative intent was discussed.   Palliative RT as well as systemic therapy (chemo immunotherapy) were discussed. I also discussed hospice, which would be appropriate given her extent of H&N disease. Agree with palliative care consult. Will need to establish whether she would like to proceed with trach (if feasible) and or PEG.  She was alone and wished to discuss with her .   Will follow along.   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  PET-CT done on 02/02/2022 showed no abnormal activity within the tongue, no signs of cervical lymph node or distant metastasis.  She underwent left selective neck dissection levels 1, 2, 3, partial glossectomy with split-thickness skin graft done on 03/03/2022 by Dr. Clif Olson.  Surgical pathology revealed unifocal squamous cell carcinoma on the dorsal surface of the tongue on left side  "measuring 1 cm, moderately differentiated, 6 mm depth of invasion, no lymphovascular invasion, positive for perineural invasion, margins negative, 0 of 39 lymph nodes with metastasis, pT2 pN0 noted.  Postop course complicated by infection requiring I&D and antibiotics.  She declined adjuvant XRT due to concern for side effect profile [seen by Dr. Rangel].    In 6/223 she developed a recurrence in the right neck, with FNA 7/7/23 demonstrating suspicion for metastatic squamous cell carcinoma. PET-CT done on 08/21/2023 has shown large necrotic mass the angle of mandible posterior to submandibular gland on the right measuring 4.4 x 4.2 cm with SUV 9.78, just superior to the mass is a 2 cm hypermetabolic lymph node and no evidence of distant metastatic disease.  She again declined treatment and pursued treatment in [place] with "stem cell transplant."   She represented in April 2024 with Developed worsening weakness, pain, and difficulty swallowing. She has a right neck mass that has been present since 7/2023. CT neck demonstrates a > 15 cm right neck mass, with extension into the overlying skin and with necrosis. There is also RP and bilateral adenopathy.       History of prior irradiation: No  History of prior systemic anti-cancer therapy: No  History of collagen vascular disease: No  Implanted electronic device (pacer/defib/nerve stimulator): No     History of Present Illness:  Marysol Cash was seen as an inpatient today to discuss the role of radiotherapy.     She reports prior "stem cell transplant" in [place] but denies ever receiving chemo or RT. She is not fully sure what was done as part of her treatment. She has significant weakness and nausea.     Review of Systems:  ROS as above    Social History:  Social History     Tobacco Use    Smoking status: Never    Smokeless tobacco: Never   Substance Use Topics    Alcohol use: No     Alcohol/week: 0.0 standard drinks of alcohol    Drug use: No       Past Medical " History:  Past Medical History:   Diagnosis Date    GERD (gastroesophageal reflux disease)     Heart valve problem     Hypertension     Obesity (BMI 30-39.9) 10/20/2014    Thyroid disease        Past Surgical History:   Procedure Laterality Date    cesaean section  1991, 1993    CHOLECYSTECTOMY  2008    COLONOSCOPY  09/2013    ESOPHAGOGASTRODUODENOSCOPY N/A 5/11/2020    Procedure: EGD (ESOPHAGOGASTRODUODENOSCOPY);  Surgeon: Nahed Zabala MD;  Location: [facility];  Service: Endoscopy;  Laterality: N/A;  covid 5/8/2020    HYSTERECTOMY  age 27    for fibroids    SALPINGECTOMY  1992    for ectopic pregnancy    TONGUE SURGERY  03/04/2022    Cancer removed on tongue and lymphs    TOTAL THYROIDECTOMY  2012    UPPER GASTROINTESTINAL ENDOSCOPY  09/2013    gastritis-hp neg         Medications:  No current facility-administered medications on file prior to encounter.     Current Outpatient Medications on File Prior to Encounter   Medication Sig Dispense Refill    calcium carbonate (CALCIUM ANTACID) 300 mg (750 mg) Chew Take 2 tablets by mouth 2 (two) times daily.      cholecalciferol, vitamin D3, (VITAMIN D3) 25 mcg (1,000 unit) capsule Take 1,000 Units by mouth once daily.      flaxseed 1,000 mg Cap Take by mouth Daily.      HYDROcodone-acetaminophen (NORCO) 5-325 mg per tablet       levothyroxine (SYNTHROID) 175 MCG tablet Take 1 tablet (175 mcg total) by mouth before breakfast. Take on an empty stomach. Wait 4 hours after taking LT4 to take any multivitamins or nutritional supplements and wait 1 hour to take other medications. 90 tablet 3    liothyronine (CYTOMEL) 5 MCG Tab Take 1 tablet (5 mcg total) by mouth before breakfast. Take on an empty stomach. Wait 4 hours after taking T3 to take any multivitamins or nutritional supplements and wait 1 hour to take other medications. 90 tablet 3    metoprolol tartrate (LOPRESSOR) 25 MG tablet Take 1 tablet (25 mg total) by mouth 2 (two) times daily. 180 tablet 3     multivitamin capsule Take 1 capsule by mouth once daily.      ondansetron (ZOFRAN-ODT) 4 MG TbDL DISSOLVE 1 TABLET (4 MG TOTAL) BY MOUTH EVERY 8 (EIGHT) HOURS AS NEEDED. 30 tablet 2       Allergies:  Review of patient's allergies indicates:   Allergen Reactions    Ciprofloxacin Swelling    Codeine Swelling    Opioids - morphine analogues     Pcn [penicillins] Hives     Tolerated cefepime 04/2024    Percocet [oxycodone-acetaminophen] Swelling       Exam:  Vitals:    04/15/24 0046 04/15/24 0402 04/15/24 0451 04/15/24 0756   BP:   122/74 129/79   BP Location:   Left arm Right arm   Patient Position:   Lying Lying   Pulse:   98 93   Resp:  18 18 20   Temp:   97.9 °F (36.6 °C) 98.5 °F (36.9 °C)   TempSrc:   Oral Oral   SpO2: (!) 94%  95% 95%   Weight:         Constitutional: Pleasant 58 y.o. female in no acute distress.  Neck bandaged. Comfortably lying in hospital bed.   HEENT: + hoarseness. No stridor or increased work of breathing.   Cardiovascular: Upper extremities warm to touch  Lungs: No audible wheezing.  Normal effort.   Musculoskeletal: No gross MSK deformities.  Skin: No rashes appreciated.  Psych: Alert and oriented with appropriate mood and affect.    Data Review:  Information obtained from Marysol Cash and via chart review.     Independent Interpretation of Test(s): CT neck from 4/10/24 was personally reviewed and shows extensive H&N disease burden in the R>L neck, with necrosis and involvement of overlying skin.         Mario Muniz MD  Radiation Oncology

## 2024-04-15 NOTE — HPI
Per Chart Review: Marysol Cash is a 58 year-old female with a PMHx of SCC of the tongue s/p radical neck dissection 03/2022 (had refused chemo and radiation) with suspected recurrence of SCC of tongue in right neck, left vocal cord paralysis, GERD, Hypertension, obesity, post-surgical hypothyroidism and hypoparathyroidism. She initially presented to [facility] Emergency Department with six days of epigastric pain with associated nausea, vomiting, and difficulty eating due to pain. However on presentation, she was noted to have a large necrotic and purulent wound located on her right neck with complaints of associated difficulty speaking, swallowing and shortness of breath. She was noted to be hypoxic 88% on room air which improved with 2L NC. Labs were notable for leukocytosis 14, hypokalemia 2.4, hypochloremia 89, CO2 36, hypercalcemia 13.5, Phos 2.5. . Troponin 0.046. Lactate 2.3. CT Soft Tissue Neck was concerning for 9.5 x 8.2 x 10 cm large lobulated mass in the right anterolateral neck extending to the deep spaces of the neck and abutting the right prevertebral space and paravertebral musculature, left midline shift, multiple bilateral necrotic cervical lymph nodes. Case was discussed and decision was made to transfer to [facility] for higher level of care.     Of additional note, she recently transitioned her care to [place] and underwent stem-cell transplant approximately 2 weeks ago in [place], has not been seen by a  physician since 2023.      On transfer: she was afebrile, mildly hypertensive /109, HR 98, RR 20, satting 96% on room air. Complaining of mild headache and poor appetite associated with nausea; denies fever, chills, chest pain, palpitations, SOB, abdominal pain, dysuria. States wound has been draining for the past week initially thick white now more liquid. Mild dysphonia is apparently chronic from left vocal cord paralysis and at baseline. Some dysphagia with solids, none with pureed soft or  liquids, denies odynophagia. Dressing on neck CDI, ENT consulted will be here shortly to assess patient.      Hospital/ICU Course:  Evaluated 4/12 in MICU by ENT, who do not feel patient is a candidate for surgical intervention. Palliative care and oncology consulted. Failed swallow study, SLP recommending NPO 4/13. Patient continues to protect airway. Stepped down from MICU on 4/13.

## 2024-04-15 NOTE — ASSESSMENT & PLAN NOTE
H/o SCC base of tongue s/p resection and subsequent recurrence which has metastasized to her neck; previously declined chemotherapy and radiation and has been undergoing stem cell infusions and hyperbaric chamber therapy in [place]   Admitted with pain/difficulty swallowing 2/2 large, necrotizing neck mass  CT showing large 9.5 x 8.2 x 10 cm bulky lobulated appearing mass right lateral and anterolateral neck extending to the D spaces of the neck with large central collection of air which appears to extend superficially to the skin surface right mid neck anterior laterally. Overall appears to be a jose mass but superimposed abscess cannot be excluded; also has multiple necrotic nodes BL   Seen by ENT who advised the mass is not resectable; recommended considering trache/PEG although trache would be technically difficulty due to the size and location of the mass; recommended heme/onc and palliative care  Afebrile and HDS; leukocytosis with WBC 22  BCx NTD; no previous superficial or deep wound cultures   Large mass on right side of neck with dehiscence; mild surrounding erythema and minimal drainage    Recommend:  - switch IV antibiotics to cefadroxil 1g daily and doxycycline 100mg BID; treat for 1 additional week  - most of mass likely necrotic tumor as opposed to abscess; Pt is not a candidate for resection so even if there was a superimposed abscess, unlikely to achieve source control without debridement/resection

## 2024-04-15 NOTE — HPI
58 year old female with SCC of the tongue S/P resection in 03/2022, suspected recurrence of SCC of tongue in R neck, left vocal cord paralysis, GERD, HTN, obesity, post-surgical hypothyroidism admitted with neck wound/infection. Presented with nausea/vomiting and reduced appetite for about 1 week. Had intermittent difficulty with PO intake over several weeks 2/2 neck mass and pain. Neck mass has been present for almost 1 year and started draining several months ago when she started undergoing lymphatic drainage ( at bedside and Pt report she would get massages). Pt previously elected not to have chemotherapy or radiation for her neck mass and has been undergoing stem cell infusions with hyperbaric chamber [place]. Given a 2 week course of antibiotics for the neck wound by the doctor in [place] which she completed on March 29; unsure the name of the antibiotic. Denies any other travel apart from this. Lives at home with , daughter, son-in-law, and 2x grandkids. No pets at home. Afebrile and HDS on presentation. CT neck showing a 9.5 x 8.2 x 10 cm large lobulated mass in the right anterolateral neck extending to the deep spaces of the neck and abutting the right prevertebral space and paravertebral musculature, with left midline shift, multiple bilateral necrotic cervical lymph nodes. Seen by ENT who advised her disease is unresectable and that trachestomy would be technically difficult.     Infectious diseases consulted for neck wound/antibiotic management.

## 2024-04-15 NOTE — SUBJECTIVE & OBJECTIVE
Past Medical History:   Diagnosis Date    GERD (gastroesophageal reflux disease)     Heart valve problem     Hypertension     Obesity (BMI 30-39.9) 10/20/2014    Thyroid disease      Past Surgical History:   Procedure Laterality Date    cesaean section  1991, 1993    CHOLECYSTECTOMY  2008    COLONOSCOPY  09/2013    ESOPHAGOGASTRODUODENOSCOPY N/A 5/11/2020    Procedure: EGD (ESOPHAGOGASTRODUODENOSCOPY);  Surgeon: Nahed Zabala MD;  Location: [facility];  Service: Endoscopy;  Laterality: N/A;  covid 5/8/2020    HYSTERECTOMY  age 27    for fibroids    SALPINGECTOMY  1992    for ectopic pregnancy    TONGUE SURGERY  03/04/2022    Cancer removed on tongue and lymphs    TOTAL THYROIDECTOMY  2012    UPPER GASTROINTESTINAL ENDOSCOPY  09/2013    gastritis-hp neg     Review of patient's allergies indicates:   Allergen Reactions    Ciprofloxacin Swelling    Codeine Swelling    Opioids - morphine analogues     Pcn [penicillins] Hives     Tolerated cefepime 04/2024    Percocet [oxycodone-acetaminophen] Swelling     Medications:  Current Facility-Administered Medications   Medication Dose Route Frequency Provider Last Rate Last Admin    acetaminophen oral solution 650 mg  650 mg Oral Q4H PRN Marnie River MD   650 mg at 04/15/24 1002    calcitonin injection 342 Units  4 Units/kg Intramuscular BID Marnie River MD   342 Units at 04/15/24 1002    ceFEPIme (MAXIPIME) 2 g in dextrose 5 % in water (D5W) 100 mL IVPB (MB+)  2 g Intravenous Q8H Samia Gill MD   Stopped at 04/15/24 0433    dextrose 10% bolus 125 mL 125 mL  12.5 g Intravenous PRN Samia Gill MD        dextrose 10% bolus 250 mL 250 mL  25 g Intravenous PRN Samia Gill MD        dextrose 5 % and 0.45 % NaCl with KCl 40 mEq infusion   Intravenous Continuous Ky Salamanca  mL/hr at 04/14/24 1500 Rate Verify at 04/14/24 1500    enoxaparin injection 40 mg  40 mg Subcutaneous Q24H (prophylaxis, 1700) Samia Gill MD    40 mg at 04/14/24 1857    fluconazole (DIFLUCAN) IVPB 200 mg  200 mg Intravenous Q24H Samia Gill  mL/hr at 04/15/24 1030 200 mg at 04/15/24 1030    glucose chewable tablet 16 g  16 g Oral PRN Samia Gill MD        glucose chewable tablet 24 g  24 g Oral PRN Samia Gill MD        HYDROmorphone injection 0.5 mg  0.5 mg Intravenous Q4H PRN Samia Gill MD   0.5 mg at 04/15/24 0944    hydrOXYzine pamoate capsule 25 mg  25 mg Oral Q8H PRN Samia Gill MD   25 mg at 04/15/24 1002    liothyronine tablet 5 mcg  5 mcg Oral Before breakfast Samia Gill MD   5 mcg at 04/15/24 0658    metoprolol injection 5 mg  5 mg Intravenous Q6H Ky Salamanca MD   5 mg at 04/15/24 0658    metronidazole IVPB 500 mg  500 mg Intravenous Q8H Samia Gill MD   Stopped at 04/15/24 0503    mupirocin 2 % ointment   Nasal BID Samia Gill MD   Given at 04/15/24 1002    naloxone 0.4 mg/mL injection 0.02 mg  0.02 mg Intravenous PRN Samia Gill MD        ondansetron injection 4 mg  4 mg Intravenous Q4H PRN Samia Gill MD   4 mg at 04/15/24 0402    prochlorperazine injection Soln 2.5 mg  2.5 mg Intravenous Q6H PRN Minerva Cardenas DO   2.5 mg at 04/14/24 1906    vancomycin - pharmacy to dose   Intravenous pharmacy to manage frequency Samia Gill MD        vancomycin 1.75 g in 5 % dextrose 500 mL IVPB  20 mg/kg Intravenous Q24H Marnie River MD         Antibiotics (From admission, onward)      Start     Stop Route Frequency Ordered    04/15/24 1045  vancomycin 1.75 g in 5 % dextrose 500 mL IVPB         -- IV Every 24 hours (non-standard times) 04/15/24 0940    04/12/24 2100  mupirocin 2 % ointment         04/16/24 2059 Nasl 2 times daily 04/12/24 1324    04/12/24 1830  ceFEPIme (MAXIPIME) 2 g in dextrose 5 % in water (D5W) 100 mL IVPB (MB+)         04/16/24 1259 IV Every 8 hours (non-standard times) 04/12/24 1324    04/12/24 1800   "metronidazole IVPB 500 mg         -- IV Every 8 hours (non-standard times) 04/12/24 1324    04/12/24 1315  vancomycin - pharmacy to dose  (vancomycin IVPB (PEDS and ADULTS))        Placed in "And" Linked Group    -- IV pharmacy to manage frequency 04/12/24 1324          Antifungals (From admission, onward)      Start     Stop Route Frequency Ordered    04/13/24 1045  fluconazole (DIFLUCAN) IVPB 200 mg         -- IV Every 24 hours (non-standard times) 04/12/24 1324          Antivirals (From admission, onward)      None             Immunization History   Administered Date(s) Administered    Tdap 05/31/2016       Family History       Problem Relation (Age of Onset)    Diabetes Mother, Father, Brother    Heart disease Mother, Father, Brother    Hyperlipidemia Mother    Hypertension Mother, Father          Social History     Socioeconomic History    Marital status:    Tobacco Use    Smoking status: Never    Smokeless tobacco: Never   Substance and Sexual Activity    Alcohol use: No     Alcohol/week: 0.0 standard drinks of alcohol    Drug use: No    Sexual activity: Yes     Partners: Male     Birth control/protection: See Surgical Hx     Social Determinants of Health     Financial Resource Strain: Low Risk  (4/12/2024)    Overall Financial Resource Strain (CARDIA)     Difficulty of Paying Living Expenses: Not very hard   Food Insecurity: No Food Insecurity (4/12/2024)    Hunger Vital Sign     Worried About Running Out of Food in the Last Year: Never true     Ran Out of Food in the Last Year: Never true   Transportation Needs: No Transportation Needs (4/12/2024)    PRAPARE - Transportation     Lack of Transportation (Medical): No     Lack of Transportation (Non-Medical): No   Physical Activity: Inactive (4/12/2024)    Exercise Vital Sign     Days of Exercise per Week: 0 days     Minutes of Exercise per Session: 0 min   Stress: Stress Concern Present (4/12/2024)    [place] [employer] - " Occupational Stress Questionnaire     Feeling of Stress : Rather much   Social Connections: Moderately Integrated (4/12/2024)    Social Connection and Isolation Panel [NHANES]     Frequency of Communication with Friends and Family: More than three times a week     Frequency of Social Gatherings with Friends and Family: More than three times a week     Attends Nondenominational Services: More than 4 times per year     Active Member of Clubs or Organizations: No     Attends Club or Organization Meetings: Never     Marital Status:    Housing Stability: Low Risk  (4/12/2024)    Housing Stability Vital Sign     Unable to Pay for Housing in the Last Year: No     Number of Places Lived in the Last Year: 1     Unstable Housing in the Last Year: No     Review of Systems   Constitutional:  Negative for chills and fever.   HENT:  Positive for trouble swallowing and voice change. Negative for congestion, rhinorrhea and sore throat.    Respiratory:  Positive for shortness of breath. Negative for cough and wheezing.    Cardiovascular:  Negative for chest pain and palpitations.   Gastrointestinal:  Negative for abdominal pain, diarrhea, nausea and vomiting.   Genitourinary:  Negative for dysuria, frequency and hematuria.   Musculoskeletal:  Positive for neck pain.   Skin:  Positive for wound.   Neurological:  Negative for dizziness and headaches.   Psychiatric/Behavioral:  Negative for agitation and confusion.      Objective:     Vital Signs (Most Recent):  Temp: 98.5 °F (36.9 °C) (04/15/24 0756)  Pulse: 93 (04/15/24 0756)  Resp: 18 (04/15/24 0944)  BP: 129/79 (04/15/24 0756)  SpO2: 95 % (04/15/24 0756) Vital Signs (24h Range):  Temp:  [97.6 °F (36.4 °C)-98.5 °F (36.9 °C)] 98.5 °F (36.9 °C)  Pulse:  [82-98] 93  Resp:  [12-25] 18  SpO2:  [89 %-96 %] 95 %  BP: (108-176)/(70-79) 129/79     Weight: 85.3 kg (188 lb 0.8 oz)  Body mass index is 30.35 kg/m².    Estimated Creatinine Clearance: 85.4 mL/min (based on SCr of 0.8 mg/dL).      Physical Exam  Vitals and nursing note reviewed.   Constitutional:       General: She is not in acute distress.     Appearance: She is ill-appearing.      Comments: Hoarse voice   HENT:      Head: Normocephalic and atraumatic.      Mouth/Throat:      Mouth: Mucous membranes are dry.   Eyes:      Conjunctiva/sclera: Conjunctivae normal.   Neck:        Comments: Large, necrotic mass on right side of neck with several openings; some mild surrounding erythema and purulent drainage  Cardiovascular:      Rate and Rhythm: Regular rhythm. Tachycardia present.      Pulses: Normal pulses.      Heart sounds: No murmur heard.  Pulmonary:      Effort: Pulmonary effort is normal.      Breath sounds: Normal breath sounds. No wheezing, rhonchi or rales.      Comments: On 2L NC  Abdominal:      General: Bowel sounds are normal. There is no distension.      Palpations: Abdomen is soft.      Tenderness: There is no abdominal tenderness. There is no guarding.   Skin:     General: Skin is warm.      Capillary Refill: Capillary refill takes less than 2 seconds.      Findings: No erythema or rash.   Neurological:      General: No focal deficit present.      Mental Status: She is alert and oriented to person, place, and time.          Significant Labs: Blood Culture:   Recent Labs   Lab 04/10/24  1713 04/10/24  1715   LABBLOO No Growth to date  No Growth to date  No Growth to date  No Growth to date  No Growth to date No Growth to date  No Growth to date  No Growth to date  No Growth to date  No Growth to date     CBC:   Recent Labs   Lab 04/14/24  0509 04/15/24  0317   WBC 21.18* 22.79*   HGB 13.7 14.0   HCT 42.5 43.4    374     CMP:   Recent Labs   Lab 04/14/24  0509 04/15/24  0317    143   K 3.1* 3.3*   CL 96 101   CO2 32* 32*   * 97   BUN 11 15   CREATININE 0.7 0.8   CALCIUM 12.1* 13.1*   PROT 5.9* 5.9*   ALBUMIN 2.7* 2.8*   BILITOT 0.6 0.6   ALKPHOS 124 118   AST 21 27   ALT 25 28   ANIONGAP 9 10  "    Lactic Acid: No results for input(s): "LACTATE" in the last 48 hours.  POCT Glucose:   Recent Labs   Lab 04/14/24  0058 04/14/24  0512 04/14/24  1228   POCTGLUCOSE 128* 137* 116*     All pertinent labs within the past 24 hours have been reviewed.  Recent Lab Results         04/15/24  0317   04/14/24  1228        Albumin 2.8                  ALT 28         Anion Gap 10         AST 27         Baso # 0.03         Basophil % 0.1         BILIRUBIN TOTAL 0.6  Comment: For infants and newborns, interpretation of results should be based  on gestational age, weight and in agreement with clinical  observations.    Premature Infant recommended reference ranges:  Up to 24 hours.............<8.0 mg/dL  Up to 48 hours............<12.0 mg/dL  3-5 days..................<15.0 mg/dL  6-29 days.................<15.0 mg/dL           BUN 15         Calcium 13.1  Comment: *Critical value notification by [facility]_ with confirmation of receipt to   FREDY CEDILLO RN___ at Date__4/15__Time__0521__           Chloride 101         CO2 32         Creatinine 0.8         Differential Method Automated         eGFR >60.0         Eos # 0.0         Eos % 0.0         Glucose 97         Gran # (ANC) 17.9         Gran % 78.6         Hematocrit 43.4         Hemoglobin 14.0         Immature Grans (Abs) 0.18  Comment: Mild elevation in immature granulocytes is non specific and   can be seen in a variety of conditions including stress response,   acute inflammation, trauma and pregnancy. Correlation with other   laboratory and clinical findings is essential.           Immature Granulocytes 0.8         Lymph # 3.0         Lymph % 13.0         Magnesium  1.8         MCH 27.5         MCHC 32.3         MCV 85         Mono # 1.7         Mono % 7.5         MPV 10.6         nRBC 0         Phosphorus Level 3.1         Platelet Count 374         POCT Glucose   116       Potassium 3.3         PROTEIN TOTAL 5.9         RBC 5.10         RDW 15.0         Sodium " 143         Vancomycin, Random 16.5         WBC 22.79               Significant Imaging: I have reviewed all pertinent imaging results/findings within the past 24 hours.

## 2024-04-15 NOTE — PT/OT/SLP PROGRESS
Speech Language Pathology Treatment    Patient Name:  Marysol Cash   MRN:  [id number]  Admitting Diagnosis: Sepsis    Recommendations:                 General Recommendations:    MBSS in the post acute setting with dysphagia therapy pending recovery of acute illness   Ongoing ENT follow up    Diet recommendations:    There is no safe or efficient form of nutrition/hydration/medication able to be established to meet needs at this time   Pt would benefit from alternative means of nutrition/hydration , patient and family expressing understanding and desire to move forward with PEG  Pudding/apple sauce, ice chips and sips of water for pleasure / comfort   Medications crushed in pudding/applesauce until alternate means able to be established     Aspiration Precautions: 1 bite/sip at a time, Alternating bites/sips, HOB to 90 degrees, Ice chips sparingly, Meds crushed in puree, and Strict aspiration precautions   General Precautions: Standard, aspiration  Communication strategies:  none    Assessment:     Marysol Cash is a 58 y.o. 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 Pt presents with neck edema and difficulty tolerating minimal amounts of PO intake this date. MBSS scheduled for this date cancelled at this time, though would recommend MBSS in the future once neck wound/edema is less severe/has resolved. Pt would benefit from alternative means of nutrition/hydration as well as ongoing follow up with speech and ENT services. Patient and family in agreement.     Subjective     Patient seen comfortable in bed with  at the bedside.   Patient goals: none stated     Pain/Comfort:  Pain Rating 1: 0/10    Respiratory Status: Nasal cannula    Objective:     Has the patient been evaluated by SLP for swallowing?   Yes  Keep patient NPO? Yes   Current Respiratory Status:    Nasal Cannula     Pt seen for follow up regarding dysphagia. Patient and   providing hx of dysphagia. Pt with a hoarse and breathy and wet voice that appears to be chronic in nature and somewhat exacerbated but acute condition at this time.  Pt stating that prior to this hospitalization she consumed approx a meal a day and mostly consumed approx 2 boosts a day. They reported that in the past, she used a PEG tube in conjunction with PO intake for about 6 months. Discussed with the patient and  overall impressions including concerns for meeting nutritional/hydration needs solely through PO intake, benefits of long term means of alternative means of nutrition at this time, risk for aspiration given her overall complex history and ongoing SLP POC including deferring MBSS at this time. Patient and  stating hesitancy with alternative means of nutrition at this time, though stating they believe it would benefit the patient at this time. Discussed with patient continuing PO for pleasure, though patient stating the only pleasurably PO is pudding/applesauce as liquids give her the most difficulty. Trialed nectar thick liquids x2 tsp and x3 tsp of pudding. Pt was with multiple ( 3-4 swallows) per tsp and delayed coughing following all trials/consistencies. Patient eventually expectorating pudding tinged secretions and requiring suctioning in between tsp bites of PO. Patient and  verbalized understanding of continuing PO for pleasure at this time and continuing to receive follow up with SLP services during this hospitalization. SLP will follow up.     Goals:   Multidisciplinary Problems       SLP Goals          Problem: SLP    Goal Priority Disciplines Outcome   SLP Goal     SLP Ongoing, Progressing   Description: Speech Language Pathology Goals  Goals expected to be met by 4/20  1. Modified Barium Swallow Study  2. Ongoing swallow assessment                       Plan:     Patient to be seen:  4 x/week   Plan of Care expires:  05/12/24  Plan of Care reviewed with:   patient, spouse   SLP Follow-Up:  Yes       Discharge recommendations:  High Intensity Therapy   Barriers to Discharge:  Level of Skilled Assistance Needed     Time Tracking:     SLP Treatment Date:   04/15/24  Speech Start Time:  0844  Speech Stop Time:  0927     Speech Total Time (min):  43 min    Billable Minutes: Treatment Swallowing Dysfunction 20 and Self Care/Home Management Training 23    04/15/2024

## 2024-04-16 PROBLEM — Z71.89 ACP (ADVANCE CARE PLANNING): Status: ACTIVE | Noted: 2024-04-16

## 2024-04-16 PROBLEM — E83.52 HYPERCALCEMIA: Status: ACTIVE | Noted: 2024-04-16

## 2024-04-16 LAB
ANION GAP SERPL CALC-SCNC: 9 MMOL/L (ref 8–16)
BASOPHILS # BLD AUTO: 0.05 K/UL (ref 0–0.2)
BASOPHILS NFR BLD: 0.3 % (ref 0–1.9)
BUN SERPL-MCNC: 11 MG/DL (ref 6–20)
CALCIUM SERPL-MCNC: 10.7 MG/DL (ref 8.7–10.5)
CHLORIDE SERPL-SCNC: 99 MMOL/L (ref 95–110)
CO2 SERPL-SCNC: 31 MMOL/L (ref 23–29)
CREAT SERPL-MCNC: 0.8 MG/DL (ref 0.5–1.4)
DIFFERENTIAL METHOD BLD: ABNORMAL
EOSINOPHIL # BLD AUTO: 0 K/UL (ref 0–0.5)
EOSINOPHIL NFR BLD: 0 % (ref 0–8)
ERYTHROCYTE [DISTWIDTH] IN BLOOD BY AUTOMATED COUNT: 14.9 % (ref 11.5–14.5)
EST. GFR  (NO RACE VARIABLE): >60 ML/MIN/1.73 M^2
GLUCOSE SERPL-MCNC: 94 MG/DL (ref 70–110)
HCT VFR BLD AUTO: 42 % (ref 37–48.5)
HGB BLD-MCNC: 13.3 G/DL (ref 12–16)
IMM GRANULOCYTES # BLD AUTO: 0.14 K/UL (ref 0–0.04)
IMM GRANULOCYTES NFR BLD AUTO: 0.8 % (ref 0–0.5)
LYMPHOCYTES # BLD AUTO: 2.7 K/UL (ref 1–4.8)
LYMPHOCYTES NFR BLD: 14.6 % (ref 18–48)
MCH RBC QN AUTO: 27 PG (ref 27–31)
MCHC RBC AUTO-ENTMCNC: 31.7 G/DL (ref 32–36)
MCV RBC AUTO: 85 FL (ref 82–98)
MONOCYTES # BLD AUTO: 1.7 K/UL (ref 0.3–1)
MONOCYTES NFR BLD: 9 % (ref 4–15)
NEUTROPHILS # BLD AUTO: 14 K/UL (ref 1.8–7.7)
NEUTROPHILS NFR BLD: 75.3 % (ref 38–73)
NRBC BLD-RTO: 0 /100 WBC
PLATELET # BLD AUTO: 317 K/UL (ref 150–450)
PMV BLD AUTO: 10.4 FL (ref 9.2–12.9)
POTASSIUM SERPL-SCNC: 3.3 MMOL/L (ref 3.5–5.1)
RBC # BLD AUTO: 4.92 M/UL (ref 4–5.4)
SODIUM SERPL-SCNC: 139 MMOL/L (ref 136–145)
VANCOMYCIN TROUGH SERPL-MCNC: 12.5 UG/ML (ref 10–22)
WBC # BLD AUTO: 18.61 K/UL (ref 3.9–12.7)

## 2024-04-16 PROCEDURE — 25000003 PHARM REV CODE 250: Performed by: STUDENT IN AN ORGANIZED HEALTH CARE EDUCATION/TRAINING PROGRAM

## 2024-04-16 PROCEDURE — 80202 ASSAY OF VANCOMYCIN: CPT | Performed by: STUDENT IN AN ORGANIZED HEALTH CARE EDUCATION/TRAINING PROGRAM

## 2024-04-16 PROCEDURE — 63600175 PHARM REV CODE 636 W HCPCS: Performed by: STUDENT IN AN ORGANIZED HEALTH CARE EDUCATION/TRAINING PROGRAM

## 2024-04-16 PROCEDURE — 99233 SBSQ HOSP IP/OBS HIGH 50: CPT | Mod: GT,,,

## 2024-04-16 PROCEDURE — 36415 COLL VENOUS BLD VENIPUNCTURE: CPT | Performed by: STUDENT IN AN ORGANIZED HEALTH CARE EDUCATION/TRAINING PROGRAM

## 2024-04-16 PROCEDURE — 85025 COMPLETE CBC W/AUTO DIFF WBC: CPT | Performed by: STUDENT IN AN ORGANIZED HEALTH CARE EDUCATION/TRAINING PROGRAM

## 2024-04-16 PROCEDURE — 80048 BASIC METABOLIC PNL TOTAL CA: CPT | Performed by: STUDENT IN AN ORGANIZED HEALTH CARE EDUCATION/TRAINING PROGRAM

## 2024-04-16 PROCEDURE — 63600175 PHARM REV CODE 636 W HCPCS

## 2024-04-16 PROCEDURE — 20600001 HC STEP DOWN PRIVATE ROOM

## 2024-04-16 PROCEDURE — 25000003 PHARM REV CODE 250

## 2024-04-16 RX ADMIN — ENOXAPARIN SODIUM 40 MG: 40 INJECTION SUBCUTANEOUS at 06:04

## 2024-04-16 RX ADMIN — CALCITONIN SALMON 342 UNITS: 200 INJECTION, SOLUTION INTRAMUSCULAR; SUBCUTANEOUS at 10:04

## 2024-04-16 RX ADMIN — PROCHLORPERAZINE EDISYLATE 2.5 MG: 5 INJECTION INTRAMUSCULAR; INTRAVENOUS at 03:04

## 2024-04-16 RX ADMIN — ONDANSETRON 4 MG: 2 INJECTION INTRAMUSCULAR; INTRAVENOUS at 06:04

## 2024-04-16 RX ADMIN — DEXTROSE MONOHYDRATE, SODIUM CHLORIDE, AND POTASSIUM CHLORIDE: 50; 4.5; 2.98 INJECTION, SOLUTION INTRAVENOUS at 06:04

## 2024-04-16 RX ADMIN — PROCHLORPERAZINE EDISYLATE 2.5 MG: 5 INJECTION INTRAMUSCULAR; INTRAVENOUS at 10:04

## 2024-04-16 RX ADMIN — METOROPROLOL TARTRATE 5 MG: 5 INJECTION, SOLUTION INTRAVENOUS at 06:04

## 2024-04-16 RX ADMIN — SENNOSIDES 8.6 MG: 8.6 TABLET, FILM COATED ORAL at 09:04

## 2024-04-16 RX ADMIN — VANCOMYCIN HYDROCHLORIDE 1750 MG: 500 INJECTION, POWDER, LYOPHILIZED, FOR SOLUTION INTRAVENOUS at 11:04

## 2024-04-16 RX ADMIN — HYDROMORPHONE HYDROCHLORIDE 0.5 MG: 1 INJECTION, SOLUTION INTRAMUSCULAR; INTRAVENOUS; SUBCUTANEOUS at 06:04

## 2024-04-16 RX ADMIN — KETOROLAC TROMETHAMINE 15 MG: 15 INJECTION, SOLUTION INTRAMUSCULAR; INTRAVENOUS at 03:04

## 2024-04-16 RX ADMIN — CEFEPIME 2 G: 2 INJECTION, POWDER, FOR SOLUTION INTRAVENOUS at 06:04

## 2024-04-16 RX ADMIN — LIOTHYRONINE SODIUM 5 MCG: 5 TABLET ORAL at 06:04

## 2024-04-16 RX ADMIN — METOROPROLOL TARTRATE 5 MG: 5 INJECTION, SOLUTION INTRAVENOUS at 11:04

## 2024-04-16 RX ADMIN — METRONIDAZOLE 500 MG: 500 INJECTION, SOLUTION INTRAVENOUS at 06:04

## 2024-04-16 RX ADMIN — METOROPROLOL TARTRATE 5 MG: 5 INJECTION, SOLUTION INTRAVENOUS at 01:04

## 2024-04-16 RX ADMIN — MUPIROCIN: 20 OINTMENT TOPICAL at 10:04

## 2024-04-16 RX ADMIN — ONDANSETRON 4 MG: 2 INJECTION INTRAMUSCULAR; INTRAVENOUS at 09:04

## 2024-04-16 RX ADMIN — ONDANSETRON 4 MG: 2 INJECTION INTRAMUSCULAR; INTRAVENOUS at 11:04

## 2024-04-16 RX ADMIN — METOROPROLOL TARTRATE 5 MG: 5 INJECTION, SOLUTION INTRAVENOUS at 09:04

## 2024-04-16 RX ADMIN — KETOROLAC TROMETHAMINE 15 MG: 15 INJECTION, SOLUTION INTRAMUSCULAR; INTRAVENOUS at 10:04

## 2024-04-16 RX ADMIN — FLUCONAZOLE 200 MG: 2 INJECTION, SOLUTION INTRAVENOUS at 10:04

## 2024-04-16 RX ADMIN — HYDROMORPHONE HYDROCHLORIDE 0.5 MG: 1 INJECTION, SOLUTION INTRAMUSCULAR; INTRAVENOUS; SUBCUTANEOUS at 03:04

## 2024-04-16 RX ADMIN — KETOROLAC TROMETHAMINE 15 MG: 15 INJECTION, SOLUTION INTRAMUSCULAR; INTRAVENOUS at 09:04

## 2024-04-16 RX ADMIN — METRONIDAZOLE 500 MG: 500 INJECTION, SOLUTION INTRAVENOUS at 05:04

## 2024-04-16 NOTE — SUBJECTIVE & OBJECTIVE
Past Medical History:   Diagnosis Date    GERD (gastroesophageal reflux disease)     Heart valve problem     Hypertension     Obesity (BMI 30-39.9) 10/20/2014    Thyroid disease        Past Surgical History:   Procedure Laterality Date    cesaean section  1991, 1993    CHOLECYSTECTOMY  2008    COLONOSCOPY  09/2013    ESOPHAGOGASTRODUODENOSCOPY N/A 5/11/2020    Procedure: EGD (ESOPHAGOGASTRODUODENOSCOPY);  Surgeon: Nahed Zabala MD;  Location: [facility];  Service: Endoscopy;  Laterality: N/A;  covid 5/8/2020    HYSTERECTOMY  age 27    for fibroids    SALPINGECTOMY  1992    for ectopic pregnancy    TONGUE SURGERY  03/04/2022    Cancer removed on tongue and lymphs    TOTAL THYROIDECTOMY  2012    UPPER GASTROINTESTINAL ENDOSCOPY  09/2013    gastritis-hp neg       Review of patient's allergies indicates:   Allergen Reactions    Ciprofloxacin Swelling    Codeine Swelling    Opioids - morphine analogues     Pcn [penicillins] Hives     Tolerated cefepime 04/2024    Percocet [oxycodone-acetaminophen] Swelling       Medications:  Continuous Infusions:  Current Facility-Administered Medications   Medication Dose Route Frequency Provider Last Rate Last Admin    acetaminophen oral solution 650 mg  650 mg Oral Q4H PRN Marnie River MD   650 mg at 04/15/24 1002    calcitonin injection 342 Units  4 Units/kg Intramuscular BID Marnie River MD   342 Units at 04/16/24 1002    dextrose 10% bolus 125 mL 125 mL  12.5 g Intravenous PRN Samia Gill MD        dextrose 10% bolus 250 mL 250 mL  25 g Intravenous PRN Samia Gill MD        enoxaparin injection 40 mg  40 mg Subcutaneous Q24H (prophylaxis, 1700) Samia Gill MD   40 mg at 04/15/24 1713    fluconazole (DIFLUCAN) IVPB 200 mg  200 mg Intravenous Q24H Samia Gill MD   Stopped at 04/16/24 1106    glucose chewable tablet 16 g  16 g Oral PRN Samia Gill MD        glucose chewable tablet 24 g  24 g Oral PRN  Samia Gill MD        HYDROmorphone injection 0.5 mg  0.5 mg Intravenous Q4H PRN Samia Gill MD   0.5 mg at 04/16/24 1527    hydrOXYzine pamoate capsule 25 mg  25 mg Oral Q8H PRN Samia Gill MD   25 mg at 04/15/24 1002    ketorolac injection 15 mg  15 mg Intravenous Q6H PRN Marnie River MD   15 mg at 04/16/24 1527    liothyronine tablet 5 mcg  5 mcg Oral Before breakfast Samia Gill MD   5 mcg at 04/16/24 0633    metoclopramide injection 10 mg  10 mg Intravenous Q6H PRN Marnie River MD   10 mg at 04/15/24 1722    metoprolol injection 5 mg  5 mg Intravenous Q6H Ky Salamanca MD   5 mg at 04/16/24 1158    metronidazole IVPB 500 mg  500 mg Intravenous Q8H Samia Gill MD   Stopped at 04/16/24 0608    naloxone 0.4 mg/mL injection 0.02 mg  0.02 mg Intravenous PRN Samia Gill MD        ondansetron injection 4 mg  4 mg Intravenous Q4H PRN Samia Gill MD   4 mg at 04/16/24 1158    prochlorperazine injection Soln 2.5 mg  2.5 mg Intravenous Q6H PRN Minerva Cardenas DO   2.5 mg at 04/16/24 1527    senna tablet 8.6 mg  8.6 mg Oral BID Marnie River MD   8.6 mg at 04/15/24 2144    vancomycin - pharmacy to dose   Intravenous pharmacy to manage frequency Samia Gill MD        vancomycin 1.75 g in 5 % dextrose 500 mL IVPB  20 mg/kg Intravenous Q24H Marnie River MD   Stopped at 04/16/24 1334     Scheduled Meds:  Current Facility-Administered Medications   Medication Dose Route Frequency Provider Last Rate Last Admin    acetaminophen oral solution 650 mg  650 mg Oral Q4H PRN Marnie River MD   650 mg at 04/15/24 1002    calcitonin injection 342 Units  4 Units/kg Intramuscular BID Marnie River MD   342 Units at 04/16/24 1002    dextrose 10% bolus 125 mL 125 mL  12.5 g Intravenous PRN Samia Gill MD        dextrose 10% bolus 250 mL 250 mL  25 g Intravenous PRN Samia Gill MD        enoxaparin injection  40 mg  40 mg Subcutaneous Q24H (prophylaxis, 1700) Samia Gill MD   40 mg at 04/15/24 1713    fluconazole (DIFLUCAN) IVPB 200 mg  200 mg Intravenous Q24H Samia Gill MD   Stopped at 04/16/24 1106    glucose chewable tablet 16 g  16 g Oral PRN Samia Gill MD        glucose chewable tablet 24 g  24 g Oral PRN Samia Gill MD        HYDROmorphone injection 0.5 mg  0.5 mg Intravenous Q4H PRN Samia Gill MD   0.5 mg at 04/16/24 1527    hydrOXYzine pamoate capsule 25 mg  25 mg Oral Q8H PRN Samia Gill MD   25 mg at 04/15/24 1002    ketorolac injection 15 mg  15 mg Intravenous Q6H PRN Marnie River MD   15 mg at 04/16/24 1527    liothyronine tablet 5 mcg  5 mcg Oral Before breakfast Samia Gill MD   5 mcg at 04/16/24 0633    metoclopramide injection 10 mg  10 mg Intravenous Q6H PRN Marnie River MD   10 mg at 04/15/24 1722    metoprolol injection 5 mg  5 mg Intravenous Q6H Ky Salamanca MD   5 mg at 04/16/24 1158    metronidazole IVPB 500 mg  500 mg Intravenous Q8H Samia Gill MD   Stopped at 04/16/24 0608    naloxone 0.4 mg/mL injection 0.02 mg  0.02 mg Intravenous PRN Samia Gill MD        ondansetron injection 4 mg  4 mg Intravenous Q4H PRN Samia Gill MD   4 mg at 04/16/24 1158    prochlorperazine injection Soln 2.5 mg  2.5 mg Intravenous Q6H PRN Minerva Cardenas DO   2.5 mg at 04/16/24 1527    senna tablet 8.6 mg  8.6 mg Oral BID Marnie River MD   8.6 mg at 04/15/24 2144    vancomycin - pharmacy to dose   Intravenous pharmacy to manage frequency Samia Gill MD        vancomycin 1.75 g in 5 % dextrose 500 mL IVPB  20 mg/kg Intravenous Q24H Marnie River MD   Stopped at 04/16/24 1334     PRN Meds:  Current Facility-Administered Medications   Medication Dose Route Frequency Provider Last Rate Last Admin    acetaminophen oral solution 650 mg  650 mg Oral Q4H PRN Marnie River MD   650  mg at 04/15/24 1002    calcitonin injection 342 Units  4 Units/kg Intramuscular BID Marnie River MD   342 Units at 04/16/24 1002    dextrose 10% bolus 125 mL 125 mL  12.5 g Intravenous PRN Samia Gill MD        dextrose 10% bolus 250 mL 250 mL  25 g Intravenous PRN Samia Gill MD        enoxaparin injection 40 mg  40 mg Subcutaneous Q24H (prophylaxis, 1700) Samia Gill MD   40 mg at 04/15/24 1713    fluconazole (DIFLUCAN) IVPB 200 mg  200 mg Intravenous Q24H Samia Gill MD   Stopped at 04/16/24 1106    glucose chewable tablet 16 g  16 g Oral PRN Samia Gill MD        glucose chewable tablet 24 g  24 g Oral PRN Samia Gill MD        HYDROmorphone injection 0.5 mg  0.5 mg Intravenous Q4H PRN Samia Gill MD   0.5 mg at 04/16/24 1527    hydrOXYzine pamoate capsule 25 mg  25 mg Oral Q8H PRN Samia Gill MD   25 mg at 04/15/24 1002    ketorolac injection 15 mg  15 mg Intravenous Q6H PRN Marnie River MD   15 mg at 04/16/24 1527    liothyronine tablet 5 mcg  5 mcg Oral Before breakfast Samia Gill MD   5 mcg at 04/16/24 0633    metoclopramide injection 10 mg  10 mg Intravenous Q6H PRN Marnie River MD   10 mg at 04/15/24 1722    metoprolol injection 5 mg  5 mg Intravenous Q6H Ky Salamanca MD   5 mg at 04/16/24 1158    metronidazole IVPB 500 mg  500 mg Intravenous Q8H Samia Gill MD   Stopped at 04/16/24 0608    naloxone 0.4 mg/mL injection 0.02 mg  0.02 mg Intravenous PRN Samia Gill MD        ondansetron injection 4 mg  4 mg Intravenous Q4H PRN Samia Gill E, MD   4 mg at 04/16/24 1158    prochlorperazine injection Soln 2.5 mg  2.5 mg Intravenous Q6H PRN Minerva Cardenas DO   2.5 mg at 04/16/24 1527    senna tablet 8.6 mg  8.6 mg Oral BID Marnie River MD   8.6 mg at 04/15/24 9643    vancomycin - pharmacy to dose   Intravenous pharmacy to manage frequency Samia Gill MD         vancomycin 1.75 g in 5 % dextrose 500 mL IVPB  20 mg/kg Intravenous Q24H Marnie River MD   Stopped at 04/16/24 1334       Family History       Problem Relation (Age of Onset)    Diabetes Mother, Father, Brother    Heart disease Mother, Father, Brother    Hyperlipidemia Mother    Hypertension Mother, Father          Tobacco Use    Smoking status: Never    Smokeless tobacco: Never   Substance and Sexual Activity    Alcohol use: No     Alcohol/week: 0.0 standard drinks of alcohol    Drug use: No    Sexual activity: Yes     Partners: Male     Birth control/protection: See Surgical Hx       Review of Systems   Constitutional:  Positive for activity change and appetite change.   HENT:  Positive for facial swelling.    Skin:  Positive for wound (to right neck).     Objective:     Vital Signs (Most Recent):  Temp: 98.7 °F (37.1 °C) (04/16/24 1540)  Pulse: 77 (04/16/24 1540)  Resp: 20 (04/16/24 1540)  BP: 130/78 (04/16/24 1540)  SpO2: (!) 93 % (04/16/24 1540) Vital Signs (24h Range):  Temp:  [97.5 °F (36.4 °C)-98.9 °F (37.2 °C)] 98.7 °F (37.1 °C)  Pulse:  [] 77  Resp:  [18-27] 20  SpO2:  [91 %-95 %] 93 %  BP: (126-149)/(64-81) 130/78     Weight: 85.3 kg (188 lb 0.8 oz)  Body mass index is 30.35 kg/m².       Physical Exam  Musculoskeletal:      Cervical back: Edema present.   Neurological:      Mental Status: She is alert and oriented to person, place, and time.            Review of Symptoms      Symptom Assessment (ESAS 0-10 Scale)  Pain:  5  Dyspnea:  0  Anxiety:  0  Nausea:  0  Depression:  0  Anorexia:  0  Fatigue:  0  Insomnia:  0  Restlessness:  0  Agitation:  0         Pain Assessment:    Location(s): neck    Neck       Location: right        Quality: Aching and burning        Quantity: 7/10 in intensity        Chronicity: Onset 10 month(s) ago, gradually worsening        Aggravating Factors: None        Alleviating Factors: Opiates        Associated Symptoms: None    ECOG Performance Status Grade:   "2    Living Arrangements:  Lives with spouse    Psychosocial/Cultural:   See Palliative Psychosocial Note: Yes  Lives with  and adult daughter.   **Primary  to Follow**  Palliative Care  Consult: Yes        Advance Care Planning   Advance Directives:   Living Will: No    Do Not Resuscitate Status: No      Decision Making:  Patient answered questions and Family answered questions  Goals of Care: The patient and family endorses that what is most important right now is to focus on symptom/pain control and quality of life, even if it means sacrificing a little time    Accordingly, we have decided that the best plan to meet the patient's goals includes continuing with pain and symptom management, while also continuing "alternative" (holistic) therapies.         Significant Labs: BMP:   Recent Labs   Lab 04/15/24  0317 04/16/24  0411   GLU 97 94    139   K 3.3* 3.3*    99   CO2 32* 31*   BUN 15 11   CREATININE 0.8 0.8   CALCIUM 13.1* 10.7*   MG 1.8  --      CBC:   Recent Labs   Lab 04/15/24  0317 04/16/24  0411   WBC 22.79* 18.61*   HGB 14.0 13.3   HCT 43.4 42.0    317     CBC:   Recent Labs   Lab 04/16/24 0411   WBC 18.61*   HGB 13.3   HCT 42.0   MCV 85        BMP:  Recent Labs   Lab 04/16/24 0411   GLU 94      K 3.3*   CL 99   CO2 31*   BUN 11   CREATININE 0.8   CALCIUM 10.7*     LFT:  Lab Results   Component Value Date    AST 27 04/15/2024    ALKPHOS 118 04/15/2024    BILITOT 0.6 04/15/2024     Albumin:   Albumin   Date Value Ref Range Status   04/15/2024 2.8 (L) 3.5 - 5.2 g/dL Final     Protein:   Total Protein   Date Value Ref Range Status   04/15/2024 5.9 (L) 6.0 - 8.4 g/dL Final     Lactic acid:   Lab Results   Component Value Date    LACTATE 1.5 04/11/2024    LACTATE 2.3 (H) 04/10/2024       Significant Imaging: I have reviewed all pertinent imaging results/findings within the past 24 hours.    "

## 2024-04-16 NOTE — HPI
Ms. 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 She initially presented to [facility] Emergency Department with six days of epigastric pain with associated nausea, vomiting, and difficulty eating due to pain. However on presentation, she was noted to have a large necrotic and purulent wound located on her right neck with complaints of associated difficulty speaking, swallowing and shortness of breath. She was noted to be hypoxic 88% on room air which improved with 2L NC. Labs were notable for leukocytosis 14, hypokalemia 2.4, hypochloremia 89, CO2 36, hypercalcemia 13.5, Phos 2.5. . Troponin 0.046. Lactate 2.3. CT Soft Tissue Neck was concerning for 9.5 x 8.2 x 10 cm large lobulated mass in the right anterolateral neck extending to the deep spaces of the neck and abutting the right prevertebral space and paravertebral musculature, left midline shift, multiple bilateral necrotic cervical lymph nodes. Case was discussed and decision was made to transfer to [facility] for higher level of care.     Of additional note, she recently transitioned her care to [place] and underwent stem-cell transplant approximately 2 weeks ago in [place], has not been seen by a  physician since 2023.      On transfer: she was afebrile, mildly hypertensive /109, HR 98, RR 20, satting 96% on room air. Complaining of mild headache and poor appetite associated with nausea; denies fever, chills, chest pain, palpitations, SOB, abdominal pain, dysuria. States wound has been draining for the past week initially thick white now more liquid. Mild dysphonia is apparently chronic from left vocal cord paralysis and at baseline. Some dysphagia with solids, none with pureed soft or liquids,  denies odynophagia. Dressing on neck CDI, ENT consulted will be here shortly to assess patient.

## 2024-04-16 NOTE — PROGRESS NOTES
04/15/24 1430   WOCN Assessment   WOCN Total Time (mins) 15   Visit Date 04/15/24   Visit Time 1430   Consult Type Follow Up   WOCN Speciality Wound   Wound surgical   Intervention assessed;chart review

## 2024-04-16 NOTE — ASSESSMENT & PLAN NOTE
Impression  58y.o F with history of R vocal cord paralysis 2/2 thyroidectomy, SCC of tongue s/p L partial glossectomy and selective neck dissection(2022), with recurrent metastatic disease per R lymph node bx in 2023. She declined chemo and radiation throughout length of diagnosis and is now with significant progression resulting in open neck wound and unresectable disease based on CT scan. R laryngeal and pharyngeal edema and obscuring vocal cords. Patient does not appear to be in respiratory distress and does not complain of difficulty breathing despite FFL findings. ENT consulted and recommended consideration of trach and PEG, however stated that tracheostomy would be very technically challenging and with significant concern for infection. Heme/Onc consult and recommends  imaging for staging and palliative radiation and chemo.     Reason for Consult. Per communication with Dr. River, GOC and ACP conversations requested, as pt with metastatic lymphadenopathy from SCC of tongue and declining chemo and radiation therapies.     ACP/GOC 4/16/24 Met with pt and  this am, along with Idalia BUNN, and Dr. River.  Dr. River reviewed findings of patient's CT from yesterday, which included identification of metastatic lesions in bilateral lungs, liver, spine, and soheila and mediastinum lymph nodes.  Dr. River explained that this needs her cancer is stage IV,  did not seem to understand this staging, and it was reviewed.  Patient asks if palliative could return later, which we did in the afternoon.       Patient reports that she is very surprised her cancer has spread this much, and never would have thought that happened.  We discussed the aggressive nature of this cancer and options going forward.  Patient and  are adamant that they do not want to pursue any chemotherapy or radiation, with the understanding that it would be palliative measures for comfort, as there are no curative options at this time.  I  "discussed that if they would like to continue their alternative therapies, she may also want to consider medications to control pain and nausea that she is currently facing. Patient is agreeable to medications to manage these symptoms, as she shares both pain and nausea have been getting worse.  I was able to briefly begin explaining that pain would likely continue, and adequate management will be a valuable asset to her comfort.    shared he and wife would like to see if she is a candidate for a PEG tube, believing that restoring her nutrition can allow her the strength to continue alternative therapies and healing.  We discussed that although I hope for a miracle with them, as they have shared, it would be most prudent to have a means of managing her symptoms at home in case a miracle does not occur.      I explored how they would obtain medication, and she shared that they do not have a primary provider.  Myself and  were able to explain the role of outpatient palliative Medicine and/or hospice so that they would be able to obtain adequate pain and symptom management.  Patient initially frightened by word of hospice, and we discussed that it would be a layer of support to assure adequate pain and symptom management, while needing there goals of not wanting to be in a hospital or doctor's office for such treatment.  Patient and  said they would discuss hospice, as they feel maybe it would be a benefit for her.  We will follow up tomorrow.        4/15/24 Met with patient and  today at bedside.  Initial visit patient asked for me to return, she had just taken pain medication and sleepy.  Upon 2nd visit I was able to discuss understanding of patient and family of current disease process.  Patient's  did most of the talking and explained that they have been participating in holistic, nontraditional" therapies since they have found out about her metastatic lymph node disease " last July. These therapies included, brushing, raking, infrared lights, and lymphatic drainage massage to name a few. She admitted that she had been noticing wound on neck getting worse, but was just caring for it at home.  Unclear if family currently understands the degree of severity associated with diagnosis, although multiple charting from Heme-Onc and ENT stating their conversations with family.       Wife and  did share that they would not want to do any chemotherapy or radiation therapy and seem very adamant about this.  shared that he understands chemo or radiation may only prolong her life by several years and have lots of complications.  Of note, wife and  did mention possibility of her getting a PEG tube if she could not eat.  However, before further goals of care and meaning could be discussed the patient with bouts of nausea and vomiting.  I will revisit tomorrow to continue goals of care discussion and further understanding of their options going forward.    MPOA Pt's  Genaro Cash [phone]    Symptoms   -Pain: r/t R neck swelling and infection. Currently controlled with 0.5mg IV Dilaudid, 24h OME = 70. Will continue to monitor usage and adjust accordingly. Antbx per ID.  -Nausea/Vomiting: Pt with Ondansetron and Prochlorperazine prn. Requiring both to control n/v today. Prior to today, without n/v x 2 days. Will continue to monitor.  -Dysphagia: Pt with increased difficulty swallowing d/t neck swelling. Follow recs per SLP.    Recommendations  -Consider adding Senna BID, as pt without BM x2 days, OME=70 (increase from previous) and n/v today.   -Monitor for airway distress, as pt with tracheal deviation on CT.  -Will continue to discuss GOC and plan.

## 2024-04-16 NOTE — PROGRESS NOTES
Pharmacokinetic Assessment Follow Up: IV Vancomycin    Vancomycin serum concentration assessment(s):    Vanc random-12.5 mcg/mL, drawn appropriately     Vancomycin Regimen Plan:    Vanc trough at goal , will continue 1.75g IV q24h.   Renal function stable, will check a trough 4/19 at 0945.  Goal 10-15 mcg/mL for SSTI.     Drug levels (last 3 results):  Recent Labs   Lab Result Units 04/14/24  0509 04/15/24  0317 04/16/24  0927   Vancomycin, Random ug/mL  --  16.5  --    Vancomycin-Trough ug/mL 36.4*  --  12.5       Pharmacy will continue to follow and monitor vancomycin.    Thank you for the consult,   Molly Birmingham, PharmD, [facility]  [id number]     Patient brief summary:  Marysol Cash is a 58 y.o. female initiated on antimicrobial therapy with IV Vancomycin for treatment of skin & soft tissue infection      Actual Body Weight:   84kg    Renal Function:   Estimated Creatinine Clearance: 85.4 mL/min (based on SCr of 0.8 mg/dL).,       CBC (last 72 hours):  Recent Labs   Lab Result Units 04/14/24  0509 04/15/24  0317 04/16/24  0411   WBC K/uL 21.18* 22.79* 18.61*   Hemoglobin g/dL 13.7 14.0 13.3   Hematocrit % 42.5 43.4 42.0   Platelets K/uL 369 374 317   Gran % % 85.3* 78.6* 75.3*   Lymph % % 8.9* 13.0* 14.6*   Mono % % 4.6 7.5 9.0   Eosinophil % % 0.0 0.0 0.0   Basophil % % 0.2 0.1 0.3   Differential Method  Automated Automated Automated       Metabolic Panel (last 72 hours):  Recent Labs   Lab Result Units 04/14/24  0509 04/15/24  0317 04/16/24  0411   Sodium mmol/L 137 143 139   Potassium mmol/L 3.1* 3.3* 3.3*   Chloride mmol/L 96 101 99   CO2 mmol/L 32* 32* 31*   Glucose mg/dL 135* 97 94   BUN mg/dL 11 15 11   Creatinine mg/dL 0.7 0.8 0.8   Albumin g/dL 2.7* 2.8*  --    Total Bilirubin mg/dL 0.6 0.6  --    Alkaline Phosphatase U/L 124 118  --    AST U/L 21 27  --    ALT U/L 25 28  --    Magnesium mg/dL 1.7 1.8  --    Phosphorus mg/dL 3.3 3.1  --        Vancomycin Administrations:  vancomycin given in the last 96  hours                     vancomycin 2 g in dextrose 5 % 500 mL IVPB ()  Restarted 04/14/24 0613     2,000 mg New Bag  0613      Restarted 04/13/24 1909     2,000 mg New Bag  1909     2,000 mg New Bag  0708      Restarted 04/12/24 2115      Restarted  2035     2,000 mg New Bag  2006    vancomycin 2 g in dextrose 5 % 500 mL IVPB (mg) 2,000 mg New Bag 04/12/24 0750     2,000 mg New Bag 04/11/24 1958                    Microbiologic Results:  Microbiology Results (last 7 days)       Procedure Component Value Units Date/Time    Culture, Anaerobe [[id number]]     Order Status: Canceled Specimen: Wound     Aerobic culture [[id number]]     Order Status: Canceled Specimen: Wound     AFB Culture & Smear [[id number]]     Order Status: Canceled Specimen: Wound

## 2024-04-16 NOTE — PROGRESS NOTES
[facility] - Telemetry Stepdown  Palliative Medicine  Progress Note    Patient Name: Marysol Cash  MRN: [id number]  Admission Date: 4/12/2024  Hospital Length of Stay: 4 days  Code Status: Full Code   Attending Provider: Marnie River MD  Consulting Provider: FRANKO Lance  Primary Care Physician: James Coronel MD  Principal Problem:Sepsis    Patient information was obtained from patient, spouse/SO, past medical records, and primary team.      Assessment/Plan:     Palliative Care  Palliative care encounter  Impression  58y.o F with history of R vocal cord paralysis 2/2 thyroidectomy, SCC of tongue s/p L partial glossectomy and selective neck dissection(2022), with recurrent metastatic disease per R lymph node bx in 2023. She declined chemo and radiation throughout length of diagnosis and is now with significant progression resulting in open neck wound and unresectable disease based on CT scan. R laryngeal and pharyngeal edema and obscuring vocal cords. Patient does not appear to be in respiratory distress and does not complain of difficulty breathing despite FFL findings. ENT consulted and recommended consideration of trach and PEG, however stated that tracheostomy would be very technically challenging and with significant concern for infection. Heme/Onc consult and recommends  imaging for staging and palliative radiation and chemo.     Reason for Consult. Per communication with Dr. River, GOC and ACP conversations requested, as pt with metastatic lymphadenopathy from SCC of tongue and declining chemo and radiation therapies.     ACP/GOC 4/16/24 Met with pt and  this am, along with Idalia BUNN, and Dr. River.  Dr. River reviewed findings of patient's CT from yesterday, which included identification of metastatic lesions in bilateral lungs, liver, spine, and soheila and mediastinum lymph nodes.  Dr. River explained that this needs her cancer is stage IV,  did not seem to understand this staging,  and it was reviewed.  Patient asks if palliative could return later, which we did in the afternoon.       Patient reports that she is very surprised her cancer has spread this much, and never would have thought that happened.  We discussed the aggressive nature of this cancer and options going forward.  Patient and  are adamant that they do not want to pursue any chemotherapy or radiation, with the understanding that it would be palliative measures for comfort, as there are no curative options at this time.  I discussed that if they would like to continue their alternative therapies, she may also want to consider medications to control pain and nausea that she is currently facing. Patient is agreeable to medications to manage these symptoms, as she shares both pain and nausea have been getting worse.  I was able to briefly begin explaining that pain would likely continue, and adequate management will be a valuable asset to her comfort.    shared he and wife would like to see if she is a candidate for a PEG tube, believing that restoring her nutrition can allow her the strength to continue alternative therapies and healing.  We discussed that although I hope for a miracle with them, as they have shared, it would be most prudent to have a means of managing her symptoms at home in case a miracle does not occur.      I explored how they would obtain medication, and she shared that they do not have a primary provider.  Myself and  were able to explain the role of outpatient palliative Medicine and/or hospice so that they would be able to obtain adequate pain and symptom management.  Patient initially frightened by word of hospice, and we discussed that it would be a layer of support to assure adequate pain and symptom management, while needing there goals of not wanting to be in a hospital or doctor's office for such treatment.  Patient and  said they would discuss hospice, as they feel  "maybe it would be a benefit for her.  We will follow up tomorrow.        4/15/24 Met with patient and  today at bedside.  Initial visit patient asked for me to return, she had just taken pain medication and sleepy.  Upon 2nd visit I was able to discuss understanding of patient and family of current disease process.  Patient's  did most of the talking and explained that they have been participating in holistic, nontraditional" therapies since they have found out about her metastatic lymph node disease last July. These therapies included, brushing, raking, infrared lights, and lymphatic drainage massage to name a few. She admitted that she had been noticing wound on neck getting worse, but was just caring for it at home.  Unclear if family currently understands the degree of severity associated with diagnosis, although multiple charting from Heme-Onc and ENT stating their conversations with family.       Wife and  did share that they would not want to do any chemotherapy or radiation therapy and seem very adamant about this.  shared that he understands chemo or radiation may only prolong her life by several years and have lots of complications.  Of note, wife and  did mention possibility of her getting a PEG tube if she could not eat.  However, before further goals of care and meaning could be discussed the patient with bouts of nausea and vomiting.  I will revisit tomorrow to continue goals of care discussion and further understanding of their options going forward.    MPOA Pt's  Genaro Cash [phone]    Symptoms   -Pain: r/t R neck swelling and infection. Currently controlled with 0.5mg IV Dilaudid, 24h OME = 70. Will continue to monitor usage and adjust accordingly. Antbx per ID.  -Nausea/Vomiting: Pt with Ondansetron and Prochlorperazine prn. Requiring both to control n/v today. Prior to today, without n/v x 2 days. Will continue to monitor.  -Dysphagia: Pt with " increased difficulty swallowing d/t neck swelling. Follow recs per SLP.    Recommendations  -Consider adding Senna BID, as pt without BM x2 days, OME=70 (increase from previous) and n/v today.   -Monitor for airway distress, as pt with tracheal deviation on CT.  -Will continue to discuss GOC and plan.        I will follow-up with patient. Please contact us if you have any additional questions.    Subjective:     Chief Complaint: No chief complaint on file.      HPI:   Per Chart Review: Marysol Cash is a 58 year-old female with a PMHx of SCC of the tongue s/p radical neck dissection 03/2022 (had refused chemo and radiation) with suspected recurrence of SCC of tongue in right neck, left vocal cord paralysis, GERD, Hypertension, obesity, post-surgical hypothyroidism and hypoparathyroidism. She initially presented to [facility] Department with six days of epigastric pain with associated nausea, vomiting, and difficulty eating due to pain. However on presentation, she was noted to have a large necrotic and purulent wound located on her right neck with complaints of associated difficulty speaking, swallowing and shortness of breath. She was noted to be hypoxic 88% on room air which improved with 2L NC. Labs were notable for leukocytosis 14, hypokalemia 2.4, hypochloremia 89, CO2 36, hypercalcemia 13.5, Phos 2.5. . Troponin 0.046. Lactate 2.3. CT Soft Tissue Neck was concerning for 9.5 x 8.2 x 10 cm large lobulated mass in the right anterolateral neck extending to the deep spaces of the neck and abutting the right prevertebral space and paravertebral musculature, left midline shift, multiple bilateral necrotic cervical lymph nodes. Case was discussed and decision was made to transfer to [facility] [place] higher level of care.     Of additional note, she recently transitioned her care to [place] and underwent stem-cell transplant approximately 2 weeks ago in [place], has not been seen by a [place] physician since 2023.      On  transfer: she was afebrile, mildly hypertensive /109, HR 98, RR 20, satting 96% on room air. Complaining of mild headache and poor appetite associated with nausea; denies fever, chills, chest pain, palpitations, SOB, abdominal pain, dysuria. States wound has been draining for the past week initially thick white now more liquid. Mild dysphonia is apparently chronic from left vocal cord paralysis and at baseline. Some dysphagia with solids, none with pureed soft or liquids, denies odynophagia. Dressing on neck CDI, ENT consulted will be here shortly to assess patient.      Hospital/ICU Course:  Evaluated 4/12 in MICU by ENT, who do not feel patient is a candidate for surgical intervention. Palliative care and oncology consulted. Failed swallow study, SLP recommending NPO 4/13. Patient continues to protect airway. Stepped down from MICU on 4/13.    Hospital Course:  No notes on file      Past Medical History:   Diagnosis Date    GERD (gastroesophageal reflux disease)     Heart valve problem     Hypertension     Obesity (BMI 30-39.9) 10/20/2014    Thyroid disease        Past Surgical History:   Procedure Laterality Date    cesaean section  1991, 1993    CHOLECYSTECTOMY  2008    COLONOSCOPY  09/2013    ESOPHAGOGASTRODUODENOSCOPY N/A 5/11/2020    Procedure: EGD (ESOPHAGOGASTRODUODENOSCOPY);  Surgeon: Nahed Zabala MD;  Location: [facility];  Service: Endoscopy;  Laterality: N/A;  covid 5/8/2020    HYSTERECTOMY  age 27    for fibroids    SALPINGECTOMY  1992    for ectopic pregnancy    TONGUE SURGERY  03/04/2022    Cancer removed on tongue and lymphs    TOTAL THYROIDECTOMY  2012    UPPER GASTROINTESTINAL ENDOSCOPY  09/2013    gastritis-hp neg       Review of patient's allergies indicates:   Allergen Reactions    Ciprofloxacin Swelling    Codeine Swelling    Opioids - morphine analogues     Pcn [penicillins] Hives     Tolerated cefepime 04/2024    Percocet [oxycodone-acetaminophen] Swelling        Medications:  Continuous Infusions:  Current Facility-Administered Medications   Medication Dose Route Frequency Provider Last Rate Last Admin    acetaminophen oral solution 650 mg  650 mg Oral Q4H PRN Marnie River MD   650 mg at 04/15/24 1002    calcitonin injection 342 Units  4 Units/kg Intramuscular BID Marnie River MD   342 Units at 04/16/24 1002    dextrose 10% bolus 125 mL 125 mL  12.5 g Intravenous PRN Samia Gill MD        dextrose 10% bolus 250 mL 250 mL  25 g Intravenous PRN Samia Gill MD        enoxaparin injection 40 mg  40 mg Subcutaneous Q24H (prophylaxis, 1700) Samia Gill MD   40 mg at 04/15/24 1713    fluconazole (DIFLUCAN) IVPB 200 mg  200 mg Intravenous Q24H Samia Gill MD   Stopped at 04/16/24 1106    glucose chewable tablet 16 g  16 g Oral PRN Samia Gill MD        glucose chewable tablet 24 g  24 g Oral PRN Samia Gill MD        HYDROmorphone injection 0.5 mg  0.5 mg Intravenous Q4H PRN Samia Gill MD   0.5 mg at 04/16/24 1527    hydrOXYzine pamoate capsule 25 mg  25 mg Oral Q8H PRN Samia Gill MD   25 mg at 04/15/24 1002    ketorolac injection 15 mg  15 mg Intravenous Q6H PRN Marnie River MD   15 mg at 04/16/24 1527    liothyronine tablet 5 mcg  5 mcg Oral Before breakfast Samia Gill MD   5 mcg at 04/16/24 0633    metoclopramide injection 10 mg  10 mg Intravenous Q6H PRN Marnie River MD   10 mg at 04/15/24 1722    metoprolol injection 5 mg  5 mg Intravenous Q6H Ky Salamanca MD   5 mg at 04/16/24 1158    metronidazole IVPB 500 mg  500 mg Intravenous Q8H Samia Gill MD   Stopped at 04/16/24 0608    naloxone 0.4 mg/mL injection 0.02 mg  0.02 mg Intravenous PRN Samia Gill MD        ondansetron injection 4 mg  4 mg Intravenous Q4H PRN Samia Gill MD   4 mg at 04/16/24 1158    prochlorperazine injection Soln 2.5 mg  2.5 mg Intravenous Q6H  PRN Minerva Cardenas DO   2.5 mg at 04/16/24 1527    senna tablet 8.6 mg  8.6 mg Oral BID Marnie River MD   8.6 mg at 04/15/24 2144    vancomycin - pharmacy to dose   Intravenous pharmacy to manage frequency Samia Gill MD        vancomycin 1.75 g in 5 % dextrose 500 mL IVPB  20 mg/kg Intravenous Q24H Marnie River MD   Stopped at 04/16/24 1334     Scheduled Meds:  Current Facility-Administered Medications   Medication Dose Route Frequency Provider Last Rate Last Admin    acetaminophen oral solution 650 mg  650 mg Oral Q4H PRN Marnie River MD   650 mg at 04/15/24 1002    calcitonin injection 342 Units  4 Units/kg Intramuscular BID Marnie River MD   342 Units at 04/16/24 1002    dextrose 10% bolus 125 mL 125 mL  12.5 g Intravenous PRN Samia Gill MD        dextrose 10% bolus 250 mL 250 mL  25 g Intravenous PRN Samia Glil MD        enoxaparin injection 40 mg  40 mg Subcutaneous Q24H (prophylaxis, 1700) Samia Gill MD   40 mg at 04/15/24 1713    fluconazole (DIFLUCAN) IVPB 200 mg  200 mg Intravenous Q24H Samia Gill MD   Stopped at 04/16/24 1106    glucose chewable tablet 16 g  16 g Oral PRN Samia iGll MD        glucose chewable tablet 24 g  24 g Oral PRN Samia Gill MD        HYDROmorphone injection 0.5 mg  0.5 mg Intravenous Q4H PRN Samia Gill MD   0.5 mg at 04/16/24 1527    hydrOXYzine pamoate capsule 25 mg  25 mg Oral Q8H PRN Samia Gill MD   25 mg at 04/15/24 1002    ketorolac injection 15 mg  15 mg Intravenous Q6H PRN Marnie River MD   15 mg at 04/16/24 1527    liothyronine tablet 5 mcg  5 mcg Oral Before breakfast Samia Gill MD   5 mcg at 04/16/24 0633    metoclopramide injection 10 mg  10 mg Intravenous Q6H PRN Marnie River MD   10 mg at 04/15/24 1722    metoprolol injection 5 mg  5 mg Intravenous Q6H Ky Salamanca MD   5 mg at 04/16/24 1158    metronidazole IVPB 500 mg   500 mg Intravenous Q8H Samia Gill MD   Stopped at 04/16/24 0608    naloxone 0.4 mg/mL injection 0.02 mg  0.02 mg Intravenous PRN Samia Gill MD        ondansetron injection 4 mg  4 mg Intravenous Q4H PRN Samia Gill MD   4 mg at 04/16/24 1158    prochlorperazine injection Soln 2.5 mg  2.5 mg Intravenous Q6H PRN Minerva Cardenas DO   2.5 mg at 04/16/24 1527    senna tablet 8.6 mg  8.6 mg Oral BID Marnie River MD   8.6 mg at 04/15/24 2144    vancomycin - pharmacy to dose   Intravenous pharmacy to manage frequency Samia Gill MD        vancomycin 1.75 g in 5 % dextrose 500 mL IVPB  20 mg/kg Intravenous Q24H Marnie River MD   Stopped at 04/16/24 1334     PRN Meds:  Current Facility-Administered Medications   Medication Dose Route Frequency Provider Last Rate Last Admin    acetaminophen oral solution 650 mg  650 mg Oral Q4H PRN Marnie River MD   650 mg at 04/15/24 1002    calcitonin injection 342 Units  4 Units/kg Intramuscular BID Marnie River MD   342 Units at 04/16/24 1002    dextrose 10% bolus 125 mL 125 mL  12.5 g Intravenous PRN Samia Gill MD        dextrose 10% bolus 250 mL 250 mL  25 g Intravenous PRN Samia Gill MD        enoxaparin injection 40 mg  40 mg Subcutaneous Q24H (prophylaxis, 1700) Samia Gill MD   40 mg at 04/15/24 1713    fluconazole (DIFLUCAN) IVPB 200 mg  200 mg Intravenous Q24H Samia Gill MD   Stopped at 04/16/24 1106    glucose chewable tablet 16 g  16 g Oral PRN Samia Gill MD        glucose chewable tablet 24 g  24 g Oral PRN Samia Gill MD        HYDROmorphone injection 0.5 mg  0.5 mg Intravenous Q4H PRN Samia Gill MD   0.5 mg at 04/16/24 1527    hydrOXYzine pamoate capsule 25 mg  25 mg Oral Q8H PRN Samia Gill MD   25 mg at 04/15/24 1002    ketorolac injection 15 mg  15 mg Intravenous Q6H PRN Marnie River MD   15 mg at 04/16/24 1527     liothyronine tablet 5 mcg  5 mcg Oral Before breakfast Samia Gill MD   5 mcg at 04/16/24 0633    metoclopramide injection 10 mg  10 mg Intravenous Q6H PRN Marnie River MD   10 mg at 04/15/24 1722    metoprolol injection 5 mg  5 mg Intravenous Q6H Ky Salamanca MD   5 mg at 04/16/24 1158    metronidazole IVPB 500 mg  500 mg Intravenous Q8H Samia Gill MD   Stopped at 04/16/24 0608    naloxone 0.4 mg/mL injection 0.02 mg  0.02 mg Intravenous PRN Samia Gill MD        ondansetron injection 4 mg  4 mg Intravenous Q4H PRN Samia Gill MD   4 mg at 04/16/24 1158    prochlorperazine injection Soln 2.5 mg  2.5 mg Intravenous Q6H PRN Minerva Cardenas DO   2.5 mg at 04/16/24 1527    senna tablet 8.6 mg  8.6 mg Oral BID Marnie River MD   8.6 mg at 04/15/24 2144    vancomycin - pharmacy to dose   Intravenous pharmacy to manage frequency Samia Gill MD        vancomycin 1.75 g in 5 % dextrose 500 mL IVPB  20 mg/kg Intravenous Q24H Marnie River MD   Stopped at 04/16/24 1334       Family History       Problem Relation (Age of Onset)    Diabetes Mother, Father, Brother    Heart disease Mother, Father, Brother    Hyperlipidemia Mother    Hypertension Mother, Father          Tobacco Use    Smoking status: Never    Smokeless tobacco: Never   Substance and Sexual Activity    Alcohol use: No     Alcohol/week: 0.0 standard drinks of alcohol    Drug use: No    Sexual activity: Yes     Partners: Male     Birth control/protection: See Surgical Hx       Review of Systems   Constitutional:  Positive for activity change and appetite change.   HENT:  Positive for facial swelling.    Skin:  Positive for wound (to right neck).     Objective:     Vital Signs (Most Recent):  Temp: 98.7 °F (37.1 °C) (04/16/24 1540)  Pulse: 77 (04/16/24 1540)  Resp: 20 (04/16/24 1540)  BP: 130/78 (04/16/24 1540)  SpO2: (!) 93 % (04/16/24 1540) Vital Signs (24h Range):  Temp:  [97.5 °F (36.4 °C)-98.9  "°F (37.2 °C)] 98.7 °F (37.1 °C)  Pulse:  [] 77  Resp:  [18-27] 20  SpO2:  [91 %-95 %] 93 %  BP: (126-149)/(64-81) 130/78     Weight: 85.3 kg (188 lb 0.8 oz)  Body mass index is 30.35 kg/m².       Physical Exam  Musculoskeletal:      Cervical back: Edema present.   Neurological:      Mental Status: She is alert and oriented to person, place, and time.            Review of Symptoms      Symptom Assessment (ESAS 0-10 Scale)  Pain:  5  Dyspnea:  0  Anxiety:  0  Nausea:  0  Depression:  0  Anorexia:  0  Fatigue:  0  Insomnia:  0  Restlessness:  0  Agitation:  0         Pain Assessment:    Location(s): neck    Neck       Location: right        Quality: Aching and burning        Quantity: 7/10 in intensity        Chronicity: Onset 10 month(s) ago, gradually worsening        Aggravating Factors: None        Alleviating Factors: Opiates        Associated Symptoms: None    ECOG Performance Status rdGrdrrdarddrderd:rd rd3rd Living Arrangements:  Lives with spouse    Psychosocial/Cultural:   See Palliative Psychosocial Note: Yes  Lives with  and adult daughter.   **Primary  to Follow**  Palliative Care  Consult: Yes        Advance Care Planning  Advance Directives:   Living Will: No    Do Not Resuscitate Status: No      Decision Making:  Patient answered questions and Family answered questions  Goals of Care: The patient and family endorses that what is most important right now is to focus on symptom/pain control and quality of life, even if it means sacrificing a little time    Accordingly, we have decided that the best plan to meet the patient's goals includes continuing with pain and symptom management, while also continuing "alternative" (holistic) therapies.         Significant Labs: BMP:   Recent Labs   Lab 04/15/24  0317 04/16/24  0411   GLU 97 94    139   K 3.3* 3.3*    99   CO2 32* 31*   BUN 15 11   CREATININE 0.8 0.8   CALCIUM 13.1* 10.7*   MG 1.8  --      CBC:   Recent Labs   Lab " 04/15/24  0317 04/16/24 0411   WBC 22.79* 18.61*   HGB 14.0 13.3   HCT 43.4 42.0    317     CBC:   Recent Labs   Lab 04/16/24 0411   WBC 18.61*   HGB 13.3   HCT 42.0   MCV 85        BMP:  Recent Labs   Lab 04/16/24 0411   GLU 94      K 3.3*   CL 99   CO2 31*   BUN 11   CREATININE 0.8   CALCIUM 10.7*     LFT:  Lab Results   Component Value Date    AST 27 04/15/2024    ALKPHOS 118 04/15/2024    BILITOT 0.6 04/15/2024     Albumin:   Albumin   Date Value Ref Range Status   04/15/2024 2.8 (L) 3.5 - 5.2 g/dL Final     Protein:   Total Protein   Date Value Ref Range Status   04/15/2024 5.9 (L) 6.0 - 8.4 g/dL Final     Lactic acid:   Lab Results   Component Value Date    LACTATE 1.5 04/11/2024    LACTATE 2.3 (H) 04/10/2024       Significant Imaging: I have reviewed all pertinent imaging results/findings within the past 24 hours.    > 50% of 55  min visit spent in chart review, face to face discussion of goals of care,  symptom assessment, coordination of care, charting, and emotional support   Cristina Nix, CNS  Palliative Medicine  [facility] - Telemetry Stepdown

## 2024-04-16 NOTE — HOSPITAL COURSE
Evaluated 4/12 in MICU by ENT, who do not feel patient is a candidate for surgical intervention. Palliative care and oncology consulted. Pt declines chemo or XRT. Failed swallow study, SLP recommending NPO 4/13. Patient continues to protect airway. Stepped down from MICU on 4/13.  ENT attempted awake trach but could not get one. Gen surgery rec consulting IR for PEG as they can't put one w/o a trach. Now s/p PEG placement by IR on 4/26. Started on TF. Unable to tolerate Impact peptide TF. Discussed with RD, rec switching to Isael SMS[employer] CRANE ACQUISITION peptide 1.5. Added bentyl. Abdominal Xray w/o distention, PEG in place. On d/c will need Isael Tizaro Peptide 1.5 (bolus) - 4 cans/day. Per CM will need a few days to arrange for isael farm cans. Tube Feeds to be delivered to home on 5/7. Patient medically ready to discharge home with hospice.     Per palliative:   Goals of Care: The patient and family endorses that what is most important right now is to focus on symptom/pain control and quality of life, even if it means sacrificing a little time     Accordingly, we have decided that the best plan to meet the patient's goals includes continuing with limited treatment, while exploring other options for treatment.

## 2024-04-16 NOTE — CLINICAL REVIEW
Sepsis Screen (most recent)       Sepsis Screen (IP) - 04/16/24 1022       Is the patient's history or complaint suggestive of a possible infection? Yes  -    Are there at least two of the following signs and symptoms present? Yes  -    Sepsis signs/symptoms - Tachycardia Tachycardia     >90  -    Sepsis signs/symptoms - Tachypnea Tachypnea     >20  -    Sepsis signs/symptoms - WBC WBC < 4,000 or WBC > 12,000  -    Are any of the following organ dysfunction criteria present and not considered to be due to a chronic condition? Yes  -    Organ Dysfunction Criteria - O2 O2 Saturation < 95% on room air  -    Initiate Sepsis Protocol No  -    Reason sepsis not considered Pt. receiving appropriate management  -              User Key  (r) = Recorded By, (t) = Taken By, (c) = Cosigned By      Initials Name    Marnie Ley RN

## 2024-04-16 NOTE — SUBJECTIVE & OBJECTIVE
Interval History: Reports episode of emesis when attempting to swallow this AM. Endorses continued neck pain w/ extension up face & head. Medical & radiation oncology consulted along w/ Palliative Medicine. Continued SLP/swallow assessements; pt remains NPO.     Review of Systems   Constitutional:  Negative for chills, diaphoresis and fever.   HENT:  Positive for trouble swallowing and voice change. Negative for congestion, rhinorrhea and sore throat.    Respiratory:  Negative for cough, shortness of breath and wheezing.    Cardiovascular:  Negative for chest pain and palpitations.   Gastrointestinal:  Negative for abdominal pain, diarrhea, nausea and vomiting.   Genitourinary:  Negative for dysuria, frequency and hematuria.   Musculoskeletal:  Positive for neck pain.   Skin:  Positive for wound. Negative for pallor.   Neurological:  Positive for headaches. Negative for dizziness, light-headedness and numbness.   Psychiatric/Behavioral:  Negative for agitation and confusion.      Objective:     Vital Signs (Most Recent):  Temp: 98.9 °F (37.2 °C) (04/15/24 1924)  Pulse: 95 (04/15/24 1600)  Resp: 20 (04/15/24 1600)  BP: (!) 144/71 (04/15/24 1719)  SpO2: (!) 91 % (04/15/24 1600) Vital Signs (24h Range):  Temp:  [97.6 °F (36.4 °C)-98.9 °F (37.2 °C)] 98.9 °F (37.2 °C)  Pulse:  [] 95  Resp:  [18-20] 20  SpO2:  [91 %-97 %] 91 %  BP: (122-158)/(70-87) 144/71     Weight: 85.3 kg (188 lb 0.8 oz)  Body mass index is 30.35 kg/m².    Intake/Output Summary (Last 24 hours) at 4/15/2024 2227  Last data filed at 4/15/2024 0925  Gross per 24 hour   Intake --   Output 300 ml   Net -300 ml         Physical Exam  Vitals and nursing note reviewed.   Constitutional:       General: She is not in acute distress.     Appearance: She is ill-appearing. She is not toxic-appearing.   Eyes:      Conjunctiva/sclera: Conjunctivae normal.   Neck:      Comments: Dressing in place over entire circumference of neck. Dressing  c/d/i  Cardiovascular:      Rate and Rhythm: Normal rate and regular rhythm.      Heart sounds: Normal heart sounds.   Pulmonary:      Effort: Pulmonary effort is normal. No respiratory distress.      Breath sounds: Normal breath sounds. No wheezing.   Abdominal:      General: Bowel sounds are normal. There is no distension.      Palpations: Abdomen is soft.      Tenderness: There is no abdominal tenderness. There is no guarding.   Skin:     General: Skin is warm and dry.   Neurological:      General: No focal deficit present.      Mental Status: She is alert and oriented to person, place, and time. Mental status is at baseline.   Psychiatric:         Mood and Affect: Mood normal.         Behavior: Behavior normal.           Significant Labs: All pertinent labs within the past 24 hours have been reviewed.    Significant Imaging: I have reviewed all pertinent imaging results/findings within the past 24 hours.

## 2024-04-16 NOTE — PROGRESS NOTES
[facility] - Tele[facility] Medicine  Progress Note    Patient Name: Marysol Cash  MRN: [id number]  Patient Class: IP- Inpatient   Admission Date: 4/12/2024  Length of Stay: 3 days  Attending Physician: Marnie River MD  Primary Care Provider: James Coronel MD    Subjective:     Principal Problem: Sepsis    HPI:  Ms. 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 She initially presented to [facility] Emergency Department with six days of epigastric pain with associated nausea, vomiting, and difficulty eating due to pain. However on presentation, she was noted to have a large necrotic and purulent wound located on her right neck with complaints of associated difficulty speaking, swallowing and shortness of breath. She was noted to be hypoxic 88% on room air which improved with 2L NC. Labs were notable for leukocytosis 14, hypokalemia 2.4, hypochloremia 89, CO2 36, hypercalcemia 13.5, Phos 2.5. . Troponin 0.046. Lactate 2.3. CT Soft Tissue Neck was concerning for 9.5 x 8.2 x 10 cm large lobulated mass in the right anterolateral neck extending to the deep spaces of the neck and abutting the right prevertebral space and paravertebral musculature, left midline shift, multiple bilateral necrotic cervical lymph nodes. Case was discussed and decision was made to transfer to [facility] for higher level of care.     Of additional note, she recently transitioned her care to [place] and underwent stem-cell transplant approximately 2 weeks ago in [place], has not been seen by a [place] physician since 2023.      On transfer: she was afebrile, mildly hypertensive /109, HR 98, RR 20, satting 96% on room air. Complaining of mild headache and poor appetite  associated with nausea; denies fever, chills, chest pain, palpitations, SOB, abdominal pain, dysuria. States wound has been draining for the past week initially thick white now more liquid. Mild dysphonia is apparently chronic from left vocal cord paralysis and at baseline. Some dysphagia with solids, none with pureed soft or liquids, denies odynophagia. Dressing on neck CDI, ENT consulted will be here shortly to assess patient.        Overview/Hospital Course:  Evaluated 4/12 in MICU by ENT, who do not feel patient is a candidate for surgical intervention. Palliative care and oncology consulted. Failed swallow study, SLP recommending NPO 4/13. Patient continues to protect airway. Stepped down from MICU on 4/13.    Interval History: Reports episode of emesis when attempting to swallow this AM. Endorses continued neck pain w/ extension up face & head. Medical & radiation oncology consulted along w/ Palliative Medicine. Continued SLP/swallow assessements; pt remains NPO.     Review of Systems   Constitutional:  Negative for chills, diaphoresis and fever.   HENT:  Positive for trouble swallowing and voice change. Negative for congestion, rhinorrhea and sore throat.    Respiratory:  Negative for cough, shortness of breath and wheezing.    Cardiovascular:  Negative for chest pain and palpitations.   Gastrointestinal:  Negative for abdominal pain, diarrhea, nausea and vomiting.   Genitourinary:  Negative for dysuria, frequency and hematuria.   Musculoskeletal:  Positive for neck pain.   Skin:  Positive for wound. Negative for pallor.   Neurological:  Positive for headaches. Negative for dizziness, light-headedness and numbness.   Psychiatric/Behavioral:  Negative for agitation and confusion.      Objective:     Vital Signs (Most Recent):  Temp: 98.9 °F (37.2 °C) (04/15/24 1924)  Pulse: 95 (04/15/24 1600)  Resp: 20 (04/15/24 1600)  BP: (!) 144/71 (04/15/24 1719)  SpO2: (!) 91 % (04/15/24 1600) Vital Signs (24h  Range):  Temp:  [97.6 °F (36.4 °C)-98.9 °F (37.2 °C)] 98.9 °F (37.2 °C)  Pulse:  [] 95  Resp:  [18-20] 20  SpO2:  [91 %-97 %] 91 %  BP: (122-158)/(70-87) 144/71     Weight: 85.3 kg (188 lb 0.8 oz)  Body mass index is 30.35 kg/m².    Intake/Output Summary (Last 24 hours) at 4/15/2024 2229  Last data filed at 4/15/2024 0997  Gross per 24 hour   Intake --   Output 300 ml   Net -300 ml         Physical Exam  Vitals and nursing note reviewed.   Constitutional:       General: She is not in acute distress.     Appearance: She is ill-appearing. She is not toxic-appearing.   Eyes:      Conjunctiva/sclera: Conjunctivae normal.   Neck:      Comments: Dressing in place over entire circumference of neck. Dressing c/d/i  Cardiovascular:      Rate and Rhythm: Normal rate and regular rhythm.      Heart sounds: Normal heart sounds.   Pulmonary:      Effort: Pulmonary effort is normal. No respiratory distress.      Breath sounds: Normal breath sounds. No wheezing.   Abdominal:      General: Bowel sounds are normal. There is no distension.      Palpations: Abdomen is soft.      Tenderness: There is no abdominal tenderness. There is no guarding.   Skin:     General: Skin is warm and dry.   Neurological:      General: No focal deficit present.      Mental Status: She is alert and oriented to person, place, and time. Mental status is at baseline.   Psychiatric:         Mood and Affect: Mood normal.         Behavior: Behavior normal.           Significant Labs: All pertinent labs within the past 24 hours have been reviewed.    Significant Imaging: I have reviewed all pertinent imaging results/findings within the past 24 hours.    Assessment/Plan:      HTN (hypertension)  Patient taking Lopressor 25 mg BID at home     Plan:  - Hypertensive on arrival  - Oral metoprolol switched to IV, 5 mg Q6H     Renal/  Hypokalemia  Potassium 2.4 on arrival. Suspect 2/2 decreased PO intake.     --IV and PO replacement  --Replete Mg as needed,  goal Mg>2  --BMP and Mg daily     ID  * Sepsis  This patient does have evidence of infective focus  My overall impression is sepsis.  Source: Skin and Soft Tissue (location Neck)  Antibiotics given-   Antibiotics (72h ago, onward)        None     Latest lactate reviewed-      Recent Labs   Lab 04/10/24  1917   LACTATE 2.3*      Organ dysfunction indicated by Acute respiratory failure     Fluid challenge Ideal Body Weight- The patient's ideal body weight is Patient weight not recorded which will be used to calculate fluid bolus of 30 ml/kg for treatment of septic shock.       Post- resuscitation assessment Yes Perfusion exam was performed within 6 hours of septic shock presentation after bolus shows Adequate tissue perfusion assessed by non-invasive monitoring      Will Not start Pressors- Levophed for MAP of 65  Source control achieved by: Broad spectrum antibiotics     Plan:  Source likely skin/soft tissue origin secondary to purulent, necrotic neck wound  - Continue broad spectrum antibiotics with Vancomycin, Cefepime  - Received 1.7L Lactated Ringers sepsis fluids at OSH  - Will obtain wound culture  - ENT consulted, f/u recs  - Repeat Lactate  - Repeat and follow up blood cultures through maturation  - CBC daily to trend leukocytosis     Oncology  Malignant neoplasm of tip and lateral border of tongue  Oncology History per Chart Review 8/2023:  Patient was evaluated by Dr. Rodrigo BOLDEN for lung lesion.  Biopsy of ventral surface of tongue done on 12/24/2021 has shown atypia with no diagnostic evidence of dysplasia.  Repeat biopsy after failed treatment on 01/24/2022 has shown moderately differentiated squamous cell carcinoma suspicious for perineural invasion.  CT soft tissue neck done on 01/31/2022 showed no evidence of metastatic disease to the neck. PET-CT done on 02/02/2022 showed no abnormal activity within the tongue, no signs of cervical lymph node or distant metastasis.  Declined medical oncology and  radiation oncology consultation.  Status post left selective neck dissection levels 1, 2, 3, partial glossectomy with split-thickness skin graft done on 03/03/2022 by Dr. Clif Olson.  Surgical pathology has shown unifocal squamous cell carcinoma on the dorsal surface of the tongue on left side measuring 1 cm, moderately differentiated, 6 mm depth of invasion, no lymphovascular invasion, positive for perineural invasion, margins negative, 0 of 39 lymph nodes with metastasis, pT2 pN0 noted.  Postop course complicated by infection requiring I&D and antibiotics.  Declined adjuvant XRT due to concern for side effect profile [seen by Dr. Rangel].     Due to localized right neck swelling CT soft tissue neck with without contrast ordered on 06/21/2023 has shown 2.2 cm lesion deep to the right sternocleidomastoid suspicious for necrotic lymph node with few adjacent pathological appearing lymph nodes suspicious for metastasis. Right neck FNA done on 07/07/2023 were suspicious for metastatic squamous cell carcinoma.  PET-CT done on 08/21/2023 has shown large necrotic mass the angle of mandible posterior to submandibular gland on the right measuring 4.4 x 4.2 cm with SUV 9.78, just superior to the mass is a 2 cm hypermetabolic lymph node and no evidence of distant metastatic disease     Plan:     -- ENT consulted, plan to offer biopsy if patient amenable but do not believe surgery an option at this point  -- Palliative on board, has seen patient  -- Oncology consulted 4/13     Endocrine  Postprocedural hypoparathyroidism  Patient takes calcium carbonate, Vitamin D3 at home. Presented with symptomatic hypercalcemia 13.5 on admission with abdominal pain, nausea/vomiting     - Normal saline infusion 125 mg/hr  - Daily CMP to monitor calcium level  - Resume home medications when clinically indicated     Postsurgical hypothyroidism  Patient taking Levothyroxine 175 mcg daily and Liothyronine 5 mcg daily     - Continue  "Levothyroxine IV, 125 mcg daily        GI  GERD (gastroesophageal reflux disease)  Pt complaining of reflux, requesting medication for treatment     Plan:  -- Famotidine  -- Consider deescalating to calcium carbonate if well controlled     Orthopedic  Open neck wound  Pt with hx of head and neck cancer, s/p left selective neck dissection levels 1, 2, 3, partial glossectomy. Presenting with complaints of nausea and epigastric abdominal pain. Also reports worsening drainage from right neck.   Pt with extensive hx of H/N cancer. See Malignant Neoplasm of tongue.   Endorses continued drainage from the neck for months with recent worsening of symptoms for past 2 weeks. Pt returned from [place] where she received antibiotics, "detox" and hyperbaric chamber as treatment.   Denies fevers, chills, diaphoresis, cough, sputum production. Endorses difficulty with swallowing 2/2 to oral trush.   Upon ED evaluation, patient noted to have draining wound to right neck.   - CT soft tissue Large bulky lobulated appearing mass right lateral and anterolateral neck extending to the D spaces of the neck having overall dimensions of approximately 9.5 x 8.2 x 10 cm with large central collection of air which appears to extend superficially to the skin surface right mid neck anterior laterally.  Overall appearance is very concerning for large conglomerate jose mass although superimposed abscess remains a possibility given concern for such.  Associated mass effect results in displacement of midline structures to the patient's left.   Vitals afebrile, tachycardic  (125) and low normotensive BP (131/74) on admission.  Lactic acid 2.0>2.3>1.5  CXR no acute airspace disease noted.   EKG on admission sinus tachycardia  Troponin 0.046 >0.029>0.031  UA pH 7.0, sg >1.030, trace proteins noted     In [facility], patient handling secretions well, protecting airway.     - Blood Cx pending  - Received IV Vanc/Cefepime while in ED. Case discussed with " ENT-otolaryngology, patient accepted for transfer at [facility], for evaluation of abscess, and debridement. Pt awaiting bed placement.   - 4/12/2024: Afebrile, VS stable. Labs with improvement in leukocytosis. Reports mild improvement in pain symptoms.   - Continue vanc/cefepime/flagyl for broad spectrum coverage, will tailor to wound culture/blood cultures results, may need to consult ID for assistance  - ENT consulted, f/u recs  - failed swallow study, NPO until repeat evaluation by SLP      VTE Risk Mitigation (From admission, onward)           Ordered     enoxaparin injection 40 mg  Every 24 hours         04/12/24 1536     IP VTE HIGH RISK PATIENT  Once         04/12/24 1324     Place sequential compression device  Until discontinued         04/12/24 1324                    Discharge Planning   ANDRES: 4/18/2024     Code Status: Full Code   Is the patient medically ready for discharge?: No    Reason for patient still in hospital (select all that apply): Patient trending condition and Consult recommendations  Discharge Plan A: Home with family          Marnie River MD  Department of Hospital Medicine   [facility] - Telemetry Stepdown

## 2024-04-16 NOTE — PLAN OF CARE
"Advance Care Planning   [facility] - Telemetry Stepdown  Palliative Care       Patient Name: Marysol Cash  MRN: [id number]  Admission Date: 4/12/2024  Hospital Length of Stay: 4 days  Code Status: Full Code   Attending Provider: Marnie River MD  Palliative Care Provider:   Primary Care Physician: James Coronel MD  Principal Problem:Sepsis     LCSW, along with TORY PIERCE, met with pt and  at bedside this morning, in conjunction with Dr. River, as she shared with pt that her cancer has metastasized to various organs/locations. Pt asked for some time to process and requested we return at a later time.     LCSW and TORY PIERCE returned to bedside this afternoon to follow up on GOC. Pt states that she is shocked by the news that her cancer had spread, and never anticipated that this could be a potential issue. Of note, pt has never received any disease directed therapies, and opted for alternative, more holistic approaches. Pt was given the option for palliative chemo and/or radiation during this hospitalization, but pt has again declined. Pt feels pressured by the physicians offering treatments, as she states that they "keep bringing it up like I'm going to change my mind." Pt's friend/cousin/ Miriam joined the visit midway, providing additional support. Pt and  feel that the best next steps are to go home and establish care with a primary care physician, who they felt would manage pt's pain meds, and continue their course of alternative holistic treatments. LCSW and TORY expressed concerns that a PCP would not be comfortable doing so, and a palliative care provider or hospice would be the best next step for adequate pain control and general symptom management. Explained that the cancer cannot be cured and is aggressive, and we unfortunately predict that she will continue to be symptomatic, and want her to be established with the best possible outpt care. Pt/ initially " rejected the idea of hospice, but were amicable to hospice education. It seems as though they were more willing to consider it as an option by the end of the conversation. Pt states that she will pray, think about it, and speak with an acquaintance who works for a hospice company, and will discuss her thoughts with us tomorrow. WCTF.     Idalia Christianson, [facility]  Palliative Medicine

## 2024-04-16 NOTE — PROGRESS NOTES
[facility] - Telemetry Stepdown  Wound Care    Patient Name:  Marysol Cash   MRN:  [id number]  Date: 4/15/2024  Diagnosis: Sepsis    History:     Past Medical History:   Diagnosis Date    GERD (gastroesophageal reflux disease)     Heart valve problem     Hypertension     Obesity (BMI 30-39.9) 10/20/2014    Thyroid disease        Social History     Socioeconomic History    Marital status:    Tobacco Use    Smoking status: Never    Smokeless tobacco: Never   Substance and Sexual Activity    Alcohol use: No     Alcohol/week: 0.0 standard drinks of alcohol    Drug use: No    Sexual activity: Yes     Partners: Male     Birth control/protection: See Surgical Hx     Social Determinants of Health     Financial Resource Strain: Low Risk  (4/12/2024)    Overall Financial Resource Strain (CARDIA)     Difficulty of Paying Living Expenses: Not very hard   Food Insecurity: No Food Insecurity (4/12/2024)    Hunger Vital Sign     Worried About Running Out of Food in the Last Year: Never true     Ran Out of Food in the Last Year: Never true   Transportation Needs: No Transportation Needs (4/12/2024)    PRAPARE - Transportation     Lack of Transportation (Medical): No     Lack of Transportation (Non-Medical): No   Physical Activity: Inactive (4/12/2024)    Exercise Vital Sign     Days of Exercise per Week: 0 days     Minutes of Exercise per Session: 0 min   Stress: Stress Concern Present (4/12/2024)    [place] [employer] - Occupational Stress Questionnaire     Feeling of Stress : Rather much   Social Connections: Moderately Integrated (4/12/2024)    Social Connection and Isolation Panel [NHANES]     Frequency of Communication with Friends and Family: More than three times a week     Frequency of Social Gatherings with Friends and Family: More than three times a week     Attends [employer] Services: More than 4 times per year     Active Member of Clubs or Organizations: No     Attends Club or Organization  "Meetings: Never     Marital Status:    Housing Stability: Low Risk  (4/12/2024)    Housing Stability Vital Sign     Unable to Pay for Housing in the Last Year: No     Number of Places Lived in the Last Year: 1     Unstable Housing in the Last Year: No       Precautions:     Allergies as of 04/10/2024 - Reviewed 04/10/2024   Allergen Reaction Noted    Ciprofloxacin Swelling 07/08/2014    Codeine Swelling 07/08/2014    Pcn [penicillins] Hives 07/08/2014    Percocet [oxycodone-acetaminophen] Swelling 07/08/2014       WOC Assessment Details/Treatment     Pt resting in bed.   present in room.  States he changed the dressing around 10am today and next dressing change to be 4 or 5pm.   did not want dressing changed at this time.  Pt has ABD dressing in place.  No breakthrough drainage noted at this time.   states he did not want to use Foam dressings placed on Saturday due to dressing "sticking" on Pt face.  Will return in AM when  changes dressing for assessment.         04/15/24 1430   WOCN Assessment   WOCN Total Time (mins) 15   Visit Date 04/15/24   Visit Time 1430   Consult Type Follow Up   WOCN Speciality Wound   Wound surgical   Intervention assessed;chart review     Taina Pete RN, BSN, CWON  04/15/2024    "

## 2024-04-17 LAB
ANION GAP SERPL CALC-SCNC: 9 MMOL/L (ref 8–16)
BASOPHILS # BLD AUTO: 0.02 K/UL (ref 0–0.2)
BASOPHILS NFR BLD: 0.1 % (ref 0–1.9)
BUN SERPL-MCNC: 12 MG/DL (ref 6–20)
CALCIUM SERPL-MCNC: 10.4 MG/DL (ref 8.7–10.5)
CHLORIDE SERPL-SCNC: 102 MMOL/L (ref 95–110)
CO2 SERPL-SCNC: 30 MMOL/L (ref 23–29)
CREAT SERPL-MCNC: 0.7 MG/DL (ref 0.5–1.4)
DIFFERENTIAL METHOD BLD: ABNORMAL
EOSINOPHIL # BLD AUTO: 0 K/UL (ref 0–0.5)
EOSINOPHIL NFR BLD: 0 % (ref 0–8)
ERYTHROCYTE [DISTWIDTH] IN BLOOD BY AUTOMATED COUNT: 15.1 % (ref 11.5–14.5)
EST. GFR  (NO RACE VARIABLE): >60 ML/MIN/1.73 M^2
GLUCOSE SERPL-MCNC: 88 MG/DL (ref 70–110)
HCT VFR BLD AUTO: 40 % (ref 37–48.5)
HGB BLD-MCNC: 13 G/DL (ref 12–16)
IMM GRANULOCYTES # BLD AUTO: 0.11 K/UL (ref 0–0.04)
IMM GRANULOCYTES NFR BLD AUTO: 0.7 % (ref 0–0.5)
LYMPHOCYTES # BLD AUTO: 2.5 K/UL (ref 1–4.8)
LYMPHOCYTES NFR BLD: 14.9 % (ref 18–48)
MCH RBC QN AUTO: 27.7 PG (ref 27–31)
MCHC RBC AUTO-ENTMCNC: 32.5 G/DL (ref 32–36)
MCV RBC AUTO: 85 FL (ref 82–98)
MONOCYTES # BLD AUTO: 1.2 K/UL (ref 0.3–1)
MONOCYTES NFR BLD: 7.2 % (ref 4–15)
NEUTROPHILS # BLD AUTO: 12.8 K/UL (ref 1.8–7.7)
NEUTROPHILS NFR BLD: 77.1 % (ref 38–73)
NRBC BLD-RTO: 0 /100 WBC
PLATELET # BLD AUTO: 291 K/UL (ref 150–450)
PMV BLD AUTO: 10.4 FL (ref 9.2–12.9)
POTASSIUM SERPL-SCNC: 3.2 MMOL/L (ref 3.5–5.1)
RBC # BLD AUTO: 4.7 M/UL (ref 4–5.4)
SODIUM SERPL-SCNC: 141 MMOL/L (ref 136–145)
T4 FREE SERPL-MCNC: 1.19 NG/DL (ref 0.71–1.51)
TSH SERPL DL<=0.005 MIU/L-ACNC: 0.02 UIU/ML (ref 0.4–4)
WBC # BLD AUTO: 16.63 K/UL (ref 3.9–12.7)

## 2024-04-17 PROCEDURE — 20600001 HC STEP DOWN PRIVATE ROOM

## 2024-04-17 PROCEDURE — 63600175 PHARM REV CODE 636 W HCPCS: Performed by: STUDENT IN AN ORGANIZED HEALTH CARE EDUCATION/TRAINING PROGRAM

## 2024-04-17 PROCEDURE — 25000003 PHARM REV CODE 250: Performed by: STUDENT IN AN ORGANIZED HEALTH CARE EDUCATION/TRAINING PROGRAM

## 2024-04-17 PROCEDURE — 99233 SBSQ HOSP IP/OBS HIGH 50: CPT | Mod: GT,,,

## 2024-04-17 PROCEDURE — 84439 ASSAY OF FREE THYROXINE: CPT | Performed by: STUDENT IN AN ORGANIZED HEALTH CARE EDUCATION/TRAINING PROGRAM

## 2024-04-17 PROCEDURE — 84443 ASSAY THYROID STIM HORMONE: CPT | Performed by: STUDENT IN AN ORGANIZED HEALTH CARE EDUCATION/TRAINING PROGRAM

## 2024-04-17 PROCEDURE — 36415 COLL VENOUS BLD VENIPUNCTURE: CPT | Performed by: STUDENT IN AN ORGANIZED HEALTH CARE EDUCATION/TRAINING PROGRAM

## 2024-04-17 PROCEDURE — 25000003 PHARM REV CODE 250

## 2024-04-17 PROCEDURE — 94761 N-INVAS EAR/PLS OXIMETRY MLT: CPT

## 2024-04-17 PROCEDURE — 27000221 HC OXYGEN, UP TO 24 HOURS

## 2024-04-17 PROCEDURE — 85025 COMPLETE CBC W/AUTO DIFF WBC: CPT | Performed by: STUDENT IN AN ORGANIZED HEALTH CARE EDUCATION/TRAINING PROGRAM

## 2024-04-17 PROCEDURE — 80048 BASIC METABOLIC PNL TOTAL CA: CPT | Performed by: STUDENT IN AN ORGANIZED HEALTH CARE EDUCATION/TRAINING PROGRAM

## 2024-04-17 PROCEDURE — 99900035 HC TECH TIME PER 15 MIN (STAT)

## 2024-04-17 PROCEDURE — 99233 SBSQ HOSP IP/OBS HIGH 50: CPT | Mod: ,,, | Performed by: STUDENT IN AN ORGANIZED HEALTH CARE EDUCATION/TRAINING PROGRAM

## 2024-04-17 RX ORDER — POTASSIUM CHLORIDE 7.45 MG/ML
10 INJECTION INTRAVENOUS
Status: DISPENSED | OUTPATIENT
Start: 2024-04-17 | End: 2024-04-17

## 2024-04-17 RX ORDER — LORAZEPAM 2 MG/ML
2 INJECTION INTRAMUSCULAR NIGHTLY PRN
Status: DISCONTINUED | OUTPATIENT
Start: 2024-04-17 | End: 2024-05-08 | Stop reason: HOSPADM

## 2024-04-17 RX ADMIN — METOROPROLOL TARTRATE 5 MG: 5 INJECTION, SOLUTION INTRAVENOUS at 11:04

## 2024-04-17 RX ADMIN — HYDROMORPHONE HYDROCHLORIDE 0.5 MG: 1 INJECTION, SOLUTION INTRAMUSCULAR; INTRAVENOUS; SUBCUTANEOUS at 10:04

## 2024-04-17 RX ADMIN — FLUCONAZOLE 200 MG: 2 INJECTION, SOLUTION INTRAVENOUS at 04:04

## 2024-04-17 RX ADMIN — METOROPROLOL TARTRATE 5 MG: 5 INJECTION, SOLUTION INTRAVENOUS at 01:04

## 2024-04-17 RX ADMIN — ONDANSETRON 4 MG: 2 INJECTION INTRAMUSCULAR; INTRAVENOUS at 04:04

## 2024-04-17 RX ADMIN — METOROPROLOL TARTRATE 5 MG: 5 INJECTION, SOLUTION INTRAVENOUS at 05:04

## 2024-04-17 RX ADMIN — ONDANSETRON 4 MG: 2 INJECTION INTRAMUSCULAR; INTRAVENOUS at 01:04

## 2024-04-17 RX ADMIN — METRONIDAZOLE 500 MG: 500 INJECTION, SOLUTION INTRAVENOUS at 09:04

## 2024-04-17 RX ADMIN — LORAZEPAM 2 MG: 2 INJECTION INTRAMUSCULAR; INTRAVENOUS at 07:04

## 2024-04-17 RX ADMIN — METRONIDAZOLE 500 MG: 500 INJECTION, SOLUTION INTRAVENOUS at 04:04

## 2024-04-17 RX ADMIN — KETOROLAC TROMETHAMINE 15 MG: 15 INJECTION, SOLUTION INTRAMUSCULAR; INTRAVENOUS at 01:04

## 2024-04-17 RX ADMIN — ONDANSETRON 4 MG: 2 INJECTION INTRAMUSCULAR; INTRAVENOUS at 10:04

## 2024-04-17 RX ADMIN — POTASSIUM CHLORIDE 10 MEQ: 7.46 INJECTION, SOLUTION INTRAVENOUS at 04:04

## 2024-04-17 RX ADMIN — METRONIDAZOLE 500 MG: 500 INJECTION, SOLUTION INTRAVENOUS at 01:04

## 2024-04-17 RX ADMIN — POTASSIUM CHLORIDE 10 MEQ: 7.46 INJECTION, SOLUTION INTRAVENOUS at 11:04

## 2024-04-17 RX ADMIN — SENNOSIDES 8.6 MG: 8.6 TABLET, FILM COATED ORAL at 09:04

## 2024-04-17 RX ADMIN — KETOROLAC TROMETHAMINE 15 MG: 15 INJECTION, SOLUTION INTRAMUSCULAR; INTRAVENOUS at 04:04

## 2024-04-17 RX ADMIN — VANCOMYCIN HYDROCHLORIDE 1750 MG: 500 INJECTION, POWDER, LYOPHILIZED, FOR SOLUTION INTRAVENOUS at 06:04

## 2024-04-17 RX ADMIN — POTASSIUM CHLORIDE 10 MEQ: 7.46 INJECTION, SOLUTION INTRAVENOUS at 02:04

## 2024-04-17 RX ADMIN — ENOXAPARIN SODIUM 40 MG: 40 INJECTION SUBCUTANEOUS at 05:04

## 2024-04-17 RX ADMIN — POTASSIUM CHLORIDE 10 MEQ: 7.46 INJECTION, SOLUTION INTRAVENOUS at 03:04

## 2024-04-17 NOTE — PROGRESS NOTES
[facility] Follow Up Note    Assessment:  Marysol Cash is a 58 y.o. 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 Very bulky disease, > 15 cm. No prior RT or chemo.   ENT has concerns about ability to trach given extent of disease and anatomic distortion.  CT chest with distant mets  ECOG: (2) Ambulatory and capable of self care, unable to carry out work activity, up and about > 50% or waking hours      Plan:  Path is pending. History is strongly suggestive of a N3bM1 recurrence of her oral tongue cancer. Given extent of disease and unresectable nature, available local options are limited to palliative treatments.   With Ms. Cash and her family, including her , I discussed RT, systemic therapy (chemo and or IO), and no further treatment. I discussed quad shot regimen, including risks, logistics and benefits.  I reiterated the goal of treatment to help with symptoms.   She and family do not wish to proceed with RT or chemotherapy. She is interested in hearing about IO. I will defer that to medical oncology.   At this time she is favoring a feeding tube. We discussed the trajectory of untreated head and neck cancer, including pain, non healing wounds, difficulty eating, airway obstruction, cranial nerve damage and high risk for fatal bleed.  Her carotid is already encased with necrotic tumor and adjacent to large ulceration.    I recommend continued GOC discussions with primary and palliative. Given above, including patient wishes, I recommend hospice.  If she has further questions about palliative RT, I would be happy to re discuss.           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  PET-CT done on 02/02/2022 showed no abnormal activity within the tongue,  "no signs of cervical lymph node or distant metastasis.  She underwent left selective neck dissection levels 1, 2, 3, partial glossectomy with split-thickness skin graft done on 03/03/2022 by Dr. Clif Olson.  Surgical pathology revealed unifocal squamous cell carcinoma on the dorsal surface of the tongue on left side measuring 1 cm, moderately differentiated, 6 mm depth of invasion, no lymphovascular invasion, positive for perineural invasion, margins negative, 0 of 39 lymph nodes with metastasis, pT2 pN0 noted.  Postop course complicated by infection requiring I&D and antibiotics.  She declined adjuvant XRT due to concern for side effect profile [seen by Dr. Rangel].    In 6/223 she developed a recurrence in the right neck, with FNA 7/7/23 demonstrating suspicion for metastatic squamous cell carcinoma. PET-CT done on 08/21/2023 has shown large necrotic mass the angle of mandible posterior to submandibular gland on the right measuring 4.4 x 4.2 cm with SUV 9.78, just superior to the mass is a 2 cm hypermetabolic lymph node and no evidence of distant metastatic disease.  She again declined treatment and pursued treatment in [place] with "stem cell transplant."   She represented in April 2024 with Developed worsening weakness, pain, and difficulty swallowing. She has a right neck mass that has been present since 7/2023. CT neck demonstrates a > 15 cm right neck mass, with extension into the overlying skin and with necrosis. There is also RP and bilateral adenopathy. CT CAP with distant mets.             Mario Muniz MD  Radiation Oncology        "

## 2024-04-17 NOTE — PROGRESS NOTES
[facility] - Telemetry [facility] Medicine  Progress Note    Patient Name: Marysol Cash  MRN: [id number]  Patient Class: IP- Inpatient   Admission Date: 4/12/2024  Length of Stay: 4 days  Attending Physician: Marnie River MD  Primary Care Provider: James Coronel MD    Subjective:     Principal Problem: Sepsis    HPI:  Ms. 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 She initially presented to [facility] Emergency Department with six days of epigastric pain with associated nausea, vomiting, and difficulty eating due to pain. However on presentation, she was noted to have a large necrotic and purulent wound located on her right neck with complaints of associated difficulty speaking, swallowing and shortness of breath. She was noted to be hypoxic 88% on room air which improved with 2L NC. Labs were notable for leukocytosis 14, hypokalemia 2.4, hypochloremia 89, CO2 36, hypercalcemia 13.5, Phos 2.5. . Troponin 0.046. Lactate 2.3. CT Soft Tissue Neck was concerning for 9.5 x 8.2 x 10 cm large lobulated mass in the right anterolateral neck extending to the deep spaces of the neck and abutting the right prevertebral space and paravertebral musculature, left midline shift, multiple bilateral necrotic cervical lymph nodes. Case was discussed and decision was [place] [facility] [place].     Of additional note, she recently transitioned her care to [place] and underwent stem-cell transplant approximately 2 weeks ago in [place], has not been seen by a [place] physician since 2023.      On transfer: she was afebrile, mildly hypertensive /109, HR 98, RR 20, satting 96% on room air. Complaining of mild headache and poor appetite  associated with nausea; denies fever, chills, chest pain, palpitations, SOB, abdominal pain, dysuria. States wound has been draining for the past week initially thick white now more liquid. Mild dysphonia is apparently chronic from left vocal cord paralysis and at baseline. Some dysphagia with solids, none with pureed soft or liquids, denies odynophagia. Dressing on neck CDI, ENT consulted will be here shortly to assess patient.        Overview/Hospital Course:  Evaluated 4/12 in MICU by ENT, who do not feel patient is a candidate for surgical intervention. Palliative care and oncology consulted. Failed swallow study, SLP recommending NPO 4/13. Patient continues to protect airway. Stepped down from MICU on 4/13.    Interval History: Pt feeling tired this AM. Continued nausea & difficulty tolerating PO. Endorses stable neck pain extending from collarbone, up neck, side of face to head. CT C/A/P yesterday evening showing diffuse metastases (w/ involvement of bilateral lungs, hilar & mediastinal LNs, lumber vertebrae, peritoneum, & liver). Discussed findings with patient (with Palliative and  present) and that presence of distant metastases means that she has stage IV cancer. Pt understandably stunned by news. Asked Palliative team to come back a little later once she's had some time to process news.     Review of Systems   Constitutional:  Positive for appetite change and fatigue. Negative for chills, diaphoresis and fever.   HENT:  Positive for trouble swallowing and voice change. Negative for congestion and rhinorrhea.    Respiratory:  Negative for cough, shortness of breath and wheezing.    Cardiovascular:  Negative for chest pain and palpitations.   Gastrointestinal:  Negative for abdominal pain, diarrhea, nausea and vomiting.   Genitourinary:  Negative for dysuria, frequency and hematuria.   Musculoskeletal:  Positive for neck pain.   Skin:  Positive for wound. Negative for pallor.   Neurological:   Positive for headaches. Negative for dizziness, light-headedness and numbness.   Psychiatric/Behavioral:  Negative for agitation and confusion. The patient is nervous/anxious.      Objective:     Vital Signs (Most Recent):  Temp: 98.3 °F (36.8 °C) (04/16/24 1935)  Pulse: 90 (04/16/24 1935)  Resp: 17 (04/16/24 1935)  BP: (!) 159/82 (04/16/24 2111)  SpO2: (!) 94 % (04/16/24 1935) Vital Signs (24h Range):  Temp:  [97.5 °F (36.4 °C)-98.9 °F (37.2 °C)] 98.3 °F (36.8 °C)  Pulse:  [] 90  Resp:  [17-27] 17  SpO2:  [91 %-95 %] 94 %  BP: (126-159)/(64-82) 159/82     Weight: 85.3 kg (188 lb 0.8 oz)  Body mass index is 30.35 kg/m².  No intake or output data in the 24 hours ending 04/16/24 2209        Physical Exam  Vitals and nursing note reviewed.   Constitutional:       General: She is not in acute distress.     Appearance: She is ill-appearing. She is not toxic-appearing.   Eyes:      Conjunctiva/sclera: Conjunctivae normal.   Neck:      Comments: Dressing in place over entire circumference of neck. Dressing c/d/i  Cardiovascular:      Rate and Rhythm: Normal rate and regular rhythm.      Heart sounds: Normal heart sounds.   Pulmonary:      Effort: Pulmonary effort is normal. No respiratory distress.      Breath sounds: Normal breath sounds. No wheezing.   Abdominal:      General: Bowel sounds are normal. There is no distension.      Palpations: Abdomen is soft.      Tenderness: There is no abdominal tenderness. There is no guarding.   Skin:     General: Skin is warm and dry.   Neurological:      General: No focal deficit present.      Mental Status: She is alert and oriented to person, place, and time. Mental status is at baseline.   Psychiatric:         Mood and Affect: Mood normal.         Behavior: Behavior normal.           Significant Labs: All pertinent labs within the past 24 hours have been reviewed.    Significant Imaging: I have reviewed all pertinent imaging results/findings within the past 24  hours.    Assessment/Plan:      * Sepsis  On arrival to [facility] [place], meeting 2/4 SIRS (requiring 2L NC w/ RR 20s & WBC 13). Likely soft tissue/neck source. Received IVF at OSH prior to transfer (for lactate 2.3 at that time). Started on broad-spectrum abx. Blood cx negative.   - ID consulted; appreciate recs  - Continue vancomycin + Flagyl + fluconazole   - Monitor CBC & fever curve  - Mgmt of neck mass/wound as below      Malignant neoplasm of tip and lateral border of tongue  Initial bx of ventral surface of tongue (12/24/21) showed atypia w/o diagnostic e/o dysplasia. Underwent repeat bx after failed tx (1/24/22) that showed moderately differentiated squamous cell carcinoma w/ suspicion for perineural invasion. CT soft tissue neck (Jan 2022) w/o e/o metastatic dx. PET-CT (Feb 2022) w/o e/o active local or distant dx- no abnormal activity within the tongue, no signs of cervical lymph node or distant metastasis. Declined chemotherapy &/or XRT. Underwent selective neck dissection levels 1, 2, 3, and partial glossectomy w/ split-thickness skin graft (3/3/2022 per Dr. Clif Olson).  Surgical pathology w/ unifocal squamous cell carcinoma on the dorsal surface of the tongue on left side measuring 1 cm, moderately differentiated, 6 mm depth of invasion, no lymphovascular invasion, positive for perineural invasion, margins negative, 0 of 39 lymph nodes with metastasis, pT2 pN0 noted.  Postop course complicated by infection requiring I&D and antibiotics.  Declined adjuvant XRT. Repeat imaging (June 2023) w/ 2.2 cm lesion deep to the R SCM c/f necrotic LN w/ few adjacent pathological appearing LNs suspicious for metastasis. Underwent R neck FNA (7/7/23) w/ pathology suspicious for metastatic SCC. PET-CT (8/21/2023) w/ large necrotic mass the angle of mandible posterior to submandibular gland on the right measuring 4.4 x 4.2 cm with SUV 9.78, just superior to the mass is a 2 cm hypermetabolic lymph node and no evidence of  "distant metastatic disease. Subsequently transferred care to [place] where she reportedly recently abx,"detox", hyperbaric chamber, & stem-cell transplant approximately 2 weeks ago PTA in [place]. Now presenting w/ progressive neck wound. CT neck soft tissue (4/10/24) w/ large bulky lobulated appearing mass right lateral and anterolateral neck extending to the D spaces of the neck having overall dimensions of approximately 9.5 x 8.2 x 10 cm, multiple addn'l b/l cervical necrotic nodes, & assoc mass effect results in displacement of midline structures to the patient's left. CT C/A/P with diffuse metastatic disease including mediastinal & hilar LAD, bilateral lungs, peritoneum, lumbar vertebrae, & likely liver.   - ENT consulted; appreciate recs - not surgical candidate for debridement  - Medical & Radiation Oncology consulted; appreciate recs - pt declines chemo or XRT  - Palliative following; appreciate assistance  - Multimodal analgesia   - SLP following, appreciate assistance  - Supportive care     Open neck wound   Pt with extensive hx of H/N cancer. Presents w/ continued/worsening drainage from the neck for months with recent worsening of symptoms for past 2 weeks w/ N/V. Pt returned from [place] where she received antibiotics, "detox" and hyperbaric chamber as treatment. Endorses difficulty with swallowing 2/2 to oral trush. Large neck mass on imaging w/ large central collection of air which appears to extend superficially to the skin surface right mid neck anterior laterally; unable to r/o superimposed abscess. Meeting 2/4 SIRS (HR & WBC) w/ elevated lactate, but nml BP. Blood cx negative. Pt not surgical candidate for debridement per ENT evaluation.   - ID consulted; appreciate recs  - Continue Vancomycin + Flagyl   - Continue fluconazole  - Given that surgical debridement is not an option (and thus source control not attainable), follow-up with ID to discuss ultimate recs for abx course     Hypercalcemia   On " arrival to OSH, Ca 13.5 --> 11.0 on arrival to [facility] [place]. Continued subsequent uptrend peaking at 13.1 on 4/15 (corrected Ca 14.5). Likely 2/2 malignancy.   - IVF  - Calcitonin BID x2 days   - Monitor CMP     Postsurgical hypothyroidism  Last TSH elevated at 5.264. Home regimen includes levothyroxine 175mcg daily & liothyronine 5mcg daily  - Continue home liothyronine & levothyroxine   - Repeat TSH pending     Postprocedural hypoparathyroidism  Last PTHi nml at 26 (2016). Home regimen includes calcium carbonate & vitamin D. Symptomatic hypercalcemia (Ca 13.5) on presentation to OSH w/ abdominal pain & N/V. Improved w/ NS ggt (although query if pt's hypercalcemia was re: hx of hypoparathyroidism vs hypercalcemia of malignancy.   - Holding home calcium carbonate  - Resume home vitamin D as PO tolerance improves   - Monitor CMP    HTN (hypertension)  Hx of essential HTN; home regimen includes metoprolol 25mg BID. On arrival to [facility] [place] [facility], SBP 140s-150s.   - IV metoprolol 5mg Q6H until pt able to tolerate PO      VTE Risk Mitigation (From admission, onward)           Ordered     enoxaparin injection 40 mg  Every 24 hours         04/12/24 1536     IP VTE HIGH RISK PATIENT  Once         04/12/24 1324     Place sequential compression device  Until discontinued         04/12/24 1324                    Discharge Planning   ANDRES: 4/18/2024     Code Status: Full Code   Is the patient medically ready for discharge?: No    Reason for patient still in hospital (select all that apply): Patient trending condition and Consult recommendations  Discharge Plan A: Home with family          Marnie River MD  Department of Hospital Medicine   [facility] - Telemetry Stepdown

## 2024-04-17 NOTE — PLAN OF CARE
Advance Care Planning   [facility] - Telemetry Stepdown  Palliative Care       Patient Name: Marysol Cash  MRN: [id number]  Admission Date: 4/12/2024  Hospital Length of Stay: 5 days  Code Status: Full Code   Attending Provider: Gregoria Donnelly DO  Palliative Care Provider:   Primary Care Physician: James Coronel MD  Principal Problem:Sepsis     EDMUNDOW, along with TORY PIERCE, met with pt and family at bedside to follow up on GOC. Pt alert and oriented, stating she is feeling better than she had been yesterday, but still having discomfort to her right ear/head/neck. Pt describes the pain as a shooting pain. Pt states that she would like to go home with hospice. Explained that case management will provide a list of providers in the [place] area for them to select from. Pt and family interested in pursuing PEG placement prior to discharge. No questions or concerns at this time. WCTF.     Idalia Christianson LCSW  Palliative Medicine

## 2024-04-17 NOTE — ASSESSMENT & PLAN NOTE
On arrival to OSH, Ca 13.5 --> 11.0 on arrival to [facility]. Continued subsequent uptrend peaking at 13.1 on 4/15 (corrected Ca 14.5). Likely 2/2 malignancy.   - IVF  - Calcitonin BID x2 days   - Monitor CMP

## 2024-04-17 NOTE — SUBJECTIVE & OBJECTIVE
Past Medical History:   Diagnosis Date    GERD (gastroesophageal reflux disease)     Heart valve problem     Hypertension     Obesity (BMI 30-39.9) 10/20/2014    Thyroid disease        Past Surgical History:   Procedure Laterality Date    cesaean section  1991, 1993    CHOLECYSTECTOMY  2008    COLONOSCOPY  09/2013    ESOPHAGOGASTRODUODENOSCOPY N/A 5/11/2020    Procedure: EGD (ESOPHAGOGASTRODUODENOSCOPY);  Surgeon: Nahed Zabala MD;  Location: [facility];  Service: Endoscopy;  Laterality: N/A;  covid 5/8/2020    HYSTERECTOMY  age 27    for fibroids    SALPINGECTOMY  1992    for ectopic pregnancy    TONGUE SURGERY  03/04/2022    Cancer removed on tongue and lymphs    TOTAL THYROIDECTOMY  2012    UPPER GASTROINTESTINAL ENDOSCOPY  09/2013    gastritis-hp neg       Review of patient's allergies indicates:   Allergen Reactions    Ciprofloxacin Swelling    Codeine Swelling    Opioids - morphine analogues     Pcn [penicillins] Hives     Tolerated cefepime 04/2024    Percocet [oxycodone-acetaminophen] Swelling       Medications:  Continuous Infusions:  Current Facility-Administered Medications   Medication Dose Route Frequency Last Rate Last Admin     Scheduled Meds:  Current Facility-Administered Medications   Medication Dose Route Frequency    enoxparin  40 mg Subcutaneous Q24H (prophylaxis, 1700)    fluconazole (DIFLUCAN) IV (PEDS and ADULTS)  200 mg Intravenous Q24H    levothyroxine  175 mcg Oral Before breakfast    liothyronine  5 mcg Oral Before breakfast    metoprolol  5 mg Intravenous Q6H    metronidazole  500 mg Intravenous Q8H    potassium chloride  10 mEq Intravenous Q1H    senna  8.6 mg Oral BID    vancomycin (VANCOCIN) IV (PEDS and ADULTS)  20 mg/kg Intravenous Q24H     PRN Meds:    Current Facility-Administered Medications:     acetaminophen, 650 mg, Oral, Q4H PRN    dextrose 10%, 12.5 g, Intravenous, PRN    dextrose 10%, 25 g, Intravenous, PRN    glucose, 16 g, Oral, PRN    glucose, 24 g, Oral, PRN     HYDROmorphone, 0.5 mg, Intravenous, Q4H PRN    hydrOXYzine pamoate, 25 mg, Oral, Q8H PRN    ketorolac, 15 mg, Intravenous, Q6H PRN    metoclopramide, 10 mg, Intravenous, Q6H PRN    naloxone, 0.02 mg, Intravenous, PRN    ondansetron, 4 mg, Intravenous, Q4H PRN    prochlorperazine, 2.5 mg, Intravenous, Q6H PRN    Pharmacy to dose Vancomycin consult, , , Once **AND** vancomycin - pharmacy to dose, , Intravenous, pharmacy to manage frequency      Family History       Problem Relation (Age of Onset)    Diabetes Mother, Father, Brother    Heart disease Mother, Father, Brother    Hyperlipidemia Mother    Hypertension Mother, Father          Tobacco Use    Smoking status: Never    Smokeless tobacco: Never   Substance and Sexual Activity    Alcohol use: No     Alcohol/week: 0.0 standard drinks of alcohol    Drug use: No    Sexual activity: Yes     Partners: Male     Birth control/protection: See Surgical Hx       Review of Systems   Constitutional:  Positive for activity change and appetite change.   HENT:  Positive for facial swelling.    Skin:  Positive for wound (to right neck).     Objective:     Vital Signs (Most Recent):  Temp: 98.2 °F (36.8 °C) (04/17/24 1139)  Pulse: 92 (04/17/24 1443)  Resp: (!) 44 (04/17/24 1139)  BP: (!) 143/74 (04/17/24 1342)  SpO2: 95 % (04/17/24 1443) Vital Signs (24h Range):  Temp:  [97.8 °F (36.6 °C)-98.7 °F (37.1 °C)] 98.2 °F (36.8 °C)  Pulse:  [] 92  Resp:  [17-44] 44  SpO2:  [91 %-97 %] 95 %  BP: (128-159)/(71-90) 143/74     Weight: 85.3 kg (188 lb 0.8 oz)  Body mass index is 30.35 kg/m².       Physical Exam  Musculoskeletal:      Cervical back: Edema present.   Neurological:      Mental Status: She is alert and oriented to person, place, and time.            Review of Symptoms      Symptom Assessment (ESAS 0-10 Scale)  Pain:  5  Dyspnea:  0  Anxiety:  0  Nausea:  0  Depression:  0  Anorexia:  0  Fatigue:  0  Insomnia:  0  Restlessness:  0  Agitation:  0         Pain  "Assessment:    Location(s): neck    Neck       Location: right        Quality: Aching and burning        Quantity: 7/10 in intensity        Chronicity: Onset 10 month(s) ago, gradually worsening        Aggravating Factors: None        Alleviating Factors: Opiates        Associated Symptoms: None    ECOG Performance Status rdGrdrrdarddrderd:rd rd3rd Living Arrangements:  Lives with spouse    Psychosocial/Cultural:   See Palliative Psychosocial Note: Yes  Lives with  and adult daughter.   **Primary  to Follow**  Palliative Care  Consult: Yes        Advance Care Planning   Advance Directives:   Living Will: No    Do Not Resuscitate Status: No      Decision Making:  Patient answered questions and Family answered questions  Goals of Care: The patient and family endorses that what is most important right now is to focus on symptom/pain control and quality of life, even if it means sacrificing a little time    Accordingly, we have decided that the best plan to meet the patient's goals includes continuing with pain and symptom management, while also continuing "alternative" (holistic) therapies.         Significant Labs: BMP:   Recent Labs   Lab 04/17/24 0625   GLU 88      K 3.2*      CO2 30*   BUN 12   CREATININE 0.7   CALCIUM 10.4     CBC:   Recent Labs   Lab 04/16/24  0411 04/17/24 0625   WBC 18.61* 16.63*   HGB 13.3 13.0   HCT 42.0 40.0    291     CBC:   Recent Labs   Lab 04/17/24 0625   WBC 16.63*   HGB 13.0   HCT 40.0   MCV 85        BMP:  Recent Labs   Lab 04/17/24 0625   GLU 88      K 3.2*      CO2 30*   BUN 12   CREATININE 0.7   CALCIUM 10.4     LFT:  Lab Results   Component Value Date    AST 27 04/15/2024    ALKPHOS 118 04/15/2024    BILITOT 0.6 04/15/2024     Albumin:   Albumin   Date Value Ref Range Status   04/15/2024 2.8 (L) 3.5 - 5.2 g/dL Final     Protein:   Total Protein   Date Value Ref Range Status   04/15/2024 5.9 (L) 6.0 - 8.4 g/dL Final "     Lactic acid:   Lab Results   Component Value Date    LACTATE 1.5 04/11/2024    LACTATE 2.3 (H) 04/10/2024       Significant Imaging: I have reviewed all pertinent imaging results/findings within the past 24 hours.

## 2024-04-17 NOTE — PLAN OF CARE
CM notified that patient and her family have decided they would like to go home with home hospice. They will be staying with their daughter at [address] in [place] [address]. They were provided with a list of home hospice agencies in the [place] area. Family is requesting a PEG tube be placed prior to discharge. Will continue to follow.    Nisah Hightower RN  Ext [phone]

## 2024-04-17 NOTE — ASSESSMENT & PLAN NOTE
"Initial bx of ventral surface of tongue (12/24/21) showed atypia w/o diagnostic e/o dysplasia. Underwent repeat bx after failed tx (1/24/22) that showed moderately differentiated squamous cell carcinoma w/ suspicion for perineural invasion. CT soft tissue neck (Jan 2022) w/o e/o metastatic dx. PET-CT (Feb 2022) w/o e/o active local or distant dx- no abnormal activity within the tongue, no signs of cervical lymph node or distant metastasis. Declined chemotherapy &/or XRT. Underwent selective neck dissection levels 1, 2, 3, and partial glossectomy w/ split-thickness skin graft (3/3/2022 per Dr. Clif Olson).  Surgical pathology w/ unifocal squamous cell carcinoma on the dorsal surface of the tongue on left side measuring 1 cm, moderately differentiated, 6 mm depth of invasion, no lymphovascular invasion, positive for perineural invasion, margins negative, 0 of 39 lymph nodes with metastasis, pT2 pN0 noted.  Postop course complicated by infection requiring I&D and antibiotics.  Declined adjuvant XRT. Repeat imaging (June 2023) w/ 2.2 cm lesion deep to the R SCM c/f necrotic LN w/ few adjacent pathological appearing LNs suspicious for metastasis. Underwent R neck FNA (7/7/23) w/ pathology suspicious for metastatic SCC. PET-CT (8/21/2023) w/ large necrotic mass the angle of mandible posterior to submandibular gland on the right measuring 4.4 x 4.2 cm with SUV 9.78, just superior to the mass is a 2 cm hypermetabolic lymph node and no evidence of distant metastatic disease. Subsequently transferred care to [place] where she reportedly recently abx,"detox", hyperbaric chamber, & stem-cell transplant approximately 2 weeks ago PTA in [place]. Now presenting w/ progressive neck wound. CT neck soft tissue (4/10/24) w/ large bulky lobulated appearing mass right lateral and anterolateral neck extending to the D spaces of the neck having overall dimensions of approximately 9.5 x 8.2 x 10 cm, multiple addn'l b/l cervical " necrotic nodes, & assoc mass effect results in displacement of midline structures to the patient's left. CT C/A/P with diffuse metastatic disease including mediastinal & hilar LAD, bilateral lungs, peritoneum, lumbar vertebrae, & likely liver.     - ENT consulted; appreciate recs - not surgical candidate for debridement  - Medical & Radiation Oncology consulted; appreciate recs - pt declines chemo or XRT  - Palliative following; appreciate assistance  - Multimodal analgesia   - SLP following, appreciate assistance  - Supportive care

## 2024-04-17 NOTE — PROGRESS NOTES
[facility] Nguyen - Telemetry [facility] Medicine  Progress Note    Patient Name: Marysol Cash  MRN: [id number]  Patient Class: IP- Inpatient   Admission Date: 4/12/2024  Length of Stay: 5 days  Attending Physician: Gregoria Donnelly DO  Primary Care Provider: James Coronel MD        Subjective:     Principal Problem:Sepsis        HPI:  Ms. 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 She initially presented to [facility] Emergency Department with six days of epigastric pain with associated nausea, vomiting, and difficulty eating due to pain. However on presentation, she was noted to have a large necrotic and purulent wound located on her right neck with complaints of associated difficulty speaking, swallowing and shortness of breath. She was noted to be hypoxic 88% on room air which improved with 2L NC. Labs were notable for leukocytosis 14, hypokalemia 2.4, hypochloremia 89, CO2 36, hypercalcemia 13.5, Phos 2.5. . Troponin 0.046. Lactate 2.3. CT Soft Tissue Neck was concerning for 9.5 x 8.2 x 10 cm large lobulated mass in the right anterolateral neck extending to the deep spaces of the neck and abutting the right prevertebral space and paravertebral musculature, left midline shift, multiple bilateral necrotic cervical lymph nodes. Case was discussed and decision was made to transfer to [facility] [place] higher level of care.     Of additional note, she recently transitioned her care to [place] and underwent stem-cell transplant approximately 2 weeks ago in [place], has not been seen by a US physician since 2023.      On transfer: she was afebrile, mildly hypertensive /109, HR 98, RR 20, satting 96% on room air. Complaining of mild headache and poor  appetite associated with nausea; denies fever, chills, chest pain, palpitations, SOB, abdominal pain, dysuria. States wound has been draining for the past week initially thick white now more liquid. Mild dysphonia is apparently chronic from left vocal cord paralysis and at baseline. Some dysphagia with solids, none with pureed soft or liquids, denies odynophagia. Dressing on neck CDI, ENT consulted will be here shortly to assess patient.        Overview/Hospital Course:  Evaluated 4/12 in MICU by ENT, who do not feel patient is a candidate for surgical intervention. Palliative care and oncology consulted. Failed swallow study, SLP recommending NPO 4/13. Patient continues to protect airway. Stepped down from MICU on 4/13.    Interval History:  Patient was lying bed, family at bedside.  Asking for PEG tube placement.  Discussed the case with Interventional Radiology who are planning to assess patient.      Objective:     Vital Signs (Most Recent):  Temp: 98.3 °F (36.8 °C) (04/16/24 1935)  Pulse: 90 (04/16/24 1935)  Resp: 17 (04/16/24 1935)  BP: (!) 159/82 (04/16/24 2111)  SpO2: (!) 94 % (04/16/24 1935) Vital Signs (24h Range):  Temp:  [97.5 °F (36.4 °C)-98.9 °F (37.2 °C)] 98.3 °F (36.8 °C)  Pulse:  [] 90  Resp:  [17-27] 17  SpO2:  [91 %-95 %] 94 %  BP: (126-159)/(64-82) 159/82     Weight: 85.3 kg (188 lb 0.8 oz)  Body mass index is 30.35 kg/m².  No intake or output data in the 24 hours ending 04/16/24 2209        Physical Exam  Vitals and nursing note reviewed.   Constitutional:       General: She is not in acute distress.     Appearance: She is ill-appearing. She is not toxic-appearing.   Eyes:      Conjunctiva/sclera: Conjunctivae normal.   Neck:      Comments: Dressing in place over entire circumference of neck. Dressing c/d/i  Cardiovascular:      Rate and Rhythm: Normal rate and regular rhythm.      Heart sounds: Normal heart sounds.   Pulmonary:      Effort: Pulmonary effort is normal. No respiratory  "distress.      Breath sounds: Normal breath sounds. No wheezing.   Abdominal:      General: Bowel sounds are normal. There is no distension.      Palpations: Abdomen is soft.      Tenderness: There is no abdominal tenderness. There is no guarding.   Skin:     General: Skin is warm and dry.   Neurological:      General: No focal deficit present.      Mental Status: She is alert and oriented to person, place, and time. Mental status is at baseline.   Psychiatric:         Mood and Affect: Mood normal.         Behavior: Behavior normal.           Significant Labs: All pertinent labs within the past 24 hours have been reviewed.    Significant Imaging: I have reviewed all pertinent imaging results/findings within the past 24 hours.    Assessment/Plan:      * Sepsis  On arrival to [facility], meeting 2/4 SIRS (requiring 2L NC w/ RR 20s & WBC 13). Likely soft tissue/neck source. Received IVF at OSH prior to transfer (for lactate 2.3 at that time). Started on broad-spectrum abx. Blood cx negative.   - ID consulted; appreciate recs  - Continue vancomycin + Flagyl + fluconazole   - Monitor CBC & fever curve  - Mgmt of neck mass/wound as below     Hypercalcemia  On arrival to OSH, Ca 13.5 --> 11.0 on arrival to [facility]. Continued subsequent uptrend peaking at 13.1 on 4/15 (corrected Ca 14.5). Likely 2/2 malignancy.   - IVF  - Calcitonin BID x2 days   - Monitor CMP     Palliative care encounter  Pt and family asking for PEG tube eval.      Open neck wound  Pt with extensive hx of H/N cancer. Presents w/ continued/worsening drainage from the neck for months with recent worsening of symptoms for past 2 weeks w/ N/V. Pt returned from [place] where she received antibiotics, "detox" and hyperbaric chamber as treatment. Endorses difficulty with swallowing 2/2 to oral trush. Large neck mass on imaging w/ large central collection of air which appears to extend superficially to the skin surface right mid neck anterior laterally; unable to r/o " superimposed abscess. Meeting 2/4 SIRS (HR & WBC) w/ elevated lactate, but nml BP. Blood cx negative. Pt not surgical candidate for debridement per ENT evaluation.   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 Underwent repeat bx after failed tx (1/24/22) that showed moderately differentiated squamous cell carcinoma w/ suspicion for perineural invasion. CT soft tissue neck (Jan 2022) w/o e/o metastatic dx. PET-CT (Feb 2022) w/o e/o active local or distant dx- no abnormal activity within the tongue, no signs of cervical lymph node or distant metastasis. Declined chemotherapy &/or XRT. Underwent selective neck dissection levels 1, 2, 3, and partial glossectomy w/ split-thickness skin graft (3/3/2022 per Dr. Clif Olson).  Surgical pathology w/ unifocal squamous cell carcinoma on the dorsal surface of the tongue on left side measuring 1 cm, moderately differentiated, 6 mm depth of invasion, no lymphovascular invasion, positive for perineural invasion, margins negative, 0 of 39 lymph nodes with metastasis, pT2 pN0 noted.  Postop course complicated by infection requiring I&D and antibiotics.  Declined adjuvant XRT. Repeat imaging (June 2023) w/ 2.2 cm lesion deep to the R SCM c/f necrotic LN w/ few adjacent pathological appearing LNs suspicious for metastasis. Underwent R neck FNA (7/7/23) w/ pathology suspicious for metastatic SCC. PET-CT (8/21/2023) w/ large necrotic mass the angle of mandible posterior to submandibular gland on the right measuring 4.4 x 4.2 cm with SUV 9.78, just superior to the mass is a 2 cm hypermetabolic lymph node and no evidence of distant  "metastatic disease. Subsequently transferred care to [place] where she reportedly recently abx,"detox", hyperbaric chamber, & stem-cell transplant approximately 2 weeks ago PTA in [place]. Now presenting w/ progressive neck wound. CT neck soft tissue (4/10/24) w/ large bulky lobulated appearing mass right lateral and anterolateral neck extending to the D spaces of the neck having overall dimensions of approximately 9.5 x 8.2 x 10 cm, multiple addn'l b/l cervical necrotic nodes, & assoc mass effect results in displacement of midline structures to the patient's left. CT C/A/P with diffuse metastatic disease including mediastinal & hilar LAD, bilateral lungs, peritoneum, lumbar vertebrae, & likely liver.     - ENT consulted; appreciate recs - not surgical candidate for debridement  - Medical & Radiation Oncology consulted; appreciate recs - pt declines chemo or XRT  - Palliative following; appreciate assistance  - Multimodal analgesia   - SLP following, appreciate assistance  - Supportive care     Postprocedural hypoparathyroidism  Last PTHi nml at 26 (2016). Home regimen includes calcium carbonate & vitamin D. Symptomatic hypercalcemia (Ca 13.5) on presentation to OSH w/ abdominal pain & N/V. Improved w/ NS ggt (although query if pt's hypercalcemia was re: hx of hypoparathyroidism vs hypercalcemia of malignancy.   - Holding home calcium carbonate  - Resume home vitamin D as PO tolerance improves   - Monitor CMP    Postsurgical hypothyroidism  Last TSH elevated at 5.264. Home regimen includes levothyroxine 175mcg daily & liothyronine 5mcg daily  - Continue home liothyronine & levothyroxine   - Repeat TSH pending    HTN (hypertension)  Hx of essential HTN; home regimen includes metoprolol 25mg BID. On arrival to [facility], SBP 140s-150s.   - IV metoprolol 5mg Q6H until pt able to tolerate PO      VTE Risk Mitigation (From admission, onward)           Ordered     enoxaparin injection 40 mg  Every 24 hours         04/12/24 1536 "     IP VTE HIGH RISK PATIENT  Once         04/12/24 1324     Place sequential compression device  Until discontinued         04/12/24 1324                    Discharge Planning   ANDRES: 4/18/2024     Code Status: Full Code   Is the patient medically ready for discharge?: No    Reason for patient still in hospital (select all that apply): Patient trending condition and Treatment  Discharge Plan A: Home with family                  Gregoria Donnelly DO  Department of Hospital Medicine   [facility] Nguyen - Telemetry Stepdown

## 2024-04-17 NOTE — CHAPLAIN
"Palliative Care     Patient: Marysol Cash  MRN: [id number]  : 1965  Age: 58 y.o.  Hospital Length of Stay: 5  Code Status: Code Status Discussion Note  Attending Provider: Gregoria Donnelly DO  Principal Problem: Sepsis    Non-clinical observations of patient/room: Visited pall med pt per request of pall med NP,Jacob Evans. Pt was awake, lying down,  Jin on sofa and during visit pt's parents arrived as well. This was my intro visit; 15 min    Provided compassionate presence and listening ear as  welcomed me in and motioned me to sit down. I reach out to his hand to hold it for just a second, but he gripped and remained holding my hand throughout the conversation until I got up to get closer to pt.     We did not delve deeply into their story and pt's disease, but stayed more surface level for this first visit.  said they have a lot of family-- all in [facility] and [place] family and that "many people are praying for pt from all around the world." I affirmed that pt is well loved and well supported.     I stepped closer to pt with tender touch to arm and asked how she is feeling today and she smiled several times and said "I'm okay", "I'm doing okay"-- however I did notice her wince a few times while I was talking with her .  continued to talk about their mercedes which gives them comfort and strength-- they are S[employer]ism Christians.     During visit, pt's father entered the room-- slowly, cautiously and didn't say much (was this is first time visiting???)-- his name is Humberto. Then he stepped out and pt said he was going to get her mother and come back.     Pt welcomed me to pray over/with/for her--  remained on sofa. Knowing a little about the situation I said in my prayer that ".... God heals the multitudes, raises the dead and... calls his children "home" to their eternal home-- all according to His will and His timing..."    They thanked me for the prayer " "and as I was beginning to wrap up the visit, the  said, "we trust God in all this, He is in control." I confirmed that indeed He is and at the same time the clinicians will be asking you and pt questions about their wishes for medical treatments, or not-- humans asking humans, so I suggested we all continue the prayers and pray for wisdom and discernment as well, as the days press on.  heard.    Offered to fetch him water or snacks and he politely declined, gave him my card and told them both that I'd continue to visit-- also shared the availability of the [facility] chaplains.   Spoke at length with bedside nurse, Lisseth, after the visit.    Lord, in your mercy.       **Spiritual Care Dept. Chaplains are available evenings, overnight and weekends **    In Peace,    Rev. Sugar Hull MDiv, [facility]  Board Certified   Palliative Medicine Department  [phone]  "

## 2024-04-17 NOTE — ASSESSMENT & PLAN NOTE
Impression  58y.o F with history of R vocal cord paralysis 2/2 thyroidectomy, SCC of tongue s/p L partial glossectomy and selective neck dissection(2022), with recurrent metastatic disease per R lymph node bx in 2023. She declined chemo and radiation throughout length of diagnosis and is now with significant progression resulting in open neck wound and unresectable disease based on CT scan. R laryngeal and pharyngeal edema and obscuring vocal cords. Patient does not appear to be in respiratory distress and does not complain of difficulty breathing despite FFL findings. ENT consulted and recommended consideration of trach and PEG, however stated that tracheostomy would be very technically challenging and with significant concern for infection. Heme/Onc consult and recommends  imaging for staging and palliative radiation and chemo.     Reason for Consult. Per communication with Dr. River, GOC and ACP conversations requested, as pt with metastatic lymphadenopathy from SCC of tongue and declining chemo and radiation therapies.     ACP/GOC 4/17/24 Met with pt, , pt's sister, and parents this afternoon, along with Idalia BUNN. Pt shared with me that she and her  have had time to think about all of the information/diagnosis they received yesterday, along with our conversation about hospice care. She shares that they have decided they would like to request a PEG tube and utilize home hospice upon discharge. They are hopeful that as her nutrition increases, she will regain strength and be able to tolerate more alternative therapies. I was able to reiterate that hospice will be a beneficial addition to manage pain, nausea, and any other symptoms that may develop. Today, she describes her neck pain as more neuropathic in nature. Reports that her nausea has been better since taking nausea medicine with pain meds. I have updated primary MD and CM that pt is interested in hospice information.       4/16/24 Met with pt  and  this am, along with Idalia BUNN, and Dr. River.  Dr. River reviewed findings of patient's CT from yesterday, which included identification of metastatic lesions in bilateral lungs, liver, spine, and soheila and mediastinum lymph nodes.  Dr. River explained that this needs her cancer is stage IV,  did not seem to understand this staging, and it was reviewed.  Patient asks if palliative could return later, which we did in the afternoon.       Patient reports that she is very surprised her cancer has spread this much, and never would have thought that happened.  We discussed the aggressive nature of this cancer and options going forward.  Patient and  are adamant that they do not want to pursue any chemotherapy or radiation, with the understanding that it would be palliative measures for comfort, as there are no curative options at this time.  I discussed that if they would like to continue their alternative therapies, she may also want to consider medications to control pain and nausea that she is currently facing. Patient is agreeable to medications to manage these symptoms, as she shares both pain and nausea have been getting worse.  I was able to briefly begin explaining that pain would likely continue, and adequate management will be a valuable asset to her comfort.    shared he and wife would like to see if she is a candidate for a PEG tube, believing that restoring her nutrition can allow her the strength to continue alternative therapies and healing.  We discussed that although I hope for a miracle with them, as they have shared, it would be most prudent to have a means of managing her symptoms at home in case a miracle does not occur.      I explored how they would obtain medication, and she shared that they do not have a primary provider.  Myself and  were able to explain the role of outpatient palliative Medicine and/or hospice so that they would be able to obtain  "adequate pain and symptom management.  Patient initially frightened by word of hospice, and we discussed that it would be a layer of support to assure adequate pain and symptom management, while needing there goals of not wanting to be in a hospital or doctor's office for such treatment.  Patient and  said they would discuss hospice, as they feel maybe it would be a benefit for her.  We will follow up tomorrow.        4/15/24 Met with patient and  today at bedside.  Initial visit patient asked for me to return, she had just taken pain medication and sleepy.  Upon 2nd visit I was able to discuss understanding of patient and family of current disease process.  Patient's  did most of the talking and explained that they have been participating in holistic, nontraditional" therapies since they have found out about her metastatic lymph node disease last July. These therapies included, brushing, raking, infrared lights, and lymphatic drainage massage to name a few. She admitted that she had been noticing wound on neck getting worse, but was just caring for it at home.  Unclear if family currently understands the degree of severity associated with diagnosis, although multiple charting from Heme-Onc and ENT stating their conversations with family.       Wife and  did share that they would not want to do any chemotherapy or radiation therapy and seem very adamant about this.  shared that he understands chemo or radiation may only prolong her life by several years and have lots of complications.  Of note, wife and  did mention possibility of her getting a PEG tube if she could not eat.  However, before further goals of care and meaning could be discussed the patient with bouts of nausea and vomiting.  I will revisit tomorrow to continue goals of care discussion and further understanding of their options going forward.    MPOA Pt's  Genaro Cash [phone]    Symptoms "   -Pain: r/t R neck swelling and infection. Currently no longer controlled with 0.5mg IV Dilaudid. Pt describing pain as shooting and burning, dependant on position, seemingly neuropathic.   -Nausea/Vomiting: Pt with Ondansetron and Prochlorperazine prn. Requiring both to control n/v today. Prior to today, without n/v x 2 days. Will continue to monitor.  -Dysphagia: Pt with increased difficulty swallowing d/t neck swelling. Follow recs per SLP. Family interested in PEG placement.    Recommendations  -Consider adding Senna BID, as pt without BM x2 days, OME=70 (increase from previous) and n/v today.   -Monitor for airway distress, as pt with tracheal deviation on CT.  -Consider addition of Gabapentin 300 qhs, with titration up by 300mg every 3-7 days.

## 2024-04-17 NOTE — ASSESSMENT & PLAN NOTE
Hx of essential HTN; home regimen includes metoprolol 25mg BID. On arrival to [facility], SBP 140s-150s.   - IV metoprolol 5mg Q6H until pt able to tolerate PO

## 2024-04-17 NOTE — ASSESSMENT & PLAN NOTE
"Pt with extensive hx of H/N cancer. Presents w/ continued/worsening drainage from the neck for months with recent worsening of symptoms for past 2 weeks w/ N/V. Pt returned from [place] where she received antibiotics, "detox" and hyperbaric chamber as treatment. Endorses difficulty with swallowing 2/2 to oral trush. Large neck mass on imaging w/ large central collection of air which appears to extend superficially to the skin surface right mid neck anterior laterally; unable to r/o superimposed abscess. Meeting 2/4 SIRS (HR & WBC) w/ elevated lactate, but nml BP. Blood cx negative. Pt not surgical candidate for debridement per ENT evaluation.   - ID consulted; appreciate recs  - Continue Vancomycin + Flagyl   - Continue fluconazole  - Given that surgical debridement is not an option (and thus source control not attainable), follow-up with ID to discuss ultimate recs for abx course   "

## 2024-04-17 NOTE — SUBJECTIVE & OBJECTIVE
Interval History:  Patient was lying bed, family at bedside.  Asking for PEG tube placement.  Discussed the case with Interventional Radiology who are planning to assess patient.      Objective:     Vital Signs (Most Recent):  Temp: 98.3 °F (36.8 °C) (04/16/24 1935)  Pulse: 90 (04/16/24 1935)  Resp: 17 (04/16/24 1935)  BP: (!) 159/82 (04/16/24 2111)  SpO2: (!) 94 % (04/16/24 1935) Vital Signs (24h Range):  Temp:  [97.5 °F (36.4 °C)-98.9 °F (37.2 °C)] 98.3 °F (36.8 °C)  Pulse:  [] 90  Resp:  [17-27] 17  SpO2:  [91 %-95 %] 94 %  BP: (126-159)/(64-82) 159/82     Weight: 85.3 kg (188 lb 0.8 oz)  Body mass index is 30.35 kg/m².  No intake or output data in the 24 hours ending 04/16/24 2209        Physical Exam  Vitals and nursing note reviewed.   Constitutional:       General: She is not in acute distress.     Appearance: She is ill-appearing. She is not toxic-appearing.   Eyes:      Conjunctiva/sclera: Conjunctivae normal.   Neck:      Comments: Dressing in place over entire circumference of neck. Dressing c/d/i  Cardiovascular:      Rate and Rhythm: Normal rate and regular rhythm.      Heart sounds: Normal heart sounds.   Pulmonary:      Effort: Pulmonary effort is normal. No respiratory distress.      Breath sounds: Normal breath sounds. No wheezing.   Abdominal:      General: Bowel sounds are normal. There is no distension.      Palpations: Abdomen is soft.      Tenderness: There is no abdominal tenderness. There is no guarding.   Skin:     General: Skin is warm and dry.   Neurological:      General: No focal deficit present.      Mental Status: She is alert and oriented to person, place, and time. Mental status is at baseline.   Psychiatric:         Mood and Affect: Mood normal.         Behavior: Behavior normal.           Significant Labs: All pertinent labs within the past 24 hours have been reviewed.    Significant Imaging: I have reviewed all pertinent imaging results/findings within the past 24 hours.

## 2024-04-17 NOTE — PROGRESS NOTES
[facility] - Telemetry Stepdown  Palliative Medicine  Progress Note    Patient Name: Marysol Cash  MRN: [id number]  Admission Date: 4/12/2024  Hospital Length of Stay: 5 days  Code Status: Full Code   Attending Provider: Gregoria Donnelly DO  Consulting Provider: FRANKO Lance  Primary Care Physician: James Coronel MD  Principal Problem:Sepsis    Patient information was obtained from patient, spouse/SO, relative(s), and primary team.      Assessment/Plan:     Palliative Care  Palliative care encounter  Impression  58y.o F with history of R vocal cord paralysis 2/2 thyroidectomy, SCC of tongue s/p L partial glossectomy and selective neck dissection(2022), with recurrent metastatic disease per R lymph node bx in 2023. She declined chemo and radiation throughout length of diagnosis and is now with significant progression resulting in open neck wound and unresectable disease based on CT scan. R laryngeal and pharyngeal edema and obscuring vocal cords. Patient does not appear to be in respiratory distress and does not complain of difficulty breathing despite FFL findings. ENT consulted and recommended consideration of trach and PEG, however stated that tracheostomy would be very technically challenging and with significant concern for infection. Heme/Onc consult and recommends  imaging for staging and palliative radiation and chemo.     Reason for Consult. Per communication with Dr. River, GOC and ACP conversations requested, as pt with metastatic lymphadenopathy from SCC of tongue and declining chemo and radiation therapies.     ACP/GOC 4/17/24 Met with pt, , pt's sister, and parents this afternoon, along with Idalia BUNN. Pt shared with me that she and her  have had time to think about all of the information/diagnosis they received yesterday, along with our conversation about hospice care. She shares that they have decided they would like to request a PEG tube and utilize home hospice upon  discharge. They are hopeful that as her nutrition increases, she will regain strength and be able to tolerate more alternative therapies. I was able to reiterate that hospice will be a beneficial addition to manage pain, nausea, and any other symptoms that may develop. Today, she describes her neck pain as more neuropathic in nature. Reports that her nausea has been better since taking nausea medicine with pain meds. I have updated primary MD and CM that pt is interested in hospice information.       4/16/24 Met with pt and  this am, along with Idalia BUNN, and Dr. River.  Dr. River reviewed findings of patient's CT from yesterday, which included identification of metastatic lesions in bilateral lungs, liver, spine, and soheila and mediastinum lymph nodes.  Dr. River explained that this needs her cancer is stage IV,  did not seem to understand this staging, and it was reviewed.  Patient asks if palliative could return later, which we did in the afternoon.       Patient reports that she is very surprised her cancer has spread this much, and never would have thought that happened.  We discussed the aggressive nature of this cancer and options going forward.  Patient and  are adamant that they do not want to pursue any chemotherapy or radiation, with the understanding that it would be palliative measures for comfort, as there are no curative options at this time.  I discussed that if they would like to continue their alternative therapies, she may also want to consider medications to control pain and nausea that she is currently facing. Patient is agreeable to medications to manage these symptoms, as she shares both pain and nausea have been getting worse.  I was able to briefly begin explaining that pain would likely continue, and adequate management will be a valuable asset to her comfort.    shared he and wife would like to see if she is a candidate for a PEG tube, believing that restoring her  "nutrition can allow her the strength to continue alternative therapies and healing.  We discussed that although I hope for a miracle with them, as they have shared, it would be most prudent to have a means of managing her symptoms at home in case a miracle does not occur.      I explored how they would obtain medication, and she shared that they do not have a primary provider.  Myself and  were able to explain the role of outpatient palliative Medicine and/or hospice so that they would be able to obtain adequate pain and symptom management.  Patient initially frightened by word of hospice, and we discussed that it would be a layer of support to assure adequate pain and symptom management, while needing there goals of not wanting to be in a hospital or doctor's office for such treatment.  Patient and  said they would discuss hospice, as they feel maybe it would be a benefit for her.  We will follow up tomorrow.        4/15/24 Met with patient and  today at bedside.  Initial visit patient asked for me to return, she had just taken pain medication and sleepy.  Upon 2nd visit I was able to discuss understanding of patient and family of current disease process.  Patient's  did most of the talking and explained that they have been participating in holistic, nontraditional" therapies since they have found out about her metastatic lymph node disease last July. These therapies included, brushing, raking, infrared lights, and lymphatic drainage massage to name a few. She admitted that she had been noticing wound on neck getting worse, but was just caring for it at home.  Unclear if family currently understands the degree of severity associated with diagnosis, although multiple charting from Heme-Onc and ENT stating their conversations with family.       Wife and  did share that they would not want to do any chemotherapy or radiation therapy and seem very adamant about this.  " shared that he understands chemo or radiation may only prolong her life by several years and have lots of complications.  Of note, wife and  did mention possibility of her getting a PEG tube if she could not eat.  However, before further goals of care and meaning could be discussed the patient with bouts of nausea and vomiting.  I will revisit tomorrow to continue goals of care discussion and further understanding of their options going forward.    MPOA Pt's  Genaro Cash [phone]    Symptoms   -Pain: r/t R neck swelling and infection. Currently no longer controlled with 0.5mg IV Dilaudid. Pt describing pain as shooting and burning, dependant on position, seemingly neuropathic.   -Nausea/Vomiting: Pt with Ondansetron and Prochlorperazine prn. Requiring both to control n/v today. Prior to today, without n/v x 2 days. Will continue to monitor.  -Dysphagia: Pt with increased difficulty swallowing d/t neck swelling. Follow recs per SLP. Family interested in PEG placement.    Recommendations  -Consider adding Senna BID, as pt without BM x2 days, OME=70 (increase from previous) and n/v today.   -Monitor for airway distress, as pt with tracheal deviation on CT.  -Consider addition of Gabapentin 300 qhs, with titration up by 300mg every 3-7 days.         I will sign off. Please contact us if you have any additional questions.    Subjective:     Chief Complaint: No chief complaint on file.      HPI:   Per Chart Review: Marysol Cash is a 58 year-old female with a PMHx of SCC of the tongue s/p radical neck dissection 03/2022 (had refused chemo and radiation) with suspected recurrence of SCC of tongue in right neck, left vocal cord paralysis, GERD, Hypertension, obesity, post-surgical hypothyroidism and hypoparathyroidism. She initially presented to [facility] Emergency Department with six days of epigastric pain with associated nausea, vomiting, and difficulty eating due to pain. However on presentation, she was  noted to have a large necrotic and purulent wound located on her right neck with complaints of associated difficulty speaking, swallowing and shortness of breath. She was noted to be hypoxic 88% on room air which improved with 2L NC. Labs were notable for leukocytosis 14, hypokalemia 2.4, hypochloremia 89, CO2 36, hypercalcemia 13.5, Phos 2.5. . Troponin 0.046. Lactate 2.3. CT Soft Tissue Neck was concerning for 9.5 x 8.2 x 10 cm large lobulated mass in the right anterolateral neck extending to the deep spaces of the neck and abutting the right prevertebral space and paravertebral musculature, left midline shift, multiple bilateral necrotic cervical lymph nodes. Case was discussed and decision was made to transfer to [facility] for higher level of care.     Of additional note, she recently transitioned her care to [place] and underwent stem-cell transplant approximately 2 weeks ago in [place], has not been seen by a  physician since 2023.      On transfer: she was afebrile, mildly hypertensive /109, HR 98, RR 20, satting 96% on room air. Complaining of mild headache and poor appetite associated with nausea; denies fever, chills, chest pain, palpitations, SOB, abdominal pain, dysuria. States wound has been draining for the past week initially thick white now more liquid. Mild dysphonia is apparently chronic from left vocal cord paralysis and at baseline. Some dysphagia with solids, none with pureed soft or liquids, denies odynophagia. Dressing on neck CDI, ENT consulted will be here shortly to assess patient.      Hospital/ICU Course:  Evaluated 4/12 in MICU by ENT, who do not feel patient is a candidate for surgical intervention. Palliative care and oncology consulted. Failed swallow study, SLP recommending NPO 4/13. Patient continues to protect airway. Stepped down from MICU on 4/13.    Hospital Course:  No notes on file      Past Medical History:   Diagnosis Date    GERD (gastroesophageal reflux disease)      Heart valve problem     Hypertension     Obesity (BMI 30-39.9) 10/20/2014    Thyroid disease        Past Surgical History:   Procedure Laterality Date    cesaean section  1991, 1993    CHOLECYSTECTOMY  2008    COLONOSCOPY  09/2013    ESOPHAGOGASTRODUODENOSCOPY N/A 5/11/2020    Procedure: EGD (ESOPHAGOGASTRODUODENOSCOPY);  Surgeon: Nahed Zabala MD;  Location: [facility];  Service: Endoscopy;  Laterality: N/A;  covid 5/8/2020    HYSTERECTOMY  age 27    for fibroids    SALPINGECTOMY  1992    for ectopic pregnancy    TONGUE SURGERY  03/04/2022    Cancer removed on tongue and lymphs    TOTAL THYROIDECTOMY  2012    UPPER GASTROINTESTINAL ENDOSCOPY  09/2013    gastritis-hp neg       Review of patient's allergies indicates:   Allergen Reactions    Ciprofloxacin Swelling    Codeine Swelling    Opioids - morphine analogues     Pcn [penicillins] Hives     Tolerated cefepime 04/2024    Percocet [oxycodone-acetaminophen] Swelling       Medications:  Continuous Infusions:  Current Facility-Administered Medications   Medication Dose Route Frequency Last Rate Last Admin     Scheduled Meds:  Current Facility-Administered Medications   Medication Dose Route Frequency    enoxparin  40 mg Subcutaneous Q24H (prophylaxis, 1700)    fluconazole (DIFLUCAN) IV (PEDS and ADULTS)  200 mg Intravenous Q24H    levothyroxine  175 mcg Oral Before breakfast    liothyronine  5 mcg Oral Before breakfast    metoprolol  5 mg Intravenous Q6H    metronidazole  500 mg Intravenous Q8H    potassium chloride  10 mEq Intravenous Q1H    senna  8.6 mg Oral BID    vancomycin (VANCOCIN) IV (PEDS and ADULTS)  20 mg/kg Intravenous Q24H     PRN Meds:    Current Facility-Administered Medications:     acetaminophen, 650 mg, Oral, Q4H PRN    dextrose 10%, 12.5 g, Intravenous, PRN    dextrose 10%, 25 g, Intravenous, PRN    glucose, 16 g, Oral, PRN    glucose, 24 g, Oral, PRN    HYDROmorphone, 0.5 mg, Intravenous, Q4H PRN    hydrOXYzine pamoate, 25 mg, Oral, Q8H  PRN    ketorolac, 15 mg, Intravenous, Q6H PRN    metoclopramide, 10 mg, Intravenous, Q6H PRN    naloxone, 0.02 mg, Intravenous, PRN    ondansetron, 4 mg, Intravenous, Q4H PRN    prochlorperazine, 2.5 mg, Intravenous, Q6H PRN    Pharmacy to dose Vancomycin consult, , , Once **AND** vancomycin - pharmacy to dose, , Intravenous, pharmacy to manage frequency      Family History       Problem Relation (Age of Onset)    Diabetes Mother, Father, Brother    Heart disease Mother, Father, Brother    Hyperlipidemia Mother    Hypertension Mother, Father          Tobacco Use    Smoking status: Never    Smokeless tobacco: Never   Substance and Sexual Activity    Alcohol use: No     Alcohol/week: 0.0 standard drinks of alcohol    Drug use: No    Sexual activity: Yes     Partners: Male     Birth control/protection: See Surgical Hx       Review of Systems   Constitutional:  Positive for activity change and appetite change.   HENT:  Positive for facial swelling.    Skin:  Positive for wound (to right neck).     Objective:     Vital Signs (Most Recent):  Temp: 98.2 °F (36.8 °C) (04/17/24 1139)  Pulse: 92 (04/17/24 1443)  Resp: (!) 44 (04/17/24 1139)  BP: (!) 143/74 (04/17/24 1342)  SpO2: 95 % (04/17/24 1443) Vital Signs (24h Range):  Temp:  [97.8 °F (36.6 °C)-98.7 °F (37.1 °C)] 98.2 °F (36.8 °C)  Pulse:  [] 92  Resp:  [17-44] 44  SpO2:  [91 %-97 %] 95 %  BP: (128-159)/(71-90) 143/74     Weight: 85.3 kg (188 lb 0.8 oz)  Body mass index is 30.35 kg/m².       Physical Exam  Musculoskeletal:      Cervical back: Edema present.   Neurological:      Mental Status: She is alert and oriented to person, place, and time.            Review of Symptoms      Symptom Assessment (ESAS 0-10 Scale)  Pain:  5  Dyspnea:  0  Anxiety:  0  Nausea:  0  Depression:  0  Anorexia:  0  Fatigue:  0  Insomnia:  0  Restlessness:  0  Agitation:  0         Pain Assessment:    Location(s): neck    Neck       Location: right        Quality: Aching and burning      "   Quantity: 7/10 in intensity        Chronicity: Onset 10 month(s) ago, gradually worsening        Aggravating Factors: None        Alleviating Factors: Opiates        Associated Symptoms: None    ECOG Performance Status rdGrdrrdarddrderd:rd rd3rd Living Arrangements:  Lives with spouse    Psychosocial/Cultural:   See Palliative Psychosocial Note: Yes  Lives with  and adult daughter.   **Primary  to Follow**  Palliative Care  Consult: Yes        Advance Care Planning  Advance Directives:   Living Will: No    Do Not Resuscitate Status: No      Decision Making:  Patient answered questions and Family answered questions  Goals of Care: The patient and family endorses that what is most important right now is to focus on symptom/pain control and quality of life, even if it means sacrificing a little time    Accordingly, we have decided that the best plan to meet the patient's goals includes continuing with pain and symptom management, while also continuing "alternative" (holistic) therapies.         Significant Labs: BMP:   Recent Labs   Lab 04/17/24 0625   GLU 88      K 3.2*      CO2 30*   BUN 12   CREATININE 0.7   CALCIUM 10.4     CBC:   Recent Labs   Lab 04/16/24  0411 04/17/24 0625   WBC 18.61* 16.63*   HGB 13.3 13.0   HCT 42.0 40.0    291     CBC:   Recent Labs   Lab 04/17/24 0625   WBC 16.63*   HGB 13.0   HCT 40.0   MCV 85        BMP:  Recent Labs   Lab 04/17/24 0625   GLU 88      K 3.2*      CO2 30*   BUN 12   CREATININE 0.7   CALCIUM 10.4     LFT:  Lab Results   Component Value Date    AST 27 04/15/2024    ALKPHOS 118 04/15/2024    BILITOT 0.6 04/15/2024     Albumin:   Albumin   Date Value Ref Range Status   04/15/2024 2.8 (L) 3.5 - 5.2 g/dL Final     Protein:   Total Protein   Date Value Ref Range Status   04/15/2024 5.9 (L) 6.0 - 8.4 g/dL Final     Lactic acid:   Lab Results   Component Value Date    LACTATE 1.5 04/11/2024    LACTATE 2.3 (H) " 04/10/2024       Significant Imaging: I have reviewed all pertinent imaging results/findings within the past 24 hours.    > 50% of 55 min visit spent in chart review, face to face discussion of goals of care,  symptom assessment, coordination of care, charting, and emotional support   Cristina Nix, CNS  Palliative Medicine  [facility] - Telemetry Stepdown

## 2024-04-17 NOTE — ASSESSMENT & PLAN NOTE
Last PTHi nml at 27 (2016). Home regimen includes calcium carbonate & vitamin D. Symptomatic hypercalcemia (Ca 13.5) on presentation to OSH w/ abdominal pain & N/V. Improved w/ NS ggt (although query if pt's hypercalcemia was re: hx of hypoparathyroidism vs hypercalcemia of malignancy.   - Holding home calcium carbonate  - Resume home vitamin D as PO tolerance improves   - Monitor CMP

## 2024-04-17 NOTE — ASSESSMENT & PLAN NOTE
On arrival to [facility] [place], meeting 2/4 SIRS (requiring 2L NC w/ RR 20s & WBC 13). Likely soft tissue/neck source. Received IVF at OSH prior to transfer (for lactate 2.3 at that time). Started on broad-spectrum abx. Blood cx negative.   - ID consulted; appreciate recs  - Continue vancomycin + Flagyl + fluconazole   - Monitor CBC & fever curve  - Mgmt of neck mass/wound as below

## 2024-04-17 NOTE — ASSESSMENT & PLAN NOTE
Last TSH elevated at 5.264. Home regimen includes levothyroxine 175mcg daily & liothyronine 5mcg daily  - Continue home liothyronine & levothyroxine   - Repeat TSH pending

## 2024-04-18 PROBLEM — E43 SEVERE MALNUTRITION: Status: ACTIVE | Noted: 2024-04-18

## 2024-04-18 PROBLEM — C76.0 HEAD AND NECK CANCER: Status: ACTIVE | Noted: 2024-04-18

## 2024-04-18 LAB
ANION GAP SERPL CALC-SCNC: 8 MMOL/L (ref 8–16)
BASOPHILS # BLD AUTO: 0.02 K/UL (ref 0–0.2)
BASOPHILS NFR BLD: 0.1 % (ref 0–1.9)
BUN SERPL-MCNC: 13 MG/DL (ref 6–20)
CALCIUM SERPL-MCNC: 11.6 MG/DL (ref 8.7–10.5)
CHLORIDE SERPL-SCNC: 103 MMOL/L (ref 95–110)
CO2 SERPL-SCNC: 28 MMOL/L (ref 23–29)
CREAT SERPL-MCNC: 0.7 MG/DL (ref 0.5–1.4)
DIFFERENTIAL METHOD BLD: ABNORMAL
EOSINOPHIL # BLD AUTO: 0 K/UL (ref 0–0.5)
EOSINOPHIL NFR BLD: 0 % (ref 0–8)
ERYTHROCYTE [DISTWIDTH] IN BLOOD BY AUTOMATED COUNT: 14.9 % (ref 11.5–14.5)
EST. GFR  (NO RACE VARIABLE): >60 ML/MIN/1.73 M^2
GLUCOSE SERPL-MCNC: 88 MG/DL (ref 70–110)
HCT VFR BLD AUTO: 39.3 % (ref 37–48.5)
HGB BLD-MCNC: 13 G/DL (ref 12–16)
IMM GRANULOCYTES # BLD AUTO: 0.09 K/UL (ref 0–0.04)
IMM GRANULOCYTES NFR BLD AUTO: 0.5 % (ref 0–0.5)
LYMPHOCYTES # BLD AUTO: 2.2 K/UL (ref 1–4.8)
LYMPHOCYTES NFR BLD: 12.8 % (ref 18–48)
MCH RBC QN AUTO: 28 PG (ref 27–31)
MCHC RBC AUTO-ENTMCNC: 33.1 G/DL (ref 32–36)
MCV RBC AUTO: 85 FL (ref 82–98)
MONOCYTES # BLD AUTO: 1.3 K/UL (ref 0.3–1)
MONOCYTES NFR BLD: 7.2 % (ref 4–15)
NEUTROPHILS # BLD AUTO: 13.9 K/UL (ref 1.8–7.7)
NEUTROPHILS NFR BLD: 79.4 % (ref 38–73)
NRBC BLD-RTO: 0 /100 WBC
PLATELET # BLD AUTO: 276 K/UL (ref 150–450)
PMV BLD AUTO: 10.1 FL (ref 9.2–12.9)
POTASSIUM SERPL-SCNC: 3.3 MMOL/L (ref 3.5–5.1)
RBC # BLD AUTO: 4.64 M/UL (ref 4–5.4)
SODIUM SERPL-SCNC: 139 MMOL/L (ref 136–145)
WBC # BLD AUTO: 17.49 K/UL (ref 3.9–12.7)

## 2024-04-18 PROCEDURE — 36415 COLL VENOUS BLD VENIPUNCTURE: CPT | Performed by: STUDENT IN AN ORGANIZED HEALTH CARE EDUCATION/TRAINING PROGRAM

## 2024-04-18 PROCEDURE — 99223 1ST HOSP IP/OBS HIGH 75: CPT | Mod: ,,,

## 2024-04-18 PROCEDURE — 25000003 PHARM REV CODE 250: Performed by: STUDENT IN AN ORGANIZED HEALTH CARE EDUCATION/TRAINING PROGRAM

## 2024-04-18 PROCEDURE — 63600175 PHARM REV CODE 636 W HCPCS: Performed by: STUDENT IN AN ORGANIZED HEALTH CARE EDUCATION/TRAINING PROGRAM

## 2024-04-18 PROCEDURE — 97535 SELF CARE MNGMENT TRAINING: CPT

## 2024-04-18 PROCEDURE — 85025 COMPLETE CBC W/AUTO DIFF WBC: CPT | Performed by: STUDENT IN AN ORGANIZED HEALTH CARE EDUCATION/TRAINING PROGRAM

## 2024-04-18 PROCEDURE — 92526 ORAL FUNCTION THERAPY: CPT

## 2024-04-18 PROCEDURE — 20600001 HC STEP DOWN PRIVATE ROOM

## 2024-04-18 PROCEDURE — 25000003 PHARM REV CODE 250

## 2024-04-18 PROCEDURE — 80048 BASIC METABOLIC PNL TOTAL CA: CPT | Performed by: STUDENT IN AN ORGANIZED HEALTH CARE EDUCATION/TRAINING PROGRAM

## 2024-04-18 RX ORDER — POTASSIUM CHLORIDE 7.45 MG/ML
10 INJECTION INTRAVENOUS
Status: COMPLETED | OUTPATIENT
Start: 2024-04-18 | End: 2024-04-18

## 2024-04-18 RX ORDER — POTASSIUM CHLORIDE 7.45 MG/ML
10 INJECTION INTRAVENOUS
Status: DISPENSED | OUTPATIENT
Start: 2024-04-18 | End: 2024-04-18

## 2024-04-18 RX ADMIN — METOROPROLOL TARTRATE 5 MG: 5 INJECTION, SOLUTION INTRAVENOUS at 11:04

## 2024-04-18 RX ADMIN — LEVOTHYROXINE SODIUM 175 MCG: 150 TABLET ORAL at 05:04

## 2024-04-18 RX ADMIN — METRONIDAZOLE 500 MG: 500 INJECTION, SOLUTION INTRAVENOUS at 09:04

## 2024-04-18 RX ADMIN — FLUCONAZOLE 200 MG: 2 INJECTION, SOLUTION INTRAVENOUS at 01:04

## 2024-04-18 RX ADMIN — METOROPROLOL TARTRATE 5 MG: 5 INJECTION, SOLUTION INTRAVENOUS at 05:04

## 2024-04-18 RX ADMIN — POTASSIUM CHLORIDE 10 MEQ: 7.46 INJECTION, SOLUTION INTRAVENOUS at 09:04

## 2024-04-18 RX ADMIN — HYDROXYZINE PAMOATE 25 MG: 25 CAPSULE ORAL at 08:04

## 2024-04-18 RX ADMIN — POTASSIUM CHLORIDE 10 MEQ: 7.46 INJECTION, SOLUTION INTRAVENOUS at 07:04

## 2024-04-18 RX ADMIN — METRONIDAZOLE 500 MG: 500 INJECTION, SOLUTION INTRAVENOUS at 05:04

## 2024-04-18 RX ADMIN — SENNOSIDES 8.6 MG: 8.6 TABLET, FILM COATED ORAL at 09:04

## 2024-04-18 RX ADMIN — ENOXAPARIN SODIUM 40 MG: 40 INJECTION SUBCUTANEOUS at 05:04

## 2024-04-18 RX ADMIN — POTASSIUM CHLORIDE 10 MEQ: 7.46 INJECTION, SOLUTION INTRAVENOUS at 01:04

## 2024-04-18 RX ADMIN — METRONIDAZOLE 500 MG: 500 INJECTION, SOLUTION INTRAVENOUS at 03:04

## 2024-04-18 RX ADMIN — METOROPROLOL TARTRATE 5 MG: 5 INJECTION, SOLUTION INTRAVENOUS at 01:04

## 2024-04-18 RX ADMIN — KETOROLAC TROMETHAMINE 15 MG: 15 INJECTION, SOLUTION INTRAMUSCULAR; INTRAVENOUS at 08:04

## 2024-04-18 RX ADMIN — HYDROXYZINE PAMOATE 25 MG: 25 CAPSULE ORAL at 04:04

## 2024-04-18 RX ADMIN — VANCOMYCIN HYDROCHLORIDE 1750 MG: 500 INJECTION, POWDER, LYOPHILIZED, FOR SOLUTION INTRAVENOUS at 05:04

## 2024-04-18 RX ADMIN — HYDROMORPHONE HYDROCHLORIDE 0.5 MG: 1 INJECTION, SOLUTION INTRAMUSCULAR; INTRAVENOUS; SUBCUTANEOUS at 04:04

## 2024-04-18 RX ADMIN — LIOTHYRONINE SODIUM 5 MCG: 5 TABLET ORAL at 05:04

## 2024-04-18 RX ADMIN — SENNOSIDES 8.6 MG: 8.6 TABLET, FILM COATED ORAL at 08:04

## 2024-04-18 RX ADMIN — ONDANSETRON 4 MG: 2 INJECTION INTRAMUSCULAR; INTRAVENOUS at 04:04

## 2024-04-18 RX ADMIN — ONDANSETRON 4 MG: 2 INJECTION INTRAMUSCULAR; INTRAVENOUS at 08:04

## 2024-04-18 RX ADMIN — METOROPROLOL TARTRATE 5 MG: 5 INJECTION, SOLUTION INTRAVENOUS at 06:04

## 2024-04-18 NOTE — PLAN OF CARE
Problem: Adult Inpatient Plan of Care  Goal: Absence of Hospital-Acquired Illness or Injury  Outcome: Ongoing, Progressing  Goal: Optimal Comfort and Wellbeing  Outcome: Ongoing, Progressing  Goal: Readiness for Transition of Care  Outcome: Ongoing, Progressing     Problem: Adjustment to Illness (Sepsis/Septic Shock)  Goal: Optimal Coping  Outcome: Ongoing, Progressing     Pt resting in bed quietly with eyes closed, breathing even and unlabored, safety checks done, no acute distress noted at this time.

## 2024-04-18 NOTE — CONSULTS
Gastrostomy Tube Placement Consult Note  Interventional Radiology      Date: 4/18/2024   Primary team: [facility] WAndrzej Muaamar, DO   Room/bed: 8094/8094 A    Inpatient consult to Interventional Radiology  Consult performed by: Viviana Weber PA-C  Consult ordered by: Gregoria Donnelly DO             SUBJECTIVE:     Chief Complaint:  head and neck cancer, need for feeding tube    History of Present Illness:  Marysol Cash is a 58 y.o. 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 Interventional Radiology consulted for percutaneous gastrostomy tube placement for management of long term enteral feeds. Pt unable to tolerate oral feeds due to head and neck cancer. She had recent imaging including a CTAP on 4/17/2024 which revealed a possible window for G tube placement. The pt does not have an NG tube in place.       Review of Systems   Reason unable to perform ROS: mental status.       Scheduled Meds:  Current Facility-Administered Medications   Medication Dose Route Frequency    barium  450 mL Per NG tube Once    [START ON 4/19/2024] barium  450 mL Per NG tube Once    enoxparin  40 mg Subcutaneous Q24H (prophylaxis, 1700)    fluconazole (DIFLUCAN) IV (PEDS and ADULTS)  200 mg Intravenous Q24H    levothyroxine  175 mcg Oral Before breakfast    liothyronine  5 mcg Oral Before breakfast    metoprolol  5 mg Intravenous Q6H    metronidazole  500 mg Intravenous Q8H    senna  8.6 mg Oral BID    vancomycin (VANCOCIN) IV (PEDS and ADULTS)  20 mg/kg Intravenous Q24H     Continuous Infusions:  Current Facility-Administered Medications   Medication Dose Route Frequency Last Rate Last Admin     PRN Meds:  Current Facility-Administered Medications:     acetaminophen, 650 mg, Oral, Q4H PRN    dextrose 10%, 12.5 g, Intravenous, PRN     dextrose 10%, 25 g, Intravenous, PRN    glucose, 16 g, Oral, PRN    glucose, 24 g, Oral, PRN    HYDROmorphone, 0.5 mg, Intravenous, Q4H PRN    hydrOXYzine pamoate, 25 mg, Oral, Q8H PRN    ketorolac, 15 mg, Intravenous, Q6H PRN    lorazepam, 2 mg, Intravenous, Nightly PRN    metoclopramide, 10 mg, Intravenous, Q6H PRN    naloxone, 0.02 mg, Intravenous, PRN    ondansetron, 4 mg, Intravenous, Q4H PRN    prochlorperazine, 2.5 mg, Intravenous, Q6H PRN    Pharmacy to dose Vancomycin consult, , , Once **AND** vancomycin - pharmacy to dose, , Intravenous, pharmacy to manage frequency    Review of patient's allergies indicates:   Allergen Reactions    Ciprofloxacin Swelling    Codeine Swelling    Opioids - morphine analogues     Pcn [penicillins] Hives     Tolerated cefepime 04/2024    Percocet [oxycodone-acetaminophen] Swelling       Past Medical History:   Diagnosis Date    GERD (gastroesophageal reflux disease)     Heart valve problem     Hypertension     Obesity (BMI 30-39.9) 10/20/2014    Thyroid disease      Past Surgical History:   Procedure Laterality Date    cesaean section  1991, 1993    CHOLECYSTECTOMY  2008    COLONOSCOPY  09/2013    ESOPHAGOGASTRODUODENOSCOPY N/A 5/11/2020    Procedure: EGD (ESOPHAGOGASTRODUODENOSCOPY);  Surgeon: Nahed Zabala MD;  Location: Counts include 234 beds at the [facility];  Service: Endoscopy;  Laterality: N/A;  covid 5/8/2020    HYSTERECTOMY  age 27    for fibroids    SALPINGECTOMY  1992    for ectopic pregnancy    TONGUE SURGERY  03/04/2022    Cancer removed on tongue and lymphs    TOTAL THYROIDECTOMY  2012    UPPER GASTROINTESTINAL ENDOSCOPY  09/2013    gastritis-hp neg     Family History   Problem Relation Name Age of Onset    Hypertension Mother      Diabetes Mother      Hyperlipidemia Mother      Heart disease Mother      Heart disease Father      Hypertension Father      Diabetes Father      Heart disease Brother      Diabetes Brother       Social History     Tobacco Use    Smoking status: Never     Smokeless tobacco: Never   Substance Use Topics    Alcohol use: No     Alcohol/week: 0.0 standard drinks of alcohol    Drug use: No       OBJECTIVE:       Anticoagulants/Antiplatelets:   Lovenox     Vital Signs (Most Recent)  Temp: 98.2 °F (36.8 °C) (04/18/24 0734)  Pulse: 93 (04/18/24 0734)  Resp: (!) 28 (04/18/24 0734)  BP: 128/77 (04/18/24 0734)  SpO2: (!) 94 % (04/18/24 0734)    Physical Exam:   Physical Exam  Constitutional:       Appearance: She is obese.      Comments: Sleeping on exam   HENT:      Head: Normocephalic.   Cardiovascular:      Rate and Rhythm: Normal rate and regular rhythm.   Pulmonary:      Effort: Pulmonary effort is normal.   Abdominal:      Comments: obese   Neurological:      Comments: Sleeping on exam   Psychiatric:      Comments: Unable to assess as pt is sleeping         Laboratory  Lab Results   Component Value Date    INR 1.1 04/10/2024       Lab Results   Component Value Date    WBC 17.49 (H) 04/18/2024    HGB 13.0 04/18/2024    HCT 39.3 04/18/2024    MCV 85 04/18/2024     04/18/2024      Lab Results   Component Value Date    GLU 88 04/18/2024     04/18/2024    K 3.3 (L) 04/18/2024     04/18/2024    CO2 28 04/18/2024    BUN 13 04/18/2024    CREATININE 0.7 04/18/2024    CALCIUM 11.6 (H) 04/18/2024    MG 1.8 04/15/2024    ALT 28 04/15/2024    AST 27 04/15/2024    ALBUMIN 2.8 (L) 04/15/2024    BILITOT 0.6 04/15/2024       ASA/Mallampati  ASA: 3  Mallampati: 3    Imaging:  Reviewed by Lonny Sanchez MD.     ASSESSMENT/PLAN:     Assessment:  58 y.o. female with a PMHx of head and neck cancer who has been referred to IR for percutaneous gastrostomy tube placement for long term enteral feeds. She does not have an NGT in place. Unsure if NGT placement is feasible given neck mass. For IR procedure, NGT is necessary as it used for barium administration and during IR procedure.      Plan:  Patient does not have an NGT in place. NGT placement may be difficult given head and  neck cancer. If NGT is placed, can proceed with percutaneous gastrostomy tube placement on 04/19/24  Sedation plan:  up to moderate    Please keep pt NPO/ hold TF starting at midnight on day of procedure.    Administer barium overnight starting at midnight (ordered by IR, see instructions in orders) via NG tube  Anticoagulation history reviewed.   Coagulation labs reviewed.  Will need to place and maintain NG tube through IR procedure.  Thank you for the consult.Please contact with questions via [employer] secure chat.      Viviana Weber PA-C  Interventional Radiology  Spectra [phone]  4/18/2024

## 2024-04-18 NOTE — PROGRESS NOTES
[facility] - Telemetry Stepdown  Adult Nutrition  Progress Note    SUMMARY       Recommendations    If/when PEG placed, initiate TFs. Rec'd Impact Peptide @ 50 mL/hr to provide 1800 kcals, 113 g of protein, 924 mL fluid.  RD to monitor & follow-up.    Goals: Meet % EEN, EPN by RD f/u date  Nutrition Goal Status: new  Communication of RD Recs: reviewed with physician    Assessment and Plan    Severe malnutrition    Malnutrition Type:  Context: chronic illness  Level: severe    Related to (etiology):   Inability to consume sufficient energy     Signs and Symptoms (as evidenced by):   Weight loss, inadequate energy intake    Malnutrition Characteristic Summary:  Weight Loss (Malnutrition): greater than 10% in 6 months  Energy Intake (Malnutrition): less than or equal to 50% for greater than or equal to 1 month    Interventions/Recommendations (treatment strategy):  Collaboration of nutrition care w/ other providers  EN    Nutrition Diagnosis Status:   New    Malnutrition Assessment    Malnutrition Context: chronic illness  Malnutrition Level: severe    Weight Loss (Malnutrition): greater than 10% in 6 months  Energy Intake (Malnutrition): less than or equal to 50% for greater than or equal to 1 month     Reason for Assessment    Reason For Assessment: NPO/clear liquids x 5 days  Diagnosis: other (see comments) (Sepsis)  Relevant Medical History: 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 Noted palliative care following/hospice being discussed - NFPE not appropriate. Per H & P, pt w/ decreased appetite PTA. Per chart review, pt w/ UBW of 240#. RD feels pt meets criteria for severe malnutrition; please see PES statement for details.  Nutrition Discharge Planning: Adequate nutrition    Nutrition/Diet  "History    Spiritual, Cultural Beliefs, Baptist Practices, Values that Affect Care: no  Factors Affecting Nutritional Intake: NPO    Anthropometrics    Temp: 99 °F (37.2 °C)  Height: 5' 6" (167.6 cm)  Height (inches): 66 in  Weight Method: Bed Scale  Weight: 85.3 kg (188 lb 0.8 oz)  Weight (lb): 188.05 lb  Ideal Body Weight (IBW), Female: 130 lb  % Ideal Body Weight, Female (lb): 144.65 %  BMI (Calculated): 30.4  BMI Grade: 30 - 34.9- obesity - grade I  Usual Body Weight (UBW), k kg  % Usual Body Weight: 78.42  % Weight Change From Usual Weight: -21.74 %    Lab/Procedures/Meds    Pertinent Labs Reviewed: reviewed  Pertinent Medications Reviewed: reviewed    Estimated/Assessed Needs    Weight Used For Calorie Calculations: 85.3 kg (188 lb 0.8 oz)    Energy Calorie Requirements (kcal): 1746 kcal/d  Energy Need Method: [place]-St Jeor (1.2 PAL)    Protein Requirements: 111 g/d (1.3 g/kg)  Weight Used For Protein Calculations: 85.3 kg (188 lb 0.8 oz)    Estimated Fluid Requirement Method: other (see comments) (Per MD)  RDA Method (mL): 1746    CHO Requirement: 218g    Nutrition Prescription Ordered    Current Diet Order: NPO    Evaluation of Received Nutrient/Fluid Intake    I/O: +3L since admit    Comments: LBM:     Nutrition Risk    Level of Risk/Frequency of Follow-up:  (1x/week)     Monitor and Evaluation    Food and Nutrient Intake: enteral nutrition intake, food and beverage intake, energy intake  Food and Nutrient Adminstration: diet order, enteral and parenteral nutrition administration  Physical Activity and Function: nutrition-related ADLs and IADLs  Anthropometric Measurements: weight, weight change  Biochemical Data, Medical Tests and Procedures: glucose/endocrine profile, lipid profile, inflammatory profile, electrolyte and renal panel, gastrointestinal profile  Nutrition-Focused Physical Findings: overall appearance     Nutrition Follow-Up    RD Follow-up?: Yes      "

## 2024-04-18 NOTE — PT/OT/SLP PROGRESS
"Speech Language Pathology Treatment    Patient Name:  Marysol Cash   MRN:  [id number]  Admitting Diagnosis: Sepsis    Recommendations:              General Recommendations:    MBSS in the post acute setting with dysphagia therapy pending recovery of acute illness   Ongoing ENT follow up     Diet recommendations:    There is no safe or efficient form of nutrition/hydration/medication able to be established to meet needs at this time   Pt would benefit from alternative means of nutrition/hydration , patient and family expressing understanding and desire to move forward with PEG  Pudding/apple sauce, ice chips and sips of water for pleasure / comfort   Medications crushed in pudding/applesauce until alternate means able to be established      Aspiration Precautions: 1 bite/sip at a time, Alternating bites/sips, HOB to 90 degrees, Ice chips sparingly, Meds crushed in puree, and Strict aspiration precautions   General Precautions: Standard, aspiration  Communication strategies:  none    Assessment:     Marysol Cash is a 58 y.o. 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 Pt presents with neck edema and difficulty tolerating minimal amounts of PO intake this date. MBSS scheduled for this date cancelled at this time, though would recommend MBSS in the future once neck wound/edema is less severe/has resolved. Pt would benefit from alternative means of nutrition/hydration as well as ongoing follow up with speech and ENT services. Patient and family in agreement.     Subjective   Awake & alert. Daughter at bedside. Pt having PEG placed tomorrow.   "Do you think I could have coffee"    Pain/Comfort:  Pain Rating 1:  (no rating provided)  Location 1:  (at IV site in arm)  Pain Rating Post-Intervention 2:  (same)    Respiratory Status: Room air    Objective:     Has the patient been evaluated by SLP for swallowing?   Yes  Keep patient NPO? Yes     SLP " educated pt & daughter on current swallowing status with continued rec for NPO & alternative means with plan for PEG being placed tomorrow. Pt with occasional productive cough during session. Pt tolerated small ice chips x3 & small 1/2 tsp bites of applesauce x3 utilizing multiple swallows & no overt s/s aspiration. SLP educated family & pt on all recs & precautions, risks, having min amount of ice chips sparingly for pleasure, s/s aspiration, role of SLP, POC, etc. All verbalized understanding. Pt expressed wanting to have coffee, SLP told pt we can likely thicken or present in very small amounts for pleasure.     Goals:   Multidisciplinary Problems       SLP Goals          Problem: SLP    Goal Priority Disciplines Outcome   SLP Goal     SLP Ongoing, Progressing   Description: Speech Language Pathology Goals  Goals expected to be met by 4/20  1. Modified Barium Swallow Study  2. Ongoing swallow assessment                       Plan:     Patient to be seen:  4 x/week   Plan of Care expires:  05/12/24  Plan of Care reviewed with:  patient, daughter   SLP Follow-Up:  Yes       Discharge recommendations:  High Intensity Therapy     Time Tracking:     SLP Treatment Date:   04/18/24  Speech Start Time:  1337  Speech Stop Time:  1400     Speech Total Time (min):  23 min    Billable Minutes: Treatment Swallowing Dysfunction 12 and Self Care/Home Management Training 11    04/18/2024

## 2024-04-18 NOTE — PLAN OF CARE
Met with patient and her family  to discuss home hospice services.    Patient/family provided list of facilities in-network with patient's payor plan. Providers that are owned, operated, or affiliated with [facility] are included on the list.     Notified that referral sent to below listed facilities from in-network list based on proximity to home/family support:   [facility]    Preferred Facility:   [facility]    If an additional preferred facility not listed above is identified, additional referral to be sent. If above facilities unable to accept, will send additional referrals to in-network providers.

## 2024-04-18 NOTE — ASSESSMENT & PLAN NOTE
Malnutrition Type:  Context: chronic illness  Level: severe    Related to (etiology):   Inability to consume sufficient energy     Signs and Symptoms (as evidenced by):   Weight loss, inadequate energy intake    Malnutrition Characteristic Summary:  Weight Loss (Malnutrition): greater than 10% in 6 months  Energy Intake (Malnutrition): less than or equal to 50% for greater than or equal to 1 month    Interventions/Recommendations (treatment strategy):  Collaboration of nutrition care w/ other providers  EN    Nutrition Diagnosis Status:   New

## 2024-04-18 NOTE — PLAN OF CARE
Recommendations     If/when PEG placed, initiate TFs. Rec'd Impact Peptide @ 50 mL/hr to provide 1800 kcals, 113 g of protein, 924 mL fluid.  RD to monitor & follow-up.     Goals: Meet % EEN, EPN by RD f/u date  Nutrition Goal Status: new  Communication of RD Recs: reviewed with physician

## 2024-04-19 LAB
ANION GAP SERPL CALC-SCNC: 9 MMOL/L (ref 8–16)
BASOPHILS # BLD AUTO: 0.02 K/UL (ref 0–0.2)
BASOPHILS NFR BLD: 0.1 % (ref 0–1.9)
BUN SERPL-MCNC: 14 MG/DL (ref 6–20)
CALCIUM SERPL-MCNC: 12 MG/DL (ref 8.7–10.5)
CHLORIDE SERPL-SCNC: 103 MMOL/L (ref 95–110)
CO2 SERPL-SCNC: 26 MMOL/L (ref 23–29)
CREAT SERPL-MCNC: 0.8 MG/DL (ref 0.5–1.4)
DIFFERENTIAL METHOD BLD: ABNORMAL
EOSINOPHIL # BLD AUTO: 0 K/UL (ref 0–0.5)
EOSINOPHIL NFR BLD: 0 % (ref 0–8)
ERYTHROCYTE [DISTWIDTH] IN BLOOD BY AUTOMATED COUNT: 15.1 % (ref 11.5–14.5)
EST. GFR  (NO RACE VARIABLE): >60 ML/MIN/1.73 M^2
FINAL PATHOLOGIC DIAGNOSIS: NORMAL
GLUCOSE SERPL-MCNC: 78 MG/DL (ref 70–110)
GROSS: NORMAL
HCT VFR BLD AUTO: 39.5 % (ref 37–48.5)
HGB BLD-MCNC: 13.1 G/DL (ref 12–16)
IMM GRANULOCYTES # BLD AUTO: 0.12 K/UL (ref 0–0.04)
IMM GRANULOCYTES NFR BLD AUTO: 0.7 % (ref 0–0.5)
LYMPHOCYTES # BLD AUTO: 2.5 K/UL (ref 1–4.8)
LYMPHOCYTES NFR BLD: 14.2 % (ref 18–48)
Lab: NORMAL
MCH RBC QN AUTO: 27.9 PG (ref 27–31)
MCHC RBC AUTO-ENTMCNC: 33.2 G/DL (ref 32–36)
MCV RBC AUTO: 84 FL (ref 82–98)
MICROSCOPIC EXAM: NORMAL
MONOCYTES # BLD AUTO: 1.4 K/UL (ref 0.3–1)
MONOCYTES NFR BLD: 7.6 % (ref 4–15)
NEUTROPHILS # BLD AUTO: 13.8 K/UL (ref 1.8–7.7)
NEUTROPHILS NFR BLD: 77.4 % (ref 38–73)
NRBC BLD-RTO: 0 /100 WBC
PLATELET # BLD AUTO: 281 K/UL (ref 150–450)
PMV BLD AUTO: 10.5 FL (ref 9.2–12.9)
POTASSIUM SERPL-SCNC: 3.8 MMOL/L (ref 3.5–5.1)
RBC # BLD AUTO: 4.69 M/UL (ref 4–5.4)
SODIUM SERPL-SCNC: 138 MMOL/L (ref 136–145)
VANCOMYCIN TROUGH SERPL-MCNC: 14 UG/ML (ref 10–22)
WBC # BLD AUTO: 17.77 K/UL (ref 3.9–12.7)

## 2024-04-19 PROCEDURE — 25000003 PHARM REV CODE 250: Performed by: STUDENT IN AN ORGANIZED HEALTH CARE EDUCATION/TRAINING PROGRAM

## 2024-04-19 PROCEDURE — 63600175 PHARM REV CODE 636 W HCPCS: Performed by: STUDENT IN AN ORGANIZED HEALTH CARE EDUCATION/TRAINING PROGRAM

## 2024-04-19 PROCEDURE — 85025 COMPLETE CBC W/AUTO DIFF WBC: CPT | Performed by: STUDENT IN AN ORGANIZED HEALTH CARE EDUCATION/TRAINING PROGRAM

## 2024-04-19 PROCEDURE — A4217 STERILE WATER/SALINE, 500 ML: HCPCS | Performed by: STUDENT IN AN ORGANIZED HEALTH CARE EDUCATION/TRAINING PROGRAM

## 2024-04-19 PROCEDURE — 20600001 HC STEP DOWN PRIVATE ROOM

## 2024-04-19 PROCEDURE — 36415 COLL VENOUS BLD VENIPUNCTURE: CPT | Performed by: STUDENT IN AN ORGANIZED HEALTH CARE EDUCATION/TRAINING PROGRAM

## 2024-04-19 PROCEDURE — 63600175 PHARM REV CODE 636 W HCPCS

## 2024-04-19 PROCEDURE — 63600175 PHARM REV CODE 636 W HCPCS: Mod: JZ,JG | Performed by: STUDENT IN AN ORGANIZED HEALTH CARE EDUCATION/TRAINING PROGRAM

## 2024-04-19 PROCEDURE — 25000003 PHARM REV CODE 250

## 2024-04-19 PROCEDURE — 80202 ASSAY OF VANCOMYCIN: CPT | Performed by: STUDENT IN AN ORGANIZED HEALTH CARE EDUCATION/TRAINING PROGRAM

## 2024-04-19 PROCEDURE — 80048 BASIC METABOLIC PNL TOTAL CA: CPT | Performed by: STUDENT IN AN ORGANIZED HEALTH CARE EDUCATION/TRAINING PROGRAM

## 2024-04-19 RX ORDER — HYDROMORPHONE HYDROCHLORIDE 1 MG/ML
1 INJECTION, SOLUTION INTRAMUSCULAR; INTRAVENOUS; SUBCUTANEOUS
Status: DISCONTINUED | OUTPATIENT
Start: 2024-04-19 | End: 2024-05-08 | Stop reason: HOSPADM

## 2024-04-19 RX ADMIN — METOROPROLOL TARTRATE 5 MG: 5 INJECTION, SOLUTION INTRAVENOUS at 11:04

## 2024-04-19 RX ADMIN — HYDROMORPHONE HYDROCHLORIDE 1 MG: 1 INJECTION, SOLUTION INTRAMUSCULAR; INTRAVENOUS; SUBCUTANEOUS at 03:04

## 2024-04-19 RX ADMIN — METRONIDAZOLE 500 MG: 500 INJECTION, SOLUTION INTRAVENOUS at 08:04

## 2024-04-19 RX ADMIN — HYDROMORPHONE HYDROCHLORIDE 1 MG: 1 INJECTION, SOLUTION INTRAMUSCULAR; INTRAVENOUS; SUBCUTANEOUS at 01:04

## 2024-04-19 RX ADMIN — METRONIDAZOLE 500 MG: 500 INJECTION, SOLUTION INTRAVENOUS at 05:04

## 2024-04-19 RX ADMIN — METRONIDAZOLE 500 MG: 500 INJECTION, SOLUTION INTRAVENOUS at 04:04

## 2024-04-19 RX ADMIN — HYDROMORPHONE HYDROCHLORIDE 0.5 MG: 1 INJECTION, SOLUTION INTRAMUSCULAR; INTRAVENOUS; SUBCUTANEOUS at 08:04

## 2024-04-19 RX ADMIN — LEVOTHYROXINE SODIUM 175 MCG: 150 TABLET ORAL at 05:04

## 2024-04-19 RX ADMIN — ASCORBIC ACID, VITAMIN A PALMITATE, CHOLECALCIFEROL, THIAMINE HYDROCHLORIDE, RIBOFLAVIN-5 PHOSPHATE SODIUM, PYRIDOXINE HYDROCHLORIDE, NIACINAMIDE, DEXPANTHENOL, ALPHA-TOCOPHEROL ACETATE, VITAMIN K1, FOLIC ACID, BIOTIN, CYANOCOBALAMIN: 200; 3300; 200; 6; 3.6; 6; 40; 15; 10; 150; 600; 60; 5 INJECTION, SOLUTION INTRAVENOUS at 09:04

## 2024-04-19 RX ADMIN — FLUCONAZOLE 200 MG: 2 INJECTION, SOLUTION INTRAVENOUS at 11:04

## 2024-04-19 RX ADMIN — ENOXAPARIN SODIUM 40 MG: 40 INJECTION SUBCUTANEOUS at 04:04

## 2024-04-19 RX ADMIN — PROCHLORPERAZINE EDISYLATE 2.5 MG: 5 INJECTION INTRAMUSCULAR; INTRAVENOUS at 12:04

## 2024-04-19 RX ADMIN — LIOTHYRONINE SODIUM 5 MCG: 5 TABLET ORAL at 05:04

## 2024-04-19 RX ADMIN — HYDROMORPHONE HYDROCHLORIDE 1 MG: 1 INJECTION, SOLUTION INTRAMUSCULAR; INTRAVENOUS; SUBCUTANEOUS at 11:04

## 2024-04-19 RX ADMIN — SENNOSIDES 8.6 MG: 8.6 TABLET, FILM COATED ORAL at 08:04

## 2024-04-19 RX ADMIN — HYDROMORPHONE HYDROCHLORIDE 1 MG: 1 INJECTION, SOLUTION INTRAMUSCULAR; INTRAVENOUS; SUBCUTANEOUS at 06:04

## 2024-04-19 RX ADMIN — METOROPROLOL TARTRATE 5 MG: 5 INJECTION, SOLUTION INTRAVENOUS at 04:04

## 2024-04-19 RX ADMIN — ONDANSETRON 4 MG: 2 INJECTION INTRAMUSCULAR; INTRAVENOUS at 06:04

## 2024-04-19 RX ADMIN — PROCHLORPERAZINE EDISYLATE 2.5 MG: 5 INJECTION INTRAMUSCULAR; INTRAVENOUS at 08:04

## 2024-04-19 RX ADMIN — HYDROMORPHONE HYDROCHLORIDE 1 MG: 1 INJECTION, SOLUTION INTRAMUSCULAR; INTRAVENOUS; SUBCUTANEOUS at 08:04

## 2024-04-19 RX ADMIN — VANCOMYCIN HYDROCHLORIDE 1750 MG: 500 INJECTION, POWDER, LYOPHILIZED, FOR SOLUTION INTRAVENOUS at 05:04

## 2024-04-19 RX ADMIN — METOROPROLOL TARTRATE 5 MG: 5 INJECTION, SOLUTION INTRAVENOUS at 06:04

## 2024-04-19 NOTE — PLAN OF CARE
[facility] - Telemetry Stepdown  Discharge Reassessment    Primary Care Provider: James Coronel MD    Expected Discharge Date: 4/20/2024    Reassessment (most recent)       Discharge Reassessment - 04/19/24 1331          Discharge Reassessment    Assessment Type Discharge Planning Reassessment     Did the patient's condition or plan change since previous assessment? No     Discharge Plan discussed with: Patient;Adult children;Spouse/sig other     Communicated ANDRES with patient/caregiver Yes     Discharge Plan A Hospice/home     Discharge Plan B Hospice/home     DME Needed Upon Discharge  other (see comments)   TBD    Transition of Care Barriers --   PEG tube placement    Why the patient remains in the hospital Requires continued medical care        Post-Acute Status    Post-Acute Authorization Hospice     Hospice Status Referrals Sent                   Discharge Plan A and Plan B have been determined by review of patient's clinical status, future medical and therapeutic needs, and coverage/benefits for post-acute care in coordination with multidisciplinary team members.       Nisha Hightower RN  Ext [phone]

## 2024-04-19 NOTE — NURSING
0701: Report received, care assumed.  Patient asleep in bed with eyes closed, respirations even and unlabored, NDN.  Family at bedside, NPO status remains in effect.

## 2024-04-19 NOTE — NURSING
End of shift note.  Patient rested intermittently throughout the day.  Peg placement is scheduled for tomorrow.  NGT discussed with Dr. Donnelly who will consult ENT, and or GI.  Pain, and nausea monitored.   Patient ambulates to RR with assistance from spouse.  NPO status maintained.  Report given to oncoming nurse.

## 2024-04-19 NOTE — PLAN OF CARE
Problem: Adult Inpatient Plan of Care  Goal: Plan of Care Review  4/19/2024 0906 by Lisseth Olson RN  Outcome: Ongoing, Progressing  4/18/2024 1952 by Lisseth Olson RN  Outcome: Ongoing, Progressing  Goal: Patient-Specific Goal (Individualized)  4/19/2024 0906 by Lisseth Olson RN  Outcome: Ongoing, Progressing  4/18/2024 1952 by Lisseth Olson RN  Outcome: Ongoing, Progressing  Goal: Absence of Hospital-Acquired Illness or Injury  4/19/2024 0906 by Lisseth Olson RN  Outcome: Ongoing, Progressing  4/18/2024 1952 by Lisseth Olson RN  Outcome: Ongoing, Progressing  Goal: Optimal Comfort and Wellbeing  4/19/2024 0906 by Lisseth Olson RN  Outcome: Ongoing, Progressing  4/18/2024 1952 by Lisseth Olson RN  Outcome: Ongoing, Progressing  Goal: Readiness for Transition of Care  4/19/2024 0906 by Lisseth Olson RN  Outcome: Ongoing, Progressing  4/18/2024 1952 by Lisseth Olson RN  Outcome: Ongoing, Progressing     Problem: Adjustment to Illness (Sepsis/Septic Shock)  Goal: Optimal Coping  4/19/2024 0906 by Lisseth Olson RN  Outcome: Ongoing, Progressing  4/18/2024 1952 by Lisseth Olson RN  Outcome: Ongoing, Progressing     Problem: Bleeding (Sepsis/Septic Shock)  Goal: Absence of Bleeding  4/19/2024 0906 by Lisseth Olson RN  Outcome: Ongoing, Progressing  4/18/2024 1952 by Lisseth Olson RN  Outcome: Ongoing, Progressing     Problem: Glycemic Control Impaired (Sepsis/Septic Shock)  Goal: Blood Glucose Level Within Desired Range  4/19/2024 0906 by Lisseth Olson RN  Outcome: Ongoing, Progressing  4/18/2024 1952 by Lisseth Olson RN  Outcome: Ongoing, Progressing     Problem: Infection Progression (Sepsis/Septic Shock)  Goal: Absence of Infection Signs and Symptoms  4/19/2024 0906 by Lisseth Olson RN  Outcome: Ongoing, Progressing  4/18/2024 1952 by Lisseth Olson RN  Outcome: Ongoing, Progressing     Problem: Nutrition Impaired (Sepsis/Septic Shock)  Goal: Optimal Nutrition Intake  4/19/2024 0906 by Lisseth Olson, RN  Outcome: Ongoing,  Progressing  4/18/2024 1952 by Lisseth Olson RN  Outcome: Ongoing, Progressing     Problem: Impaired Wound Healing  Goal: Optimal Wound Healing  4/19/2024 0906 by Lisseth Olson RN  Outcome: Ongoing, Progressing  4/18/2024 1952 by Lisseth Olson RN  Outcome: Ongoing, Progressing     Problem: Skin Injury Risk Increased  Goal: Skin Health and Integrity  4/19/2024 0906 by Lisseth Olson RN  Outcome: Ongoing, Progressing  4/18/2024 1952 by Lisseth Olson RN  Outcome: Ongoing, Progressing     Problem: Coping Ineffective  Goal: Effective Coping  4/19/2024 0906 by Lisseth Olson RN  Outcome: Ongoing, Progressing  4/18/2024 1952 by Lisseth Olson RN  Outcome: Ongoing, Progressing

## 2024-04-19 NOTE — PLAN OF CARE
Call placed to [facility] ([phone]) to check on the status of patient's referral that was sent yesterday via [employer]. CM spoke with Anne who informed this CM that she did not receive the referral yesterday. Referral sent again via [employer]. Will continue to follow.    9:15AM  Call placed to [facility] and they stated that they have received the referral.     12:00PM  Call placed to [facility] to check on the status of patient's referral. Chelsea informed this CM that someone will be reaching out to patient's spouse shortly. Will continue to follow.    Nisha Hightower RN  Ext [phone]

## 2024-04-19 NOTE — PROGRESS NOTES
Con Nguyen - Telemetry [facility] Medicine  Progress Note    Patient Name: Marysol Cash  MRN: [id number]  Patient Class: IP- Inpatient   Admission Date: 4/12/2024  Length of Stay: 6 days  Attending Physician: Gregoria Donnelly DO  Primary Care Provider: James Coronel MD        Subjective:     Principal Problem:Sepsis        HPI:  Ms. 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 She initially presented to [facility] Department with six days of epigastric pain with associated nausea, vomiting, and difficulty eating due to pain. However on presentation, she was noted to have a large necrotic and purulent wound located on her right neck with complaints of associated difficulty speaking, swallowing and shortness of breath. She was noted to be hypoxic 88% on room air which improved with 2L NC. Labs were notable for leukocytosis 14, hypokalemia 2.4, hypochloremia 89, CO2 36, hypercalcemia 13.5, Phos 2.5. . Troponin 0.046. Lactate 2.3. CT Soft Tissue Neck was concerning for 9.5 x 8.2 x 10 cm large lobulated mass in the right anterolateral neck extending to the deep spaces of the neck and abutting the right prevertebral space and paravertebral musculature, left midline shift, multiple bilateral necrotic cervical lymph nodes. Case was discussed and decision was made [place] [facility] [place].     Of additional note, she recently transitioned her care to [place] and underwent stem-cell transplant approximately 2 weeks ago in [place], has not been seen by a [place] physician since 2023.      On transfer: she was afebrile, mildly hypertensive /109, HR 98, RR 20, satting 96% on room air. Complaining of mild headache and poor  appetite associated with nausea; denies fever, chills, chest pain, palpitations, SOB, abdominal pain, dysuria. States wound has been draining for the past week initially thick white now more liquid. Mild dysphonia is apparently chronic from left vocal cord paralysis and at baseline. Some dysphagia with solids, none with pureed soft or liquids, denies odynophagia. Dressing on neck CDI, ENT consulted will be here shortly to assess patient.        Overview/Hospital Course:  Evaluated 4/12 in MICU by ENT, who do not feel patient is a candidate for surgical intervention. Palliative care and oncology consulted. Failed swallow study, SLP recommending NPO 4/13. Patient continues to protect airway. Stepped down from MICU on 4/13.    Interval History: patient and family continues wanting PEG tube. However, it seems that there are barriers to doing so including the location/size of the mass.       Objective:     Vital Signs (Most Recent):  Temp: 98.8 °F (37.1 °C) (04/18/24 2336)  Pulse: 100 (04/18/24 2336)  Resp: 18 (04/18/24 2336)  BP: (!) 147/77 (04/18/24 2337)  SpO2: 96 % (04/18/24 2336) Vital Signs (24h Range):  Temp:  [98.2 °F (36.8 °C)-99 °F (37.2 °C)] 98.8 °F (37.1 °C)  Pulse:  [] 100  Resp:  [14-28] 18  SpO2:  [92 %-100 %] 96 %  BP: (128-149)/(73-87) 147/77     Weight: 85.3 kg (188 lb 0.8 oz)  Body mass index is 30.35 kg/m².  No intake or output data in the 24 hours ending 04/18/24 2356      Physical Exam  Vitals and nursing note reviewed.   Constitutional:       General: She is not in acute distress.     Appearance: She is ill-appearing. She is not toxic-appearing.   Eyes:      Conjunctiva/sclera: Conjunctivae normal.   Neck:      Comments: Dressing in place over entire circumference of neck. Dressing c/d/i  Cardiovascular:      Rate and Rhythm: Normal rate and regular rhythm.      Heart sounds: Normal heart sounds.   Pulmonary:      Effort: Pulmonary effort is normal. No respiratory distress.      Breath  "sounds: Normal breath sounds. No wheezing.   Abdominal:      General: Bowel sounds are normal. There is no distension.      Palpations: Abdomen is soft.      Tenderness: There is no abdominal tenderness. There is no guarding.   Skin:     General: Skin is warm and dry.   Neurological:      General: No focal deficit present.      Mental Status: She is alert and oriented to person, place, and time. Mental status is at baseline.   Psychiatric:         Mood and Affect: Mood normal.         Behavior: Behavior normal.             Significant Labs: All pertinent labs within the past 24 hours have been reviewed.    Significant Imaging: I have reviewed all pertinent imaging results/findings within the past 24 hours.    Assessment/Plan:      * Sepsis  On arrival to [facility], meeting 2/4 SIRS (requiring 2L NC w/ RR 20s & WBC 13). Likely soft tissue/neck source. Received IVF at OSH prior to transfer (for lactate 2.3 at that time). Started on broad-spectrum abx. Blood cx negative.   - ID consulted; appreciate recs  - Continue vancomycin + Flagyl + fluconazole   - Monitor CBC & fever curve  - Mgmt of neck mass/wound as below     Hypercalcemia  On arrival to OSH, Ca 13.5 --> 11.0 on arrival to [facility]. Continued subsequent uptrend peaking at 13.1 on 4/15 (corrected Ca 14.5). Likely 2/2 malignancy.   - IVF  - Calcitonin BID x2 days   - Monitor CMP     Palliative care encounter  Pt and family asking for PEG tube eval.      Open neck wound  Pt with extensive hx of H/N cancer. Presents w/ continued/worsening drainage from the neck for months with recent worsening of symptoms for past 2 weeks w/ N/V. Pt returned from [place] where she received antibiotics, "detox" and hyperbaric chamber as treatment. Endorses difficulty with swallowing 2/2 to oral trush. Large neck mass on imaging w/ large central collection of air which appears to extend superficially to the skin surface right mid neck anterior laterally; unable to r/o superimposed abscess. " Meeting 2/4 SIRS (HR & WBC) w/ elevated lactate, but nml BP. Blood cx negative. Pt not surgical candidate for debridement per ENT evaluation.   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 Underwent repeat bx after failed tx (1/24/22) that showed moderately differentiated squamous cell carcinoma w/ suspicion for perineural invasion. CT soft tissue neck (Jan 2022) w/o e/o metastatic dx. PET-CT (Feb 2022) w/o e/o active local or distant dx- no abnormal activity within the tongue, no signs of cervical lymph node or distant metastasis. Declined chemotherapy &/or XRT. Underwent selective neck dissection levels 1, 2, 3, and partial glossectomy w/ split-thickness skin graft (3/3/2022 per Dr. Clif Olson).  Surgical pathology w/ unifocal squamous cell carcinoma on the dorsal surface of the tongue on left side measuring 1 cm, moderately differentiated, 6 mm depth of invasion, no lymphovascular invasion, positive for perineural invasion, margins negative, 0 of 39 lymph nodes with metastasis, pT2 pN0 noted.  Postop course complicated by infection requiring I&D and antibiotics.  Declined adjuvant XRT. Repeat imaging (June 2023) w/ 2.2 cm lesion deep to the R SCM c/f necrotic LN w/ few adjacent pathological appearing LNs suspicious for metastasis. Underwent R neck FNA (7/7/23) w/ pathology suspicious for metastatic SCC. PET-CT (8/21/2023) w/ large necrotic mass the angle of mandible posterior to submandibular gland on the right measuring 4.4 x 4.2 cm with SUV 9.78, just superior to the mass is a 2 cm hypermetabolic lymph node and no evidence of distant metastatic disease.  "Subsequently transferred care to [place] where she reportedly recently abx,"detox", hyperbaric chamber, & stem-cell transplant approximately 2 weeks ago PTA in [place]. Now presenting w/ progressive neck wound. CT neck soft tissue (4/10/24) w/ large bulky lobulated appearing mass right lateral and anterolateral neck extending to the D spaces of the neck having overall dimensions of approximately 9.5 x 8.2 x 10 cm, multiple addn'l b/l cervical necrotic nodes, & assoc mass effect results in displacement of midline structures to the patient's left. CT C/A/P with diffuse metastatic disease including mediastinal & hilar LAD, bilateral lungs, peritoneum, lumbar vertebrae, & likely liver.     - ENT consulted; appreciate recs - not surgical candidate for debridement  - Medical & Radiation Oncology consulted; appreciate recs - pt declines chemo or XRT  - Palliative following; appreciate assistance  - Multimodal analgesia   - SLP following, appreciate assistance  - Supportive care     Postprocedural hypoparathyroidism  Last PTHi nml at 26 (2016). Home regimen includes calcium carbonate & vitamin D. Symptomatic hypercalcemia (Ca 13.5) on presentation to OSH w/ abdominal pain & N/V. Improved w/ NS ggt (although query if pt's hypercalcemia was re: hx of hypoparathyroidism vs hypercalcemia of malignancy.   - Holding home calcium carbonate  - Resume home vitamin D as PO tolerance improves   - Monitor CMP    Postsurgical hypothyroidism  Last TSH elevated at 5.264. Home regimen includes levothyroxine 175mcg daily & liothyronine 5mcg daily  - Continue home liothyronine & levothyroxine   - Repeat TSH pending    HTN (hypertension)  Hx of essential HTN; home regimen includes metoprolol 25mg BID. On arrival to [facility], SBP 140s-150s.   - IV metoprolol 5mg Q6H until pt able to tolerate PO      VTE Risk Mitigation (From admission, onward)           Ordered     enoxaparin injection 40 mg  Every 24 hours         04/12/24 1536     IP VTE HIGH RISK " PATIENT  Once         04/12/24 1324     Place sequential compression device  Until discontinued         04/12/24 1324                    Discharge Planning   ANDRES: 4/19/2024     Code Status: Full Code   Is the patient medically ready for discharge?: No    Reason for patient still in hospital (select all that apply): Patient trending condition and Treatment  Discharge Plan A: Home with family                  Gregoria Donnelly DO  Department of Hospital Medicine   Con Nguyen - Telemetry Stepdown

## 2024-04-19 NOTE — NURSING
At bedside with Dr Donnelly to speak with patient and family about goals of care. Patient would like to explore the options to have peg tube placed, MD explained the risk. Patient also c/o increase pain and MD changed pain med orders. Administered 1mg of Iv dilaudid and patient states and appears to feel better. Lisseth bedside Rn aware of plan

## 2024-04-19 NOTE — PROGRESS NOTES
Pharmacokinetic Assessment Follow Up: IV Vancomycin    Vancomycin serum concentration assessment/plan:  Random level resulted as 14 mcg/mL; Goal 10-15 mcg/mL, SSTI  Renal function stable  Continue Vancomycin 1750 mg IV q24h  Next level to be drawn on 4/20 at 1630     Drug levels (last 3 results):  Recent Labs   Lab Result Units 04/19/24  1000   Vancomycin-Trough ug/mL 14.0     Pharmacy will continue to follow and monitor vancomycin.    Thank you for the consult,   Evleyn Vallecillo, PharmD, BCPS  [id number]     Patient brief summary:  Marysol Cash is a 58 y.o. female initiated on antimicrobial therapy with IV Vancomycin for treatment of skin & soft tissue infection    Actual Body Weight:   85.3 kg    Renal Function:   Estimated Creatinine Clearance: 85.4 mL/min (based on SCr of 0.8 mg/dL).,     CBC (last 72 hours):  Recent Labs   Lab Result Units 04/17/24  0625 04/18/24  0430 04/19/24  0302   WBC K/uL 16.63* 17.49* 17.77*   Hemoglobin g/dL 13.0 13.0 13.1   Hematocrit % 40.0 39.3 39.5   Platelets K/uL 291 276 281   Gran % % 77.1* 79.4* 77.4*   Lymph % % 14.9* 12.8* 14.2*   Mono % % 7.2 7.2 7.6   Eosinophil % % 0.0 0.0 0.0   Basophil % % 0.1 0.1 0.1   Differential Method  Automated Automated Automated     Metabolic Panel (last 72 hours):  Recent Labs   Lab Result Units 04/17/24  0625 04/18/24  0430 04/19/24  0302   Sodium mmol/L 141 139 138   Potassium mmol/L 3.2* 3.3* 3.8   Chloride mmol/L 102 103 103   CO2 mmol/L 30* 28 26   Glucose mg/dL 88 88 78   BUN mg/dL 12 13 14   Creatinine mg/dL 0.7 0.7 0.8     Vancomycin Administrations:  vancomycin given in the last 96 hours                     vancomycin 2 g in dextrose 5 % 500 mL IVPB ()  Restarted 04/14/24 0613     2,000 mg New Bag  0613      Restarted 04/13/24 1909     2,000 mg New Bag  1909     2,000 mg New Bag  0708      Restarted 04/12/24 2115      Restarted  2035     2,000 mg New Bag  2006    vancomycin 2 g in dextrose 5 % 500 mL IVPB (mg) 2,000 mg New Bag 04/12/24  0750     2,000 mg New Bag 04/11/24 1958                    Microbiologic Results:  Microbiology Results (last 7 days)       Procedure Component Value Units Date/Time    Culture, Anaerobe [[id number]]     Order Status: Canceled Specimen: Wound     Aerobic culture [[id number]]     Order Status: Canceled Specimen: Wound     AFB Culture & Smear [[id number]]     Order Status: Canceled Specimen: Wound

## 2024-04-19 NOTE — SUBJECTIVE & OBJECTIVE
Interval History: patient and family continues wanting PEG tube. However, it seems that there are barriers to doing so including the location/size of the mass.       Objective:     Vital Signs (Most Recent):  Temp: 98.8 °F (37.1 °C) (04/18/24 2336)  Pulse: 100 (04/18/24 2336)  Resp: 18 (04/18/24 2336)  BP: (!) 147/77 (04/18/24 2337)  SpO2: 96 % (04/18/24 2336) Vital Signs (24h Range):  Temp:  [98.2 °F (36.8 °C)-99 °F (37.2 °C)] 98.8 °F (37.1 °C)  Pulse:  [] 100  Resp:  [14-28] 18  SpO2:  [92 %-100 %] 96 %  BP: (128-149)/(73-87) 147/77     Weight: 85.3 kg (188 lb 0.8 oz)  Body mass index is 30.35 kg/m².  No intake or output data in the 24 hours ending 04/18/24 2356      Physical Exam  Vitals and nursing note reviewed.   Constitutional:       General: She is not in acute distress.     Appearance: She is ill-appearing. She is not toxic-appearing.   Eyes:      Conjunctiva/sclera: Conjunctivae normal.   Neck:      Comments: Dressing in place over entire circumference of neck. Dressing c/d/i  Cardiovascular:      Rate and Rhythm: Normal rate and regular rhythm.      Heart sounds: Normal heart sounds.   Pulmonary:      Effort: Pulmonary effort is normal. No respiratory distress.      Breath sounds: Normal breath sounds. No wheezing.   Abdominal:      General: Bowel sounds are normal. There is no distension.      Palpations: Abdomen is soft.      Tenderness: There is no abdominal tenderness. There is no guarding.   Skin:     General: Skin is warm and dry.   Neurological:      General: No focal deficit present.      Mental Status: She is alert and oriented to person, place, and time. Mental status is at baseline.   Psychiatric:         Mood and Affect: Mood normal.         Behavior: Behavior normal.             Significant Labs: All pertinent labs within the past 24 hours have been reviewed.    Significant Imaging: I have reviewed all pertinent imaging results/findings within the past 24 hours.

## 2024-04-19 NOTE — CHAPLAIN
"Palliative Care     Patient: Marysol Cash  MRN: [id number]  : 1965  Age: 58 y.o.  Hospital Length of Stay: 7  Code Status: Code Status Discussion Note  Attending Provider: Gregoria Donnelly DO  Principal Problem: Sepsis    Non-clinical observations of patient/room: Visited [facility] pt again, who is in process of hospice discussions; , father, and two daughters bedside; spoke at length with Dr. Donnelly and bedside nurse as well; 30 min    Provided compassionate presence for pt and family, supporting them emotionally and spiritually. They are devout [employer] and their  visits daily. They appreciate additional support-- "all prayers are good!"  Pt is pleasant, communicative and still manages to smile through it all. Pt thanked me for "being Adolfo's hands and feet."     Offered to get family water and/or snacks and they declined, but pt insisted I get water for them. I fetched both water and snacks for family--  said he's fine with his coffee and coke. One daughter (step) was very emotional and softly crying-- all others remain stoic.    Provided pregnant pauses in between questions and comments. In the silence the pt asked me to pray; me, the two daughters and pt held hands as I prayed. My impromptu prayer incorporated both what the family has been saying and what I know of their beliefs, as well as tenderly blending in my prayer about our fragile bodies, our finite time on earth and God's plan-- and that we need wisdom, discernment and ability to trust god beyond it all. Family periodically joined in with my prayer ("Amen!", "thank you Ulises", etc).     Family appreciative of prayer and visited; reminded them I am not here on weekends, but the [place] chaplains can assist if/when needed. Lord, in your mercy.             **Spiritual Care Dept. Chaplains are available evenings, overnight and weekends **    In Peace,  Rev. Sugar Hull MDiv, Psychiatric  Board Certified " [facility]  Palliative Medicine Department  [phone]

## 2024-04-19 NOTE — NURSING
Wound care and Lisseth Rn at bedside performing wound care, patient medicated with prn pain med, NAD noted.

## 2024-04-20 LAB
ANION GAP SERPL CALC-SCNC: 9 MMOL/L (ref 8–16)
BASOPHILS # BLD AUTO: 0.02 K/UL (ref 0–0.2)
BASOPHILS NFR BLD: 0.1 % (ref 0–1.9)
BUN SERPL-MCNC: 14 MG/DL (ref 6–20)
CALCIUM SERPL-MCNC: 12.4 MG/DL (ref 8.7–10.5)
CHLORIDE SERPL-SCNC: 99 MMOL/L (ref 95–110)
CO2 SERPL-SCNC: 28 MMOL/L (ref 23–29)
CREAT SERPL-MCNC: 0.8 MG/DL (ref 0.5–1.4)
DIFFERENTIAL METHOD BLD: ABNORMAL
EOSINOPHIL # BLD AUTO: 0 K/UL (ref 0–0.5)
EOSINOPHIL NFR BLD: 0 % (ref 0–8)
ERYTHROCYTE [DISTWIDTH] IN BLOOD BY AUTOMATED COUNT: 15.2 % (ref 11.5–14.5)
EST. GFR  (NO RACE VARIABLE): >60 ML/MIN/1.73 M^2
GLUCOSE SERPL-MCNC: 116 MG/DL (ref 70–110)
HCT VFR BLD AUTO: 40.8 % (ref 37–48.5)
HGB BLD-MCNC: 13.1 G/DL (ref 12–16)
IMM GRANULOCYTES # BLD AUTO: 0.12 K/UL (ref 0–0.04)
IMM GRANULOCYTES NFR BLD AUTO: 0.7 % (ref 0–0.5)
LYMPHOCYTES # BLD AUTO: 2.4 K/UL (ref 1–4.8)
LYMPHOCYTES NFR BLD: 14 % (ref 18–48)
MCH RBC QN AUTO: 27.4 PG (ref 27–31)
MCHC RBC AUTO-ENTMCNC: 32.1 G/DL (ref 32–36)
MCV RBC AUTO: 85 FL (ref 82–98)
MONOCYTES # BLD AUTO: 1.5 K/UL (ref 0.3–1)
MONOCYTES NFR BLD: 8.5 % (ref 4–15)
NEUTROPHILS # BLD AUTO: 13 K/UL (ref 1.8–7.7)
NEUTROPHILS NFR BLD: 76.7 % (ref 38–73)
NRBC BLD-RTO: 0 /100 WBC
PLATELET # BLD AUTO: 281 K/UL (ref 150–450)
PMV BLD AUTO: 10.8 FL (ref 9.2–12.9)
POTASSIUM SERPL-SCNC: 3.2 MMOL/L (ref 3.5–5.1)
RBC # BLD AUTO: 4.78 M/UL (ref 4–5.4)
SODIUM SERPL-SCNC: 136 MMOL/L (ref 136–145)
VANCOMYCIN TROUGH SERPL-MCNC: 14.9 UG/ML (ref 10–22)
WBC # BLD AUTO: 17.02 K/UL (ref 3.9–12.7)

## 2024-04-20 PROCEDURE — 80048 BASIC METABOLIC PNL TOTAL CA: CPT | Performed by: STUDENT IN AN ORGANIZED HEALTH CARE EDUCATION/TRAINING PROGRAM

## 2024-04-20 PROCEDURE — 20600001 HC STEP DOWN PRIVATE ROOM

## 2024-04-20 PROCEDURE — A4217 STERILE WATER/SALINE, 500 ML: HCPCS | Performed by: STUDENT IN AN ORGANIZED HEALTH CARE EDUCATION/TRAINING PROGRAM

## 2024-04-20 PROCEDURE — 36415 COLL VENOUS BLD VENIPUNCTURE: CPT | Performed by: STUDENT IN AN ORGANIZED HEALTH CARE EDUCATION/TRAINING PROGRAM

## 2024-04-20 PROCEDURE — 25000003 PHARM REV CODE 250

## 2024-04-20 PROCEDURE — 25000003 PHARM REV CODE 250: Performed by: STUDENT IN AN ORGANIZED HEALTH CARE EDUCATION/TRAINING PROGRAM

## 2024-04-20 PROCEDURE — 63600175 PHARM REV CODE 636 W HCPCS: Mod: JZ,JG | Performed by: STUDENT IN AN ORGANIZED HEALTH CARE EDUCATION/TRAINING PROGRAM

## 2024-04-20 PROCEDURE — C1751 CATH, INF, PER/CENT/MIDLINE: HCPCS

## 2024-04-20 PROCEDURE — 80202 ASSAY OF VANCOMYCIN: CPT | Performed by: STUDENT IN AN ORGANIZED HEALTH CARE EDUCATION/TRAINING PROGRAM

## 2024-04-20 PROCEDURE — 02HV33Z INSERTION OF INFUSION DEVICE INTO SUPERIOR VENA CAVA, PERCUTANEOUS APPROACH: ICD-10-PCS | Performed by: STUDENT IN AN ORGANIZED HEALTH CARE EDUCATION/TRAINING PROGRAM

## 2024-04-20 PROCEDURE — A4216 STERILE WATER/SALINE, 10 ML: HCPCS | Performed by: STUDENT IN AN ORGANIZED HEALTH CARE EDUCATION/TRAINING PROGRAM

## 2024-04-20 PROCEDURE — 63600175 PHARM REV CODE 636 W HCPCS: Performed by: STUDENT IN AN ORGANIZED HEALTH CARE EDUCATION/TRAINING PROGRAM

## 2024-04-20 PROCEDURE — 76937 US GUIDE VASCULAR ACCESS: CPT

## 2024-04-20 PROCEDURE — 36573 INSJ PICC RS&I 5 YR+: CPT

## 2024-04-20 PROCEDURE — 85025 COMPLETE CBC W/AUTO DIFF WBC: CPT | Performed by: STUDENT IN AN ORGANIZED HEALTH CARE EDUCATION/TRAINING PROGRAM

## 2024-04-20 PROCEDURE — 63600175 PHARM REV CODE 636 W HCPCS

## 2024-04-20 RX ORDER — SODIUM CHLORIDE 0.9 % (FLUSH) 0.9 %
10 SYRINGE (ML) INJECTION
Status: DISCONTINUED | OUTPATIENT
Start: 2024-04-20 | End: 2024-05-08 | Stop reason: HOSPADM

## 2024-04-20 RX ORDER — SODIUM CHLORIDE 0.9 % (FLUSH) 0.9 %
10 SYRINGE (ML) INJECTION EVERY 6 HOURS
Status: DISCONTINUED | OUTPATIENT
Start: 2024-04-20 | End: 2024-05-08 | Stop reason: HOSPADM

## 2024-04-20 RX ORDER — POTASSIUM CHLORIDE 7.45 MG/ML
10 INJECTION INTRAVENOUS
Status: DISPENSED | OUTPATIENT
Start: 2024-04-20 | End: 2024-04-20

## 2024-04-20 RX ADMIN — VANCOMYCIN HYDROCHLORIDE 1750 MG: 500 INJECTION, POWDER, LYOPHILIZED, FOR SOLUTION INTRAVENOUS at 07:04

## 2024-04-20 RX ADMIN — ONDANSETRON 4 MG: 2 INJECTION INTRAMUSCULAR; INTRAVENOUS at 12:04

## 2024-04-20 RX ADMIN — POTASSIUM CHLORIDE 10 MEQ: 7.46 INJECTION, SOLUTION INTRAVENOUS at 08:04

## 2024-04-20 RX ADMIN — FLUCONAZOLE 200 MG: 2 INJECTION, SOLUTION INTRAVENOUS at 11:04

## 2024-04-20 RX ADMIN — POTASSIUM CHLORIDE 10 MEQ: 7.46 INJECTION, SOLUTION INTRAVENOUS at 10:04

## 2024-04-20 RX ADMIN — HYDROMORPHONE HYDROCHLORIDE 1 MG: 1 INJECTION, SOLUTION INTRAMUSCULAR; INTRAVENOUS; SUBCUTANEOUS at 04:04

## 2024-04-20 RX ADMIN — METOROPROLOL TARTRATE 5 MG: 5 INJECTION, SOLUTION INTRAVENOUS at 11:04

## 2024-04-20 RX ADMIN — HYDROMORPHONE HYDROCHLORIDE 1 MG: 1 INJECTION, SOLUTION INTRAMUSCULAR; INTRAVENOUS; SUBCUTANEOUS at 07:04

## 2024-04-20 RX ADMIN — ONDANSETRON 4 MG: 2 INJECTION INTRAMUSCULAR; INTRAVENOUS at 02:04

## 2024-04-20 RX ADMIN — PROCHLORPERAZINE EDISYLATE 2.5 MG: 5 INJECTION INTRAMUSCULAR; INTRAVENOUS at 04:04

## 2024-04-20 RX ADMIN — HYDROMORPHONE HYDROCHLORIDE 1 MG: 1 INJECTION, SOLUTION INTRAMUSCULAR; INTRAVENOUS; SUBCUTANEOUS at 02:04

## 2024-04-20 RX ADMIN — Medication 10 ML: at 06:04

## 2024-04-20 RX ADMIN — PROCHLORPERAZINE EDISYLATE 2.5 MG: 5 INJECTION INTRAMUSCULAR; INTRAVENOUS at 01:04

## 2024-04-20 RX ADMIN — Medication 10 ML: at 11:04

## 2024-04-20 RX ADMIN — METOROPROLOL TARTRATE 5 MG: 5 INJECTION, SOLUTION INTRAVENOUS at 07:04

## 2024-04-20 RX ADMIN — ENOXAPARIN SODIUM 40 MG: 40 INJECTION SUBCUTANEOUS at 06:04

## 2024-04-20 RX ADMIN — METRONIDAZOLE 500 MG: 500 INJECTION, SOLUTION INTRAVENOUS at 08:04

## 2024-04-20 RX ADMIN — METRONIDAZOLE 500 MG: 500 INJECTION, SOLUTION INTRAVENOUS at 06:04

## 2024-04-20 RX ADMIN — HYDROMORPHONE HYDROCHLORIDE 1 MG: 1 INJECTION, SOLUTION INTRAMUSCULAR; INTRAVENOUS; SUBCUTANEOUS at 01:04

## 2024-04-20 RX ADMIN — ONDANSETRON 4 MG: 2 INJECTION INTRAMUSCULAR; INTRAVENOUS at 09:04

## 2024-04-20 RX ADMIN — PROCHLORPERAZINE EDISYLATE 2.5 MG: 5 INJECTION INTRAMUSCULAR; INTRAVENOUS at 07:04

## 2024-04-20 RX ADMIN — ASCORBIC ACID, VITAMIN A PALMITATE, CHOLECALCIFEROL, THIAMINE HYDROCHLORIDE, RIBOFLAVIN-5 PHOSPHATE SODIUM, PYRIDOXINE HYDROCHLORIDE, NIACINAMIDE, DEXPANTHENOL, ALPHA-TOCOPHEROL ACETATE, VITAMIN K1, FOLIC ACID, BIOTIN, CYANOCOBALAMIN: 200; 3300; 200; 6; 3.6; 6; 40; 15; 10; 150; 600; 60; 5 INJECTION, SOLUTION INTRAVENOUS at 09:04

## 2024-04-20 RX ADMIN — METOROPROLOL TARTRATE 5 MG: 5 INJECTION, SOLUTION INTRAVENOUS at 05:04

## 2024-04-20 RX ADMIN — HYDROMORPHONE HYDROCHLORIDE 1 MG: 1 INJECTION, SOLUTION INTRAMUSCULAR; INTRAVENOUS; SUBCUTANEOUS at 09:04

## 2024-04-20 RX ADMIN — HYDROMORPHONE HYDROCHLORIDE 1 MG: 1 INJECTION, SOLUTION INTRAMUSCULAR; INTRAVENOUS; SUBCUTANEOUS at 06:04

## 2024-04-20 RX ADMIN — METOROPROLOL TARTRATE 5 MG: 5 INJECTION, SOLUTION INTRAVENOUS at 01:04

## 2024-04-20 RX ADMIN — METOCLOPRAMIDE 10 MG: 5 INJECTION, SOLUTION INTRAMUSCULAR; INTRAVENOUS at 11:04

## 2024-04-20 RX ADMIN — ONDANSETRON 4 MG: 2 INJECTION INTRAMUSCULAR; INTRAVENOUS at 06:04

## 2024-04-20 RX ADMIN — METRONIDAZOLE 500 MG: 500 INJECTION, SOLUTION INTRAVENOUS at 04:04

## 2024-04-20 RX ADMIN — LIOTHYRONINE SODIUM 5 MCG: 5 TABLET ORAL at 05:04

## 2024-04-20 RX ADMIN — LEVOTHYROXINE SODIUM 175 MCG: 150 TABLET ORAL at 05:04

## 2024-04-20 RX ADMIN — POTASSIUM CHLORIDE 10 MEQ: 7.46 INJECTION, SOLUTION INTRAVENOUS at 12:04

## 2024-04-20 NOTE — PLAN OF CARE
Pt AAOx4, c/o of neck pain, Dilaudid PRN given per pt request with + outcome. Neck dressing CDI,  did dressing changes throughout the night. PPN infusing @42ml/hr. Pending gen surgery consult with PEG placement. NSR-sinus tach on telemetry. VSS, no acute distress noted. POC on going.     Problem: Adult Inpatient Plan of Care  Goal: Plan of Care Review  Outcome: Ongoing, Progressing  Goal: Absence of Hospital-Acquired Illness or Injury  Outcome: Ongoing, Progressing  Goal: Optimal Comfort and Wellbeing  Outcome: Ongoing, Progressing  Goal: Readiness for Transition of Care  Outcome: Ongoing, Progressing     Problem: Impaired Wound Healing  Goal: Optimal Wound Healing  Outcome: Ongoing, Progressing     Problem: Skin Injury Risk Increased  Goal: Skin Health and Integrity  Outcome: Ongoing, Progressing     Problem: Coping Ineffective  Goal: Effective Coping  Outcome: Ongoing, Progressing

## 2024-04-20 NOTE — SUBJECTIVE & OBJECTIVE
Interval History: Discussed case with general surgery and anesthesia. Anesthesia evaluated patient as well.       Objective:     Vital Signs (Most Recent):  Temp: 98.1 °F (36.7 °C) (04/19/24 2343)  Pulse: 95 (04/19/24 2343)  Resp: 18 (04/19/24 2343)  BP: (!) 148/79 (04/19/24 2353)  SpO2: (!) 94 % (04/20/24 0005) Vital Signs (24h Range):  Temp:  [98 °F (36.7 °C)-99.3 °F (37.4 °C)] 98.1 °F (36.7 °C)  Pulse:  [] 95  Resp:  [18-26] 18  SpO2:  [91 %-98 %] 94 %  BP: (146-161)/(65-80) 148/79     Weight: 85.3 kg (188 lb 0.8 oz)  Body mass index is 30.35 kg/m².  No intake or output data in the 24 hours ending 04/20/24 0054      Physical Exam  Vitals and nursing note reviewed.   Constitutional:       General: She is not in acute distress.     Appearance: She is ill-appearing. She is not toxic-appearing.   Eyes:      Conjunctiva/sclera: Conjunctivae normal.   Neck:      Comments: Dressing in place over entire circumference of neck. Dressing c/d/i  Cardiovascular:      Rate and Rhythm: Normal rate and regular rhythm.      Heart sounds: Normal heart sounds.   Pulmonary:      Effort: Pulmonary effort is normal. No respiratory distress.      Breath sounds: Normal breath sounds. No wheezing.   Abdominal:      General: Bowel sounds are normal. There is no distension.      Palpations: Abdomen is soft.      Tenderness: There is no abdominal tenderness. There is no guarding.   Skin:     General: Skin is warm and dry.   Neurological:      General: No focal deficit present.      Mental Status: She is alert and oriented to person, place, and time. Mental status is at baseline.   Psychiatric:         Mood and Affect: Mood normal.         Behavior: Behavior normal.             Significant Labs: All pertinent labs within the past 24 hours have been reviewed.    Significant Imaging: I have reviewed all pertinent imaging results/findings within the past 24 hours.

## 2024-04-20 NOTE — PROGRESS NOTES
Con Nguyen - Telemetry [facility]  Progress Note    Patient Name: Marysol Cash  MRN: [id number]  Patient Class: IP- Inpatient   Admission Date: 4/12/2024  Length of Stay: 8 days  Attending Physician: Gregoria Donnelly DO  Primary Care Provider: James Coronel MD        Subjective:     Principal Problem:Sepsis        HPI:  Ms. 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 She initially presented to [facility] Emergency Department with six days of epigastric pain with associated nausea, vomiting, and difficulty eating due to pain. However on presentation, she was noted to have a large necrotic and purulent wound located on her right neck with complaints of associated difficulty speaking, swallowing and shortness of breath. She was noted to be hypoxic 88% on room air which improved with 2L NC. Labs were notable for leukocytosis 14, hypokalemia 2.4, hypochloremia 89, CO2 36, hypercalcemia 13.5, Phos 2.5. . Troponin 0.046. Lactate 2.3. CT Soft Tissue Neck was concerning for 9.5 x 8.2 x 10 cm large lobulated mass in the right anterolateral neck extending to the deep spaces of the neck and abutting the right prevertebral space and paravertebral musculature, left midline shift, multiple bilateral necrotic cervical lymph nodes. Case was discussed and [place] [facility] [place].     Of additional note, she recently transitioned her care to [place] and underwent stem-cell transplant approximately 2 weeks ago in [place], has not been seen by a US physician since 2023.      On transfer: she was afebrile, mildly hypertensive /109, HR 98, RR 20, satting 96% on room air. Complaining of mild headache and poor  appetite associated with nausea; denies fever, chills, chest pain, palpitations, SOB, abdominal pain, dysuria. States wound has been draining for the past week initially thick white now more liquid. Mild dysphonia is apparently chronic from left vocal cord paralysis and at baseline. Some dysphagia with solids, none with pureed soft or liquids, denies odynophagia. Dressing on neck CDI, ENT consulted will be here shortly to assess patient.        Overview/Hospital Course:  Evaluated 4/12 in MICU by ENT, who do not feel patient is a candidate for surgical intervention. Palliative care and oncology consulted. Failed swallow study, SLP recommending NPO 4/13. Patient continues to protect airway. Stepped down from MICU on 4/13.    Interval History: Discussed case with general surgery and anesthesia. Anesthesia evaluated patient as well.       Objective:     Vital Signs (Most Recent):  Temp: 98.1 °F (36.7 °C) (04/19/24 2343)  Pulse: 95 (04/19/24 2343)  Resp: 18 (04/19/24 2343)  BP: (!) 148/79 (04/19/24 2353)  SpO2: (!) 94 % (04/20/24 0005) Vital Signs (24h Range):  Temp:  [98 °F (36.7 °C)-99.3 °F (37.4 °C)] 98.1 °F (36.7 °C)  Pulse:  [] 95  Resp:  [18-26] 18  SpO2:  [91 %-98 %] 94 %  BP: (146-161)/(65-80) 148/79     Weight: 85.3 kg (188 lb 0.8 oz)  Body mass index is 30.35 kg/m².  No intake or output data in the 24 hours ending 04/20/24 0054      Physical Exam  Vitals and nursing note reviewed.   Constitutional:       General: She is not in acute distress.     Appearance: She is ill-appearing. She is not toxic-appearing.   Eyes:      Conjunctiva/sclera: Conjunctivae normal.   Neck:      Comments: Dressing in place over entire circumference of neck. Dressing c/d/i  Cardiovascular:      Rate and Rhythm: Normal rate and regular rhythm.      Heart sounds: Normal heart sounds.   Pulmonary:      Effort: Pulmonary effort is normal. No respiratory distress.      Breath sounds: Normal breath sounds. No wheezing.   Abdominal:       General: Bowel sounds are normal. There is no distension.      Palpations: Abdomen is soft.      Tenderness: There is no abdominal tenderness. There is no guarding.   Skin:     General: Skin is warm and dry.   Neurological:      General: No focal deficit present.      Mental Status: She is alert and oriented to person, place, and time. Mental status is at baseline.   Psychiatric:         Mood and Affect: Mood normal.         Behavior: Behavior normal.             Significant Labs: All pertinent labs within the past 24 hours have been reviewed.    Significant Imaging: I have reviewed all pertinent imaging results/findings within the past 24 hours.    Assessment/Plan:      * Sepsis  [place] [facility] 2/4 SIRS (requiring 2L NC w/ RR 20s & WBC 13). Likely soft tissue/neck source. Received IVF at OSH prior to transfer (for lactate 2.3 at that time). Started on broad-spectrum abx. Blood cx negative.   - ID consulted; appreciate recs  - Continue vancomycin + Flagyl + fluconazole   - Monitor CBC & fever curve  - Mgmt of neck mass/wound as below     Severe malnutrition  PEG tube placement is being discussed. IR and GI deferred the procedure. Surgery depending on anesthesia assessment. ENT updated with recs. Awaiting final recs.       Nutrition consulted. Most recent weight and BMI monitored-     Measurements:  Wt Readings from Last 1 Encounters:   04/18/24 85.3 kg (188 lb 0.8 oz)   Body mass index is 30.35 kg/m².    Patient has been screened and assessed by RD.    Malnutrition Type:  Context: chronic illness  Level: severe    Malnutrition Characteristic Summary:  Weight Loss (Malnutrition): greater than 10% in 6 months  Energy Intake (Malnutrition): less than or equal to 50% for greater than or equal to 1 month    Interventions/Recommendations (treatment strategy):  1.      Hypercalcemia  On arrival to OSH, Ca 13.5 --> 11.0 on [place] [facility]. Continued subsequent uptrend peaking at 13.1 on 4/15 (corrected Ca  "14.5). Likely 2/2 malignancy.   - IVF  - Calcitonin BID x2 days   - Monitor CMP     Palliative care encounter  Pt and family asking for PEG tube eval.      Open neck wound  Pt with extensive hx of H/N cancer. Presents w/ continued/worsening drainage from the neck for months with recent worsening of symptoms for past 2 weeks w/ N/V. Pt returned from [place] where she received antibiotics, "detox" and hyperbaric chamber as treatment. Endorses difficulty with swallowing 2/2 to oral trush. Large neck mass on imaging w/ large central collection of air which appears to extend superficially to the skin surface right mid neck anterior laterally; unable to r/o superimposed abscess. Meeting 2/4 SIRS (HR & WBC) w/ elevated lactate, but nml BP. Blood cx negative. Pt not surgical candidate for debridement per ENT evaluation.   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 Underwent repeat bx after failed tx (1/24/22) that showed moderately differentiated squamous cell carcinoma w/ suspicion for perineural invasion. CT soft tissue neck (Jan 2022) w/o e/o metastatic dx. PET-CT (Feb 2022) w/o e/o active local or distant dx- no abnormal activity within the tongue, no signs of cervical lymph node or distant metastasis. Declined chemotherapy &/or XRT. Underwent selective neck dissection levels 1, 2, 3, and partial glossectomy w/ split-thickness skin graft (3/3/2022 per Dr. Clif Olson).  Surgical pathology w/ unifocal squamous cell carcinoma on the dorsal surface of the tongue on left side measuring 1 cm, moderately differentiated, 6 mm depth of invasion, no lymphovascular invasion, " "positive for perineural invasion, margins negative, 0 of 39 lymph nodes with metastasis, pT2 pN0 noted.  Postop course complicated by infection requiring I&D and antibiotics.  Declined adjuvant XRT. Repeat imaging (June 2023) w/ 2.2 cm lesion deep to the R SCM c/f necrotic LN w/ few adjacent pathological appearing LNs suspicious for metastasis. Underwent R neck FNA (7/7/23) w/ pathology suspicious for metastatic SCC. PET-CT (8/21/2023) w/ large necrotic mass the angle of mandible posterior to submandibular gland on the right measuring 4.4 x 4.2 cm with SUV 9.78, just superior to the mass is a 2 cm hypermetabolic lymph node and no evidence of distant metastatic disease. Subsequently transferred care to [place] where she reportedly recently abx,"detox", hyperbaric chamber, & stem-cell transplant approximately 2 weeks ago PTA in [place]. Now presenting w/ progressive neck wound. CT neck soft tissue (4/10/24) w/ large bulky lobulated appearing mass right lateral and anterolateral neck extending to the D spaces of the neck having overall dimensions of approximately 9.5 x 8.2 x 10 cm, multiple addn'l b/l cervical necrotic nodes, & assoc mass effect results in displacement of midline structures to the patient's left. CT C/A/P with diffuse metastatic disease including mediastinal & hilar LAD, bilateral lungs, peritoneum, lumbar vertebrae, & likely liver.     - ENT consulted; appreciate recs - not surgical candidate for debridement  - Medical & Radiation Oncology consulted; appreciate recs - pt declines chemo or XRT  - Palliative following; appreciate assistance  - Multimodal analgesia   - SLP following, appreciate assistance  - Supportive care     Postprocedural hypoparathyroidism  Last PTHi nml at 26 (2016). Home regimen includes calcium carbonate & vitamin D. Symptomatic hypercalcemia (Ca 13.5) on presentation to OSH w/ abdominal pain & N/V. Improved w/ NS ggt (although query if pt's hypercalcemia was re: hx of " hypoparathyroidism vs hypercalcemia of malignancy.   - Holding home calcium carbonate  - Resume home vitamin D as PO tolerance improves   - Monitor CMP    Postsurgical hypothyroidism  Last TSH elevated at 5.264. Home regimen includes levothyroxine 175mcg daily & liothyronine 5mcg daily  - Continue home liothyronine & levothyroxine   - Repeat TSH pending    HTN (hypertension)  Hx of essential HTN; home regimen includes metoprolol 25mg BID. On arrival to [facility], SBP 140s-150s.   - IV metoprolol 5mg Q6H until pt able to tolerate PO      VTE Risk Mitigation (From admission, onward)           Ordered     enoxaparin injection 40 mg  Every 24 hours         04/12/24 1536     IP VTE HIGH RISK PATIENT  Once         04/12/24 1324     Place sequential compression device  Until discontinued         04/12/24 1324                    Discharge Planning   ANDRES: 4/20/2024     Code Status: Full Code   Is the patient medically ready for discharge?: No    Reason for patient still in hospital (select all that apply): Patient trending condition and Treatment  Discharge Plan A: Hospice/home                  Gregoria Donnelly DO  Department of Hospital Medicine   Con Nguyen - Telemetry Stepdown

## 2024-04-20 NOTE — PROCEDURES
"Marysol Cash is a 58 y.o. female patient.    Temp: 97.6 °F (36.4 °C) (04/20/24 1617)  Pulse: 107 (04/20/24 1617)  Resp: 18 (04/20/24 1343)  BP: 130/88 (04/20/24 1615)  SpO2: (!) 93 % (04/20/24 1617)  Weight: 85.3 kg (188 lb 0.8 oz) (04/18/24 1339)  Height: 5' 6" (167.6 cm) (04/18/24 1339)    PICC  Date/Time: 4/20/2024 4:45 PM  Location procedure was performed: [facility] PICC LINE PLACEMENT  Performed by: Dalila Bush RN  Assisting provider: Ron Bess LPN  Consent Done: Yes  Time out: Immediately prior to procedure a time out was called to verify the correct patient, procedure, equipment, support staff and site/side marked as required  Indications: med administration  Anesthesia: local infiltration  Local anesthetic: lidocaine 1% without epinephrine  Anesthetic Total (mL): 3  Preparation: skin prepped with ChloraPrep  Skin prep agent dried: skin prep agent completely dried prior to procedure  Sterile barriers: all five maximum sterile barriers used - cap, mask, sterile gown, sterile gloves, and large sterile sheet  Hand hygiene: hand hygiene performed prior to central venous catheter insertion  Location details: right brachial  Catheter type: double lumen  Catheter size: 5 Fr  Catheter Length: 37cm    Ultrasound guidance: yes  Vessel Caliber: medium and patent, compressibility normal  Vascular Doppler: not done  Needle advanced into vessel with real time Ultrasound guidance.  Guidewire confirmed in vessel.  Image recorded and saved.  Sterile sheath used.  no esophageal manometryNumber of attempts: 1  Post-procedure: blood return through all ports, sterile dressing applied and chlorhexidine patch  Technical procedures used: 3CG  Specimens: No  Implants: No  Assessment: placement verified by x-ray  Complications: none          Name Ron Bess LPN   4/20/2024    "

## 2024-04-20 NOTE — CARE UPDATE
Evaluated patient at bedside for general anesthesia for PEG tube placement. Airway appears tenuous but no significant stridor appreciated and patient on minimal oxygen. Imaging reviewed, appears to be no contraindication to PPV. She would nevertheless be a difficult intubation. General anesthesia would require an awake fiberoptic intubation with minimal sedation done in a controlled setting in the OR with advanced airway equipment available. Discussed with patient and , and they are in agreement.     ENT or general surgery would need to be available during intubation attempt in case of need for emergent surgical airway. Please place case request for the OR after the weekend for sufficient staffing. Discussed with hospital medicine. Full preop evaluation and consent to follow after case booked.     Daniel Krueger MD  Anesthesiology, PGY3  [facility]

## 2024-04-20 NOTE — ASSESSMENT & PLAN NOTE
Marysol Cash is a 58 y.o. F with history of SCC s/p radical resection in 2022 who now presents with recurrent, unresectable disease. General surgery consulted for PEG placement.    -Discussed with patient and  at bedside the options for feeding tube placement. Given head/neck SCC, there are concerns from ENT and anesthesia regarding NGT placement and airway management intra-operatively. From surgical standpoint, no reason to not move forward with NGT placement.     -Discussed that GI service would be first line consult for isolated PEG placement. If they are unable to obtain a good window, general surgery will be available for open G tube placement if indicated. Anesthesia team will evaluate patient today, will await further recommendations from their standpoint.     -Please contact surgery with further questions/concerns.

## 2024-04-20 NOTE — PLAN OF CARE
Patient without new complaints this shift, iv potassium on hold d/t loss of piv access. PICC line inserted, plan of care reviewed with patient and spouse.      Problem: Adult Inpatient Plan of Care  Goal: Plan of Care Review  Outcome: Ongoing, Progressing  Goal: Patient-Specific Goal (Individualized)  Outcome: Ongoing, Progressing  Goal: Absence of Hospital-Acquired Illness or Injury  Outcome: Ongoing, Progressing  Goal: Optimal Comfort and Wellbeing  Outcome: Ongoing, Progressing  Goal: Readiness for Transition of Care  Outcome: Ongoing, Progressing

## 2024-04-20 NOTE — TREATMENT PLAN
Discussed with patient and  at bedside the options for feeding tube placement. Given head/neck SCC, there are concerns from ENT and anesthesia regarding NGT placement and airway management intra-operatively. From surgical standpoint, no reason to not move forward with NGT placement.    Discussed that GI service would be first line consult for isolated PEG placement. If they are unable to obtain a good window, general surgery will be available for open G tube placement if indicated. Anesthesia team will evaluate patient today, will await further recommendations from their standpoint.    Please contact surgery with further questions/concerns.    Sandra Hernandez MD  [facility]  General Surgery PGY-1

## 2024-04-20 NOTE — CONSULTS
D/L PICC placed in right brachial vein, 37 cm in length with 0 cm exposed. Arm circumference 31 cm. Lot#[id number]

## 2024-04-20 NOTE — PROGRESS NOTES
Con Nguyen - Telemetry Stepdown  Wound Care    Patient Name:  Marysol Cash   MRN:  [id number]  Date: 4/19/2024  Diagnosis: Sepsis    History:     Past Medical History:   Diagnosis Date    GERD (gastroesophageal reflux disease)     Heart valve problem     Hypertension     Obesity (BMI 30-39.9) 10/20/2014    Thyroid disease        Social History     Socioeconomic History    Marital status:    Tobacco Use    Smoking status: Never    Smokeless tobacco: Never   Substance and Sexual Activity    Alcohol use: No     Alcohol/week: 0.0 standard drinks of alcohol    Drug use: No    Sexual activity: Yes     Partners: Male     Birth control/protection: See Surgical Hx     Social Determinants of Health     Financial Resource Strain: Low Risk  (4/12/2024)    Overall Financial Resource Strain (CARDIA)     Difficulty of Paying Living Expenses: Not very hard   Food Insecurity: No Food Insecurity (4/12/2024)    Hunger Vital Sign     Worried About Running Out of Food in the Last Year: Never true     Ran Out of Food in the Last Year: Never true   Transportation Needs: No Transportation Needs (4/12/2024)    PRAPARE - Transportation     Lack of Transportation (Medical): No     Lack of Transportation (Non-Medical): No   Physical Activity: Inactive (4/12/2024)    Exercise Vital Sign     Days of Exercise per Week: 0 days     Minutes of Exercise per Session: 0 min   Stress: Stress Concern Present (4/12/2024)    [employer] - Occupational Stress Questionnaire     Feeling of Stress : Rather much   Social Connections: Moderately Integrated (4/12/2024)    Social Connection and Isolation Panel [NHANES]     Frequency of Communication with Friends and Family: More than three times a week     Frequency of Social Gatherings with Friends and Family: More than three times a week     Attends [employer] Services: More than 4 times per year     Active Member of Clubs or Organizations: No     Attends Club or Organization  Meetings: Never     Marital Status:    Housing Stability: Low Risk  (4/12/2024)    Housing Stability Vital Sign     Unable to Pay for Housing in the Last Year: No     Number of Places Lived in the Last Year: 1     Unstable Housing in the Last Year: No       Precautions:     Allergies as of 04/10/2024 - Reviewed 04/10/2024   Allergen Reaction Noted    Ciprofloxacin Swelling 07/08/2014    Codeine Swelling 07/08/2014    Pcn [penicillins] Hives 07/08/2014    Percocet [oxycodone-acetaminophen] Swelling 07/08/2014       WOC Assessment Details/Treatment   Pt resting in bed in NAD. AAOx3.  and family in room. Pt agrees to wound care to right neck wound. Removed gauze dressing. Small serous/yellow drainage noted on dressing. Wound noted moist with small serous drainage across wound bed. Deep open areas noted distally. Unable to visualize wound base. Cleaned wound with Vashe antimicrobial wound cleanser. Applied Aquacel Ag to wound for antimicrobial coverage absorption of drainage, and odor control. Covered with ABD, gauze, and kerlex dressing. Pt tolerated dressing change with minimal discomfort. Primary Nurse present for dressing change.      04/19/24 1545   WOCN Assessment   WOCN Total Time (mins) 30   Visit Date 04/19/24   Visit Time 1545   Consult Type Follow Up   WOCN Speciality Wound   Wound surgical   Intervention assessed;changed;applied;chart review;coordination of care   Teaching on-going   Skin Interventions   Device Skin Pressure Protection absorbent pad utilized/changed;adhesive use limited;positioning supports utilized;pressure points protected   Pressure Reduction Devices positioning supports utilized;pressure-redistributing mattress utilized   Pressure Reduction Techniques frequent weight shift encouraged   Skin Protection adhesive use limited   Positioning   Body Position position changed independently   Head of Bed (HOB) Positioning HOB elevated   Positioning/Transfer Devices pillows    Pressure Injury Prevention    Check Moisture Management Pad Done   Check Medical Devices Done        Altered Skin Integrity 04/11/24 1901 Right Throat #1   Date First Assessed/Time First Assessed: 04/11/24 1901   Altered Skin Integrity Present on Admission - Did Patient arrive to the hospital with altered skin?: yes  Side: Right  Location: Throat  Wound Number: #1   Wound Image     Dressing Appearance Intact;Moist drainage   Drainage Amount Small   Drainage Characteristics/Odor Serous;Yellow   Appearance Red;Maroon;Tan;Yellow;Slough;Moist;Ecchymotic   Tissue loss description Full thickness   Periwound Area Ecchymotic;Excoriated;Macerated;Redness   Wound Edges Jagged;Irregular   Wound Length (cm) 13 cm   Wound Width (cm) 1.8 cm   Wound Surface Area (cm^2) 23.4 cm^2   Care Cleansed with:;Antimicrobial agent;Wound cleanser   Dressing Applied;Silver;Hydrofiber;Gauze;Absorptive Pad;Rolled gauze   Periwound Care Absorptive dressing applied;Cleansed with pH balanced cleanser;Dry periwound area maintained     Taina Pete RN, BSN, CWON  04/19/2024

## 2024-04-20 NOTE — ASSESSMENT & PLAN NOTE
PEG tube placement is being discussed. IR and GI deferred the procedure. Surgery depending on anesthesia assessment. ENT updated with recs. Awaiting final recs.       Nutrition consulted. Most recent weight and BMI monitored-     Measurements:  Wt Readings from Last 1 Encounters:   04/18/24 85.3 kg (188 lb 0.8 oz)   Body mass index is 30.35 kg/m².    Patient has been screened and assessed by RD.    Malnutrition Type:  Context: chronic illness  Level: severe    Malnutrition Characteristic Summary:  Weight Loss (Malnutrition): greater than 10% in 6 months  Energy Intake (Malnutrition): less than or equal to 50% for greater than or equal to 1 month    Interventions/Recommendations (treatment strategy):  1.

## 2024-04-20 NOTE — SUBJECTIVE & OBJECTIVE
Current Facility-Administered Medications   Medication Dose Route Frequency Provider Last Rate Last Admin    acetaminophen oral solution 650 mg  650 mg Oral Q4H PRN Marnie River MD   650 mg at 04/15/24 1002    barium (READI-CAT 2) suspension 450 mL  450 mL Per NG tube Once Viviana Weber PA-C        barium (READI-CAT 2) suspension 450 mL  450 mL Per NG tube Once Viviana Weber PA-C        dextrose 10% bolus 125 mL 125 mL  12.5 g Intravenous PRN Samia Gill MD        dextrose 10% bolus 250 mL 250 mL  25 g Intravenous PRN Samia Gill MD        enoxaparin injection 40 mg  40 mg Subcutaneous Q24H (prophylaxis, 1700) Samia Gill MD   40 mg at 04/19/24 1618    fluconazole (DIFLUCAN) IVPB 200 mg  200 mg Intravenous Q24H Samia Gill  mL/hr at 04/20/24 1147 200 mg at 04/20/24 1147    glucose chewable tablet 16 g  16 g Oral PRN Samia Gill MD        glucose chewable tablet 24 g  24 g Oral PRN Samia Gill MD        HYDROmorphone injection 1 mg  1 mg Intravenous Q2H PRN Gregoria Donnelly DO   1 mg at 04/20/24 0640    hydrOXYzine pamoate capsule 25 mg  25 mg Oral Q8H PRN Samia Gill MD   25 mg at 04/18/24 1627    levothyroxine tablet 175 mcg  175 mcg Oral Before breakfast Marnie River MD   175 mcg at 04/20/24 0541    liothyronine tablet 5 mcg  5 mcg Oral Before breakfast Samia Gill MD   5 mcg at 04/20/24 0541    LORazepam injection 2 mg  2 mg Intravenous Nightly PRN Gregoria Donnelly DO   2 mg at 04/17/24 1930    metoclopramide injection 10 mg  10 mg Intravenous Q6H PRN Marnie River MD   10 mg at 04/15/24 1722    metoprolol injection 5 mg  5 mg Intravenous Q6H Ky Salamanca MD   5 mg at 04/20/24 0541    metronidazole IVPB 500 mg  500 mg Intravenous Q8H Samia Gill MD   Stopped at 04/20/24 0524    naloxone 0.4 mg/mL injection 0.02 mg  0.02 mg Intravenous PRN Samia Gill MD        ondansetron injection  4 mg  4 mg Intravenous Q4H PRN Samia Gill MD   4 mg at 04/20/24 0640    potassium chloride 10 mEq in 100 mL IVPB  10 mEq Intravenous Q1H Gregoria Donnelly  mL/hr at 04/20/24 1017 10 mEq at 04/20/24 1017    prochlorperazine injection Soln 2.5 mg  2.5 mg Intravenous Q6H PRN Minerva Cardenas, DO   2.5 mg at 04/20/24 0419    senna tablet 8.6 mg  8.6 mg Oral BID Marnie River MD   8.6 mg at 04/19/24 2018    Standard Custom Day One ADULT TPN for patient WITH electrolyte abnormality or renal dysfunction (PERIPHERAL)   Intravenous Continuous Gregoria Donnelly DO 42 mL/hr at 04/20/24 0559 Rate Verify at 04/20/24 0559    Standard Custom Day One ADULT TPN for patient WITH electrolyte abnormality or renal dysfunction (PERIPHERAL)   Intravenous Continuous Gregoria Donnelly DO        vancomycin - pharmacy to dose   Intravenous pharmacy to manage frequency Samia Gill MD        vancomycin 1.75 g in 5 % dextrose 500 mL IVPB  20 mg/kg Intravenous Q24H Marnie River MD   Stopped at 04/19/24 1930       Review of patient's allergies indicates:   Allergen Reactions    Ciprofloxacin Swelling    Codeine Swelling    Opioids - morphine analogues     Pcn [penicillins] Hives     Tolerated cefepime 04/2024    Percocet [oxycodone-acetaminophen] Swelling       Past Medical History:   Diagnosis Date    GERD (gastroesophageal reflux disease)     Heart valve problem     Hypertension     Obesity (BMI 30-39.9) 10/20/2014    Thyroid disease      Past Surgical History:   Procedure Laterality Date    cesaean section  1991, 1993    CHOLECYSTECTOMY  2008    COLONOSCOPY  09/2013    ESOPHAGOGASTRODUODENOSCOPY N/A 5/11/2020    Procedure: EGD (ESOPHAGOGASTRODUODENOSCOPY);  Surgeon: Nahed Zabala MD;  Location: [facility];  Service: Endoscopy;  Laterality: N/A;  covid 5/8/2020    HYSTERECTOMY  age 27    for fibroids    SALPINGECTOMY  1992    for ectopic pregnancy    TONGUE SURGERY  03/04/2022    Cancer removed on  tongue and lymphs    TOTAL THYROIDECTOMY  2012    UPPER GASTROINTESTINAL ENDOSCOPY  09/2013    gastritis-hp neg     Family History       Problem Relation (Age of Onset)    Diabetes Mother, Father, Brother    Heart disease Mother, Father, Brother    Hyperlipidemia Mother    Hypertension Mother, Father          Tobacco Use    Smoking status: Never    Smokeless tobacco: Never   Substance and Sexual Activity    Alcohol use: No     Alcohol/week: 0.0 standard drinks of alcohol    Drug use: No    Sexual activity: Yes     Partners: Male     Birth control/protection: See Surgical Hx     Review of Systems   Constitutional:  Negative for chills and fever.   HENT:  Positive for trouble swallowing.    Eyes: Negative.    Respiratory:  Negative for chest tightness and shortness of breath.    Cardiovascular:  Negative for chest pain.   Gastrointestinal:  Negative for abdominal pain, nausea and vomiting.   Genitourinary: Negative.    Musculoskeletal: Negative.    Skin:  Positive for wound.   Neurological:  Positive for speech difficulty.     Objective:     Vital Signs (Most Recent):  Temp: 97.7 °F (36.5 °C) (04/20/24 1138)  Pulse: 100 (04/20/24 1138)  Resp: 16 (04/20/24 0819)  BP: 132/79 (04/20/24 1138)  SpO2: 96 % (04/20/24 1138) Vital Signs (24h Range):  Temp:  [97.6 °F (36.4 °C)-98.6 °F (37 °C)] 97.7 °F (36.5 °C)  Pulse:  [] 100  Resp:  [16-26] 16  SpO2:  [91 %-96 %] 96 %  BP: (105-152)/(70-80) 132/79     Weight: 85.3 kg (188 lb 0.8 oz)  Body mass index is 30.35 kg/m².     Physical Exam  Constitutional:       Appearance: Normal appearance.   HENT:      Head: Normocephalic and atraumatic.      Nose: Nose normal.      Mouth/Throat:      Mouth: Mucous membranes are moist.      Pharynx: Oropharynx is clear.   Eyes:      Extraocular Movements: Extraocular movements intact.      Conjunctiva/sclera: Conjunctivae normal.   Neck:      Comments: diffuse neck swelling with overlying skin discoloration, large wound with drainage noted,  dressed with gauze and ABD pads.  No tenderness to palpation, appears insensate     Cardiovascular:      Rate and Rhythm: Normal rate and regular rhythm.   Pulmonary:      Effort: Pulmonary effort is normal.      Breath sounds: Normal breath sounds.   Abdominal:      General: Abdomen is flat.      Palpations: Abdomen is soft.      Comments: Midline incision well approximated without induration or erythema   Skin:     General: Skin is warm and dry.      Capillary Refill: Capillary refill takes less than 2 seconds.   Neurological:      General: No focal deficit present.      Mental Status: She is alert.            I have reviewed all pertinent lab results within the past 24 hours.    Significant Diagnostics:  I have reviewed all pertinent imaging results/findings within the past 24 hours.

## 2024-04-20 NOTE — HPI
Marysol Cash is a 58 year-old female with a PMHx of SCC of the tongue with history of radical neck dissection in 03/2022 (had PEG placed at that time) who now presents as transfer to [facility] with suspected recurrence of SCC and left vocal cord paralysis. She was evaluated by ENT who performed bedside biopsy of eroding neck mass. Path confirmed recurrent disease. Imaging is consistent with unresectable disease and palliative care was recommended due to the extent of mass involvement.    General surgery was consulted for consideration of PEG placement to ensure reliable means of nutrition given paralysis of cord.

## 2024-04-20 NOTE — CONSULTS
[facility] - Telemetry Stepdown  General Surgery  Consult Note    Patient Name: Marysol Cash  MRN: [id number]  Code Status: Full Code  Admission Date: 4/12/2024  Hospital Length of Stay: 8 days  Attending Physician: Gregoria Donnelly DO  Primary Care Provider: James Coronel MD    Patient information was obtained from patient, spouse/SO, and past medical records.     Inpatient consult to General Surgery  Consult performed by: Sandra Hernandez MD  Consult ordered by: Gregoria Donnelly DO        Subjective:     Principal Problem: Sepsis    History of Present Illness: Marysol Cash is a 58 year-old female with a PMHx of SCC of the tongue with history of radical neck dissection in 03/2022 (had PEG placed at that time) who now presents as transfer to [facility] with suspected recurrence of SCC and left vocal cord paralysis. She was evaluated by ENT who performed bedside biopsy of eroding neck mass. Path confirmed recurrent disease. Imaging is consistent with unresectable disease and palliative care was recommended due to the extent of mass involvement.    General surgery was consulted for consideration of PEG placement to ensure reliable means of nutrition given paralysis of cord.      Current Facility-Administered Medications   Medication Dose Route Frequency Provider Last Rate Last Admin    acetaminophen oral solution 650 mg  650 mg Oral Q4H PRN Marnie River MD   650 mg at 04/15/24 1002    barium (READI-CAT 2) suspension 450 mL  450 mL Per NG tube Once Viviana Weber PA-C        barium (READI-CAT 2) suspension 450 mL  450 mL Per NG tube Once Viviana Weber PA-C        dextrose 10% bolus 125 mL 125 mL  12.5 g Intravenous PRN Samia Gill MD        dextrose 10% bolus 250 mL 250 mL  25 g Intravenous PRN Samia Gill MD        enoxaparin injection 40 mg  40 mg Subcutaneous Q24H (prophylaxis, 1700) Samia Gill MD   40 mg at 04/19/24 1618    fluconazole (DIFLUCAN) IVPB 200 mg  200 mg  Intravenous Q24H Samia Gill  mL/hr at 04/20/24 1147 200 mg at 04/20/24 1147    glucose chewable tablet 16 g  16 g Oral PRN Samia Gill MD        glucose chewable tablet 24 g  24 g Oral PRN Samia Gill MD        HYDROmorphone injection 1 mg  1 mg Intravenous Q2H PRN Gregoria Donnelly DO   1 mg at 04/20/24 0640    hydrOXYzine pamoate capsule 25 mg  25 mg Oral Q8H PRN Samia Gill MD   25 mg at 04/18/24 1627    levothyroxine tablet 175 mcg  175 mcg Oral Before breakfast Marnie River MD   175 mcg at 04/20/24 0541    liothyronine tablet 5 mcg  5 mcg Oral Before breakfast Samia Gill MD   5 mcg at 04/20/24 0541    LORazepam injection 2 mg  2 mg Intravenous Nightly PRN Gregoria Donnelly DO   2 mg at 04/17/24 1930    metoclopramide injection 10 mg  10 mg Intravenous Q6H PRN Marine River MD   10 mg at 04/15/24 1722    metoprolol injection 5 mg  5 mg Intravenous Q6H Ky Salamanca MD   5 mg at 04/20/24 0541    metronidazole IVPB 500 mg  500 mg Intravenous Q8H Samia Gill MD   Stopped at 04/20/24 0524    naloxone 0.4 mg/mL injection 0.02 mg  0.02 mg Intravenous PRN Samia Gill MD        ondansetron injection 4 mg  4 mg Intravenous Q4H PRN Samia Gill MD   4 mg at 04/20/24 0640    potassium chloride 10 mEq in 100 mL IVPB  10 mEq Intravenous Q1H Gregoria Donnelly  mL/hr at 04/20/24 1017 10 mEq at 04/20/24 1017    prochlorperazine injection Soln 2.5 mg  2.5 mg Intravenous Q6H PRN Minerva Cardenas DO   2.5 mg at 04/20/24 0419    senna tablet 8.6 mg  8.6 mg Oral BID Marnie River MD   8.6 mg at 04/19/24 2018    Standard Custom Day One ADULT TPN for patient WITH electrolyte abnormality or renal dysfunction (PERIPHERAL)   Intravenous Continuous Gregoria Donnelly DO 42 mL/hr at 04/20/24 0559 Rate Verify at 04/20/24 0559    Standard Custom Day One ADULT TPN for patient WITH electrolyte abnormality or renal dysfunction  (PERIPHERAL)   Intravenous Continuous Gregoria Donnelly DO        vancomycin - pharmacy to dose   Intravenous pharmacy to manage frequency Samia Gill MD        vancomycin 1.75 g in 5 % dextrose 500 mL IVPB  20 mg/kg Intravenous Q24H Marnie River MD   Stopped at 04/19/24 1930       Review of patient's allergies indicates:   Allergen Reactions    Ciprofloxacin Swelling    Codeine Swelling    Opioids - morphine analogues     Pcn [penicillins] Hives     Tolerated cefepime 04/2024    Percocet [oxycodone-acetaminophen] Swelling       Past Medical History:   Diagnosis Date    GERD (gastroesophageal reflux disease)     Heart valve problem     Hypertension     Obesity (BMI 30-39.9) 10/20/2014    Thyroid disease      Past Surgical History:   Procedure Laterality Date    cesaean section  1991, 1993    CHOLECYSTECTOMY  2008    COLONOSCOPY  09/2013    ESOPHAGOGASTRODUODENOSCOPY N/A 5/11/2020    Procedure: EGD (ESOPHAGOGASTRODUODENOSCOPY);  Surgeon: Nahed Zabala MD;  Location: [facility];  Service: Endoscopy;  Laterality: N/A;  covid 5/8/2020    HYSTERECTOMY  age 27    for fibroids    SALPINGECTOMY  1992    for ectopic pregnancy    TONGUE SURGERY  03/04/2022    Cancer removed on tongue and lymphs    TOTAL THYROIDECTOMY  2012    UPPER GASTROINTESTINAL ENDOSCOPY  09/2013    gastritis-hp neg     Family History       Problem Relation (Age of Onset)    Diabetes Mother, Father, Brother    Heart disease Mother, Father, Brother    Hyperlipidemia Mother    Hypertension Mother, Father          Tobacco Use    Smoking status: Never    Smokeless tobacco: Never   Substance and Sexual Activity    Alcohol use: No     Alcohol/week: 0.0 standard drinks of alcohol    Drug use: No    Sexual activity: Yes     Partners: Male     Birth control/protection: See Surgical Hx     Review of Systems   Constitutional:  Negative for chills and fever.   HENT:  Positive for trouble swallowing.    Eyes: Negative.    Respiratory:  Negative  for chest tightness and shortness of breath.    Cardiovascular:  Negative for chest pain.   Gastrointestinal:  Negative for abdominal pain, nausea and vomiting.   Genitourinary: Negative.    Musculoskeletal: Negative.    Skin:  Positive for wound.   Neurological:  Positive for speech difficulty.     Objective:     Vital Signs (Most Recent):  Temp: 97.7 °F (36.5 °C) (04/20/24 1138)  Pulse: 100 (04/20/24 1138)  Resp: 16 (04/20/24 0819)  BP: 132/79 (04/20/24 1138)  SpO2: 96 % (04/20/24 1138) Vital Signs (24h Range):  Temp:  [97.6 °F (36.4 °C)-98.6 °F (37 °C)] 97.7 °F (36.5 °C)  Pulse:  [] 100  Resp:  [16-26] 16  SpO2:  [91 %-96 %] 96 %  BP: (105-152)/(70-80) 132/79     Weight: 85.3 kg (188 lb 0.8 oz)  Body mass index is 30.35 kg/m².     Physical Exam  Constitutional:       Appearance: Normal appearance.   HENT:      Head: Normocephalic and atraumatic.      Nose: Nose normal.      Mouth/Throat:      Mouth: Mucous membranes are moist.      Pharynx: Oropharynx is clear.   Eyes:      Extraocular Movements: Extraocular movements intact.      Conjunctiva/sclera: Conjunctivae normal.   Neck:      Comments: diffuse neck swelling with overlying skin discoloration, large wound with drainage noted, dressed with gauze and ABD pads.  No tenderness to palpation, appears insensate     Cardiovascular:      Rate and Rhythm: Normal rate and regular rhythm.   Pulmonary:      Effort: Pulmonary effort is normal.      Breath sounds: Normal breath sounds.   Abdominal:      General: Abdomen is flat.      Palpations: Abdomen is soft.      Comments: Midline incision well approximated without induration or erythema   Skin:     General: Skin is warm and dry.      Capillary Refill: Capillary refill takes less than 2 seconds.   Neurological:      General: No focal deficit present.      Mental Status: She is alert.            I have reviewed all pertinent lab results within the past 24 hours.    Significant Diagnostics:  I have reviewed all  pertinent imaging results/findings within the past 24 hours.    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 F with history of SCC s/p radical resection in 2022 who now presents with recurrent, unresectable disease. General surgery consulted for PEG placement.    -Discussed with patient and  at bedside the options for feeding tube placement. Given head/neck SCC, there are concerns from ENT and anesthesia regarding NGT placement and airway management intra-operatively. From surgical standpoint, no reason to not move forward with NGT placement.     -Discussed that GI service would be first line consult for isolated PEG placement. If they are unable to obtain a good window, general surgery will be available for open G tube placement if indicated. Anesthesia team will evaluate patient today, will await further recommendations from their standpoint.     -Please contact surgery with further questions/concerns.              Thank you for your consult. I will sign off. Please contact us if you have any additional questions.    Sandra Hernandez MD  General Surgery  [facility] - Telemetry Stepdown

## 2024-04-21 LAB
ALBUMIN SERPL BCP-MCNC: 2.4 G/DL (ref 3.5–5.2)
ALP SERPL-CCNC: 95 U/L (ref 55–135)
ALT SERPL W/O P-5'-P-CCNC: 15 U/L (ref 10–44)
ANION GAP SERPL CALC-SCNC: 5 MMOL/L (ref 8–16)
ANION GAP SERPL CALC-SCNC: 5 MMOL/L (ref 8–16)
AST SERPL-CCNC: 18 U/L (ref 10–40)
BASOPHILS # BLD AUTO: 0.04 K/UL (ref 0–0.2)
BASOPHILS NFR BLD: 0.2 % (ref 0–1.9)
BILIRUB SERPL-MCNC: 0.4 MG/DL (ref 0.1–1)
BUN SERPL-MCNC: 16 MG/DL (ref 6–20)
BUN SERPL-MCNC: 17 MG/DL (ref 6–20)
CA-I BLDV-SCNC: 1.59 MMOL/L (ref 1.06–1.42)
CALCIUM SERPL-MCNC: 12.4 MG/DL (ref 8.7–10.5)
CALCIUM SERPL-MCNC: 12.4 MG/DL (ref 8.7–10.5)
CHLORIDE SERPL-SCNC: 95 MMOL/L (ref 95–110)
CHLORIDE SERPL-SCNC: 97 MMOL/L (ref 95–110)
CO2 SERPL-SCNC: 33 MMOL/L (ref 23–29)
CO2 SERPL-SCNC: 34 MMOL/L (ref 23–29)
CREAT SERPL-MCNC: 0.8 MG/DL (ref 0.5–1.4)
CREAT SERPL-MCNC: 0.9 MG/DL (ref 0.5–1.4)
DIFFERENTIAL METHOD BLD: ABNORMAL
EOSINOPHIL # BLD AUTO: 0 K/UL (ref 0–0.5)
EOSINOPHIL NFR BLD: 0 % (ref 0–8)
ERYTHROCYTE [DISTWIDTH] IN BLOOD BY AUTOMATED COUNT: 15 % (ref 11.5–14.5)
EST. GFR  (NO RACE VARIABLE): >60 ML/MIN/1.73 M^2
EST. GFR  (NO RACE VARIABLE): >60 ML/MIN/1.73 M^2
GLUCOSE SERPL-MCNC: 114 MG/DL (ref 70–110)
GLUCOSE SERPL-MCNC: 130 MG/DL (ref 70–110)
HCT VFR BLD AUTO: 37.5 % (ref 37–48.5)
HGB BLD-MCNC: 12.4 G/DL (ref 12–16)
IMM GRANULOCYTES # BLD AUTO: 0.1 K/UL (ref 0–0.04)
IMM GRANULOCYTES NFR BLD AUTO: 0.6 % (ref 0–0.5)
LYMPHOCYTES # BLD AUTO: 2.3 K/UL (ref 1–4.8)
LYMPHOCYTES NFR BLD: 13.5 % (ref 18–48)
MCH RBC QN AUTO: 28.6 PG (ref 27–31)
MCHC RBC AUTO-ENTMCNC: 33.1 G/DL (ref 32–36)
MCV RBC AUTO: 87 FL (ref 82–98)
MONOCYTES # BLD AUTO: 1.3 K/UL (ref 0.3–1)
MONOCYTES NFR BLD: 7.7 % (ref 4–15)
NEUTROPHILS # BLD AUTO: 13.2 K/UL (ref 1.8–7.7)
NEUTROPHILS NFR BLD: 78 % (ref 38–73)
NRBC BLD-RTO: 0 /100 WBC
PLATELET # BLD AUTO: 225 K/UL (ref 150–450)
PMV BLD AUTO: 10.1 FL (ref 9.2–12.9)
POCT GLUCOSE: 133 MG/DL (ref 70–110)
POTASSIUM SERPL-SCNC: 2.9 MMOL/L (ref 3.5–5.1)
POTASSIUM SERPL-SCNC: 3.5 MMOL/L (ref 3.5–5.1)
PROT SERPL-MCNC: 5.3 G/DL (ref 6–8.4)
PTH-INTACT SERPL-MCNC: <5 PG/ML (ref 9–77)
RBC # BLD AUTO: 4.33 M/UL (ref 4–5.4)
SODIUM SERPL-SCNC: 134 MMOL/L (ref 136–145)
SODIUM SERPL-SCNC: 135 MMOL/L (ref 136–145)
WBC # BLD AUTO: 16.98 K/UL (ref 3.9–12.7)

## 2024-04-21 PROCEDURE — 94761 N-INVAS EAR/PLS OXIMETRY MLT: CPT

## 2024-04-21 PROCEDURE — 25000003 PHARM REV CODE 250: Performed by: STUDENT IN AN ORGANIZED HEALTH CARE EDUCATION/TRAINING PROGRAM

## 2024-04-21 PROCEDURE — 80053 COMPREHEN METABOLIC PANEL: CPT | Performed by: STUDENT IN AN ORGANIZED HEALTH CARE EDUCATION/TRAINING PROGRAM

## 2024-04-21 PROCEDURE — B4185 PARENTERAL SOL 10 GM LIPIDS: HCPCS | Performed by: STUDENT IN AN ORGANIZED HEALTH CARE EDUCATION/TRAINING PROGRAM

## 2024-04-21 PROCEDURE — 63600175 PHARM REV CODE 636 W HCPCS: Performed by: STUDENT IN AN ORGANIZED HEALTH CARE EDUCATION/TRAINING PROGRAM

## 2024-04-21 PROCEDURE — 63600175 PHARM REV CODE 636 W HCPCS

## 2024-04-21 PROCEDURE — A4217 STERILE WATER/SALINE, 500 ML: HCPCS | Performed by: STUDENT IN AN ORGANIZED HEALTH CARE EDUCATION/TRAINING PROGRAM

## 2024-04-21 PROCEDURE — 83970 ASSAY OF PARATHORMONE: CPT | Performed by: STUDENT IN AN ORGANIZED HEALTH CARE EDUCATION/TRAINING PROGRAM

## 2024-04-21 PROCEDURE — 20600001 HC STEP DOWN PRIVATE ROOM

## 2024-04-21 PROCEDURE — 82330 ASSAY OF CALCIUM: CPT | Performed by: STUDENT IN AN ORGANIZED HEALTH CARE EDUCATION/TRAINING PROGRAM

## 2024-04-21 PROCEDURE — 27000221 HC OXYGEN, UP TO 24 HOURS

## 2024-04-21 PROCEDURE — 85025 COMPLETE CBC W/AUTO DIFF WBC: CPT | Performed by: STUDENT IN AN ORGANIZED HEALTH CARE EDUCATION/TRAINING PROGRAM

## 2024-04-21 PROCEDURE — 25000003 PHARM REV CODE 250

## 2024-04-21 PROCEDURE — A4216 STERILE WATER/SALINE, 10 ML: HCPCS | Performed by: STUDENT IN AN ORGANIZED HEALTH CARE EDUCATION/TRAINING PROGRAM

## 2024-04-21 PROCEDURE — 80048 BASIC METABOLIC PNL TOTAL CA: CPT | Mod: XB | Performed by: STUDENT IN AN ORGANIZED HEALTH CARE EDUCATION/TRAINING PROGRAM

## 2024-04-21 RX ORDER — POTASSIUM CHLORIDE 14.9 MG/ML
20 INJECTION INTRAVENOUS
Status: COMPLETED | OUTPATIENT
Start: 2024-04-21 | End: 2024-04-21

## 2024-04-21 RX ORDER — SODIUM CHLORIDE 9 MG/ML
INJECTION, SOLUTION INTRAVENOUS CONTINUOUS
Status: ACTIVE | OUTPATIENT
Start: 2024-04-21 | End: 2024-04-21

## 2024-04-21 RX ADMIN — Medication 10 ML: at 05:04

## 2024-04-21 RX ADMIN — HYDROMORPHONE HYDROCHLORIDE 1 MG: 1 INJECTION, SOLUTION INTRAMUSCULAR; INTRAVENOUS; SUBCUTANEOUS at 11:04

## 2024-04-21 RX ADMIN — ONDANSETRON 4 MG: 2 INJECTION INTRAMUSCULAR; INTRAVENOUS at 02:04

## 2024-04-21 RX ADMIN — METRONIDAZOLE 500 MG: 500 INJECTION, SOLUTION INTRAVENOUS at 09:04

## 2024-04-21 RX ADMIN — HYDROMORPHONE HYDROCHLORIDE 1 MG: 1 INJECTION, SOLUTION INTRAMUSCULAR; INTRAVENOUS; SUBCUTANEOUS at 02:04

## 2024-04-21 RX ADMIN — METRONIDAZOLE 500 MG: 500 INJECTION, SOLUTION INTRAVENOUS at 06:04

## 2024-04-21 RX ADMIN — METOROPROLOL TARTRATE 5 MG: 5 INJECTION, SOLUTION INTRAVENOUS at 05:04

## 2024-04-21 RX ADMIN — HYDROMORPHONE HYDROCHLORIDE 1 MG: 1 INJECTION, SOLUTION INTRAMUSCULAR; INTRAVENOUS; SUBCUTANEOUS at 05:04

## 2024-04-21 RX ADMIN — POTASSIUM CHLORIDE 20 MEQ: 200 INJECTION, SOLUTION INTRAVENOUS at 11:04

## 2024-04-21 RX ADMIN — I.V. FAT EMULSION 250 ML: 20 EMULSION INTRAVENOUS at 10:04

## 2024-04-21 RX ADMIN — METOCLOPRAMIDE 10 MG: 5 INJECTION, SOLUTION INTRAMUSCULAR; INTRAVENOUS at 09:04

## 2024-04-21 RX ADMIN — PROCHLORPERAZINE EDISYLATE 2.5 MG: 5 INJECTION INTRAMUSCULAR; INTRAVENOUS at 06:04

## 2024-04-21 RX ADMIN — MAGNESIUM SULFATE HEPTAHYDRATE: 500 INJECTION, SOLUTION INTRAMUSCULAR; INTRAVENOUS at 10:04

## 2024-04-21 RX ADMIN — POTASSIUM CHLORIDE 20 MEQ: 200 INJECTION, SOLUTION INTRAVENOUS at 02:04

## 2024-04-21 RX ADMIN — Medication 10 ML: at 11:04

## 2024-04-21 RX ADMIN — SENNOSIDES 8.6 MG: 8.6 TABLET, FILM COATED ORAL at 08:04

## 2024-04-21 RX ADMIN — FLUCONAZOLE 200 MG: 2 INJECTION, SOLUTION INTRAVENOUS at 11:04

## 2024-04-21 RX ADMIN — METRONIDAZOLE 500 MG: 500 INJECTION, SOLUTION INTRAVENOUS at 01:04

## 2024-04-21 RX ADMIN — SODIUM CHLORIDE: 9 INJECTION, SOLUTION INTRAVENOUS at 11:04

## 2024-04-21 RX ADMIN — HYDROMORPHONE HYDROCHLORIDE 1 MG: 1 INJECTION, SOLUTION INTRAMUSCULAR; INTRAVENOUS; SUBCUTANEOUS at 08:04

## 2024-04-21 RX ADMIN — METOCLOPRAMIDE 10 MG: 5 INJECTION, SOLUTION INTRAMUSCULAR; INTRAVENOUS at 05:04

## 2024-04-21 RX ADMIN — METOROPROLOL TARTRATE 5 MG: 5 INJECTION, SOLUTION INTRAVENOUS at 11:04

## 2024-04-21 RX ADMIN — ENOXAPARIN SODIUM 40 MG: 40 INJECTION SUBCUTANEOUS at 05:04

## 2024-04-21 RX ADMIN — ONDANSETRON 4 MG: 2 INJECTION INTRAMUSCULAR; INTRAVENOUS at 08:04

## 2024-04-21 RX ADMIN — LEVOTHYROXINE SODIUM 175 MCG: 150 TABLET ORAL at 06:04

## 2024-04-21 RX ADMIN — Medication 10 ML: at 06:04

## 2024-04-21 RX ADMIN — VANCOMYCIN HYDROCHLORIDE 1750 MG: 500 INJECTION, POWDER, LYOPHILIZED, FOR SOLUTION INTRAVENOUS at 08:04

## 2024-04-21 RX ADMIN — POTASSIUM CHLORIDE 20 MEQ: 200 INJECTION, SOLUTION INTRAVENOUS at 04:04

## 2024-04-21 RX ADMIN — LIOTHYRONINE SODIUM 5 MCG: 5 TABLET ORAL at 06:04

## 2024-04-21 RX ADMIN — ONDANSETRON 4 MG: 2 INJECTION INTRAMUSCULAR; INTRAVENOUS at 11:04

## 2024-04-21 RX ADMIN — METOROPROLOL TARTRATE 5 MG: 5 INJECTION, SOLUTION INTRAVENOUS at 06:04

## 2024-04-21 NOTE — PLAN OF CARE
Problem: Adult Inpatient Plan of Care  Goal: Absence of Hospital-Acquired Illness or Injury  Outcome: Ongoing, Progressing  Goal: Optimal Comfort and Wellbeing  Outcome: Ongoing, Progressing  Goal: Readiness for Transition of Care  Outcome: Ongoing, Progressing     Problem: Adjustment to Illness (Sepsis/Septic Shock)  Goal: Optimal Coping  Outcome: Ongoing, Progressing

## 2024-04-21 NOTE — PLAN OF CARE
Department of Otolaryngology - Head and Neck Surgery  Plan of Care Note    Called by general surgery team to discuss plan for G tube placement. Per anesthesia, plan for awake fiberoptic intubation but would like to have ENT in room in case of need for emergent surgical airway.     Discussed plan and potential need for tracheostomy if unable to be intubated by anesthesia with patient and  at bedside. They are very hesitant at this time. Would like more time to consider. Discussed that if patient is not willing to consent to possible tracheostomy (if unable to successfully intubate), that there is a chance anesthesia will be unwilling to proceed to operating room due to high chance for difficult intubation.    Patient and  would like to discuss this. Will plan to revisit conversation again tomorrow. Consider FFL at that time to reassess airway.       Larissa Flores MD   Otolaryngology-Head and Neck Surgery   PGY3

## 2024-04-21 NOTE — CARE UPDATE
Anesthesia recommendations reviewed. Will coordinate with ENT this week and likely plan for PEG in the operating room in case emergent surgical airway is needed.    Sandra Hernandez MD  [facility]  General Surgery PGY-1      Addendum  Discussed case with ENT. Will plan for OR tomorrow. Consent previously obtained.     Tanya Rodriguez MD  General Surgery, PGY-5  [phone]

## 2024-04-21 NOTE — PROGRESS NOTES
Pharmacokinetic Assessment Follow Up: IV Vancomycin    Vancomycin serum concentration assessment(s):    The trough level was drawn correctly and can be used to guide therapy at this time. The measurement is within the desired definitive target range of 10 to 15 mcg/mL.    Vancomycin Regimen Plan:    -VT resulted at 14.9 (goal 10-15)  -Renal function appears stable  -Will continue regimen of 1750 mg every 24 hours  -Next scheduled VT for 4/22/24 1830        Drug levels (last 3 results):  Recent Labs   Lab Result Units 04/19/24  1000 04/20/24  1754   Vancomycin-Trough ug/mL 14.0 14.9       Pharmacy will continue to follow and monitor vancomycin.    Please contact pharmacy at extension [phone] for questions regarding this assessment.    Thank you for the consult,   Александр Hoover       Patient brief summary:  Marysol Cash is a 58 y.o. female initiated on antimicrobial therapy with IV Vancomycin for treatment of skin & soft tissue infection    Drug Allergies:   Review of patient's allergies indicates:   Allergen Reactions    Ciprofloxacin Swelling    Codeine Swelling    Opioids - morphine analogues     Pcn [penicillins] Hives     Tolerated cefepime 04/2024    Percocet [oxycodone-acetaminophen] Swelling       Actual Body Weight:   85.3 kg    Renal Function:   Estimated Creatinine Clearance: 85.4 mL/min (based on SCr of 0.8 mg/dL).,     Dialysis Method (if applicable):  N/A    CBC (last 72 hours):  Recent Labs   Lab Result Units 04/18/24  0430 04/19/24  0302 04/20/24  0257   WBC K/uL 17.49* 17.77* 17.02*   Hemoglobin g/dL 13.0 13.1 13.1   Hematocrit % 39.3 39.5 40.8   Platelets K/uL 276 281 281   Gran % % 79.4* 77.4* 76.7*   Lymph % % 12.8* 14.2* 14.0*   Mono % % 7.2 7.6 8.5   Eosinophil % % 0.0 0.0 0.0   Basophil % % 0.1 0.1 0.1   Differential Method  Automated Automated Automated       Metabolic Panel (last 72 hours):  Recent Labs   Lab Result Units 04/18/24  0430 04/19/24  0302 04/20/24  0257   Sodium mmol/L 139 138  136   Potassium mmol/L 3.3* 3.8 3.2*   Chloride mmol/L 103 103 99   CO2 mmol/L 28 26 28   Glucose mg/dL 88 78 116*   BUN mg/dL 13 14 14   Creatinine mg/dL 0.7 0.8 0.8       Vancomycin Administrations:  vancomycin given in the last 96 hours                     vancomycin 1.75 g in 5 % dextrose 500 mL IVPB (mg) 1,750 mg New Bag 04/20/24 1926     1,750 mg New Bag 04/19/24 1730     1,750 mg New Bag 04/18/24 1731     1,750 mg New Bag 04/17/24 1813                    Microbiologic Results:  Microbiology Results (last 7 days)       Procedure Component Value Units Date/Time    Culture, Anaerobe [[id number]]     Order Status: Canceled Specimen: Wound     Aerobic culture [[id number]]     Order Status: Canceled Specimen: Wound     AFB Culture & Smear [[id number]]     Order Status: Canceled Specimen: Wound

## 2024-04-22 LAB
ALBUMIN SERPL BCP-MCNC: 2.4 G/DL (ref 3.5–5.2)
ALP SERPL-CCNC: 100 U/L (ref 55–135)
ALT SERPL W/O P-5'-P-CCNC: 16 U/L (ref 10–44)
ANION GAP SERPL CALC-SCNC: 11 MMOL/L (ref 8–16)
ANION GAP SERPL CALC-SCNC: 11 MMOL/L (ref 8–16)
ANION GAP SERPL CALC-SCNC: 7 MMOL/L (ref 8–16)
AST SERPL-CCNC: 95 U/L (ref 10–40)
BASOPHILS # BLD AUTO: 0.03 K/UL (ref 0–0.2)
BASOPHILS NFR BLD: 0.2 % (ref 0–1.9)
BILIRUB SERPL-MCNC: 0.4 MG/DL (ref 0.1–1)
BUN SERPL-MCNC: 13 MG/DL (ref 6–20)
BUN SERPL-MCNC: 13 MG/DL (ref 6–20)
BUN SERPL-MCNC: 14 MG/DL (ref 6–20)
CALCIUM SERPL-MCNC: 11.6 MG/DL (ref 8.7–10.5)
CALCIUM SERPL-MCNC: 11.6 MG/DL (ref 8.7–10.5)
CALCIUM SERPL-MCNC: 12.8 MG/DL (ref 8.7–10.5)
CHLORIDE SERPL-SCNC: 89 MMOL/L (ref 95–110)
CHLORIDE SERPL-SCNC: 89 MMOL/L (ref 95–110)
CHLORIDE SERPL-SCNC: 96 MMOL/L (ref 95–110)
CO2 SERPL-SCNC: 28 MMOL/L (ref 23–29)
CO2 SERPL-SCNC: 28 MMOL/L (ref 23–29)
CO2 SERPL-SCNC: 36 MMOL/L (ref 23–29)
CREAT SERPL-MCNC: 0.8 MG/DL (ref 0.5–1.4)
CREAT SERPL-MCNC: 1.1 MG/DL (ref 0.5–1.4)
CREAT SERPL-MCNC: 1.1 MG/DL (ref 0.5–1.4)
DIFFERENTIAL METHOD BLD: ABNORMAL
EOSINOPHIL # BLD AUTO: 0 K/UL (ref 0–0.5)
EOSINOPHIL NFR BLD: 0 % (ref 0–8)
ERYTHROCYTE [DISTWIDTH] IN BLOOD BY AUTOMATED COUNT: 15.3 % (ref 11.5–14.5)
EST. GFR  (NO RACE VARIABLE): 58.6 ML/MIN/1.73 M^2
EST. GFR  (NO RACE VARIABLE): 58.6 ML/MIN/1.73 M^2
EST. GFR  (NO RACE VARIABLE): >60 ML/MIN/1.73 M^2
GLUCOSE SERPL-MCNC: 112 MG/DL (ref 70–110)
GLUCOSE SERPL-MCNC: 748 MG/DL (ref 70–110)
GLUCOSE SERPL-MCNC: 748 MG/DL (ref 70–110)
HCT VFR BLD AUTO: 38.9 % (ref 37–48.5)
HGB BLD-MCNC: 13.1 G/DL (ref 12–16)
IMM GRANULOCYTES # BLD AUTO: 0.09 K/UL (ref 0–0.04)
IMM GRANULOCYTES NFR BLD AUTO: 0.5 % (ref 0–0.5)
LYMPHOCYTES # BLD AUTO: 1.6 K/UL (ref 1–4.8)
LYMPHOCYTES NFR BLD: 9.8 % (ref 18–48)
MAGNESIUM SERPL-MCNC: 2.5 MG/DL (ref 1.6–2.6)
MCH RBC QN AUTO: 29.8 PG (ref 27–31)
MCHC RBC AUTO-ENTMCNC: 33.7 G/DL (ref 32–36)
MCV RBC AUTO: 88 FL (ref 82–98)
MONOCYTES # BLD AUTO: 0.9 K/UL (ref 0.3–1)
MONOCYTES NFR BLD: 5.4 % (ref 4–15)
NEUTROPHILS # BLD AUTO: 14.1 K/UL (ref 1.8–7.7)
NEUTROPHILS NFR BLD: 84.1 % (ref 38–73)
NRBC BLD-RTO: 0 /100 WBC
PHOSPHATE SERPL-MCNC: 4.8 MG/DL (ref 2.7–4.5)
PLATELET # BLD AUTO: 270 K/UL (ref 150–450)
PMV BLD AUTO: 12.5 FL (ref 9.2–12.9)
POTASSIUM SERPL-SCNC: 3.3 MMOL/L (ref 3.5–5.1)
POTASSIUM SERPL-SCNC: 5.4 MMOL/L (ref 3.5–5.1)
POTASSIUM SERPL-SCNC: 5.4 MMOL/L (ref 3.5–5.1)
PROT SERPL-MCNC: 7.3 G/DL (ref 6–8.4)
RBC # BLD AUTO: 4.4 M/UL (ref 4–5.4)
SODIUM SERPL-SCNC: 128 MMOL/L (ref 136–145)
SODIUM SERPL-SCNC: 128 MMOL/L (ref 136–145)
SODIUM SERPL-SCNC: 139 MMOL/L (ref 136–145)
TRIGL SERPL-MCNC: 1064 MG/DL (ref 30–150)
VANCOMYCIN TROUGH SERPL-MCNC: 16.4 UG/ML (ref 10–22)
WBC # BLD AUTO: 16.75 K/UL (ref 3.9–12.7)

## 2024-04-22 PROCEDURE — 80053 COMPREHEN METABOLIC PANEL: CPT | Performed by: STUDENT IN AN ORGANIZED HEALTH CARE EDUCATION/TRAINING PROGRAM

## 2024-04-22 PROCEDURE — 63600175 PHARM REV CODE 636 W HCPCS

## 2024-04-22 PROCEDURE — 84478 ASSAY OF TRIGLYCERIDES: CPT | Performed by: STUDENT IN AN ORGANIZED HEALTH CARE EDUCATION/TRAINING PROGRAM

## 2024-04-22 PROCEDURE — 80202 ASSAY OF VANCOMYCIN: CPT | Performed by: STUDENT IN AN ORGANIZED HEALTH CARE EDUCATION/TRAINING PROGRAM

## 2024-04-22 PROCEDURE — 63600175 PHARM REV CODE 636 W HCPCS: Performed by: STUDENT IN AN ORGANIZED HEALTH CARE EDUCATION/TRAINING PROGRAM

## 2024-04-22 PROCEDURE — 25000003 PHARM REV CODE 250: Performed by: INTERNAL MEDICINE

## 2024-04-22 PROCEDURE — 20600001 HC STEP DOWN PRIVATE ROOM

## 2024-04-22 PROCEDURE — 25000003 PHARM REV CODE 250: Performed by: STUDENT IN AN ORGANIZED HEALTH CARE EDUCATION/TRAINING PROGRAM

## 2024-04-22 PROCEDURE — 63600175 PHARM REV CODE 636 W HCPCS: Performed by: INTERNAL MEDICINE

## 2024-04-22 PROCEDURE — 83735 ASSAY OF MAGNESIUM: CPT | Performed by: STUDENT IN AN ORGANIZED HEALTH CARE EDUCATION/TRAINING PROGRAM

## 2024-04-22 PROCEDURE — 84100 ASSAY OF PHOSPHORUS: CPT | Performed by: STUDENT IN AN ORGANIZED HEALTH CARE EDUCATION/TRAINING PROGRAM

## 2024-04-22 PROCEDURE — 85025 COMPLETE CBC W/AUTO DIFF WBC: CPT | Performed by: STUDENT IN AN ORGANIZED HEALTH CARE EDUCATION/TRAINING PROGRAM

## 2024-04-22 PROCEDURE — A4217 STERILE WATER/SALINE, 500 ML: HCPCS | Performed by: INTERNAL MEDICINE

## 2024-04-22 PROCEDURE — A4216 STERILE WATER/SALINE, 10 ML: HCPCS | Performed by: STUDENT IN AN ORGANIZED HEALTH CARE EDUCATION/TRAINING PROGRAM

## 2024-04-22 PROCEDURE — 80048 BASIC METABOLIC PNL TOTAL CA: CPT | Mod: XB | Performed by: INTERNAL MEDICINE

## 2024-04-22 PROCEDURE — 25000003 PHARM REV CODE 250

## 2024-04-22 PROCEDURE — 82962 GLUCOSE BLOOD TEST: CPT | Performed by: STUDENT IN AN ORGANIZED HEALTH CARE EDUCATION/TRAINING PROGRAM

## 2024-04-22 RX ORDER — PROCHLORPERAZINE EDISYLATE 5 MG/ML
5 INJECTION INTRAMUSCULAR; INTRAVENOUS EVERY 6 HOURS PRN
Status: DISCONTINUED | OUTPATIENT
Start: 2024-04-22 | End: 2024-05-08 | Stop reason: HOSPADM

## 2024-04-22 RX ORDER — LABETALOL HCL 20 MG/4 ML
10 SYRINGE (ML) INTRAVENOUS EVERY 6 HOURS PRN
Status: DISCONTINUED | OUTPATIENT
Start: 2024-04-22 | End: 2024-05-03

## 2024-04-22 RX ORDER — HYDRALAZINE HYDROCHLORIDE 20 MG/ML
10 INJECTION INTRAMUSCULAR; INTRAVENOUS EVERY 6 HOURS PRN
Status: DISCONTINUED | OUTPATIENT
Start: 2024-04-22 | End: 2024-05-03

## 2024-04-22 RX ORDER — POTASSIUM CHLORIDE 14.9 MG/ML
20 INJECTION INTRAVENOUS ONCE
Status: COMPLETED | OUTPATIENT
Start: 2024-04-22 | End: 2024-04-22

## 2024-04-22 RX ORDER — SODIUM CHLORIDE, SODIUM LACTATE, POTASSIUM CHLORIDE, CALCIUM CHLORIDE 600; 310; 30; 20 MG/100ML; MG/100ML; MG/100ML; MG/100ML
INJECTION, SOLUTION INTRAVENOUS CONTINUOUS
Status: DISCONTINUED | OUTPATIENT
Start: 2024-04-22 | End: 2024-04-23

## 2024-04-22 RX ADMIN — METRONIDAZOLE 500 MG: 500 INJECTION, SOLUTION INTRAVENOUS at 12:04

## 2024-04-22 RX ADMIN — METOROPROLOL TARTRATE 5 MG: 5 INJECTION, SOLUTION INTRAVENOUS at 06:04

## 2024-04-22 RX ADMIN — SODIUM CHLORIDE, POTASSIUM CHLORIDE, SODIUM LACTATE AND CALCIUM CHLORIDE: 600; 310; 30; 20 INJECTION, SOLUTION INTRAVENOUS at 05:04

## 2024-04-22 RX ADMIN — HYDROMORPHONE HYDROCHLORIDE 1 MG: 1 INJECTION, SOLUTION INTRAMUSCULAR; INTRAVENOUS; SUBCUTANEOUS at 01:04

## 2024-04-22 RX ADMIN — METOROPROLOL TARTRATE 5 MG: 5 INJECTION, SOLUTION INTRAVENOUS at 05:04

## 2024-04-22 RX ADMIN — HYDROMORPHONE HYDROCHLORIDE 1 MG: 1 INJECTION, SOLUTION INTRAMUSCULAR; INTRAVENOUS; SUBCUTANEOUS at 09:04

## 2024-04-22 RX ADMIN — FLUCONAZOLE 200 MG: 2 INJECTION, SOLUTION INTRAVENOUS at 11:04

## 2024-04-22 RX ADMIN — Medication 10 ML: at 06:04

## 2024-04-22 RX ADMIN — METOCLOPRAMIDE 10 MG: 5 INJECTION, SOLUTION INTRAMUSCULAR; INTRAVENOUS at 01:04

## 2024-04-22 RX ADMIN — ENOXAPARIN SODIUM 40 MG: 40 INJECTION SUBCUTANEOUS at 04:04

## 2024-04-22 RX ADMIN — ONDANSETRON 4 MG: 2 INJECTION INTRAMUSCULAR; INTRAVENOUS at 05:04

## 2024-04-22 RX ADMIN — PROMETHAZINE HYDROCHLORIDE 12.5 MG: 25 INJECTION INTRAMUSCULAR; INTRAVENOUS at 07:04

## 2024-04-22 RX ADMIN — METOROPROLOL TARTRATE 5 MG: 5 INJECTION, SOLUTION INTRAVENOUS at 12:04

## 2024-04-22 RX ADMIN — METOROPROLOL TARTRATE 5 MG: 5 INJECTION, SOLUTION INTRAVENOUS at 01:04

## 2024-04-22 RX ADMIN — POTASSIUM CHLORIDE 20 MEQ: 14.9 INJECTION, SOLUTION INTRAVENOUS at 03:04

## 2024-04-22 RX ADMIN — Medication 10 ML: at 12:04

## 2024-04-22 RX ADMIN — ONDANSETRON 4 MG: 2 INJECTION INTRAMUSCULAR; INTRAVENOUS at 04:04

## 2024-04-22 RX ADMIN — HYDROMORPHONE HYDROCHLORIDE 1 MG: 1 INJECTION, SOLUTION INTRAMUSCULAR; INTRAVENOUS; SUBCUTANEOUS at 05:04

## 2024-04-22 RX ADMIN — PROCHLORPERAZINE EDISYLATE 2.5 MG: 5 INJECTION INTRAMUSCULAR; INTRAVENOUS at 12:04

## 2024-04-22 RX ADMIN — MAGNESIUM SULFATE HEPTAHYDRATE: 500 INJECTION, SOLUTION INTRAMUSCULAR; INTRAVENOUS at 10:04

## 2024-04-22 RX ADMIN — Medication 10 ML: at 05:04

## 2024-04-22 RX ADMIN — HYDROMORPHONE HYDROCHLORIDE 1 MG: 1 INJECTION, SOLUTION INTRAMUSCULAR; INTRAVENOUS; SUBCUTANEOUS at 08:04

## 2024-04-22 RX ADMIN — HYDROMORPHONE HYDROCHLORIDE 1 MG: 1 INJECTION, SOLUTION INTRAMUSCULAR; INTRAVENOUS; SUBCUTANEOUS at 04:04

## 2024-04-22 RX ADMIN — LIOTHYRONINE SODIUM 5 MCG: 5 TABLET ORAL at 05:04

## 2024-04-22 RX ADMIN — METRONIDAZOLE 500 MG: 500 INJECTION, SOLUTION INTRAVENOUS at 05:04

## 2024-04-22 RX ADMIN — ONDANSETRON 4 MG: 2 INJECTION INTRAMUSCULAR; INTRAVENOUS at 08:04

## 2024-04-22 RX ADMIN — Medication 10 ML: at 01:04

## 2024-04-22 RX ADMIN — LEVOTHYROXINE SODIUM 175 MCG: 150 TABLET ORAL at 05:04

## 2024-04-22 NOTE — PROGRESS NOTES
Lab call Glucose 700, Reported to DR Hadley, was drawn from PICC line used to infuse TPN> will do accucheck

## 2024-04-22 NOTE — PT/OT/SLP PROGRESS
General Recommendations:    MBSS in the post acute setting with dysphagia therapy pending recovery of acute illness   Ongoing ENT follow up     Diet recommendations:    There is no safe or efficient form of nutrition/hydration/medication able to be established to meet needs at this time   Pt would benefit from alternative means of nutrition/hydration   Pudding/apple sauce, ice chips and sips of water for pleasure / comfort   Medications crushed in pudding/applesauce until alternate means able to be established        The above are the safest  recommendations, though no true diet can be established at this time. MBSS warranted in the future once acute illness and neck wound have resolved/less severe, MBSS may also be completed at the post acute setting. Pt would continue to benefit from alternative means of nutrition at this time, and may continue with ice chips, water and pudding/applesauce for pleasure. SLP will sign off at this time, re-consult as needed.

## 2024-04-22 NOTE — PROGRESS NOTES
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 She initially presented to [facility] Emergency Department with six days of epigastric pain with associated nausea, vomiting, and difficulty eating due to pain. However on presentation, she was noted to have a large necrotic and purulent wound located on her right neck with complaints of associated difficulty speaking, swallowing and shortness of breath. She was noted to be hypoxic 88% on room air which improved with 2L NC. Labs were notable for leukocytosis 14, hypokalemia 2.4, hypochloremia 89, CO2 36, hypercalcemia 13.5, Phos 2.5. . Troponin 0.046. Lactate 2.3. CT Soft Tissue Neck was concerning for 9.5 x 8.2 x 10 cm large lobulated mass in the right anterolateral neck extending to the deep spaces of the neck and abutting the right prevertebral space and paravertebral musculature, left midline shift, multiple bilateral necrotic cervical lymph nodes. Case was discussed and decision was made to transfer to [facility] for higher level of care.     Of additional note, she recently transitioned her care to [place] and underwent stem-cell transplant approximately 2 weeks ago in [place], has not been seen by a [place] physician since 2023.      On transfer: she was afebrile, mildly hypertensive /109, HR 98, RR 20, satting 96% on room  air. Complaining of mild headache and poor appetite associated with nausea; denies fever, chills, chest pain, palpitations, SOB, abdominal pain, dysuria. States wound has been draining for the past week initially thick white now more liquid. Mild dysphonia is apparently chronic from left vocal cord paralysis and at baseline. Some dysphagia with solids, none with pureed soft or liquids, denies odynophagia. Dressing on neck CDI, ENT consulted will be here shortly to assess patient.        Overview/Hospital Course:  Evaluated 4/12 in MICU by ENT, who do not feel patient is a candidate for surgical intervention. Palliative care and oncology consulted. Failed swallow study, SLP recommending NPO 4/13. Patient continues to protect airway. Stepped down from MICU on 4/13.    DC planning: awaiting PEG placement    Interval History: NAEON. Pt will require awake tracheostomy prior to PEG placement by general surgery. Pt refusing at this time and would like to consider alternative options including IR/GI.       Objective:     Vital Signs (Most Recent):  Temp: 97.8 °F (36.6 °C) (04/22/24 1621)  Pulse: 95 (04/22/24 1621)  Resp: 17 (04/22/24 1621)  BP: (!) 140/87 (04/22/24 1621)  SpO2: 96 % (04/22/24 1621) Vital Signs (24h Range):  Temp:  [97.5 °F (36.4 °C)-99 °F (37.2 °C)] 97.8 °F (36.6 °C)  Pulse:  [] 95  Resp:  [16-18] 17  SpO2:  [86 %-99 %] 96 %  BP: (140-171)/(68-87) 140/87     Weight: 85.3 kg (188 lb 0.8 oz)  Body mass index is 30.35 kg/m².    Intake/Output Summary (Last 24 hours) at 4/22/2024 1659  Last data filed at 4/22/2024 0655  Gross per 24 hour   Intake --   Output 600 ml   Net -600 ml         Physical Exam  Vitals and nursing note reviewed.   Constitutional:       General: She is not in acute distress.     Appearance: She is ill-appearing. She is not toxic-appearing.   Eyes:      Conjunctiva/sclera: Conjunctivae normal.   Neck:      Comments: Dressing in place over entire circumference of neck. Dressing  c/d/i  Cardiovascular:      Rate and Rhythm: Normal rate and regular rhythm.      Heart sounds: Normal heart sounds.   Pulmonary:      Effort: Pulmonary effort is normal. No respiratory distress.      Breath sounds: Normal breath sounds. No wheezing.   Abdominal:      General: Bowel sounds are normal. There is no distension.      Palpations: Abdomen is soft.      Tenderness: There is no abdominal tenderness. There is no guarding.   Skin:     General: Skin is warm and dry.   Neurological:      General: No focal deficit present.      Mental Status: She is alert and oriented to person, place, and time. Mental status is at baseline.   Psychiatric:         Mood and Affect: Mood normal.         Behavior: Behavior normal.             Significant Labs: All pertinent labs within the past 24 hours have been reviewed.    Significant Imaging: I have reviewed all pertinent imaging results/findings within the past 24 hours.  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 Underwent repeat bx after failed tx (1/24/22) that showed moderately differentiated squamous cell carcinoma w/ suspicion for perineural invasion. CT soft tissue neck (Jan 2022) w/o e/o metastatic dx. PET-CT (Feb 2022) w/o e/o active local or distant dx- no abnormal activity within the tongue, no signs of cervical lymph node or distant metastasis. Declined chemotherapy &/or XRT. Underwent selective neck dissection levels 1, 2, 3, and partial glossectomy w/ split-thickness skin graft (3/3/2022 per Dr. Clif Olson).  Surgical pathology w/ unifocal squamous cell carcinoma on the dorsal surface of the tongue on left side measuring 1 cm, moderately differentiated, 6 mm depth of invasion, no lymphovascular invasion, positive for perineural invasion, margins negative, 0 of 39 lymph nodes with metastasis, pT2 pN0 noted.   "Postop course complicated by infection requiring I&D and antibiotics.  Declined adjuvant XRT. Repeat imaging (June 2023) w/ 2.2 cm lesion deep to the R SCM c/f necrotic LN w/ few adjacent pathological appearing LNs suspicious for metastasis. Underwent R neck FNA (7/7/23) w/ pathology suspicious for metastatic SCC. PET-CT (8/21/2023) w/ large necrotic mass the angle of mandible posterior to submandibular gland on the right measuring 4.4 x 4.2 cm with SUV 9.78, just superior to the mass is a 2 cm hypermetabolic lymph node and no evidence of distant metastatic disease. Subsequently transferred care to [place] where she reportedly recently abx,"detox", hyperbaric chamber, & stem-cell transplant approximately 2 weeks ago PTA in [place]. Now presenting w/ progressive neck wound. CT neck soft tissue (4/10/24) w/ large bulky lobulated appearing mass right lateral and anterolateral neck extending to the D spaces of the neck having overall dimensions of approximately 9.5 x 8.2 x 10 cm, multiple addn'l b/l cervical necrotic nodes, & assoc mass effect results in displacement of midline structures to the patient's left. CT C/A/P with diffuse metastatic disease including mediastinal & hilar LAD, bilateral lungs, peritoneum, lumbar vertebrae, & likely liver.     - ENT consulted; appreciate recs - not surgical candidate for debridement  - Medical & Radiation Oncology consulted; appreciate recs - pt declines chemo or XRT  - Palliative following; appreciate assistance  - Multimodal analgesia   - SLP following, appreciate assistance  - Supportive care     Severe malnutrition  PEG tube placement is being discussed. IR and GI deferred the procedure. Surgery depending on anesthesia assessment. ENT updated with recs. Awaiting final recs.       Nutrition consulted. Most recent weight and BMI monitored-     Measurements:  Wt Readings from Last 1 Encounters:   04/22/24 85.3 kg (188 lb 0.8 oz)   Body mass index is 30.35 kg/m².    Patient has " "been screened and assessed by RD.    Malnutrition Type:  Context: chronic illness  Level: severe    Malnutrition Characteristic Summary:  Weight Loss (Malnutrition): greater than 10% in 6 months  Energy Intake (Malnutrition): less than or equal to 50% for greater than or equal to 1 month    Interventions/Recommendations (treatment strategy):  1.    Cont TPN    Head and neck cancer        Hypercalcemia  On arrival to OSH, Ca 13.5 --> 11.0 on arrival to [facility]. Continued subsequent uptrend peaking at 13.1 on 4/15 (corrected Ca 14.5). Likely 2/2 malignancy.   - IVF  - Calcitonin BID x2 days  - Ionized Ca 1.59 4/21  - Monitor CMP  - IV hydration and cont to trend Ca level     ACP (advance care planning)        Pain        Palliative care encounter  Pt and family asking for PEG eval.      Airway compromise        Hypokalemia  Patient has hypokalemia which is Acute and currently controlled. Most recent potassium levels reviewed-   Lab Results   Component Value Date    K 3.3 (L) 04/22/2024   . Will continue potassium replacement per protocol and recheck repeat levels after replacement completed.     Open neck wound  Pt with extensive hx of H/N cancer. Presents w/ continued/worsening drainage from the neck for months with recent worsening of symptoms for past 2 weeks w/ N/V. Pt returned from [place] where she received antibiotics, "detox" and hyperbaric chamber as treatment. Endorses difficulty with swallowing 2/2 to oral trush. Large neck mass on imaging w/ large central collection of air which appears to extend superficially to the skin surface right mid neck anterior laterally; unable to r/o superimposed abscess. Meeting 2/4 SIRS (HR & WBC) w/ elevated lactate, but nml BP. Blood cx negative. Pt not surgical candidate for debridement per ENT evaluation.   - ID consulted; appreciate recs  - Continue Vancomycin + Flagyl   - Continue fluconazole  - Given that surgical debridement is not an option (and thus source control not " attainable), follow-up with ID to discuss ultimate recs for abx course   - Will require awake tracheostomy prior to PEG placement by general surgery. Discuss alternative options with IR/GI.     Sepsis  On arrival to [facility], meeting 2/4 SIRS (requiring 2L NC w/ RR 20s & WBC 13). Likely soft tissue/neck source. Received IVF at OSH prior to transfer (for lactate 2.3 at that time). Started on broad-spectrum abx. Blood cx negative.   - ID consulted; appreciate recs  - Continue vancomycin + Flagyl + fluconazole   - Monitor CBC & fever curve  - Mgmt of neck mass/wound as below     Postprocedural hypoparathyroidism  Last PTHi nml at 26 (2016). Home regimen includes calcium carbonate & vitamin D. Symptomatic hypercalcemia (Ca 13.5) on presentation to OSH w/ abdominal pain & N/V. Improved w/ NS ggt (although query if pt's hypercalcemia was re: hx of hypoparathyroidism vs hypercalcemia of malignancy.   - Holding home calcium carbonate  - Resume home vitamin D as PO tolerance improves   - Monitor CMP    GERD (gastroesophageal reflux disease)        Postsurgical hypothyroidism  Last TSH elevated at 5.264. Home regimen includes levothyroxine 175mcg daily & liothyronine 5mcg daily  - Continue home liothyronine & levothyroxine   - Repeat TSH pending    HTN (hypertension)  Hx of essential HTN; home regimen includes metoprolol 25mg BID. On arrival to [facility], SBP 140s-150s.   - IV metoprolol 5mg Q6H until pt able to tolerate PO      VTE Risk Mitigation (From admission, onward)           Ordered     enoxaparin injection 40 mg  Every 24 hours         04/12/24 1536     IP VTE HIGH RISK PATIENT  Once         04/12/24 1324     Place sequential compression device  Until discontinued         04/12/24 1324                    Discharge Planning   ANDRES: 4/24/2024     Code Status: Full Code   Is the patient medically ready for discharge?: No    Reason for patient still in hospital (select all that apply): Treatment  Discharge Plan A: Hospice/home                   Pasquale Haines MD  Department of Hospital Medicine   Con nayeli - Telemetry Stepdown

## 2024-04-22 NOTE — ASSESSMENT & PLAN NOTE
PEG tube placement is being discussed. IR and GI deferred the procedure. Surgery depending on anesthesia assessment. ENT updated with recs. Awaiting final recs.       Nutrition consulted. Most recent weight and BMI monitored-     Measurements:  Wt Readings from Last 1 Encounters:   04/22/24 85.3 kg (188 lb 0.8 oz)   Body mass index is 30.35 kg/m².    Patient has been screened and assessed by RD.    Malnutrition Type:  Context: chronic illness  Level: severe    Malnutrition Characteristic Summary:  Weight Loss (Malnutrition): greater than 10% in 6 months  Energy Intake (Malnutrition): less than or equal to 50% for greater than or equal to 1 month    Interventions/Recommendations (treatment strategy):  1.    Cont TPN

## 2024-04-22 NOTE — PLAN OF CARE
Department of Otolaryngology - Head and Neck Surgery  Plan of Care Note    Patient revisited this am in pre-op for PEG. ENT asked to be available for tracheostomy if needed.    Upon interview, patient resting in bed comfortably. Dysphonic but no dyspnea. Neck wrapped in gauze.  Anterior neck firm due to tumor, insensate  Poorly identified laryngeal and tracheal landmarks    Flexible Fiberoptic Laryngoscopy     Nasopharynx - the torus is clear. There are no lesions of the posterior wall.   Oropharynx - no lesions of the tongue base. There is no obvious fullness or asymmetry.  Hypopharynx - there are no lesions of the pyriform sinuses or postcricoid region    Larynx - significant right-sided supraglottic edema involving the R portion of the epiglottis, the R AE fold and R arytenoid with near total visual obstruction of the glottic larynx. There is an identifiable patency anteriorly on the left. Briefly, the anterior commissure can be visualized and the anterior portion of the glottis appears normal and not involved with tumor. R TVF immobile. Marked pooling of secretions.     Limited view of the anterior glottis:      View of the supraglottisL      Had lengthy discussion with the patient and her  about the tenuous nature of her airway, and the inherent risk of attempt to secure the airway from above, either orotracheally or nasotracheally with a fiberoptic scope. Discussed that any edema or bleeding from this process may lead to a tenuous airway rapidly becoming unstable, which would require an emergent tracheostomy. Also discussed the unfortunate nature of her large tumor involving the pre-tracheal and laryngeal tissues in the anterior neck, which would may emergent tracheostomy even more challenging. In reviewing her scans, do believe a tracheostomy could be safely secured, without going trans-tumoral, lower in the neck near the manubrium. However, thyroid gland and innominate vessels are also typically in  this region. We recommended that the safest option would be to initially secure an awake tracheostomy prior to PEG placement. Reviewed the risks fo tracheostomy including permanent tracheostomy dependence, bleeding, airway loss, stroke and death.     - At this time, patient and  unwilling to provide consent for tracheostomy, and requested additional time to consider her options  - Recommend revisiting issue with GI and/or IR to see if there is any option to obtain enteral access without requiring general anesthesia/airway manipulation  - Case discussed with Dr. Madison. If patient does wish to proceed with tracheostomy, please secure chat or page ENT on call, and we can work to find a time to perform co-case with general surgery for trach and PEG  - Please page w questions or concerns    Basilio Mcdonald MD, PGY-IV  [phone]

## 2024-04-22 NOTE — ASSESSMENT & PLAN NOTE
"Initial bx of ventral surface of tongue (12/24/21) showed atypia w/o diagnostic e/o dysplasia. Underwent repeat bx after failed tx (1/24/22) that showed moderately differentiated squamous cell carcinoma w/ suspicion for perineural invasion. CT soft tissue neck (Jan 2022) w/o e/o metastatic dx. PET-CT (Feb 2022) w/o e/o active local or distant dx- no abnormal activity within the tongue, no signs of cervical lymph node or distant metastasis. Declined chemotherapy &/or XRT. Underwent selective neck dissection levels 1, 2, 3, and partial glossectomy w/ split-thickness skin graft (3/3/2022 per Dr. Clif Olson).  Surgical pathology w/ unifocal squamous cell carcinoma on the dorsal surface of the tongue on left side measuring 1 cm, moderately differentiated, 6 mm depth of invasion, no lymphovascular invasion, positive for perineural invasion, margins negative, 0 of 39 lymph nodes with metastasis, pT2 pN0 noted.  Postop course complicated by infection requiring I&D and antibiotics.  Declined adjuvant XRT. Repeat imaging (June 2023) w/ 2.2 cm lesion deep to the R SCM c/f necrotic LN w/ few adjacent pathological appearing LNs suspicious for metastasis. Underwent R neck FNA (7/7/23) w/ pathology suspicious for metastatic SCC. PET-CT (8/21/2023) w/ large necrotic mass the angle of mandible posterior to submandibular gland on the right measuring 4.4 x 4.2 cm with SUV 9.78, just superior to the mass is a 2 cm hypermetabolic lymph node and no evidence of distant metastatic disease. Subsequently transferred care to [place] where she reportedly recently abx,"detox", hyperbaric chamber, & stem-cell transplant approximately 2 weeks ago PTA in [place]. Now presenting w/ progressive neck wound. CT neck soft tissue (4/10/24) w/ large bulky lobulated appearing mass right lateral and anterolateral neck extending to the D spaces of the neck having overall dimensions of approximately 9.5 x 8.2 x 10 cm, multiple addn'l b/l cervical " necrotic nodes, & assoc mass effect results in displacement of midline structures to the patient's left. CT C/A/P with diffuse metastatic disease including mediastinal & hilar LAD, bilateral lungs, peritoneum, lumbar vertebrae, & likely liver.     - ENT consulted; appreciate recs - not surgical candidate for debridement  - Medical & Radiation Oncology consulted; appreciate recs - pt declines chemo or XRT  - Palliative following; appreciate assistance  - Multimodal analgesia   - SLP following, appreciate assistance  - Supportive care

## 2024-04-22 NOTE — ASSESSMENT & PLAN NOTE
On arrival to [facility], meeting 2/4 SIRS (requiring 2L NC w/ RR 20s & WBC 13). Likely soft tissue/neck source. Received IVF at OSH prior to transfer (for lactate 2.3 at that time). Started on broad-spectrum abx. Blood cx negative.   - ID consulted; appreciate recs  - Continue vancomycin + Flagyl + fluconazole   - Monitor CBC & fever curve  - Mgmt of neck mass/wound as below

## 2024-04-22 NOTE — SUBJECTIVE & OBJECTIVE
Interval History: NAEON. Pt will require awake tracheostomy prior to PEG placement by general surgery. Pt refusing at this time and would like to consider alternative options including IR/GI.       Objective:     Vital Signs (Most Recent):  Temp: 97.8 °F (36.6 °C) (04/22/24 1621)  Pulse: 95 (04/22/24 1621)  Resp: 17 (04/22/24 1621)  BP: (!) 140/87 (04/22/24 1621)  SpO2: 96 % (04/22/24 1621) Vital Signs (24h Range):  Temp:  [97.5 °F (36.4 °C)-99 °F (37.2 °C)] 97.8 °F (36.6 °C)  Pulse:  [] 95  Resp:  [16-18] 17  SpO2:  [86 %-99 %] 96 %  BP: (140-171)/(68-87) 140/87     Weight: 85.3 kg (188 lb 0.8 oz)  Body mass index is 30.35 kg/m².    Intake/Output Summary (Last 24 hours) at 4/22/2024 1653  Last data filed at 4/22/2024 0655  Gross per 24 hour   Intake --   Output 600 ml   Net -600 ml         Physical Exam  Vitals and nursing note reviewed.   Constitutional:       General: She is not in acute distress.     Appearance: She is ill-appearing. She is not toxic-appearing.   Eyes:      Conjunctiva/sclera: Conjunctivae normal.   Neck:      Comments: Dressing in place over entire circumference of neck. Dressing c/d/i  Cardiovascular:      Rate and Rhythm: Normal rate and regular rhythm.      Heart sounds: Normal heart sounds.   Pulmonary:      Effort: Pulmonary effort is normal. No respiratory distress.      Breath sounds: Normal breath sounds. No wheezing.   Abdominal:      General: Bowel sounds are normal. There is no distension.      Palpations: Abdomen is soft.      Tenderness: There is no abdominal tenderness. There is no guarding.   Skin:     General: Skin is warm and dry.   Neurological:      General: No focal deficit present.      Mental Status: She is alert and oriented to person, place, and time. Mental status is at baseline.   Psychiatric:         Mood and Affect: Mood normal.         Behavior: Behavior normal.             Significant Labs: All pertinent labs within the past 24 hours have been  reviewed.    Significant Imaging: I have reviewed all pertinent imaging results/findings within the past 24 hours.  Review of Systems

## 2024-04-22 NOTE — PLAN OF CARE
Patient stable, AOX 4, VSS. Spontaneously breathing with 2L N/C. TPN 42ml/hr in progressing.  Safety measures ensured.call light within reach.bed in low position. No distress at night.

## 2024-04-22 NOTE — SUBJECTIVE & OBJECTIVE
Interval History: very pleasant, and family are very supportive. Currently awaiting surgery for PEG placement. Patient is having nausea. Started normal saline for hypercalcemia.       Objective:     Vital Signs (Most Recent):  Temp: 97.5 °F (36.4 °C) (04/21/24 2014)  Pulse: 93 (04/21/24 2014)  Resp: 18 (04/21/24 2056)  BP: (!) 150/74 (04/21/24 2014)  SpO2: (!) 86 % (04/21/24 2014) Vital Signs (24h Range):  Temp:  [97.5 °F (36.4 °C)-98.6 °F (37 °C)] 97.5 °F (36.4 °C)  Pulse:  [] 93  Resp:  [15-20] 18  SpO2:  [86 %-99 %] 86 %  BP: (131-159)/(68-89) 150/74     Weight: 85.3 kg (188 lb 0.8 oz)  Body mass index is 30.35 kg/m².  No intake or output data in the 24 hours ending 04/21/24 2159      Physical Exam  Vitals and nursing note reviewed.   Constitutional:       General: She is not in acute distress.     Appearance: She is ill-appearing. She is not toxic-appearing.   Eyes:      Conjunctiva/sclera: Conjunctivae normal.   Neck:      Comments: Dressing in place over entire circumference of neck. Dressing c/d/i  Cardiovascular:      Rate and Rhythm: Normal rate and regular rhythm.      Heart sounds: Normal heart sounds.   Pulmonary:      Effort: Pulmonary effort is normal. No respiratory distress.      Breath sounds: Normal breath sounds. No wheezing.   Abdominal:      General: Bowel sounds are normal. There is no distension.      Palpations: Abdomen is soft.      Tenderness: There is no abdominal tenderness. There is no guarding.   Skin:     General: Skin is warm and dry.   Neurological:      General: No focal deficit present.      Mental Status: She is alert and oriented to person, place, and time. Mental status is at baseline.   Psychiatric:         Mood and Affect: Mood normal.         Behavior: Behavior normal.             Significant Labs: All pertinent labs within the past 24 hours have been reviewed.    Significant Imaging: I have reviewed all pertinent imaging results/findings within the past 24 hours.

## 2024-04-22 NOTE — ASSESSMENT & PLAN NOTE
On arrival to OSH, Ca 13.5 --> 11.0 on arrival to [facility]. Continued subsequent uptrend peaking at 13.1 on 4/15 (corrected Ca 14.5). Likely 2/2 malignancy.   - IVF  - Calcitonin BID x2 days.  May need to repeat again. I started normal saline for today.  - Monitor CMP

## 2024-04-22 NOTE — PROGRESS NOTES
PT surgery was cancelled today, I was advised by DR Hadley, DR Madison spoke to pt and her  at the bedside.

## 2024-04-22 NOTE — ASSESSMENT & PLAN NOTE
Hx of essential HTN; home regimen includes metoprolol 25mg BID. [place] [facility] 140s-150s.   - IV metoprolol 5mg Q6H until pt able to tolerate PO

## 2024-04-22 NOTE — ASSESSMENT & PLAN NOTE
On arrival to OSH, Ca 13.5 --> 11.0 on arrival to [facility]. Continued subsequent uptrend peaking at 13.1 on 4/15 (corrected Ca 14.5). Likely 2/2 malignancy.   - IVF  - Calcitonin BID x2 days  - Ionized Ca 1.59 4/21  - Monitor CMP  - IV hydration and cont to trend Ca level

## 2024-04-22 NOTE — PROGRESS NOTES
Con Nguyen - Telemetry [facility] Medicine  Progress Note    Patient Name: Marysol Cash  MRN: [id number]  Patient Class: IP- Inpatient   Admission Date: 4/12/2024  Length of Stay: 9 days  Attending Physician: Gregoria Donnelly DO  Primary Care Provider: James Coronel MD        Subjective:     Principal Problem:Sepsis        HPI:  Ms. 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 She initially presented to [facility] Emergency Department with six days of epigastric pain with associated nausea, vomiting, and difficulty eating due to pain. However on presentation, she was noted to have a large necrotic and purulent wound located on her right neck with complaints of associated difficulty speaking, swallowing and shortness of breath. She was noted to be hypoxic 88% on room air which improved with 2L NC. Labs were notable for leukocytosis 14, hypokalemia 2.4, hypochloremia 89, CO2 36, hypercalcemia 13.5, Phos 2.5. . Troponin 0.046. Lactate 2.3. CT Soft Tissue Neck was concerning for 9.5 x 8.2 x 10 cm large lobulated mass in the right anterolateral neck extending to the deep spaces of the neck and abutting the right prevertebral space and paravertebral musculature, left midline shift, multiple bilateral necrotic cervical lymph nodes. Case was discussed and decision was [place] [facility].     Of additional note, she recently transitioned her care to [place] and underwent stem-cell transplant approximately 2 weeks ago in [place], has not been seen by a US physician since 2023.      On transfer: she was afebrile, mildly hypertensive /109, HR 98, RR 20, satting 96% on room air. Complaining of mild headache and poor  appetite associated with nausea; denies fever, chills, chest pain, palpitations, SOB, abdominal pain, dysuria. States wound has been draining for the past week initially thick white now more liquid. Mild dysphonia is apparently chronic from left vocal cord paralysis and at baseline. Some dysphagia with solids, none with pureed soft or liquids, denies odynophagia. Dressing on neck CDI, ENT consulted will be here shortly to assess patient.        Overview/Hospital Course:  Evaluated 4/12 in MICU by ENT, who do not feel patient is a candidate for surgical intervention. Palliative care and oncology consulted. Failed swallow study, SLP recommending NPO 4/13. Patient continues to protect airway. Stepped down from MICU on 4/13.    DC planning: awaiting PEG placement    Interval History: very pleasant, and family are very supportive. Currently awaiting surgery for PEG placement. Patient is having nausea. Started normal saline for hypercalcemia.       Objective:     Vital Signs (Most Recent):  Temp: 97.5 °F (36.4 °C) (04/21/24 2014)  Pulse: 93 (04/21/24 2014)  Resp: 18 (04/21/24 2056)  BP: (!) 150/74 (04/21/24 2014)  SpO2: (!) 86 % (04/21/24 2014) Vital Signs (24h Range):  Temp:  [97.5 °F (36.4 °C)-98.6 °F (37 °C)] 97.5 °F (36.4 °C)  Pulse:  [] 93  Resp:  [15-20] 18  SpO2:  [86 %-99 %] 86 %  BP: (131-159)/(68-89) 150/74     Weight: 85.3 kg (188 lb 0.8 oz)  Body mass index is 30.35 kg/m².  No intake or output data in the 24 hours ending 04/21/24 2159      Physical Exam  Vitals and nursing note reviewed.   Constitutional:       General: She is not in acute distress.     Appearance: She is ill-appearing. She is not toxic-appearing.   Eyes:      Conjunctiva/sclera: Conjunctivae normal.   Neck:      Comments: Dressing in place over entire circumference of neck. Dressing c/d/i  Cardiovascular:      Rate and Rhythm: Normal rate and regular rhythm.      Heart sounds: Normal heart sounds.   Pulmonary:      Effort: Pulmonary  effort is normal. No respiratory distress.      Breath sounds: Normal breath sounds. No wheezing.   Abdominal:      General: Bowel sounds are normal. There is no distension.      Palpations: Abdomen is soft.      Tenderness: There is no abdominal tenderness. There is no guarding.   Skin:     General: Skin is warm and dry.   Neurological:      General: No focal deficit present.      Mental Status: She is alert and oriented to person, place, and time. Mental status is at baseline.   Psychiatric:         Mood and Affect: Mood normal.         Behavior: Behavior normal.             Significant Labs: All pertinent labs within the past 24 hours have been reviewed.    Significant Imaging: I have reviewed all pertinent imaging results/findings within the past 24 hours.    Assessment/Plan:      * Sepsis  On arrival to [facility] [place], meeting 2/4 SIRS (requiring 2L NC w/ RR 20s & WBC 13). Likely soft tissue/neck source. Received IVF at OSH prior to transfer (for lactate 2.3 at that time). Started on broad-spectrum abx. Blood cx negative.   - ID consulted; appreciate recs  - Continue vancomycin + Flagyl + fluconazole   - Monitor CBC & fever curve  - Mgmt of neck mass/wound as below     Severe malnutrition  PEG tube placement is being discussed. IR and GI deferred the procedure. Surgery depending on anesthesia assessment. ENT updated with recs. Awaiting final recs.       Nutrition consulted. Most recent weight and BMI monitored-     Measurements:  Wt Readings from Last 1 Encounters:   04/18/24 85.3 kg (188 lb 0.8 oz)   Body mass index is 30.35 kg/m².    Patient has been screened and assessed by RD.    Malnutrition Type:  Context: chronic illness  Level: severe    Malnutrition Characteristic Summary:  Weight Loss (Malnutrition): greater than 10% in 6 months  Energy Intake (Malnutrition): less than or equal to 50% for greater than or equal to 1 month    Interventions/Recommendations (treatment strategy):  1.      Hypercalcemia  On arrival to  "OSH, Ca 13.5 --> 11.0 on arrival to [facility] [place]. Continued subsequent uptrend peaking at 13.1 on 4/15 (corrected Ca 14.5). Likely 2/2 malignancy.   - IVF  - Calcitonin BID x2 days.  May need to repeat again. I started normal saline for today.  - Monitor CMP     Palliative care encounter  Pt and family asking for PEG tube eval.      Open neck wound  Pt with extensive hx of H/N cancer. Presents w/ continued/worsening drainage from the neck for months with recent worsening of symptoms for past 2 weeks w/ N/V. Pt returned from [place] where she received antibiotics, "detox" and hyperbaric chamber as treatment. Endorses difficulty with swallowing 2/2 to oral trush. Large neck mass on imaging w/ large central collection of air which appears to extend superficially to the skin surface right mid neck anterior laterally; unable to r/o superimposed abscess. Meeting 2/4 SIRS (HR & WBC) w/ elevated lactate, but nml BP. Blood cx negative. Pt not surgical candidate for debridement per ENT evaluation.   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 Underwent repeat bx after failed tx (1/24/22) that showed moderately differentiated squamous cell carcinoma w/ suspicion for perineural invasion. CT soft tissue neck (Jan 2022) w/o e/o metastatic dx. PET-CT (Feb 2022) w/o e/o active local or distant dx- no abnormal activity within the tongue, no signs of cervical lymph node or distant metastasis. Declined chemotherapy &/or XRT. Underwent selective neck dissection levels 1, 2, 3, and partial glossectomy w/ split-thickness skin graft (3/3/2022 per Dr. Clif Olson).  Surgical pathology w/ unifocal " "squamous cell carcinoma on the dorsal surface of the tongue on left side measuring 1 cm, moderately differentiated, 6 mm depth of invasion, no lymphovascular invasion, positive for perineural invasion, margins negative, 0 of 39 lymph nodes with metastasis, pT2 pN0 noted.  Postop course complicated by infection requiring I&D and antibiotics.  Declined adjuvant XRT. Repeat imaging (June 2023) w/ 2.2 cm lesion deep to the R SCM c/f necrotic LN w/ few adjacent pathological appearing LNs suspicious for metastasis. Underwent R neck FNA (7/7/23) w/ pathology suspicious for metastatic SCC. PET-CT (8/21/2023) w/ large necrotic mass the angle of mandible posterior to submandibular gland on the right measuring 4.4 x 4.2 cm with SUV 9.78, just superior to the mass is a 2 cm hypermetabolic lymph node and no evidence of distant metastatic disease. Subsequently transferred care to [place] where she reportedly recently abx,"detox", hyperbaric chamber, & stem-cell transplant approximately 2 weeks ago PTA in [place]. Now presenting w/ progressive neck wound. CT neck soft tissue (4/10/24) w/ large bulky lobulated appearing mass right lateral and anterolateral neck extending to the D spaces of the neck having overall dimensions of approximately 9.5 x 8.2 x 10 cm, multiple addn'l b/l cervical necrotic nodes, & assoc mass effect results in displacement of midline structures to the patient's left. CT C/A/P with diffuse metastatic disease including mediastinal & hilar LAD, bilateral lungs, peritoneum, lumbar vertebrae, & likely liver.     - ENT consulted; appreciate recs - not surgical candidate for debridement  - Medical & Radiation Oncology consulted; appreciate recs - pt declines chemo or XRT  - Palliative following; appreciate assistance  - Multimodal analgesia   - SLP following, appreciate assistance  - Supportive care     Postprocedural hypoparathyroidism  Last PTHi nml at 26 (2016). Home regimen includes calcium carbonate & vitamin D. " Symptomatic hypercalcemia (Ca 13.5) on presentation to OSH w/ abdominal pain & N/V. Improved w/ NS ggt (although query if pt's hypercalcemia was re: hx of hypoparathyroidism vs hypercalcemia of malignancy.   - Holding home calcium carbonate  - Resume home vitamin D as PO tolerance improves   - Monitor CMP    Postsurgical hypothyroidism  Last TSH elevated at 5.264. Home regimen includes levothyroxine 175mcg daily & liothyronine 5mcg daily  - Continue home liothyronine & levothyroxine   - Repeat TSH pending    HTN (hypertension)  Hx of essential HTN; home regimen includes metoprolol 25mg BID. On arrival to [facility] [place], SBP 140s-150s.   - IV metoprolol 5mg Q6H until pt able to tolerate PO      VTE Risk Mitigation (From admission, onward)           Ordered     enoxaparin injection 40 mg  Every 24 hours         04/12/24 1536     IP VTE HIGH RISK PATIENT  Once         04/12/24 1324     Place sequential compression device  Until discontinued         04/12/24 1324                    Discharge Planning   ANDRES: 4/20/2024     Code Status: Full Code   Is the patient medically ready for discharge?: No    Reason for patient still in hospital (select all that apply): Patient trending condition and Treatment  Discharge Plan A: Hospice/home                  Gregoria Donnelly DO  Department of Hospital Medicine   Con Nguyen - Telemetry Stepdown

## 2024-04-23 ENCOUNTER — ANESTHESIA EVENT (OUTPATIENT)
Dept: SURGERY | Facility: HOSPITAL | Age: 59
DRG: 579 | End: 2024-04-23
Payer: COMMERCIAL

## 2024-04-23 LAB
ALBUMIN SERPL BCP-MCNC: 2.4 G/DL (ref 3.5–5.2)
ALP SERPL-CCNC: 97 U/L (ref 55–135)
ALT SERPL W/O P-5'-P-CCNC: 13 U/L (ref 10–44)
ANION GAP SERPL CALC-SCNC: 7 MMOL/L (ref 8–16)
ANION GAP SERPL CALC-SCNC: 9 MMOL/L (ref 8–16)
AST SERPL-CCNC: 18 U/L (ref 10–40)
BASOPHILS # BLD AUTO: 0.02 K/UL (ref 0–0.2)
BASOPHILS NFR BLD: 0.1 % (ref 0–1.9)
BILIRUB SERPL-MCNC: 0.3 MG/DL (ref 0.1–1)
BUN SERPL-MCNC: 15 MG/DL (ref 6–20)
BUN SERPL-MCNC: 17 MG/DL (ref 6–20)
CA-I BLDV-SCNC: 1.38 MMOL/L (ref 1.06–1.42)
CALCIUM SERPL-MCNC: 11.9 MG/DL (ref 8.7–10.5)
CALCIUM SERPL-MCNC: 12.2 MG/DL (ref 8.7–10.5)
CHLORIDE SERPL-SCNC: 94 MMOL/L (ref 95–110)
CHLORIDE SERPL-SCNC: 96 MMOL/L (ref 95–110)
CO2 SERPL-SCNC: 34 MMOL/L (ref 23–29)
CO2 SERPL-SCNC: 38 MMOL/L (ref 23–29)
CREAT SERPL-MCNC: 0.8 MG/DL (ref 0.5–1.4)
CREAT SERPL-MCNC: 0.8 MG/DL (ref 0.5–1.4)
DIFFERENTIAL METHOD BLD: ABNORMAL
EOSINOPHIL # BLD AUTO: 0 K/UL (ref 0–0.5)
EOSINOPHIL NFR BLD: 0 % (ref 0–8)
ERYTHROCYTE [DISTWIDTH] IN BLOOD BY AUTOMATED COUNT: 15.3 % (ref 11.5–14.5)
EST. GFR  (NO RACE VARIABLE): >60 ML/MIN/1.73 M^2
EST. GFR  (NO RACE VARIABLE): >60 ML/MIN/1.73 M^2
GLUCOSE SERPL-MCNC: 131 MG/DL (ref 70–110)
GLUCOSE SERPL-MCNC: 133 MG/DL (ref 70–110)
HCT VFR BLD AUTO: 38.1 % (ref 37–48.5)
HGB BLD-MCNC: 11.6 G/DL (ref 12–16)
IMM GRANULOCYTES # BLD AUTO: 0.07 K/UL (ref 0–0.04)
IMM GRANULOCYTES NFR BLD AUTO: 0.5 % (ref 0–0.5)
LYMPHOCYTES # BLD AUTO: 1.6 K/UL (ref 1–4.8)
LYMPHOCYTES NFR BLD: 11.8 % (ref 18–48)
MAGNESIUM SERPL-MCNC: 2.7 MG/DL (ref 1.6–2.6)
MCH RBC QN AUTO: 27.6 PG (ref 27–31)
MCHC RBC AUTO-ENTMCNC: 30.4 G/DL (ref 32–36)
MCV RBC AUTO: 91 FL (ref 82–98)
MONOCYTES # BLD AUTO: 1 K/UL (ref 0.3–1)
MONOCYTES NFR BLD: 6.9 % (ref 4–15)
NEUTROPHILS # BLD AUTO: 11.1 K/UL (ref 1.8–7.7)
NEUTROPHILS NFR BLD: 80.7 % (ref 38–73)
NRBC BLD-RTO: 0 /100 WBC
PHOSPHATE SERPL-MCNC: 4.5 MG/DL (ref 2.7–4.5)
PLATELET # BLD AUTO: 230 K/UL (ref 150–450)
PMV BLD AUTO: 11.1 FL (ref 9.2–12.9)
POCT GLUCOSE: 119 MG/DL (ref 70–110)
POTASSIUM SERPL-SCNC: 2.8 MMOL/L (ref 3.5–5.1)
POTASSIUM SERPL-SCNC: 3.4 MMOL/L (ref 3.5–5.1)
PROT SERPL-MCNC: 5.4 G/DL (ref 6–8.4)
RBC # BLD AUTO: 4.2 M/UL (ref 4–5.4)
SODIUM SERPL-SCNC: 137 MMOL/L (ref 136–145)
SODIUM SERPL-SCNC: 141 MMOL/L (ref 136–145)
WBC # BLD AUTO: 13.81 K/UL (ref 3.9–12.7)

## 2024-04-23 PROCEDURE — 25000003 PHARM REV CODE 250: Performed by: INTERNAL MEDICINE

## 2024-04-23 PROCEDURE — 25000003 PHARM REV CODE 250: Performed by: STUDENT IN AN ORGANIZED HEALTH CARE EDUCATION/TRAINING PROGRAM

## 2024-04-23 PROCEDURE — 85025 COMPLETE CBC W/AUTO DIFF WBC: CPT | Performed by: STUDENT IN AN ORGANIZED HEALTH CARE EDUCATION/TRAINING PROGRAM

## 2024-04-23 PROCEDURE — 83735 ASSAY OF MAGNESIUM: CPT | Performed by: STUDENT IN AN ORGANIZED HEALTH CARE EDUCATION/TRAINING PROGRAM

## 2024-04-23 PROCEDURE — 63600175 PHARM REV CODE 636 W HCPCS: Performed by: STUDENT IN AN ORGANIZED HEALTH CARE EDUCATION/TRAINING PROGRAM

## 2024-04-23 PROCEDURE — 20600001 HC STEP DOWN PRIVATE ROOM

## 2024-04-23 PROCEDURE — 36415 COLL VENOUS BLD VENIPUNCTURE: CPT | Performed by: INTERNAL MEDICINE

## 2024-04-23 PROCEDURE — 80048 BASIC METABOLIC PNL TOTAL CA: CPT | Mod: XB | Performed by: INTERNAL MEDICINE

## 2024-04-23 PROCEDURE — 25000003 PHARM REV CODE 250

## 2024-04-23 PROCEDURE — A4216 STERILE WATER/SALINE, 10 ML: HCPCS | Performed by: STUDENT IN AN ORGANIZED HEALTH CARE EDUCATION/TRAINING PROGRAM

## 2024-04-23 PROCEDURE — 84100 ASSAY OF PHOSPHORUS: CPT | Performed by: STUDENT IN AN ORGANIZED HEALTH CARE EDUCATION/TRAINING PROGRAM

## 2024-04-23 PROCEDURE — 63600175 PHARM REV CODE 636 W HCPCS: Performed by: INTERNAL MEDICINE

## 2024-04-23 PROCEDURE — 80053 COMPREHEN METABOLIC PANEL: CPT | Performed by: INTERNAL MEDICINE

## 2024-04-23 PROCEDURE — A4217 STERILE WATER/SALINE, 500 ML: HCPCS | Performed by: INTERNAL MEDICINE

## 2024-04-23 PROCEDURE — 82330 ASSAY OF CALCIUM: CPT | Performed by: INTERNAL MEDICINE

## 2024-04-23 RX ORDER — POTASSIUM CHLORIDE 14.9 MG/ML
20 INJECTION INTRAVENOUS
Qty: 400 ML | Refills: 0 | Status: COMPLETED | OUTPATIENT
Start: 2024-04-23 | End: 2024-04-23

## 2024-04-23 RX ORDER — METOPROLOL TARTRATE 25 MG/1
25 TABLET, FILM COATED ORAL 2 TIMES DAILY
Status: DISCONTINUED | OUTPATIENT
Start: 2024-04-23 | End: 2024-04-23

## 2024-04-23 RX ADMIN — POTASSIUM CHLORIDE 20 MEQ: 14.9 INJECTION, SOLUTION INTRAVENOUS at 08:04

## 2024-04-23 RX ADMIN — HYDROMORPHONE HYDROCHLORIDE 1 MG: 1 INJECTION, SOLUTION INTRAMUSCULAR; INTRAVENOUS; SUBCUTANEOUS at 07:04

## 2024-04-23 RX ADMIN — HYDROMORPHONE HYDROCHLORIDE 1 MG: 1 INJECTION, SOLUTION INTRAMUSCULAR; INTRAVENOUS; SUBCUTANEOUS at 02:04

## 2024-04-23 RX ADMIN — METOROPROLOL TARTRATE 5 MG: 5 INJECTION, SOLUTION INTRAVENOUS at 12:04

## 2024-04-23 RX ADMIN — Medication 10 ML: at 06:04

## 2024-04-23 RX ADMIN — METOROPROLOL TARTRATE 5 MG: 5 INJECTION, SOLUTION INTRAVENOUS at 06:04

## 2024-04-23 RX ADMIN — PROCHLORPERAZINE EDISYLATE 5 MG: 5 INJECTION INTRAMUSCULAR; INTRAVENOUS at 02:04

## 2024-04-23 RX ADMIN — VASOPRESSIN: 20 INJECTION, SOLUTION INTRAVENOUS at 10:04

## 2024-04-23 RX ADMIN — PROCHLORPERAZINE EDISYLATE 5 MG: 5 INJECTION INTRAMUSCULAR; INTRAVENOUS at 07:04

## 2024-04-23 RX ADMIN — LEVOTHYROXINE SODIUM 175 MCG: 150 TABLET ORAL at 06:04

## 2024-04-23 RX ADMIN — Medication 10 ML: at 02:04

## 2024-04-23 RX ADMIN — Medication 10 ML: at 01:04

## 2024-04-23 RX ADMIN — POTASSIUM CHLORIDE 20 MEQ: 14.9 INJECTION, SOLUTION INTRAVENOUS at 04:04

## 2024-04-23 RX ADMIN — PROCHLORPERAZINE EDISYLATE 5 MG: 5 INJECTION INTRAMUSCULAR; INTRAVENOUS at 09:04

## 2024-04-23 RX ADMIN — LIOTHYRONINE SODIUM 5 MCG: 5 TABLET ORAL at 06:04

## 2024-04-23 RX ADMIN — POTASSIUM CHLORIDE 20 MEQ: 14.9 INJECTION, SOLUTION INTRAVENOUS at 05:04

## 2024-04-23 RX ADMIN — Medication 10 ML: at 10:04

## 2024-04-23 RX ADMIN — ENOXAPARIN SODIUM 40 MG: 40 INJECTION SUBCUTANEOUS at 07:04

## 2024-04-23 RX ADMIN — POTASSIUM CHLORIDE 20 MEQ: 14.9 INJECTION, SOLUTION INTRAVENOUS at 10:04

## 2024-04-23 NOTE — ASSESSMENT & PLAN NOTE
On arrival to [facility] [place], meeting 2/4 SIRS (requiring 2L NC w/ RR 20s & WBC 13). Likely soft tissue/neck source. Received IVF at OSH prior to transfer (for lactate 2.3 at that time). Started on broad-spectrum abx. Blood cx negative.   - ID consulted; appreciate recs  - Monitor CBC & fever curve  - Mgmt of neck mass/wound as below   - Has completed treatment. Discontinue vancomycin + Flagyl + fluconazole

## 2024-04-23 NOTE — PROGRESS NOTES
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 She initially presented to [facility] Emergency Department with six days of epigastric pain with associated nausea, vomiting, and difficulty eating due to pain. However on presentation, she was noted to have a large necrotic and purulent wound located on her right neck with complaints of associated difficulty speaking, swallowing and shortness of breath. She was noted to be hypoxic 88% on room air which improved with 2L NC. Labs were notable for leukocytosis 14, hypokalemia 2.4, hypochloremia 89, CO2 36, hypercalcemia 13.5, Phos 2.5. . Troponin 0.046. Lactate 2.3. CT Soft Tissue Neck was concerning for 9.5 x 8.2 x 10 cm large lobulated mass in the right anterolateral neck extending to the deep spaces of the neck and abutting the right prevertebral space and paravertebral musculature, left midline shift, multiple bilateral necrotic cervical lymph nodes. Case was discussed and decision was made to transfer to [facility] [place] higher level of care.     Of additional note, she recently transitioned her care to [place] and underwent stem-cell transplant approximately 2 weeks ago in [place], has not been seen by a US physician since 2023.      On transfer: she was afebrile, mildly hypertensive /109, HR 98, RR 20, satting 96% on room  air. Complaining of mild headache and poor appetite associated with nausea; denies fever, chills, chest pain, palpitations, SOB, abdominal pain, dysuria. States wound has been draining for the past week initially thick white now more liquid. Mild dysphonia is apparently chronic from left vocal cord paralysis and at baseline. Some dysphagia with solids, none with pureed soft or liquids, denies odynophagia. Dressing on neck CDI, ENT consulted will be here shortly to assess patient.        Overview/Hospital Course:  Evaluated 4/12 in MICU by ENT, who do not feel patient is a candidate for surgical intervention. Palliative care and oncology consulted. Failed swallow study, SLP recommending NPO 4/13. Patient continues to protect airway. Stepped down from MICU on 4/13.    DC planning: awaiting PEG placement    Interval History: NAEON. Pt removed NGT overnight. Case discussed with gen surgery, IR, and GI yesterday and unfortunately the only option is trach placement prior to PEG. I had GOC discussion with patient/spouse today. They would still like to proceed with PEG and are agreeable for trach.     Objective:     Vital Signs (Most Recent):  Temp: 98.8 °F (37.1 °C) (04/23/24 0734)  Pulse: 97 (04/23/24 1112)  Resp: 18 (04/23/24 1401)  BP: (!) 147/79 (04/23/24 0734)  SpO2: 98 % (04/23/24 0734) Vital Signs (24h Range):  Temp:  [97.5 °F (36.4 °C)-98.8 °F (37.1 °C)] 98.8 °F (37.1 °C)  Pulse:  [] 97  Resp:  [16-19] 18  SpO2:  [92 %-98 %] 98 %  BP: (139-148)/(76-87) 147/79     Weight: 85.3 kg (188 lb 0.8 oz)  Body mass index is 30.35 kg/m².    Intake/Output Summary (Last 24 hours) at 4/23/2024 1422  Last data filed at 4/22/2024 2136  Gross per 24 hour   Intake --   Output 400 ml   Net -400 ml         Physical Exam  Vitals and nursing note reviewed.   Constitutional:       General: She is not in acute distress.     Appearance: She is ill-appearing. She is not toxic-appearing.   Eyes:      Conjunctiva/sclera: Conjunctivae  normal.   Neck:      Comments: Dressing in place over entire circumference of neck. Dressing c/d/i  Cardiovascular:      Rate and Rhythm: Normal rate and regular rhythm.      Heart sounds: Normal heart sounds.   Pulmonary:      Effort: Pulmonary effort is normal. No respiratory distress.      Breath sounds: Normal breath sounds. No wheezing.   Abdominal:      General: Bowel sounds are normal. There is no distension.      Palpations: Abdomen is soft.      Tenderness: There is no abdominal tenderness. There is no guarding.   Skin:     General: Skin is warm and dry.   Neurological:      General: No focal deficit present.      Mental Status: She is alert and oriented to person, place, and time. Mental status is at baseline.   Psychiatric:         Mood and Affect: Mood normal.         Behavior: Behavior normal.             Significant Labs: All pertinent labs within the past 24 hours have been reviewed.    Significant Imaging: I have reviewed all pertinent imaging results/findings within the past 24 hours.  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 Underwent repeat bx after failed tx (1/24/22) that showed moderately differentiated squamous cell carcinoma w/ suspicion for perineural invasion. CT soft tissue neck (Jan 2022) w/o e/o metastatic dx. PET-CT (Feb 2022) w/o e/o active local or distant dx- no abnormal activity within the tongue, no signs of cervical lymph node or distant metastasis. Declined chemotherapy &/or XRT. Underwent selective neck dissection levels 1, 2, 3, and partial glossectomy w/ split-thickness skin graft (3/3/2022 per Dr. Clif Olson).  Surgical pathology w/ unifocal squamous cell carcinoma on the dorsal surface of the tongue on left side measuring 1 cm, moderately differentiated, 6 mm depth of invasion, no lymphovascular invasion, positive  "for perineural invasion, margins negative, 0 of 39 lymph nodes with metastasis, pT2 pN0 noted.  Postop course complicated by infection requiring I&D and antibiotics.  Declined adjuvant XRT. Repeat imaging (June 2023) w/ 2.2 cm lesion deep to the R SCM c/f necrotic LN w/ few adjacent pathological appearing LNs suspicious for metastasis. Underwent R neck FNA (7/7/23) w/ pathology suspicious for metastatic SCC. PET-CT (8/21/2023) w/ large necrotic mass the angle of mandible posterior to submandibular gland on the right measuring 4.4 x 4.2 cm with SUV 9.78, just superior to the mass is a 2 cm hypermetabolic lymph node and no evidence of distant metastatic disease. Subsequently transferred care to [place] where she reportedly recently abx,"detox", hyperbaric chamber, & stem-cell transplant approximately 2 weeks ago PTA in [place]. Now presenting w/ progressive neck wound. CT neck soft tissue (4/10/24) w/ large bulky lobulated appearing mass right lateral and anterolateral neck extending to the D spaces of the neck having overall dimensions of approximately 9.5 x 8.2 x 10 cm, multiple addn'l b/l cervical necrotic nodes, & assoc mass effect results in displacement of midline structures to the patient's left. CT C/A/P with diffuse metastatic disease including mediastinal & hilar LAD, bilateral lungs, peritoneum, lumbar vertebrae, & likely liver.     - ENT consulted; appreciate recs - not surgical candidate for debridement  - Medical & Radiation Oncology consulted; appreciate recs - pt declines chemo or XRT  - Palliative following; appreciate assistance  - Multimodal analgesia   - SLP following, appreciate assistance  - Will require awake tracheostomy prior to PEG placement. Gen surgery and ENT planning for procedures tomorrow.     Severe malnutrition  PEG tube placement is being discussed. IR and GI deferred the procedure. Surgery depending on anesthesia assessment. ENT updated with recs. Awaiting final recs.       Nutrition " "consulted. Most recent weight and BMI monitored-     Measurements:  Wt Readings from Last 1 Encounters:   04/22/24 85.3 kg (188 lb 0.8 oz)   Body mass index is 30.35 kg/m².    Patient has been screened and assessed by RD.    Malnutrition Type:  Context: chronic illness  Level: severe    Malnutrition Characteristic Summary:  Weight Loss (Malnutrition): greater than 10% in 6 months  Energy Intake (Malnutrition): less than or equal to 50% for greater than or equal to 1 month    Interventions/Recommendations (treatment strategy):  1.    Cont TPN    Head and neck cancer        Hypercalcemia  On arrival to OSH, Ca 13.5 --> 11.0 on arrival to [facility]. Continued subsequent uptrend peaking at 13.1 on 4/15 (corrected Ca 14.5). Likely 2/2 malignancy.   - IVF  - Calcitonin BID x2 days  - Ionized Ca 1.59 4/21  - Monitor CMP  - IV hydration and cont to trend Ca level   - Ionized calcium WNL. Cont to monitor    ACP (advance care planning)        Pain        Palliative care encounter  Daily GOC discussions  Pt and family would like to proceed with aggressive care  Cont FULL code  Planning for PEG      Airway compromise        Hypokalemia  Patient has hypokalemia which is Acute and currently controlled. Most recent potassium levels reviewed-   Lab Results   Component Value Date    K 2.8 (L) 04/23/2024   . Will continue potassium replacement per protocol and recheck repeat levels after replacement completed.     Open neck wound  Pt with extensive hx of H/N cancer. Presents w/ continued/worsening drainage from the neck for months with recent worsening of symptoms for past 2 weeks w/ N/V. Pt returned from [place] where she received antibiotics, "detox" and hyperbaric chamber as treatment. Endorses difficulty with swallowing 2/2 to oral trush. Large neck mass on imaging w/ large central collection of air which appears to extend superficially to the skin surface right mid neck anterior laterally; unable to r/o superimposed abscess. Meeting " 2/4 SIRS (HR & WBC) w/ elevated lactate, but nml BP. Blood cx negative. Pt not surgical candidate for debridement per ENT evaluation.   - ID consulted; appreciate recs  - Surgical debridement is not an option. Has completed course of antibiotics.      Sepsis  On arrival to [facility] [place], meeting 2/4 SIRS (requiring 2L NC w/ RR 20s & WBC 13). Likely soft tissue/neck source. Received IVF at OSH prior to transfer (for lactate 2.3 at that time). Started on broad-spectrum abx. Blood cx negative.   - ID consulted; appreciate recs  - Monitor CBC & fever curve  - Mgmt of neck mass/wound as below   - Has completed treatment. Discontinue vancomycin + Flagyl + fluconazole     Postprocedural hypoparathyroidism  Last PTHi nml at 26 (2016). Home regimen includes calcium carbonate & vitamin D. Symptomatic hypercalcemia (Ca 13.5) on presentation to OSH w/ abdominal pain & N/V. Improved w/ NS ggt (although query if pt's hypercalcemia was re: hx of hypoparathyroidism vs hypercalcemia of malignancy.   - Holding home calcium carbonate  - Resume home vitamin D as PO tolerance improves   - Monitor CMP    GERD (gastroesophageal reflux disease)        Postsurgical hypothyroidism  Last TSH elevated at 5.264. Home regimen includes levothyroxine 175mcg daily & liothyronine 5mcg daily  - Continue home liothyronine & levothyroxine       HTN (hypertension)  Hx of essential HTN; home regimen includes metoprolol 25mg BID. On arrival to [facility] [place], SBP 140s-150s.   PRN labetalol/hydralazine      VTE Risk Mitigation (From admission, onward)           Ordered     enoxaparin injection 40 mg  Every 24 hours         04/12/24 1536     IP VTE HIGH RISK PATIENT  Once         04/12/24 1324     Place sequential compression device  Until discontinued         04/12/24 1324                    Discharge Planning   ANDRES: 4/24/2024     Code Status: Full Code   Is the patient medically ready for discharge?: No    Reason for patient still in hospital (select all that apply):  Treatment  Discharge Plan A: Hospice/home                  Pasquale Haines MD  Department of Hospital Medicine   Con Nguyen - Telemetry Stepdown

## 2024-04-23 NOTE — ASSESSMENT & PLAN NOTE
Hx of essential HTN; home regimen includes metoprolol 25mg BID. On arrival to [facility], SBP 140s-150s.   PRN labetalol/hydralazine

## 2024-04-23 NOTE — SUBJECTIVE & OBJECTIVE
Interval History: NAEON. Pt removed NGT overnight. Case discussed with gen surgery, IR, and GI yesterday and unfortunately the only option is trach placement prior to PEG. I had C discussion with patient/spouse today. They would still like to proceed with PEG and are agreeable for trach.     Objective:     Vital Signs (Most Recent):  Temp: 98.8 °F (37.1 °C) (04/23/24 0734)  Pulse: 97 (04/23/24 1112)  Resp: 18 (04/23/24 1401)  BP: (!) 147/79 (04/23/24 0734)  SpO2: 98 % (04/23/24 0734) Vital Signs (24h Range):  Temp:  [97.5 °F (36.4 °C)-98.8 °F (37.1 °C)] 98.8 °F (37.1 °C)  Pulse:  [] 97  Resp:  [16-19] 18  SpO2:  [92 %-98 %] 98 %  BP: (139-148)/(76-87) 147/79     Weight: 85.3 kg (188 lb 0.8 oz)  Body mass index is 30.35 kg/m².    Intake/Output Summary (Last 24 hours) at 4/23/2024 1422  Last data filed at 4/22/2024 2136  Gross per 24 hour   Intake --   Output 400 ml   Net -400 ml         Physical Exam  Vitals and nursing note reviewed.   Constitutional:       General: She is not in acute distress.     Appearance: She is ill-appearing. She is not toxic-appearing.   Eyes:      Conjunctiva/sclera: Conjunctivae normal.   Neck:      Comments: Dressing in place over entire circumference of neck. Dressing c/d/i  Cardiovascular:      Rate and Rhythm: Normal rate and regular rhythm.      Heart sounds: Normal heart sounds.   Pulmonary:      Effort: Pulmonary effort is normal. No respiratory distress.      Breath sounds: Normal breath sounds. No wheezing.   Abdominal:      General: Bowel sounds are normal. There is no distension.      Palpations: Abdomen is soft.      Tenderness: There is no abdominal tenderness. There is no guarding.   Skin:     General: Skin is warm and dry.   Neurological:      General: No focal deficit present.      Mental Status: She is alert and oriented to person, place, and time. Mental status is at baseline.   Psychiatric:         Mood and Affect: Mood normal.         Behavior: Behavior normal.              Significant Labs: All pertinent labs within the past 24 hours have been reviewed.    Significant Imaging: I have reviewed all pertinent imaging results/findings within the past 24 hours.  Review of Systems

## 2024-04-23 NOTE — PLAN OF CARE
Patient stable, AOX4, VSS. Safety measures ensured.call light within reach.bed in low position. No distress at night.

## 2024-04-23 NOTE — PLAN OF CARE
Problem: Adult Inpatient Plan of Care  Goal: Plan of Care Review  Outcome: Progressing  Goal: Patient-Specific Goal (Individualized)  Outcome: Progressing  Goal: Absence of Hospital-Acquired Illness or Injury  Outcome: Progressing  Goal: Optimal Comfort and Wellbeing  Outcome: Progressing  Goal: Readiness for Transition of Care  Outcome: Progressing     Problem: Adjustment to Illness (Sepsis/Septic Shock)  Goal: Optimal Coping  Outcome: Progressing     Problem: Bleeding (Sepsis/Septic Shock)  Goal: Absence of Bleeding  Outcome: Progressing     Problem: Glycemic Control Impaired (Sepsis/Septic Shock)  Goal: Blood Glucose Level Within Desired Range  Outcome: Progressing     Problem: Infection Progression (Sepsis/Septic Shock)  Goal: Absence of Infection Signs and Symptoms  Outcome: Progressing     Problem: Nutrition Impaired (Sepsis/Septic Shock)  Goal: Optimal Nutrition Intake  Outcome: Progressing     Problem: Impaired Wound Healing  Goal: Optimal Wound Healing  Outcome: Progressing     Problem: Skin Injury Risk Increased  Goal: Skin Health and Integrity  Outcome: Progressing     Problem: Coping Ineffective  Goal: Effective Coping  Outcome: Progressing     Problem: Infection  Goal: Absence of Infection Signs and Symptoms  Outcome: Progressing

## 2024-04-23 NOTE — CARE UPDATE
General Surgery    Initially planned for PEG vs open gastrostomy tube yesterday. However, there remains high concern about the ability to safely secure an airway and the real risk of tracheostomy. She was not amenable to this yesterday.     She did have an NGT placed which she removed overnight.     General surgery will not be able to assist with enteral access unless she is amenable to trach. We will sign off for now. Please re-contact should she change her mind.     Tanya Rodriguez MD  General Surgery, PGY-5  [phone]

## 2024-04-23 NOTE — ASSESSMENT & PLAN NOTE
"Pt with extensive hx of H/N cancer. Presents w/ continued/worsening drainage from the neck for months with recent worsening of symptoms for past 2 weeks w/ N/V. Pt returned from [place] where she received antibiotics, "detox" and hyperbaric chamber as treatment. Endorses difficulty with swallowing 2/2 to oral trush. Large neck mass on imaging w/ large central collection of air which appears to extend superficially to the skin surface right mid neck anterior laterally; unable to r/o superimposed abscess. Meeting 2/4 SIRS (HR & WBC) w/ elevated lactate, but nml BP. Blood cx negative. Pt not surgical candidate for debridement per ENT evaluation.   - ID consulted; appreciate recs  - Surgical debridement is not an option. Has completed course of antibiotics.    "

## 2024-04-23 NOTE — ASSESSMENT & PLAN NOTE
Last TSH elevated at 5.264. Home regimen includes levothyroxine 175mcg daily & liothyronine 5mcg daily  - Continue home liothyronine & levothyroxine

## 2024-04-23 NOTE — ASSESSMENT & PLAN NOTE
"Initial bx of ventral surface of tongue (12/24/21) showed atypia w/o diagnostic e/o dysplasia. Underwent repeat bx after failed tx (1/24/22) that showed moderately differentiated squamous cell carcinoma w/ suspicion for perineural invasion. CT soft tissue neck (Jan 2022) w/o e/o metastatic dx. PET-CT (Feb 2022) w/o e/o active local or distant dx- no abnormal activity within the tongue, no signs of cervical lymph node or distant metastasis. Declined chemotherapy &/or XRT. Underwent selective neck dissection levels 1, 2, 3, and partial glossectomy w/ split-thickness skin graft (3/3/2022 per Dr. Clif Olson).  Surgical pathology w/ unifocal squamous cell carcinoma on the dorsal surface of the tongue on left side measuring 1 cm, moderately differentiated, 6 mm depth of invasion, no lymphovascular invasion, positive for perineural invasion, margins negative, 0 of 39 lymph nodes with metastasis, pT2 pN0 noted.  Postop course complicated by infection requiring I&D and antibiotics.  Declined adjuvant XRT. Repeat imaging (June 2023) w/ 2.2 cm lesion deep to the R SCM c/f necrotic LN w/ few adjacent pathological appearing LNs suspicious for metastasis. Underwent R neck FNA (7/7/23) w/ pathology suspicious for metastatic SCC. PET-CT (8/21/2023) w/ large necrotic mass the angle of mandible posterior to submandibular gland on the right measuring 4.4 x 4.2 cm with SUV 9.78, just superior to the mass is a 2 cm hypermetabolic lymph node and no evidence of distant metastatic disease. Subsequently transferred care to [place] where she reportedly recently abx,"detox", hyperbaric chamber, & stem-cell transplant approximately 2 weeks ago PTA in [place]. Now presenting w/ progressive neck wound. CT neck soft tissue (4/10/24) w/ large bulky lobulated appearing mass right lateral and anterolateral neck extending to the D spaces of the neck having overall dimensions of approximately 9.5 x 8.2 x 10 cm, multiple addn'l b/l cervical " necrotic nodes, & assoc mass effect results in displacement of midline structures to the patient's left. CT C/A/P with diffuse metastatic disease including mediastinal & hilar LAD, bilateral lungs, peritoneum, lumbar vertebrae, & likely liver.     - ENT consulted; appreciate recs - not surgical candidate for debridement  - Medical & Radiation Oncology consulted; appreciate recs - pt declines chemo or XRT  - Palliative following; appreciate assistance  - Multimodal analgesia   - SLP following, appreciate assistance  - Will require awake tracheostomy prior to PEG placement. Gen surgery and ENT planning for procedures tomorrow.

## 2024-04-23 NOTE — ASSESSMENT & PLAN NOTE
On arrival to OSH, Ca 13.5 --> 11.0 on arrival to [facility]. Continued subsequent uptrend peaking at 13.1 on 4/15 (corrected Ca 14.5). Likely 2/2 malignancy.   - IVF  - Calcitonin BID x2 days  - Ionized Ca 1.59 4/21  - Monitor CMP  - IV hydration and cont to trend Ca level   - Ionized calcium WNL. Cont to monitor

## 2024-04-23 NOTE — ASSESSMENT & PLAN NOTE
Daily GOC discussions  Pt and family would like to proceed with aggressive care  Cont FULL code  Planning for PEG

## 2024-04-23 NOTE — PROGRESS NOTES
"Therapy with vancomycin complete and/or consult discontinued by provider.  Pharmacy will sign off, please re-consult as needed.    Per ID note 4/15:   "- switch IV antibiotics to cefadroxil 1g daily and doxycycline 100mg BID; treat for 1 additional week  - most of mass likely necrotic tumor as opposed to abscess; Pt is not a candidate for resection so even if there was a superimposed abscess, unlikely to achieve source control without debridement/resection"    Pt has been on vancomycin since 4/10 - okay from MD to maegan      Pharmacokinetic Assessment Follow Up: IV Vancomycin    Vancomycin serum concentration assessment(s):    The trough level was drawn correctly and can be used to guide therapy at this time. The measurement is within the desired definitive target range of 10 to 15 mcg/mL. Drawn 2.5 prior to when it should have been ordered for a 24 hour level. So would have likely been in range at the 24 hour yeimy    Vancomycin Regimen Plan:    Stopping therapy    Drug levels (last 3 results):  Recent Labs   Lab Result Units 04/20/24  1754 04/22/24  1817   Vancomycin-Trough ug/mL 14.9 16.4       Pharmacy will continue to follow and monitor vancomycin.    Please contact pharmacy for questions regarding this assessment.    Thank you for the consult,   Charlotte Pastor McArdle       Patient brief summary:  Marysol Cash is a 58 y.o. female initiated on antimicrobial therapy with IV Vancomycin for treatment of skin & soft tissue infection    The patient's current regimen is 1750mg q24h    Drug Allergies:   Review of patient's allergies indicates:   Allergen Reactions    Ciprofloxacin Swelling    Codeine Swelling    Opioids - morphine analogues     Pcn [penicillins] Hives     Tolerated cefepime 04/2024    Percocet [oxycodone-acetaminophen] Swelling       Actual Body Weight:   85.3kg    Renal Function:   Estimated Creatinine Clearance: 85.4 mL/min (based on SCr of 0.8 mg/dL).,     Dialysis Method (if " applicable):  N/A    CBC (last 72 hours):  Recent Labs   Lab Result Units 04/20/24  0257 04/21/24  0548 04/22/24  0606   WBC K/uL 17.02* 16.98* 16.75*   Hemoglobin g/dL 13.1 12.4 13.1   Hematocrit % 40.8 37.5 38.9   Platelets K/uL 281 225 270   Gran % % 76.7* 78.0* 84.1*   Lymph % % 14.0* 13.5* 9.8*   Mono % % 8.5 7.7 5.4   Eosinophil % % 0.0 0.0 0.0   Basophil % % 0.1 0.2 0.2   Differential Method  Automated Automated Automated       Metabolic Panel (last 72 hours):  Recent Labs   Lab Result Units 04/20/24  0257 04/21/24  0822 04/21/24  1631 04/22/24  0606 04/22/24  1134   Sodium mmol/L 136 134* 135* 128*  128* 139   Potassium mmol/L 3.2* 2.9* 3.5 5.4*  5.4* 3.3*   Chloride mmol/L 99 95 97 89*  89* 96   CO2 mmol/L 28 34* 33* 28  28 36*   Glucose mg/dL 116* 130* 114* 748*  748* 112*   BUN mg/dL 14 17 16 13  13 14   Creatinine mg/dL 0.8 0.9 0.8 1.1  1.1 0.8   Albumin g/dL  --   --  2.4* 2.4*  --    Total Bilirubin mg/dL  --   --  0.4 0.4  --    Alkaline Phosphatase U/L  --   --  95 100  --    AST U/L  --   --  18 95*  --    ALT U/L  --   --  15 16  --    Magnesium mg/dL  --   --   --  2.5  --    Phosphorus mg/dL  --   --   --  4.8*  --        Vancomycin Administrations:  vancomycin given in the last 96 hours                     vancomycin 1.75 g in 5 % dextrose 500 mL IVPB (mg) 1,750 mg New Bag 04/21/24 2055     1,750 mg New Bag 04/20/24 1926     1,750 mg New Bag 04/19/24 1730                    Microbiologic Results:  Microbiology Results (last 7 days)       ** No results found for the last 168 hours. **

## 2024-04-23 NOTE — ASSESSMENT & PLAN NOTE
Patient has hypokalemia which is Acute and currently controlled. Most recent potassium levels reviewed-   Lab Results   Component Value Date    K 2.8 (L) 04/23/2024   . Will continue potassium replacement per protocol and recheck repeat levels after replacement completed.

## 2024-04-23 NOTE — PLAN OF CARE
[facility] - Telemetry Stepdown  Discharge Reassessment    Primary Care Provider: James Coronel MD    Expected Discharge Date: 4/26/2024    Reassessment (most recent)       Discharge Reassessment - 04/23/24 1433          Discharge Reassessment    Assessment Type Discharge Planning Reassessment     Did the patient's condition or plan change since previous assessment? No     Discharge Plan discussed with: Patient;Spouse/sig other     Communicated ANDRES with patient/caregiver Yes     Discharge Plan A Hospice/home   [facility]    Discharge Plan B Home Health     DME Needed Upon Discharge  other (see comments)   TBD    Transition of Care Barriers None     Why the patient remains in the hospital Requires continued medical care        Post-Acute Status    Post-Acute Authorization Hospice     Hospice Status Pending medical clearance/testing                     Discharge Plan A and Plan B have been determined by review of patient's clinical status, future medical and therapeutic needs, and coverage/benefits for post-acute care in coordination with multidisciplinary team members.     Nisha Hightower RN  Ext [phone]

## 2024-04-23 NOTE — ANESTHESIA PREPROCEDURE EVALUATION
[facility]  Anesthesia Pre-Operative Evaluation         Patient Name: Marysol Cash  YOB: 1965  MRN: [id number]    SUBJECTIVE:     Pre-operative evaluation for Procedure(s) (LRB):  EGD, WITH PEG TUBE INSERTION (N/A)   CREATION, TRACHEOSTOMY (Neck)    **PAPER CONSENT SIGNED BY  AT BEDSIDE AND PLACED IN CONSENT SECTION OF CHART. WITNESSED BY FLOOR RN **     04/23/2024    Marysol Cash is a 58 y.o. 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     Patient now presents for the above procedure(s).      LDA:  PICC Double Lumen 04/20/24 1652 right brachial (Active)   Line Necessity Review Medication caustic to vasculature 04/22/24 1930   Verification by X-ray No 04/22/24 1930   Site Assessment No redness;No swelling;No warmth 04/22/24 1930   Extremity Assessment Distal to IV No abnormal discoloration 04/22/24 1930   Line Securement Device Secured with sutures 04/22/24 1930   Dressing Type Central line dressing 04/22/24 1930   Dressing Status Clean;Dry;Intact 04/22/24 1930   Dressing Intervention Integrity maintained 04/22/24 1930   Date on Dressing 04/20/24 04/22/24 1930   Dressing Due to be Changed 04/27/24 04/22/24 1930   Distal Patency/Care flushed w/o difficulty 04/22/24 1930   Proximal 1 Patency/Care infusing 04/22/24 1930   Current Insertion Depth (cm) 37 cm 04/20/24 1651   Current Exposed Catheter (cm) 0 cm 04/20/24 1651   Extremity Circumference (cm) 31 cm 04/20/24 1651   Number of days: 2       Prev airway: None documented.    Drips:   Current Facility-Administered Medications   Medication Dose Route Frequency Last Rate  Last Admin    TPN ADULT CENTRAL LINE CUSTOM   Intravenous Continuous 75 mL/hr at 04/22/24 2226 New Bag at 04/22/24 2226    TPN ADULT CENTRAL LINE CUSTOM   Intravenous Continuous           Patient Active Problem List   Diagnosis    AMD (age related macular degeneration)    HTN (hypertension)    Dry eye syndrome of bilateral lacrimal glands    Nuclear sclerotic cataract of both eyes    Postsurgical hypothyroidism    BMI 37.0-37.9, adult    Drug-induced constipation    GERD (gastroesophageal reflux disease)    Postprocedural hypoparathyroidism    Hypocalcemia    IFG (impaired fasting glucose)    Degeneration disease of medial meniscus of right knee    Pulmonary nodules    Nausea    Atrophic gastritis with hemorrhage    Intestinal metaplasia of gastric mucosa    Chronic pain of right knee    Pes anserine bursitis    B12 deficiency    Lymphedema    Class 2 severe obesity due to excess calories with serious comorbidity and body mass index (BMI) of 36.0 to 36.9 in adult    Malignant neoplasm of tip and lateral border of tongue    Sepsis    Open neck wound    Hypokalemia    Oral thrush    Airway compromise    Cellulitis and abscess of neck    SCC (squamous cell carcinoma), scalp/neck    Partial obstruction of airway    Palliative care encounter    Pain    ACP (advance care planning)    Hypercalcemia    Head and neck cancer    Severe malnutrition       Review of patient's allergies indicates:   Allergen Reactions    Ciprofloxacin Swelling    Codeine Swelling    Opioids - morphine analogues     Pcn [penicillins] Hives     Tolerated cefepime 04/2024    Percocet [oxycodone-acetaminophen] Swelling       Current Inpatient Medications:  Current Facility-Administered Medications   Medication Dose Route Frequency    barium  450 mL Per NG tube Once    barium  450 mL Per NG tube Once    enoxparin  40 mg Subcutaneous Q24H (prophylaxis, 1700)    levothyroxine  175 mcg Oral Before breakfast    liothyronine  5 mcg Oral Before breakfast     potassium chloride in water  20 mEq Intravenous Q2H    senna  8.6 mg Oral BID    sodium chloride 0.9%  10 mL Intravenous Q6H       No current facility-administered medications on file prior to encounter.     Current Outpatient Medications on File Prior to Encounter   Medication Sig Dispense Refill    metoprolol tartrate (LOPRESSOR) 25 MG tablet Take 1 tablet (25 mg total) by mouth 2 (two) times daily. 180 tablet 3    calcium carbonate (CALCIUM ANTACID) 300 mg (750 mg) Chew Take 2 tablets by mouth 2 (two) times daily.      cholecalciferol, vitamin D3, (VITAMIN D3) 25 mcg (1,000 unit) capsule Take 1,000 Units by mouth once daily.      flaxseed 1,000 mg Cap Take by mouth Daily.      HYDROcodone-acetaminophen (NORCO) 5-325 mg per tablet       levothyroxine (SYNTHROID) 175 MCG tablet Take 1 tablet (175 mcg total) by mouth before breakfast. Take on an empty stomach. Wait 4 hours after taking LT4 to take any multivitamins or nutritional supplements and wait 1 hour to take other medications. 90 tablet 3    liothyronine (CYTOMEL) 5 MCG Tab Take 1 tablet (5 mcg total) by mouth before breakfast. Take on an empty stomach. Wait 4 hours after taking T3 to take any multivitamins or nutritional supplements and wait 1 hour to take other medications. 90 tablet 3    multivitamin capsule Take 1 capsule by mouth once daily.      ondansetron (ZOFRAN-ODT) 4 MG TbDL DISSOLVE 1 TABLET (4 MG TOTAL) BY MOUTH EVERY 8 (EIGHT) HOURS AS NEEDED. 30 tablet 2       Past Surgical History:   Procedure Laterality Date    cesaean section  1991, 1993    CHOLECYSTECTOMY  2008    COLONOSCOPY  09/2013    ESOPHAGOGASTRODUODENOSCOPY N/A 5/11/2020    Procedure: EGD (ESOPHAGOGASTRODUODENOSCOPY);  Surgeon: Nahed Zabala MD;  [facility]: [facility];  Service: Endoscopy;  Laterality: N/A;  covid 5/8/2020    ESOPHAGOGASTRODUODENOSCOPY W/ PEG N/A 4/22/2024    Procedure: EGD, WITH PEG TUBE INSERTION;  Surgeon: Jodie Coffey MD;  Location: [address]  [facility];  Service: General;  Laterality: N/A;  Awake intubation, need ENT available at bedside  High chance of conversion to open g-tube    HYSTERECTOMY  age 27    for fibroids    SALPINGECTOMY  1992    for ectopic pregnancy    TONGUE SURGERY  03/04/2022    Cancer removed on tongue and lymphs    TOTAL THYROIDECTOMY  2012    UPPER GASTROINTESTINAL ENDOSCOPY  09/2013    gastritis-hp neg       Social History:  Tobacco Use: Low Risk  (4/22/2024)    Patient History     Smoking Tobacco Use: Never     Smokeless Tobacco Use: Never     Passive Exposure: Not on file      Alcohol Use: Not At Risk (4/12/2024)    AUDIT-C     Frequency of Alcohol Consumption: Never     Average Number of Drinks: Patient does not drink     Frequency of Binge Drinking: Never        OBJECTIVE:     Vital Signs Range (Last 24H):  Temp:  [36.4 °C (97.5 °F)-37.1 °C (98.8 °F)]   Pulse:  []   Resp:  [16-19]   BP: (139-148)/(76-87)   SpO2:  [92 %-98 %]       Significant Labs:  Lab Results   Component Value Date    WBC 13.81 (H) 04/23/2024    HGB 11.6 (L) 04/23/2024    HCT 38.1 04/23/2024     04/23/2024    CHOL 200 (H) 08/29/2023    TRIG 1,064 (H) 04/22/2024    HDL 41 08/29/2023    ALT 13 04/23/2024    AST 18 04/23/2024     04/23/2024    K 2.8 (L) 04/23/2024    CL 96 04/23/2024    CREATININE 0.8 04/23/2024    BUN 15 04/23/2024    CO2 38 (H) 04/23/2024    TSH 0.018 (L) 04/17/2024    INR 1.1 04/10/2024    HGBA1C 5.8 (H) 12/04/2023       Diagnostic Studies: No relevant studies.    EKG:   Results for orders placed or performed in visit on 04/12/24   EKG 12-lead    Collection Time: 04/12/24  8:02 PM   Result Value Ref Range    QRS Duration 82 ms    [facility] QTC Calculation 479 ms    Narrative    Test Reason : (Not Selected)    Vent. Rate : 100 BPM     Atrial Rate : 100 BPM     P-R Int : 144 ms          QRS Dur : 082 ms      QT Int : 372 ms       P-R-T Axes : 050 029 082 degrees     QTc Int : 479 ms    Sinus rhythm with occasional Premature ventricular  complexes  Nonspecific ST and T wave abnormality  Abnormal ECG  When compared with ECG of 10-APR-2024 17:21,  Premature ventricular complexes are now Present  Nonspecific T wave abnormality no longer evident in Inferior leads  Nonspecific T wave abnormality, improved in Anterior-lateral leads  QT has lengthened  Confirmed by Nazario RODRIGUEZ MD ([id number]) on 4/13/2024 10:16:40 AM    Referred By: SRINIVAS HERNANDEZ           Confirmed By:Nazario RODRIGUEZ MD       2D ECHO:  TTE:  No results found for this or any previous visit.    REAGAN:  No results found for this or any previous visit.    ASSESSMENT/PLAN:       Pre-op Assessment    I have reviewed the Patient Summary Reports.     I have reviewed the Nursing Notes. I have reviewed the NPO Status.   I have reviewed the Medications.     Review of Systems  Anesthesia Hx:  No problems with previous Anesthesia   History of prior surgery of interest to airway management or planning:            Denies Personal Hx of Anesthesia complications.                    Hematology/Oncology:       -- Anemia:                  Current/Recent Cancer.                EENT/Dental:  EENT/Dental Normal           Cardiovascular:     Hypertension                                        Pulmonary:  Pulmonary Normal                       Renal/:  Renal/ Normal                 Hepatic/GI:     GERD             Neurological:    Neuromuscular Disease,                                   Endocrine:   Hypothyroidism          Psych:  Psychiatric Normal                    Physical Exam  General: Well nourished and Somnolent  Patient had just received IV phenergan and pain medication  Patient drowsy on exam, unable to stay awake  Airway:  Mallampati: unable to assess         Anesthesia Plan  Type of Anesthesia, risks & benefits discussed:    Anesthesia Type: Gen ETT  Intra-op Monitoring Plan: Standard ASA Monitors  Post Op Pain Control Plan: multimodal analgesia and IV/PO Opioids PRN  Induction:  IV  Airway Plan: Direct,  Fiberoptic and Video, Post-Induction  Informed Consent: Informed consent signed with the Patient representative and all parties understand the risks and agree with anesthesia plan.  All questions answered.   ASA Score: 3  Day of Surgery Review of History & Physical: H&P Update referred to the surgeon/provider.    Ready For Surgery From Anesthesia Perspective.     .

## 2024-04-24 ENCOUNTER — ANESTHESIA (OUTPATIENT)
Dept: SURGERY | Facility: HOSPITAL | Age: 59
DRG: 579 | End: 2024-04-24
Payer: COMMERCIAL

## 2024-04-24 PROBLEM — R00.0 SINUS TACHYCARDIA: Status: ACTIVE | Noted: 2024-04-24

## 2024-04-24 LAB
ANION GAP SERPL CALC-SCNC: 10 MMOL/L (ref 8–16)
BASOPHILS # BLD AUTO: 0.03 K/UL (ref 0–0.2)
BASOPHILS NFR BLD: 0.2 % (ref 0–1.9)
BUN SERPL-MCNC: 19 MG/DL (ref 6–20)
CA-I BLDV-SCNC: 1.38 MMOL/L (ref 1.06–1.42)
CALCIUM SERPL-MCNC: 11.7 MG/DL (ref 8.7–10.5)
CHLORIDE SERPL-SCNC: 96 MMOL/L (ref 95–110)
CO2 SERPL-SCNC: 31 MMOL/L (ref 23–29)
CREAT SERPL-MCNC: 0.8 MG/DL (ref 0.5–1.4)
DIFFERENTIAL METHOD BLD: ABNORMAL
EOSINOPHIL # BLD AUTO: 0 K/UL (ref 0–0.5)
EOSINOPHIL NFR BLD: 0 % (ref 0–8)
ERYTHROCYTE [DISTWIDTH] IN BLOOD BY AUTOMATED COUNT: 14.9 % (ref 11.5–14.5)
EST. GFR  (NO RACE VARIABLE): >60 ML/MIN/1.73 M^2
GLUCOSE SERPL-MCNC: 135 MG/DL (ref 70–110)
HCT VFR BLD AUTO: 41.2 % (ref 37–48.5)
HGB BLD-MCNC: 12.9 G/DL (ref 12–16)
IMM GRANULOCYTES # BLD AUTO: 0.09 K/UL (ref 0–0.04)
IMM GRANULOCYTES NFR BLD AUTO: 0.6 % (ref 0–0.5)
LYMPHOCYTES # BLD AUTO: 1.9 K/UL (ref 1–4.8)
LYMPHOCYTES NFR BLD: 12.8 % (ref 18–48)
MAGNESIUM SERPL-MCNC: 1.4 MG/DL (ref 1.6–2.6)
MCH RBC QN AUTO: 27.4 PG (ref 27–31)
MCHC RBC AUTO-ENTMCNC: 31.3 G/DL (ref 32–36)
MCV RBC AUTO: 88 FL (ref 82–98)
MONOCYTES # BLD AUTO: 1.2 K/UL (ref 0.3–1)
MONOCYTES NFR BLD: 7.7 % (ref 4–15)
NEUTROPHILS # BLD AUTO: 11.8 K/UL (ref 1.8–7.7)
NEUTROPHILS NFR BLD: 78.7 % (ref 38–73)
NRBC BLD-RTO: 0 /100 WBC
OHS QRS DURATION: 72 MS
OHS QRS DURATION: 74 MS
OHS QTC CALCULATION: 413 MS
OHS QTC CALCULATION: 421 MS
PHOSPHATE SERPL-MCNC: 1.8 MG/DL (ref 2.7–4.5)
PLATELET # BLD AUTO: 265 K/UL (ref 150–450)
PMV BLD AUTO: 10.4 FL (ref 9.2–12.9)
POCT GLUCOSE: 137 MG/DL (ref 70–110)
POTASSIUM SERPL-SCNC: 3.5 MMOL/L (ref 3.5–5.1)
RBC # BLD AUTO: 4.71 M/UL (ref 4–5.4)
SODIUM SERPL-SCNC: 137 MMOL/L (ref 136–145)
TRIGL SERPL-MCNC: 154 MG/DL (ref 30–150)
WBC # BLD AUTO: 15.01 K/UL (ref 3.9–12.7)

## 2024-04-24 PROCEDURE — 71000015 HC POSTOP RECOV 1ST HR: Performed by: OTOLARYNGOLOGY

## 2024-04-24 PROCEDURE — 63600175 PHARM REV CODE 636 W HCPCS: Performed by: INTERNAL MEDICINE

## 2024-04-24 PROCEDURE — B4185 PARENTERAL SOL 10 GM LIPIDS: HCPCS | Performed by: INTERNAL MEDICINE

## 2024-04-24 PROCEDURE — 82962 GLUCOSE BLOOD TEST: CPT | Performed by: STUDENT IN AN ORGANIZED HEALTH CARE EDUCATION/TRAINING PROGRAM

## 2024-04-24 PROCEDURE — 0WJ60ZZ INSPECTION OF NECK, OPEN APPROACH: ICD-10-PCS | Performed by: OTOLARYNGOLOGY

## 2024-04-24 PROCEDURE — A4217 STERILE WATER/SALINE, 500 ML: HCPCS | Performed by: INTERNAL MEDICINE

## 2024-04-24 PROCEDURE — 82330 ASSAY OF CALCIUM: CPT | Performed by: INTERNAL MEDICINE

## 2024-04-24 PROCEDURE — 71000016 HC POSTOP RECOV ADDL HR: Performed by: OTOLARYNGOLOGY

## 2024-04-24 PROCEDURE — 25000003 PHARM REV CODE 250: Performed by: INTERNAL MEDICINE

## 2024-04-24 PROCEDURE — 37000009 HC ANESTHESIA EA ADD 15 MINS: Performed by: OTOLARYNGOLOGY

## 2024-04-24 PROCEDURE — 25000003 PHARM REV CODE 250: Performed by: STUDENT IN AN ORGANIZED HEALTH CARE EDUCATION/TRAINING PROGRAM

## 2024-04-24 PROCEDURE — 31600 PLANNED TRACHEOSTOMY: CPT | Mod: 53,,, | Performed by: OTOLARYNGOLOGY

## 2024-04-24 PROCEDURE — 94761 N-INVAS EAR/PLS OXIMETRY MLT: CPT

## 2024-04-24 PROCEDURE — 93010 ELECTROCARDIOGRAM REPORT: CPT | Mod: ,,, | Performed by: INTERNAL MEDICINE

## 2024-04-24 PROCEDURE — 83735 ASSAY OF MAGNESIUM: CPT | Performed by: STUDENT IN AN ORGANIZED HEALTH CARE EDUCATION/TRAINING PROGRAM

## 2024-04-24 PROCEDURE — 20600001 HC STEP DOWN PRIVATE ROOM

## 2024-04-24 PROCEDURE — 71000033 HC RECOVERY, INTIAL HOUR: Performed by: OTOLARYNGOLOGY

## 2024-04-24 PROCEDURE — 37000008 HC ANESTHESIA 1ST 15 MINUTES: Performed by: OTOLARYNGOLOGY

## 2024-04-24 PROCEDURE — 85025 COMPLETE CBC W/AUTO DIFF WBC: CPT | Performed by: STUDENT IN AN ORGANIZED HEALTH CARE EDUCATION/TRAINING PROGRAM

## 2024-04-24 PROCEDURE — D9220A PRA ANESTHESIA: Mod: ANES,,, | Performed by: ANESTHESIOLOGY

## 2024-04-24 PROCEDURE — 99232 SBSQ HOSP IP/OBS MODERATE 35: CPT | Mod: ,,, | Performed by: STUDENT IN AN ORGANIZED HEALTH CARE EDUCATION/TRAINING PROGRAM

## 2024-04-24 PROCEDURE — D9220A PRA ANESTHESIA: Mod: CRNA,,, | Performed by: NURSE ANESTHETIST, CERTIFIED REGISTERED

## 2024-04-24 PROCEDURE — 99900035 HC TECH TIME PER 15 MIN (STAT)

## 2024-04-24 PROCEDURE — A4216 STERILE WATER/SALINE, 10 ML: HCPCS | Performed by: STUDENT IN AN ORGANIZED HEALTH CARE EDUCATION/TRAINING PROGRAM

## 2024-04-24 PROCEDURE — 84478 ASSAY OF TRIGLYCERIDES: CPT | Performed by: INTERNAL MEDICINE

## 2024-04-24 PROCEDURE — 36000707: Performed by: OTOLARYNGOLOGY

## 2024-04-24 PROCEDURE — 84100 ASSAY OF PHOSPHORUS: CPT | Performed by: STUDENT IN AN ORGANIZED HEALTH CARE EDUCATION/TRAINING PROGRAM

## 2024-04-24 PROCEDURE — 93005 ELECTROCARDIOGRAM TRACING: CPT

## 2024-04-24 PROCEDURE — 36415 COLL VENOUS BLD VENIPUNCTURE: CPT | Performed by: STUDENT IN AN ORGANIZED HEALTH CARE EDUCATION/TRAINING PROGRAM

## 2024-04-24 PROCEDURE — 63600175 PHARM REV CODE 636 W HCPCS

## 2024-04-24 PROCEDURE — 63600175 PHARM REV CODE 636 W HCPCS: Performed by: STUDENT IN AN ORGANIZED HEALTH CARE EDUCATION/TRAINING PROGRAM

## 2024-04-24 PROCEDURE — 25000003 PHARM REV CODE 250: Performed by: NURSE ANESTHETIST, CERTIFIED REGISTERED

## 2024-04-24 PROCEDURE — 80048 BASIC METABOLIC PNL TOTAL CA: CPT | Performed by: STUDENT IN AN ORGANIZED HEALTH CARE EDUCATION/TRAINING PROGRAM

## 2024-04-24 PROCEDURE — 36000706: Performed by: OTOLARYNGOLOGY

## 2024-04-24 RX ORDER — HYDROMORPHONE HYDROCHLORIDE 1 MG/ML
0.2 INJECTION, SOLUTION INTRAMUSCULAR; INTRAVENOUS; SUBCUTANEOUS EVERY 5 MIN PRN
Status: DISCONTINUED | OUTPATIENT
Start: 2024-04-24 | End: 2024-04-24 | Stop reason: HOSPADM

## 2024-04-24 RX ORDER — POTASSIUM CHLORIDE 14.9 MG/ML
20 INJECTION INTRAVENOUS
Status: DISPENSED | OUTPATIENT
Start: 2024-04-24 | End: 2024-04-24

## 2024-04-24 RX ORDER — METOPROLOL TARTRATE 1 MG/ML
5 INJECTION, SOLUTION INTRAVENOUS EVERY 6 HOURS
Status: DISCONTINUED | OUTPATIENT
Start: 2024-04-24 | End: 2024-04-28

## 2024-04-24 RX ORDER — SODIUM CHLORIDE 0.9 % (FLUSH) 0.9 %
10 SYRINGE (ML) INJECTION
Status: DISCONTINUED | OUTPATIENT
Start: 2024-04-24 | End: 2024-04-24 | Stop reason: HOSPADM

## 2024-04-24 RX ORDER — METOPROLOL TARTRATE 25 MG/1
25 TABLET, FILM COATED ORAL 2 TIMES DAILY
Status: DISCONTINUED | OUTPATIENT
Start: 2024-04-24 | End: 2024-04-24

## 2024-04-24 RX ORDER — DEXMEDETOMIDINE HYDROCHLORIDE 100 UG/ML
INJECTION, SOLUTION INTRAVENOUS
Status: DISCONTINUED | OUTPATIENT
Start: 2024-04-24 | End: 2024-04-24

## 2024-04-24 RX ORDER — METOPROLOL TARTRATE 1 MG/ML
5 INJECTION, SOLUTION INTRAVENOUS ONCE
Status: COMPLETED | OUTPATIENT
Start: 2024-04-24 | End: 2024-04-24

## 2024-04-24 RX ORDER — MAGNESIUM SULFATE HEPTAHYDRATE 40 MG/ML
2 INJECTION, SOLUTION INTRAVENOUS ONCE
Status: COMPLETED | OUTPATIENT
Start: 2024-04-24 | End: 2024-04-24

## 2024-04-24 RX ORDER — LIDOCAINE HYDROCHLORIDE AND EPINEPHRINE 10; 10 MG/ML; UG/ML
INJECTION, SOLUTION INFILTRATION; PERINEURAL
Status: DISCONTINUED | OUTPATIENT
Start: 2024-04-24 | End: 2024-04-24 | Stop reason: HOSPADM

## 2024-04-24 RX ADMIN — SODIUM PHOSPHATE, MONOBASIC, MONOHYDRATE AND SODIUM PHOSPHATE, DIBASIC, ANHYDROUS 30 MMOL: 142; 276 INJECTION, SOLUTION INTRAVENOUS at 09:04

## 2024-04-24 RX ADMIN — HYDROMORPHONE HYDROCHLORIDE 1 MG: 1 INJECTION, SOLUTION INTRAMUSCULAR; INTRAVENOUS; SUBCUTANEOUS at 08:04

## 2024-04-24 RX ADMIN — METOROPROLOL TARTRATE 5 MG: 5 INJECTION, SOLUTION INTRAVENOUS at 08:04

## 2024-04-24 RX ADMIN — DEXMEDETOMIDINE 8 MCG: 100 INJECTION, SOLUTION, CONCENTRATE INTRAVENOUS at 11:04

## 2024-04-24 RX ADMIN — Medication 10 ML: at 12:04

## 2024-04-24 RX ADMIN — METOROPROLOL TARTRATE 5 MG: 5 INJECTION, SOLUTION INTRAVENOUS at 06:04

## 2024-04-24 RX ADMIN — PROCHLORPERAZINE EDISYLATE 5 MG: 5 INJECTION INTRAMUSCULAR; INTRAVENOUS at 08:04

## 2024-04-24 RX ADMIN — HYDROMORPHONE HYDROCHLORIDE 0.2 MG: 1 INJECTION, SOLUTION INTRAMUSCULAR; INTRAVENOUS; SUBCUTANEOUS at 01:04

## 2024-04-24 RX ADMIN — SODIUM CHLORIDE: 0.9 INJECTION, SOLUTION INTRAVENOUS at 10:04

## 2024-04-24 RX ADMIN — PROCHLORPERAZINE EDISYLATE 5 MG: 5 INJECTION INTRAMUSCULAR; INTRAVENOUS at 02:04

## 2024-04-24 RX ADMIN — MAGNESIUM SULFATE HEPTAHYDRATE 2 G: 40 INJECTION, SOLUTION INTRAVENOUS at 08:04

## 2024-04-24 RX ADMIN — HYDROMORPHONE HYDROCHLORIDE 1 MG: 1 INJECTION, SOLUTION INTRAMUSCULAR; INTRAVENOUS; SUBCUTANEOUS at 03:04

## 2024-04-24 RX ADMIN — ENOXAPARIN SODIUM 40 MG: 40 INJECTION SUBCUTANEOUS at 06:04

## 2024-04-24 RX ADMIN — MAGNESIUM SULFATE HEPTAHYDRATE: 500 INJECTION, SOLUTION INTRAMUSCULAR; INTRAVENOUS at 10:04

## 2024-04-24 RX ADMIN — Medication 10 ML: at 06:04

## 2024-04-24 RX ADMIN — HYDROMORPHONE HYDROCHLORIDE 1 MG: 1 INJECTION, SOLUTION INTRAMUSCULAR; INTRAVENOUS; SUBCUTANEOUS at 02:04

## 2024-04-24 RX ADMIN — I.V. FAT EMULSION 250 ML: 20 EMULSION INTRAVENOUS at 10:04

## 2024-04-24 RX ADMIN — DEXMEDETOMIDINE 8 MCG: 100 INJECTION, SOLUTION, CONCENTRATE INTRAVENOUS at 10:04

## 2024-04-24 NOTE — SUBJECTIVE & OBJECTIVE
Interval History: NAEON. Tele/EKG showing sinus tachycardia with PVCs today, multiple electrolyte derangements noted. Trach and PEG planned for today.     Objective:     Vital Signs (Most Recent):  Temp: 98.1 °F (36.7 °C) (04/24/24 1345)  Pulse: 98 (04/24/24 1345)  Resp: 19 (04/24/24 1501)  BP: 124/60 (04/24/24 1345)  SpO2: 99 % (04/24/24 1345) Vital Signs (24h Range):  Temp:  [96.4 °F (35.8 °C)-98.5 °F (36.9 °C)] 98.1 °F (36.7 °C)  Pulse:  [] 98  Resp:  [16-22] 19  SpO2:  [93 %-100 %] 99 %  BP: (123-165)/(60-90) 124/60     Weight: 86.2 kg (190 lb 0.6 oz)  Body mass index is 30.67 kg/m².  No intake or output data in the 24 hours ending 04/24/24 1523        Physical Exam  Vitals and nursing note reviewed.   Constitutional:       General: She is not in acute distress.     Appearance: She is ill-appearing. She is not toxic-appearing.   Eyes:      Conjunctiva/sclera: Conjunctivae normal.   Neck:      Comments: Dressing in place over entire circumference of neck. Dressing c/d/i  Cardiovascular:      Rate and Rhythm: Regular rhythm. Tachycardia present.      Heart sounds: Normal heart sounds.   Pulmonary:      Effort: Pulmonary effort is normal. No respiratory distress.      Breath sounds: Normal breath sounds. No wheezing.   Abdominal:      General: Bowel sounds are normal. There is no distension.      Palpations: Abdomen is soft.      Tenderness: There is no abdominal tenderness. There is no guarding.   Skin:     General: Skin is warm and dry.   Neurological:      General: No focal deficit present.      Mental Status: She is alert and oriented to person, place, and time. Mental status is at baseline.   Psychiatric:         Mood and Affect: Mood normal.         Behavior: Behavior normal.             Significant Labs: All pertinent labs within the past 24 hours have been reviewed.    Significant Imaging: I have reviewed all pertinent imaging results/findings within the past 24 hours.  Review of Systems

## 2024-04-24 NOTE — TRANSFER OF CARE
"Anesthesia Transfer of Care Note    Patient: Marysol Cash    Procedure(s) Performed: Procedure(s) (LRB):  EGD, WITH PEG TUBE INSERTION (N/A)  CREATION, TRACHEOSTOMY (N/A)  EXPLORATION, NECK (N/A)    Patient location: PACU    Anesthesia Type: MAC    Transport from OR: Transported from OR on room air with adequate spontaneous ventilation    Post pain: adequate analgesia    Post assessment: no apparent anesthetic complications    Post vital signs: stable    Level of consciousness: awake, alert and oriented    Nausea/Vomiting: no nausea/vomiting    Complications: none    Transfer of care protocol was followedComments: Nasal cannula transported with patient.       Last vitals: Visit Vitals  BP (!) 158/90 (BP Location: Left arm, Patient Position: Lying)   Pulse (!) 112   Temp 36.6 °C (97.9 °F) (Temporal)   Resp 20   Ht 5' 6" (1.676 m)   Wt 86.2 kg (190 lb 0.6 oz)   LMP  (LMP Unknown)   SpO2 98%   Breastfeeding No   BMI 30.67 kg/m²     "

## 2024-04-24 NOTE — PROGRESS NOTES
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 She initially presented to [facility] Emergency Department with six days of epigastric pain with associated nausea, vomiting, and difficulty eating due to pain. However on presentation, she was noted to have a large necrotic and purulent wound located on her right neck with complaints of associated difficulty speaking, swallowing and shortness of breath. She was noted to be hypoxic 88% on room air which improved with 2L NC. Labs were notable for leukocytosis 14, hypokalemia 2.4, hypochloremia 89, CO2 36, hypercalcemia 13.5, Phos 2.5. . Troponin 0.046. Lactate 2.3. CT Soft Tissue Neck was concerning for 9.5 x 8.2 x 10 cm large lobulated mass in the right anterolateral neck extending to the deep spaces of the neck and abutting the right prevertebral space and paravertebral musculature, left midline shift, multiple bilateral necrotic cervical lymph nodes. Case was discussed and decision was made to transfer to [facility] for higher level of care.     Of additional note, she recently transitioned her care to [place] and underwent stem-cell transplant approximately 2 weeks ago in [place], has not been seen by a [place] physician since 2023.      On transfer: she was afebrile, mildly hypertensive /109, HR 98, RR 20, satting 96% on room  air. Complaining of mild headache and poor appetite associated with nausea; denies fever, chills, chest pain, palpitations, SOB, abdominal pain, dysuria. States wound has been draining for the past week initially thick white now more liquid. Mild dysphonia is apparently chronic from left vocal cord paralysis and at baseline. Some dysphagia with solids, none with pureed soft or liquids, denies odynophagia. Dressing on neck CDI, ENT consulted will be here shortly to assess patient.        Overview/Hospital Course:  Evaluated 4/12 in MICU by ENT, who do not feel patient is a candidate for surgical intervention. Palliative care and oncology consulted. Failed swallow study, SLP recommending NPO 4/13. Patient continues to protect airway. Stepped down from MICU on 4/13.    DC planning: awaiting PEG placement    Interval History: NAEON. Tele/EKG showing sinus tachycardia with PVCs today, multiple electrolyte derangements noted. Trach and PEG planned for today.     Objective:     Vital Signs (Most Recent):  Temp: 98.1 °F (36.7 °C) (04/24/24 1345)  Pulse: 98 (04/24/24 1345)  Resp: 19 (04/24/24 1501)  BP: 124/60 (04/24/24 1345)  SpO2: 99 % (04/24/24 1345) Vital Signs (24h Range):  Temp:  [96.4 °F (35.8 °C)-98.5 °F (36.9 °C)] 98.1 °F (36.7 °C)  Pulse:  [] 98  Resp:  [16-22] 19  SpO2:  [93 %-100 %] 99 %  BP: (123-165)/(60-90) 124/60     Weight: 86.2 kg (190 lb 0.6 oz)  Body mass index is 30.67 kg/m².  No intake or output data in the 24 hours ending 04/24/24 1523        Physical Exam  Vitals and nursing note reviewed.   Constitutional:       General: She is not in acute distress.     Appearance: She is ill-appearing. She is not toxic-appearing.   Eyes:      Conjunctiva/sclera: Conjunctivae normal.   Neck:      Comments: Dressing in place over entire circumference of neck. Dressing c/d/i  Cardiovascular:      Rate and Rhythm: Regular rhythm. Tachycardia present.      Heart sounds: Normal heart sounds.   Pulmonary:       Effort: Pulmonary effort is normal. No respiratory distress.      Breath sounds: Normal breath sounds. No wheezing.   Abdominal:      General: Bowel sounds are normal. There is no distension.      Palpations: Abdomen is soft.      Tenderness: There is no abdominal tenderness. There is no guarding.   Skin:     General: Skin is warm and dry.   Neurological:      General: No focal deficit present.      Mental Status: She is alert and oriented to person, place, and time. Mental status is at baseline.   Psychiatric:         Mood and Affect: Mood normal.         Behavior: Behavior normal.             Significant Labs: All pertinent labs within the past 24 hours have been reviewed.    Significant Imaging: I have reviewed all pertinent imaging results/findings within the past 24 hours.  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 Underwent repeat bx after failed tx (1/24/22) that showed moderately differentiated squamous cell carcinoma w/ suspicion for perineural invasion. CT soft tissue neck (Jan 2022) w/o e/o metastatic dx. PET-CT (Feb 2022) w/o e/o active local or distant dx- no abnormal activity within the tongue, no signs of cervical lymph node or distant metastasis. Declined chemotherapy &/or XRT. Underwent selective neck dissection levels 1, 2, 3, and partial glossectomy w/ split-thickness skin graft (3/3/2022 per Dr. Clif Olson).  Surgical pathology w/ unifocal squamous cell carcinoma on the dorsal surface of the tongue on left side measuring 1 cm, moderately differentiated, 6 mm depth of invasion, no lymphovascular invasion, positive for perineural invasion, margins negative, 0 of 39 lymph nodes with metastasis, pT2 pN0 noted.  Postop course complicated by infection requiring I&D and antibiotics.  Declined adjuvant XRT. Repeat imaging (June 2023) w/ 2.2 cm  "lesion deep to the R SCM c/f necrotic LN w/ few adjacent pathological appearing LNs suspicious for metastasis. Underwent R neck FNA (7/7/23) w/ pathology suspicious for metastatic SCC. PET-CT (8/21/2023) w/ large necrotic mass the angle of mandible posterior to submandibular gland on the right measuring 4.4 x 4.2 cm with SUV 9.78, just superior to the mass is a 2 cm hypermetabolic lymph node and no evidence of distant metastatic disease. Subsequently transferred care to [place] where she reportedly recently abx,"detox", hyperbaric chamber, & stem-cell transplant approximately 2 weeks ago PTA in [place]. Now presenting w/ progressive neck wound. CT neck soft tissue (4/10/24) w/ large bulky lobulated appearing mass right lateral and anterolateral neck extending to the D spaces of the neck having overall dimensions of approximately 9.5 x 8.2 x 10 cm, multiple addn'l b/l cervical necrotic nodes, & assoc mass effect results in displacement of midline structures to the patient's left. CT C/A/P with diffuse metastatic disease including mediastinal & hilar LAD, bilateral lungs, peritoneum, lumbar vertebrae, & likely liver.     - ENT consulted; appreciate recs - not surgical candidate for debridement  - Medical & Radiation Oncology consulted; appreciate recs - pt declines chemo or XRT  - Palliative following; appreciate assistance  - Multimodal analgesia   - SLP following, appreciate assistance  - Will require awake tracheostomy prior to PEG placement. Gen surgery and ENT planning for procedures today.     Sinus tachycardia  Metoprolol recently discontinued, possible withdrawal  Resume IV metoprolol  Correct electrolyte derangements  Pain control          Severe malnutrition  PEG tube placement is being discussed. IR and GI deferred the procedure. Surgery depending on anesthesia assessment. ENT updated with recs. Awaiting final recs.       Nutrition consulted. Most recent weight and BMI monitored-     Measurements:  Wt " Readings from Last 1 Encounters:   04/24/24 86.2 kg (190 lb 0.6 oz)   Body mass index is 30.67 kg/m².    Patient has been screened and assessed by RD.    Malnutrition Type:  Context: chronic illness  Level: severe    Malnutrition Characteristic Summary:  Weight Loss (Malnutrition): greater than 10% in 6 months  Energy Intake (Malnutrition): less than or equal to 50% for greater than or equal to 1 month    Interventions/Recommendations (treatment strategy):  1.    Cont TPN  Planning for PEG today

## 2024-04-24 NOTE — ASSESSMENT & PLAN NOTE
Patient has hypokalemia which is Acute and currently controlled. Most recent potassium levels reviewed-   Lab Results   Component Value Date    K 3.5 04/24/2024   . Will continue potassium replacement per protocol and recheck repeat levels after replacement completed.

## 2024-04-24 NOTE — PROGRESS NOTES
[facility] - Surgery (2nd Fl)  Adult Nutrition  Progress Note    SUMMARY       Recommendations    Initiate TFs via PEG when able. Rec'd Impact Peptide @ 50 mL/hr to provide 1800 kcals, 113 g of protein, 924 mL fluid.   - If bolus TFs warranted for discharge, rec'd Impact Peptide - 5 cartons/day = 1875 kcals, 118 g of protein, 965 mL fluid.   RD to monitor & follow-up.    Goals: Meet % EEN, EPN by RD f/u date  Nutrition Goal Status: progressing towards goal  Communication of RD Recs: reviewed with physician    Assessment and Plan    Severe malnutrition    Malnutrition Type:  Context: chronic illness  Level: severe    Related to (etiology):   Inability to consume sufficient energy     Signs and Symptoms (as evidenced by):   Weight loss, inadequate energy intake    Malnutrition Characteristic Summary:  Weight Loss (Malnutrition): greater than 10% in 6 months  Energy Intake (Malnutrition): less than or equal to 50% for greater than or equal to 1 month    Interventions/Recommendations (treatment strategy):  Collaboration of nutrition care w/ other providers  EN    Nutrition Diagnosis Status:   Continues    Malnutrition Assessment    Malnutrition Context: chronic illness  Malnutrition Level: severe    Weight Loss (Malnutrition): greater than 10% in 6 months  Energy Intake (Malnutrition): less than or equal to 50% for greater than or equal to 1 month     Reason for Assessment    Reason For Assessment: RD follow-up  Diagnosis: other (see comments) (Sepsis)  Relevant Medical History: 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 NPO x 10 days. Severe malnutrition diagnosis continues; please see PES statement for details.   Nutrition Discharge Planning: Adequate nutrition    Nutrition/Diet History    Spiritual, Cultural Beliefs,  "Pent[employer]al Practices, Values that Affect Care: no  Factors Affecting Nutritional Intake: NPO    Anthropometrics    Temp: 97.9 °F (36.6 °C)  Height Method: Stated  Height: 5' 6" (167.6 cm)  Height (inches): 66 in  Weight Method: Bed Scale  Weight: 86.2 kg (190 lb 0.6 oz)  Weight (lb): 190.04 lb  Ideal Body Weight (IBW), Female: 130 lb  % Ideal Body Weight, Female (lb): 146.18 %  BMI (Calculated): 30.7  BMI Grade: 30 - 34.9- obesity - grade I  Usual Body Weight (UBW), k kg  % Usual Body Weight: 78.42  % Weight Change From Usual Weight: -21.74 %    Lab/Procedures/Meds    Pertinent Labs Reviewed: reviewed  Pertinent Labs Comments: ERIK 154  Pertinent Medications Reviewed: reviewed    Estimated/Assessed Needs    Weight Used For Calorie Calculations: 86.2 kg (190 lb 0.6 oz)    Energy Calorie Requirements (kcal): 1757 kcal/d  Energy Need Method: Larue-St Jeor (1.2 PAL)    Protein Requirements: 112 g/d (1.3 g/kg)  Weight Used For Protein Calculations: 86.2 kg (190 lb 0.6 oz)    Estimated Fluid Requirement Method: other (see comments) (Per MD)  RDA Method (mL): 1757    CHO Requirement: 220g    Nutrition Prescription Ordered    Current Diet Order: NPO    Evaluation of Received Nutrient/Fluid Intake    I/O: -    Comments: LBM:     Nutrition Risk    Level of Risk/Frequency of Follow-up:  (1x/week)     Monitor and Evaluation    Food and Nutrient Intake: enteral nutrition intake, parenteral nutrition intake  Food and Nutrient Adminstration: enteral and parenteral nutrition administration  Physical Activity and Function: nutrition-related ADLs and IADLs  Anthropometric Measurements: weight, weight change  Biochemical Data, Medical Tests and Procedures: glucose/endocrine profile, lipid profile, inflammatory profile, electrolyte and renal panel, gastrointestinal profile  Nutrition-Focused Physical Findings: overall appearance     Nutrition Follow-Up    RD Follow-up?: Yes      "

## 2024-04-24 NOTE — ASSESSMENT & PLAN NOTE
Marysol Cash is a 58 y.o. F with history of SCC s/p radical resection in 2022 who now presents with recurrent, unresectable disease. General surgery consulted for PEG placement.    -Discussed with patient and  at bedside the options for feeding tube placement. Given head/neck SCC, there are concerns from ENT and anesthesia regarding NGT placement and airway management intra-operatively. From surgical standpoint, no reason to not move forward with NGT placement. Multiple discussions held between patient, family, and care teams over the last few days since initial consult. They are now agreeable to trach. Consent obtained for trach and PEG, possible open G tube    - NPO  - To OR today for open G tube   - Remainder of care per primary team  - Please contact general surgery with any questions, concerns, or clinical status changes

## 2024-04-24 NOTE — PLAN OF CARE
Problem: Adult Inpatient Plan of Care  Goal: Plan of Care Review  Outcome: Not Progressing  Goal: Patient-Specific Goal (Individualized)  Outcome: Not Progressing  Goal: Absence of Hospital-Acquired Illness or Injury  Outcome: Progressing  Intervention: Identify and Manage Fall Risk  Flowsheets (Taken 4/24/2024 0415)  Safety Promotion/Fall Prevention:   assistive device/personal item within reach   side rails raised x 2  Goal: Optimal Comfort and Wellbeing  Outcome: Not Progressing  Intervention: Monitor Pain and Promote Comfort  Flowsheets (Taken 4/24/2024 0415)  Pain Management Interventions:   around-the-clock dosing utilized   medication offered  Goal: Readiness for Transition of Care  Outcome: Progressing

## 2024-04-24 NOTE — PROGRESS NOTES
[facility] - Telemetry Stepdown  Otorhinolaryngology-Head & Neck Surgery  Progress Note    Subjective:     Post-Op Info:  Procedure(s) (LRB):  EGD, WITH PEG TUBE INSERTION (N/A)   2 Days Post-Op  Hospital Day: 13     Interval History: Tachycardic overnight. Otherwise, NAEON.     Medications:  Continuous Infusions:  Current Facility-Administered Medications   Medication Dose Route Frequency Last Rate Last Admin    TPN ADULT CENTRAL LINE CUSTOM   Intravenous Continuous 75 mL/hr at 04/23/24 2215 New Bag at 04/23/24 2215     Scheduled Meds:  Current Facility-Administered Medications   Medication Dose Route Frequency    barium  450 mL Per NG tube Once    barium  450 mL Per NG tube Once    enoxparin  40 mg Subcutaneous Q24H (prophylaxis, 1700)    levothyroxine  175 mcg Oral Before breakfast    liothyronine  5 mcg Oral Before breakfast    magnesium sulfate IVPB  2 g Intravenous Once    metoprolol  5 mg Intravenous Once    potassium chloride in water  20 mEq Intravenous Q2H    senna  8.6 mg Oral BID    sodium chloride 0.9%  10 mL Intravenous Q6H    sodium phosphate 30 mmol in dextrose 5 % (D5W) 250 mL IVPB  30 mmol Intravenous Once     PRN Meds:  Current Facility-Administered Medications:     acetaminophen, 650 mg, Oral, Q4H PRN    dextrose 10%, 12.5 g, Intravenous, PRN    dextrose 10%, 25 g, Intravenous, PRN    glucose, 16 g, Oral, PRN    glucose, 24 g, Oral, PRN    hydrALAZINE, 10 mg, Intravenous, Q6H PRN    HYDROmorphone, 1 mg, Intravenous, Q2H PRN    hydrOXYzine pamoate, 25 mg, Oral, Q8H PRN    labetalol, 10 mg, Intravenous, Q6H PRN    lorazepam, 2 mg, Intravenous, Nightly PRN    naloxone, 0.02 mg, Intravenous, PRN    prochlorperazine, 5 mg, Intravenous, Q6H PRN    promethazine (PHENERGAN) 12.5 mg in dextrose 5 % (D5W) 50 mL IVPB, 12.5 mg, Intravenous, Q6H PRN    Flushing PICC/Midline Protocol, , , Until Discontinued **AND** sodium chloride 0.9%, 10 mL, Intravenous, Q6H **AND** sodium chloride 0.9%, 10 mL, Intravenous,  PRN     Review of patient's allergies indicates:   Allergen Reactions    Ciprofloxacin Swelling    Codeine Swelling    Opioids - morphine analogues     Pcn [penicillins] Hives     Tolerated cefepime 04/2024    Percocet [oxycodone-acetaminophen] Swelling     Objective:     Vital Signs (24h Range):  Temp:  [96.4 °F (35.8 °C)-98.5 °F (36.9 °C)] 98.2 °F (36.8 °C)  Pulse:  [] 120  Resp:  [16-20] 16  SpO2:  [93 %-98 %] 98 %  BP: (123-149)/(76-86) 124/82       Lines/Drains/Airways       Peripherally Inserted Central Catheter Line  Duration             PICC Double Lumen 04/20/24 1652 right brachial 3 days                     Physical Exam   NAD  Normal WOB, resting comfortably  Breathy/strained voiced, with falsetto c/w vocal cord paralysis  Bulky neck dressing coverage wound, not removed this AM     Significant Labs:  CBC:   Recent Labs   Lab 04/24/24  0431   WBC 15.01*   RBC 4.71   HGB 12.9   HCT 41.2      MCV 88   MCH 27.4   MCHC 31.3*     CMP:   Recent Labs   Lab 04/23/24  1038 04/23/24  1931 04/24/24  0431   *   < > 135*   CALCIUM 12.2*   < > 11.7*   ALBUMIN 2.4*  --   --    PROT 5.4*  --   --       < > 137   K 2.8*   < > 3.5   CO2 38*   < > 31*   CL 96   < > 96   BUN 15   < > 19   CREATININE 0.8   < > 0.8   ALKPHOS 97  --   --    ALT 13  --   --    AST 18  --   --    BILITOT 0.3  --   --     < > = values in this interval not displayed.       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 Biopsy taken at bedside 4/12 which was positive for SCC. FFL showed R diffuse laryngeal and pharyngeal edema and fullness obscuring true vocal cords.  Patient does not appear to be in respiratory distress and does not complain of difficulty breathing despite FFL findings.     After extensive discussion with patient and patient's family, agreeable to trach/PEG.     - Plan for awake tracheostomy today, PEG with gen surg  - Rest of care per primary           Berna Carlotn MD  Otorhinolaryngology-Head & Neck Surgery  [facility] - Telemetry Stepdown

## 2024-04-24 NOTE — CARE UPDATE
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    Last VS: /86   Pulse (!) 113   Temp 98.5 °F (36.9 °C) (Oral)   Resp 17   Ht 5' 6" (1.676 m)   Wt 86.2 kg (190 lb 0.6 oz)   LMP  (LMP Unknown)   SpO2 (!) 93%   Breastfeeding No   BMI 30.67 kg/m²     24H Vital Sign Range:  Temp:  [97.7 °F (36.5 °C)-98.8 °F (37.1 °C)]   Pulse:  []   Resp:  [16-20]   BP: (123-149)/(76-86)   SpO2:  [93 %-98 %]     Level of Consciousness (AVPU): alert    Recent Labs     04/22/24  0606 04/23/24  0623 04/24/24  0431   WBC 16.75* 13.81* 15.01*   HGB 13.1 11.6* 12.9   HCT 38.9 38.1 41.2    230 265       Recent Labs     04/22/24  0606 04/22/24  1134 04/23/24  0623 04/23/24  1038 04/23/24  1931 04/24/24  0431   *  128*   < >  --  141 137 137   K 5.4*  5.4*   < >  --  2.8* 3.4* 3.5   CL 89*  89*   < >  --  96 94* 96   CO2 28  28   < >  --  38* 34* 31*   BUN 13  13   < >  --  15 17 19   CREATININE 1.1  1.1   < >  --  0.8 0.8 0.8   *  748*   < >  --  133* 131* 135*   PHOS 4.8*  --  4.5  --   --  1.8*   MG 2.5  --  2.7*  --   --  1.4*    < > = values in this interval not displayed.        No results for input(s): "PH", "PCO2", "PO2", "HCO3", "POCSATURATED", "BE" in the last 72 hours.     OXYGEN:  Flow (L/min) (Oxygen Therapy): 2          MEWS score: 3    Rounding completed w/ charge MABEL Donnelly.  ST 110s, VS otherwise stable. Electrolyte replacement ordered by primary team. Plan for PEG today. No additional concerns verbalized at this time. Instructed to call [phone] for further concerns or assistance.    Jaylin Mckeon RN       "

## 2024-04-24 NOTE — ANESTHESIA POSTPROCEDURE EVALUATION
Anesthesia Post Evaluation    Patient: Marysol Cash    Procedure(s) Performed: Procedure(s) (LRB):  EXPLORATION, NECK (N/A)    Final Anesthesia Type: MAC      Patient location during evaluation: floor  Patient participation: Yes- Able to Participate  Level of consciousness: awake and alert and oriented  Post-procedure vital signs: reviewed and stable  Pain management: adequate  Airway patency: patent    PONV status at discharge: No PONV  Anesthetic complications: no      Cardiovascular status: blood pressure returned to baseline, hemodynamically stable and stable  Respiratory status: unassisted, room air and spontaneous ventilation  Hydration status: euvolemic  Follow-up not needed.              Vitals Value Taken Time   /60 04/24/24 1346   Temp 36.7 °C (98.1 °F) 04/24/24 1345   Pulse 103 04/24/24 1400   Resp 19 04/24/24 1501   SpO2 98 % 04/24/24 1400   Vitals shown include unfiled device data.      Event Time   Out of Recovery 04/24/2024 12:45:00         Pain/Elysia Score: Pain Rating Prior to Med Admin: 10 (4/24/2024  3:01 PM)  Pain Rating Post Med Admin: 0 (4/24/2024  3:21 AM)  Elysia Score: 10 (4/24/2024 12:45 PM)

## 2024-04-24 NOTE — ASSESSMENT & PLAN NOTE
Metoprolol recently discontinued, possible withdrawal  Resume IV metoprolol  Correct electrolyte derangements  Pain control

## 2024-04-24 NOTE — PLAN OF CARE
Recommendations     Initiate TFs via PEG when able. Rec'd Impact Peptide @ 50 mL/hr to provide 1800 kcals, 113 g of protein, 924 mL fluid.   - If bolus TFs warranted for discharge, rec'd Impact Peptide - 5 cartons/day = 1875 kcals, 118 g of protein, 965 mL fluid.   RD to monitor & follow-up.     Goals: Meet % EEN, EPN by RD f/u date  Nutrition Goal Status: progressing towards goal  Communication of RD Recs: reviewed with physician

## 2024-04-24 NOTE — ASSESSMENT & PLAN NOTE
On arrival to [facility], meeting 2/4 SIRS (requiring 2L NC w/ RR 20s & WBC 13). Likely soft tissue/neck source. Received IVF at OSH prior to transfer (for lactate 2.3 at that time). Started on broad-spectrum abx. Blood cx negative.   - ID consulted; appreciate recs  - Monitor CBC & fever curve  - Mgmt of neck mass/wound as below   - Has completed treatment. Discontinue vancomycin + Flagyl + fluconazole

## 2024-04-24 NOTE — ASSESSMENT & PLAN NOTE
"Pt with extensive hx of H/N cancer. Presents w/ continued/worsening drainage from the neck for months with recent worsening of symptoms for past 2 weeks w/ N/V. Pt returned from [place] where she received antibiotics, "detox" and hyperbaric chamber as treatment. Endorses difficulty with swallowing 2/2 to oral trush. Large neck mass on imaging w/ large central collection of air which appears to extend superficially to the skin surface right mid neck anterior laterally; unable to r/o superimposed abscess. Meeting 2/4 SIRS (HR & WBC) w/ elevated lactate, but nml BP. Blood cx negative. Pt not surgical candidate for debridement per ENT evaluation.   - ID consulted; appreciate recs  - Surgical debridement is not an option. Has completed course of antibiotics.    "

## 2024-04-24 NOTE — ASSESSMENT & PLAN NOTE
"Initial bx of ventral surface of tongue (12/24/21) showed atypia w/o diagnostic e/o dysplasia. Underwent repeat bx after failed tx (1/24/22) that showed moderately differentiated squamous cell carcinoma w/ suspicion for perineural invasion. CT soft tissue neck (Jan 2022) w/o e/o metastatic dx. PET-CT (Feb 2022) w/o e/o active local or distant dx- no abnormal activity within the tongue, no signs of cervical lymph node or distant metastasis. Declined chemotherapy &/or XRT. Underwent selective neck dissection levels 1, 2, 3, and partial glossectomy w/ split-thickness skin graft (3/3/2022 per Dr. Clif Olson).  Surgical pathology w/ unifocal squamous cell carcinoma on the dorsal surface of the tongue on left side measuring 1 cm, moderately differentiated, 6 mm depth of invasion, no lymphovascular invasion, positive for perineural invasion, margins negative, 0 of 39 lymph nodes with metastasis, pT2 pN0 noted.  Postop course complicated by infection requiring I&D and antibiotics.  Declined adjuvant XRT. Repeat imaging (June 2023) w/ 2.2 cm lesion deep to the R SCM c/f necrotic LN w/ few adjacent pathological appearing LNs suspicious for metastasis. Underwent R neck FNA (7/7/23) w/ pathology suspicious for metastatic SCC. PET-CT (8/21/2023) w/ large necrotic mass the angle of mandible posterior to submandibular gland on the right measuring 4.4 x 4.2 cm with SUV 9.78, just superior to the mass is a 2 cm hypermetabolic lymph node and no evidence of distant metastatic disease. Subsequently transferred care to [place] where she reportedly recently abx,"detox", hyperbaric chamber, & stem-cell transplant approximately 2 weeks ago PTA in [place]. Now presenting w/ progressive neck wound. CT neck soft tissue (4/10/24) w/ large bulky lobulated appearing mass right lateral and anterolateral neck extending to the D spaces of the neck having overall dimensions of approximately 9.5 x 8.2 x 10 cm, multiple addn'l b/l cervical " necrotic nodes, & assoc mass effect results in displacement of midline structures to the patient's left. CT C/A/P with diffuse metastatic disease including mediastinal & hilar LAD, bilateral lungs, peritoneum, lumbar vertebrae, & likely liver.     - ENT consulted; appreciate recs - not surgical candidate for debridement  - Medical & Radiation Oncology consulted; appreciate recs - pt declines chemo or XRT  - Palliative following; appreciate assistance  - Multimodal analgesia   - SLP following, appreciate assistance  - Will require awake tracheostomy prior to PEG placement. Gen surgery and ENT planning for procedures today.

## 2024-04-24 NOTE — NURSING
Pt sustaining high HR in the 120's. Pain and nausea meds administered. MD made aware of increasing HR. Stat EKG ordered

## 2024-04-24 NOTE — ASSESSMENT & PLAN NOTE
Hx of essential HTN; home regimen includes metoprolol 25mg BID. On arrival to [facility], SBP 140s-150s.   PRN labetalol/hydralazine

## 2024-04-24 NOTE — ASSESSMENT & PLAN NOTE
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 Biopsy taken at bedside 4/12 which was positive for SCC. FFL showed R diffuse laryngeal and pharyngeal edema and fullness obscuring true vocal cords. Patient does not appear to be in respiratory distress and does not complain of difficulty breathing despite FFL findings.     After extensive discussion with patient and patient's family, agreeable to trach/PEG.     - Plan for awake tracheostomy today, PEG with gen surg  - Rest of care per primary

## 2024-04-24 NOTE — SUBJECTIVE & OBJECTIVE
Interval History: Tachycardic overnight. Otherwise, NAEON.     Medications:  Continuous Infusions:  Current Facility-Administered Medications   Medication Dose Route Frequency Last Rate Last Admin    TPN ADULT CENTRAL LINE CUSTOM   Intravenous Continuous 75 mL/hr at 04/23/24 2215 New Bag at 04/23/24 2215     Scheduled Meds:  Current Facility-Administered Medications   Medication Dose Route Frequency    barium  450 mL Per NG tube Once    barium  450 mL Per NG tube Once    enoxparin  40 mg Subcutaneous Q24H (prophylaxis, 1700)    levothyroxine  175 mcg Oral Before breakfast    liothyronine  5 mcg Oral Before breakfast    magnesium sulfate IVPB  2 g Intravenous Once    metoprolol  5 mg Intravenous Once    potassium chloride in water  20 mEq Intravenous Q2H    senna  8.6 mg Oral BID    sodium chloride 0.9%  10 mL Intravenous Q6H    sodium phosphate 30 mmol in dextrose 5 % (D5W) 250 mL IVPB  30 mmol Intravenous Once     PRN Meds:  Current Facility-Administered Medications:     acetaminophen, 650 mg, Oral, Q4H PRN    dextrose 10%, 12.5 g, Intravenous, PRN    dextrose 10%, 25 g, Intravenous, PRN    glucose, 16 g, Oral, PRN    glucose, 24 g, Oral, PRN    hydrALAZINE, 10 mg, Intravenous, Q6H PRN    HYDROmorphone, 1 mg, Intravenous, Q2H PRN    hydrOXYzine pamoate, 25 mg, Oral, Q8H PRN    labetalol, 10 mg, Intravenous, Q6H PRN    lorazepam, 2 mg, Intravenous, Nightly PRN    naloxone, 0.02 mg, Intravenous, PRN    prochlorperazine, 5 mg, Intravenous, Q6H PRN    promethazine (PHENERGAN) 12.5 mg in dextrose 5 % (D5W) 50 mL IVPB, 12.5 mg, Intravenous, Q6H PRN    Flushing PICC/Midline Protocol, , , Until Discontinued **AND** sodium chloride 0.9%, 10 mL, Intravenous, Q6H **AND** sodium chloride 0.9%, 10 mL, Intravenous, PRN     Review of patient's allergies indicates:   Allergen Reactions    Ciprofloxacin Swelling    Codeine Swelling    Opioids - morphine analogues     Pcn [penicillins] Hives     Tolerated cefepime 04/2024     Percocet [oxycodone-acetaminophen] Swelling     Objective:     Vital Signs (24h Range):  Temp:  [96.4 °F (35.8 °C)-98.5 °F (36.9 °C)] 98.2 °F (36.8 °C)  Pulse:  [] 120  Resp:  [16-20] 16  SpO2:  [93 %-98 %] 98 %  BP: (123-149)/(76-86) 124/82       Lines/Drains/Airways       Peripherally Inserted Central Catheter Line  Duration             PICC Double Lumen 04/20/24 1652 right brachial 3 days                     Physical Exam   NAD  Normal WOB, resting comfortably  Breathy/strained voiced, with falsetto c/w vocal cord paralysis  Bulky neck dressing coverage wound, not removed this AM     Significant Labs:  CBC:   Recent Labs   Lab 04/24/24  0431   WBC 15.01*   RBC 4.71   HGB 12.9   HCT 41.2      MCV 88   MCH 27.4   MCHC 31.3*     CMP:   Recent Labs   Lab 04/23/24  1038 04/23/24  1931 04/24/24  0431   *   < > 135*   CALCIUM 12.2*   < > 11.7*   ALBUMIN 2.4*  --   --    PROT 5.4*  --   --       < > 137   K 2.8*   < > 3.5   CO2 38*   < > 31*   CL 96   < > 96   BUN 15   < > 19   CREATININE 0.8   < > 0.8   ALKPHOS 97  --   --    ALT 13  --   --    AST 18  --   --    BILITOT 0.3  --   --     < > = values in this interval not displayed.       Significant Diagnostics:  None

## 2024-04-24 NOTE — BRIEF OP NOTE
[facility] - [facility] (2nd Fl)  Brief Operative Note    SUMMARY     Surgery Date: 4/24/2024     Surgeons and Role:     * Nathan Madison MD - Primary     * Berna Carlton MD - Resident - Assisting        Pre-op Diagnosis:  Severe malnutrition [E43]    Post-op Diagnosis:  Post-Op Diagnosis Codes:     * Severe malnutrition [E43]    Procedure(s) (LRB):  EXPLORATION, NECK (N/A)    Anesthesia: * No anesthesia type entered *    Implants:  * No implants in log *    Operative Findings:   Unable to successfully complete tracheostomy, patient anatomy signifcantly distorted and unable to locate the trachea safely     Estimated Blood Loss: * No values recorded between 4/24/2024 11:00 AM and 4/24/2024 12:39 PM *    Estimated Blood Loss has not been documented. EBL = 10 cc.         Specimens:   Specimen (24h ago, onward)      None            [id number]

## 2024-04-24 NOTE — NURSING TRANSFER
Nursing Transfer Note      4/24/2024   1:47 PM    Reason patient is being transferred: post procedure     Transfer To: [id number]    Transfer via bed    Transfer with IV pole     Transported by transport x 2     Medicines sent: none    Chart send with patient: Yes    Notified: spouse via text messaging system    Patient reassessed at: 4/24/2024  1400

## 2024-04-24 NOTE — SUBJECTIVE & OBJECTIVE
Interval History: NAEON. Afebrile. HDS although tachycardic. Patient now agreeable to trach. To OR today for trach, open G tube     Medications:  Continuous Infusions:  Current Facility-Administered Medications   Medication Dose Route Frequency Last Rate Last Admin    TPN ADULT CENTRAL LINE CUSTOM   Intravenous Continuous 75 mL/hr at 04/23/24 2215 New Bag at 04/23/24 2215     Scheduled Meds:  Current Facility-Administered Medications   Medication Dose Route Frequency    barium  450 mL Per NG tube Once    barium  450 mL Per NG tube Once    enoxparin  40 mg Subcutaneous Q24H (prophylaxis, 1700)    levothyroxine  175 mcg Oral Before breakfast    liothyronine  5 mcg Oral Before breakfast    magnesium sulfate IVPB  2 g Intravenous Once    potassium chloride in water  20 mEq Intravenous Q2H    senna  8.6 mg Oral BID    sodium chloride 0.9%  10 mL Intravenous Q6H    sodium phosphate 30 mmol in dextrose 5 % (D5W) 250 mL IVPB  30 mmol Intravenous Once     PRN Meds:  Current Facility-Administered Medications:     acetaminophen, 650 mg, Oral, Q4H PRN    dextrose 10%, 12.5 g, Intravenous, PRN    dextrose 10%, 25 g, Intravenous, PRN    glucose, 16 g, Oral, PRN    glucose, 24 g, Oral, PRN    hydrALAZINE, 10 mg, Intravenous, Q6H PRN    HYDROmorphone, 1 mg, Intravenous, Q2H PRN    hydrOXYzine pamoate, 25 mg, Oral, Q8H PRN    labetalol, 10 mg, Intravenous, Q6H PRN    lorazepam, 2 mg, Intravenous, Nightly PRN    naloxone, 0.02 mg, Intravenous, PRN    prochlorperazine, 5 mg, Intravenous, Q6H PRN    promethazine (PHENERGAN) 12.5 mg in dextrose 5 % (D5W) 50 mL IVPB, 12.5 mg, Intravenous, Q6H PRN    Flushing PICC/Midline Protocol, , , Until Discontinued **AND** sodium chloride 0.9%, 10 mL, Intravenous, Q6H **AND** sodium chloride 0.9%, 10 mL, Intravenous, PRN     Review of patient's allergies indicates:   Allergen Reactions    Ciprofloxacin Swelling    Codeine Swelling    Opioids - morphine analogues     Pcn [penicillins] Hives      Tolerated cefepime 04/2024    Percocet [oxycodone-acetaminophen] Swelling     Objective:     Vital Signs (Most Recent):  Temp: 98.2 °F (36.8 °C) (04/24/24 0752)  Pulse: (!) 118 (04/24/24 0805)  Resp: 16 (04/24/24 0752)  BP: (!) 137/90 (04/24/24 0805)  SpO2: 97 % (04/24/24 0805) Vital Signs (24h Range):  Temp:  [96.4 °F (35.8 °C)-98.5 °F (36.9 °C)] 98.2 °F (36.8 °C)  Pulse:  [] 118  Resp:  [16-20] 16  SpO2:  [93 %-98 %] 97 %  BP: (123-149)/(76-90) 137/90     Weight: 86.2 kg (190 lb 0.6 oz)  Body mass index is 30.67 kg/m².    Intake/Output - Last 3 Shifts         04/22 0700  04/23 0659 04/23 0700 04/24 0659 04/24 0700 04/25 0659    Urine (mL/kg/hr) 400 (0.2)      Stool 0      Total Output 400      Net -400             Stool Occurrence 0 x               Physical Exam  Vitals and nursing note reviewed.   Constitutional:       General: She is not in acute distress.     Appearance: She is not diaphoretic.      Comments: Room air   HENT:      Head: Normocephalic and atraumatic.      Mouth/Throat:      Mouth: Mucous membranes are moist.      Pharynx: Oropharynx is clear.   Eyes:      Extraocular Movements: Extraocular movements intact.      Conjunctiva/sclera: Conjunctivae normal.   Neck:      Comments: diffuse neck swelling with overlying skin discoloration and dressing in place.   Cardiovascular:      Rate and Rhythm: Tachycardia present.   Pulmonary:      Effort: Pulmonary effort is normal. No respiratory distress.   Abdominal:      General: There is no distension.      Palpations: Abdomen is soft.      Tenderness: There is no abdominal tenderness. There is no guarding or rebound.      Comments:  Midline incision well approximated without induration or erythema    Musculoskeletal:         General: No deformity.   Skin:     General: Skin is warm and dry.   Neurological:      Mental Status: She is alert and oriented to person, place, and time.          Significant Labs:  I have reviewed all pertinent lab results  within the past 24 hours.    Significant Diagnostics:  I have reviewed all pertinent imaging results/findings within the past 24 hours.

## 2024-04-24 NOTE — ASSESSMENT & PLAN NOTE
PEG tube placement is being discussed. IR and GI deferred the procedure. Surgery depending on anesthesia assessment. ENT updated with recs. Awaiting final recs.       Nutrition consulted. Most recent weight and BMI monitored-     Measurements:  Wt Readings from Last 1 Encounters:   04/24/24 86.2 kg (190 lb 0.6 oz)   Body mass index is 30.67 kg/m².    Patient has been screened and assessed by RD.    Malnutrition Type:  Context: chronic illness  Level: severe    Malnutrition Characteristic Summary:  Weight Loss (Malnutrition): greater than 10% in 6 months  Energy Intake (Malnutrition): less than or equal to 50% for greater than or equal to 1 month    Interventions/Recommendations (treatment strategy):  1.    Cont TPN  Planning for PEG today

## 2024-04-24 NOTE — PROGRESS NOTES
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 She initially presented to [facility] Emergency Department with six days of epigastric pain with associated nausea, vomiting, and difficulty eating due to pain. However on presentation, she was noted to have a large necrotic and purulent wound located on her right neck with complaints of associated difficulty speaking, swallowing and shortness of breath. She was noted to be hypoxic 88% on room air which improved with 2L NC. Labs were notable for leukocytosis 14, hypokalemia 2.4, hypochloremia 89, CO2 36, hypercalcemia 13.5, Phos 2.5. . Troponin 0.046. Lactate 2.3. CT Soft Tissue Neck was concerning for 9.5 x 8.2 x 10 cm large lobulated mass in the right anterolateral neck extending to the deep spaces of the neck and abutting the right prevertebral space and paravertebral musculature, left midline shift, multiple bilateral necrotic cervical lymph nodes. Case was discussed and decision was made to transfer to [facility] for higher level of care.     Of additional note, she recently transitioned her care to [place] and underwent stem-cell transplant approximately 2 weeks ago in [place], has not been seen by a US physician since 2023.      On transfer: she was afebrile, mildly hypertensive /109, HR 98, RR 20, satting 96% on room  air. Complaining of mild headache and poor appetite associated with nausea; denies fever, chills, chest pain, palpitations, SOB, abdominal pain, dysuria. States wound has been draining for the past week initially thick white now more liquid. Mild dysphonia is apparently chronic from left vocal cord paralysis and at baseline. Some dysphagia with solids, none with pureed soft or liquids, denies odynophagia. Dressing on neck CDI, ENT consulted will be here shortly to assess patient.        Overview/Hospital Course:  Evaluated 4/12 in MICU by ENT, who do not feel patient is a candidate for surgical intervention. Palliative care and oncology consulted. Failed swallow study, SLP recommending NPO 4/13. Patient continues to protect airway. Stepped down from MICU on 4/13.    DC planning: awaiting PEG placement    Interval History: NAEON. Tele/EKG showing sinus tachycardia with PVCs today, multiple electrolyte derangements noted. Trach and PEG planned for today.     Objective:     Vital Signs (Most Recent):  Temp: 98.1 °F (36.7 °C) (04/24/24 1345)  Pulse: 98 (04/24/24 1345)  Resp: 19 (04/24/24 1501)  BP: 124/60 (04/24/24 1345)  SpO2: 99 % (04/24/24 1345) Vital Signs (24h Range):  Temp:  [96.4 °F (35.8 °C)-98.5 °F (36.9 °C)] 98.1 °F (36.7 °C)  Pulse:  [] 98  Resp:  [16-22] 19  SpO2:  [93 %-100 %] 99 %  BP: (123-165)/(60-90) 124/60     Weight: 86.2 kg (190 lb 0.6 oz)  Body mass index is 30.67 kg/m².  No intake or output data in the 24 hours ending 04/24/24 1523        Physical Exam  Vitals and nursing note reviewed.   Constitutional:       General: She is not in acute distress.     Appearance: She is ill-appearing. She is not toxic-appearing.   Eyes:      Conjunctiva/sclera: Conjunctivae normal.   Neck:      Comments: Dressing in place over entire circumference of neck. Dressing c/d/i  Cardiovascular:      Rate and Rhythm: Regular rhythm. Tachycardia present.      Heart sounds: Normal heart sounds.   Pulmonary:       Effort: Pulmonary effort is normal. No respiratory distress.      Breath sounds: Normal breath sounds. No wheezing.   Abdominal:      General: Bowel sounds are normal. There is no distension.      Palpations: Abdomen is soft.      Tenderness: There is no abdominal tenderness. There is no guarding.   Skin:     General: Skin is warm and dry.   Neurological:      General: No focal deficit present.      Mental Status: She is alert and oriented to person, place, and time. Mental status is at baseline.   Psychiatric:         Mood and Affect: Mood normal.         Behavior: Behavior normal.             Significant Labs: All pertinent labs within the past 24 hours have been reviewed.    Significant Imaging: I have reviewed all pertinent imaging results/findings within the past 24 hours.  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 Underwent repeat bx after failed tx (1/24/22) that showed moderately differentiated squamous cell carcinoma w/ suspicion for perineural invasion. CT soft tissue neck (Jan 2022) w/o e/o metastatic dx. PET-CT (Feb 2022) w/o e/o active local or distant dx- no abnormal activity within the tongue, no signs of cervical lymph node or distant metastasis. Declined chemotherapy &/or XRT. Underwent selective neck dissection levels 1, 2, 3, and partial glossectomy w/ split-thickness skin graft (3/3/2022 per Dr. Clif Olson).  Surgical pathology w/ unifocal squamous cell carcinoma on the dorsal surface of the tongue on left side measuring 1 cm, moderately differentiated, 6 mm depth of invasion, no lymphovascular invasion, positive for perineural invasion, margins negative, 0 of 39 lymph nodes with metastasis, pT2 pN0 noted.  Postop course complicated by infection requiring I&D and antibiotics.  Declined adjuvant XRT. Repeat imaging (June 2023) w/ 2.2 cm  "lesion deep to the R SCM c/f necrotic LN w/ few adjacent pathological appearing LNs suspicious for metastasis. Underwent R neck FNA (7/7/23) w/ pathology suspicious for metastatic SCC. PET-CT (8/21/2023) w/ large necrotic mass the angle of mandible posterior to submandibular gland on the right measuring 4.4 x 4.2 cm with SUV 9.78, just superior to the mass is a 2 cm hypermetabolic lymph node and no evidence of distant metastatic disease. Subsequently transferred care to [place] where she reportedly recently abx,"detox", hyperbaric chamber, & stem-cell transplant approximately 2 weeks ago PTA in [place]. Now presenting w/ progressive neck wound. CT neck soft tissue (4/10/24) w/ large bulky lobulated appearing mass right lateral and anterolateral neck extending to the D spaces of the neck having overall dimensions of approximately 9.5 x 8.2 x 10 cm, multiple addn'l b/l cervical necrotic nodes, & assoc mass effect results in displacement of midline structures to the patient's left. CT C/A/P with diffuse metastatic disease including mediastinal & hilar LAD, bilateral lungs, peritoneum, lumbar vertebrae, & likely liver.     - ENT consulted; appreciate recs - not surgical candidate for debridement  - Medical & Radiation Oncology consulted; appreciate recs - pt declines chemo or XRT  - Palliative following; appreciate assistance  - Multimodal analgesia   - SLP following, appreciate assistance  - Will require awake tracheostomy prior to PEG placement. Gen surgery and ENT planning for procedures today.     Sinus tachycardia  Metoprolol recently discontinued, possible withdrawal  Resume IV metoprolol  Correct electrolyte derangements  Pain control          Severe malnutrition  PEG tube placement is being discussed. IR and GI deferred the procedure. Surgery depending on anesthesia assessment. ENT updated with recs. Awaiting final recs.       Nutrition consulted. Most recent weight and BMI monitored-     Measurements:  Wt " "Readings from Last 1 Encounters:   04/24/24 86.2 kg (190 lb 0.6 oz)   Body mass index is 30.67 kg/m².    Patient has been screened and assessed by RD.    Malnutrition Type:  Context: chronic illness  Level: severe    Malnutrition Characteristic Summary:  Weight Loss (Malnutrition): greater than 10% in 6 months  Energy Intake (Malnutrition): less than or equal to 50% for greater than or equal to 1 month    Interventions/Recommendations (treatment strategy):  1.    Cont TPN  Planning for PEG today    Head and neck cancer        Hypercalcemia  On arrival to OSH, Ca 13.5 --> 11.0 on arrival to [facility]. Continued subsequent uptrend peaking at 13.1 on 4/15 (corrected Ca 14.5). Likely 2/2 malignancy.   - IVF  - Calcitonin BID x2 days  - Ionized Ca 1.59 4/21  - Monitor CMP  - IV hydration and cont to trend Ca level   - Ionized calcium WNL. Cont to monitor    ACP (advance care planning)        Pain        Palliative care encounter  Daily GOC discussions  Pt and family would like to proceed with aggressive care  Cont FULL code  Planning for PEG      Airway compromise        Hypokalemia  Patient has hypokalemia which is Acute and currently controlled. Most recent potassium levels reviewed-   Lab Results   Component Value Date    K 3.5 04/24/2024   . Will continue potassium replacement per protocol and recheck repeat levels after replacement completed.     Open neck wound  Pt with extensive hx of H/N cancer. Presents w/ continued/worsening drainage from the neck for months with recent worsening of symptoms for past 2 weeks w/ N/V. Pt returned from [place] where she received antibiotics, "detox" and hyperbaric chamber as treatment. Endorses difficulty with swallowing 2/2 to oral trush. Large neck mass on imaging w/ large central collection of air which appears to extend superficially to the skin surface right mid neck anterior laterally; unable to r/o superimposed abscess. Meeting 2/4 SIRS (HR & WBC) w/ elevated lactate, but nml BP. " Blood cx negative. Pt not surgical candidate for debridement per ENT evaluation.   - ID consulted; appreciate recs  - Surgical debridement is not an option. Has completed course of antibiotics.      Sepsis  On arrival to [facility], meeting 2/4 SIRS (requiring 2L NC w/ RR 20s & WBC 13). Likely soft tissue/neck source. Received IVF at OSH prior to transfer (for lactate 2.3 at that time). Started on broad-spectrum abx. Blood cx negative.   - ID consulted; appreciate recs  - Monitor CBC & fever curve  - Mgmt of neck mass/wound as below   - Has completed treatment. Discontinue vancomycin + Flagyl + fluconazole     Postprocedural hypoparathyroidism  Last PTHi nml at 26 (2016). Home regimen includes calcium carbonate & vitamin D. Symptomatic hypercalcemia (Ca 13.5) on presentation to OSH w/ abdominal pain & N/V. Improved w/ NS ggt (although query if pt's hypercalcemia was re: hx of hypoparathyroidism vs hypercalcemia of malignancy.   - Holding home calcium carbonate  - Resume home vitamin D as PO tolerance improves   - Monitor CMP    GERD (gastroesophageal reflux disease)        Postsurgical hypothyroidism  Last TSH elevated at 5.264. Home regimen includes levothyroxine 175mcg daily & liothyronine 5mcg daily  - Continue home liothyronine & levothyroxine       HTN (hypertension)  Hx of essential HTN; home regimen includes metoprolol 25mg BID. On arrival to [facility], SBP 140s-150s.   PRN labetalol/hydralazine      VTE Risk Mitigation (From admission, onward)           Ordered     enoxaparin injection 40 mg  Every 24 hours         04/12/24 1536     IP VTE HIGH RISK PATIENT  Once         04/12/24 1324     Place sequential compression device  Until discontinued         04/12/24 1324                    Discharge Planning   ANDRES: 4/26/2024     Code Status: Full Code   Is the patient medically ready for discharge?: No  Discharge Plan A: Hospice/home ([facility])                  Pasquale Haines MD  Department of Hospital Medicine    Con Nguyen - Telemetry Stepdown

## 2024-04-24 NOTE — OP NOTE
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 currently admitted [facility] with a history of squamous cell carcinoma of the oral tongue status post surgery.  She is unfortunately developed massive recurrence within her neck.  She has indicated a desire to undergo PEG tube placement.  Consultation was called to our service for placement of tracheostomy to secure airway prior to PEG tube placement.  I visited with her and her  this morning explained the details of surgery.  I explained the rationale for an awake trach since our anesthesia team did not feel comfortable proceeding with intubation without a viable surgical airway for backup.  I also explained to them that tracheostomy would likely be permanent.    She was apprised of the risks, benefits and alternatives to surgery.  In spite of the risk inherent to surgery,she provided informed consent for the aforementioned procedures.     PROCEDURE IN DETAIL:  The patient was taken to the operating room and placed on the operating table in the supine position.  IV sedation was administered by the anesthesia team.    The central neck was addressed.  The sternal notch was difficult to palpate.  The heads of the clavicles were identified.  An approximate 4 cm incision was marked in a natural skin crease 2 fingerbreadths above the sternal notch.  The intended incision surrounding soft tissue was injected with several cc of 1% lidocaine with epinephrine.  The neck was then prepped and draped in standard sterile fashion.      To begin, the skin was incised with a 15 blade.  Sharp  dissection proceeded through the underlying subcutaneous tissue to identify the strap muscles.  The midline was identified.  The strap muscles were noted to be markedly fibrotic and were adherent to the underlying tumor/airway.  These were taken down.  The tumor was identified filling the anterior neck.  Multiple attempts to identify the trachea by debulking the tumor and aspirating air through a syringe partially filled with saline were unsuccessful.  As we dissected, I did not identify any structures consistent with the airway and did not feel that it was safe for us to proceed without definitively identifying the airway.  It seemed to me that her entire neck was filled with tumor rendering the normal structures and identifiable and, as such, rendering tracheostomy unsafe and, at worst, and impossible.    This time, hemostasis was achieved in the neck with electrocautery.  The wound was packed with fibrillar and closed with absorbable suture and skin staples.  She was then handed back to Anesthesia and transferred to recovery in satisfactory condition.    There were no intraoperative complications.  I was present for and participated in the entire procedure as dictated above.       ESTIMATED BLOOD LOSS:  Minimal    SPECIMENS:   Specimen (24h ago, onward)      None

## 2024-04-24 NOTE — PROGRESS NOTES
Con Nguyen - Telemetry Stepdown  General Surgery  Progress Note    Subjective:     History of Present Illness:  Marysol Cash is a 58 year-old female with a PMHx of SCC of the tongue with history of radical neck dissection in 03/2022 (had PEG placed at that time) who now presents as transfer to [facility] with suspected recurrence of SCC and left vocal cord paralysis. She was evaluated by ENT who performed bedside biopsy of eroding neck mass. Path confirmed recurrent disease. Imaging is consistent with unresectable disease and palliative care was recommended due to the extent of mass involvement.    General surgery was consulted for consideration of PEG placement to ensure reliable means of nutrition given paralysis of cord.      Post-Op Info:  Procedure(s) (LRB):  EGD, WITH PEG TUBE INSERTION (N/A)   2 Days Post-Op     Interval History: NAEON. Afebrile. HDS although tachycardic. Patient now agreeable to trach. To OR today for trach, open G tube     Medications:  Continuous Infusions:  Current Facility-Administered Medications   Medication Dose Route Frequency Last Rate Last Admin    TPN ADULT CENTRAL LINE CUSTOM   Intravenous Continuous 75 mL/hr at 04/23/24 2215 New Bag at 04/23/24 2215     Scheduled Meds:  Current Facility-Administered Medications   Medication Dose Route Frequency    barium  450 mL Per NG tube Once    barium  450 mL Per NG tube Once    enoxparin  40 mg Subcutaneous Q24H (prophylaxis, 1700)    levothyroxine  175 mcg Oral Before breakfast    liothyronine  5 mcg Oral Before breakfast    magnesium sulfate IVPB  2 g Intravenous Once    potassium chloride in water  20 mEq Intravenous Q2H    senna  8.6 mg Oral BID    sodium chloride 0.9%  10 mL Intravenous Q6H    sodium phosphate 30 mmol in dextrose 5 % (D5W) 250 mL IVPB  30 mmol Intravenous Once     PRN Meds:  Current Facility-Administered Medications:     acetaminophen, 650 mg, Oral, Q4H PRN    dextrose 10%, 12.5 g, Intravenous, PRN    dextrose 10%, 25 g,  Intravenous, PRN    glucose, 16 g, Oral, PRN    glucose, 24 g, Oral, PRN    hydrALAZINE, 10 mg, Intravenous, Q6H PRN    HYDROmorphone, 1 mg, Intravenous, Q2H PRN    hydrOXYzine pamoate, 25 mg, Oral, Q8H PRN    labetalol, 10 mg, Intravenous, Q6H PRN    lorazepam, 2 mg, Intravenous, Nightly PRN    naloxone, 0.02 mg, Intravenous, PRN    prochlorperazine, 5 mg, Intravenous, Q6H PRN    promethazine (PHENERGAN) 12.5 mg in dextrose 5 % (D5W) 50 mL IVPB, 12.5 mg, Intravenous, Q6H PRN    Flushing PICC/Midline Protocol, , , Until Discontinued **AND** sodium chloride 0.9%, 10 mL, Intravenous, Q6H **AND** sodium chloride 0.9%, 10 mL, Intravenous, PRN     Review of patient's allergies indicates:   Allergen Reactions    Ciprofloxacin Swelling    Codeine Swelling    Opioids - morphine analogues     Pcn [penicillins] Hives     Tolerated cefepime 04/2024    Percocet [oxycodone-acetaminophen] Swelling     Objective:     Vital Signs (Most Recent):  Temp: 98.2 °F (36.8 °C) (04/24/24 0752)  Pulse: (!) 118 (04/24/24 0805)  Resp: 16 (04/24/24 0752)  BP: (!) 137/90 (04/24/24 0805)  SpO2: 97 % (04/24/24 0805) Vital Signs (24h Range):  Temp:  [96.4 °F (35.8 °C)-98.5 °F (36.9 °C)] 98.2 °F (36.8 °C)  Pulse:  [] 118  Resp:  [16-20] 16  SpO2:  [93 %-98 %] 97 %  BP: (123-149)/(76-90) 137/90     Weight: 86.2 kg (190 lb 0.6 oz)  Body mass index is 30.67 kg/m².    Intake/Output - Last 3 Shifts         04/22 0700 04/23 0659 04/23 0700 04/24 0659 04/24 0700 04/25 0659    Urine (mL/kg/hr) 400 (0.2)      Stool 0      Total Output 400      Net -400             Stool Occurrence 0 x               Physical Exam  Vitals and nursing note reviewed.   Constitutional:       General: She is not in acute distress.     Appearance: She is not diaphoretic.      Comments: Room air   HENT:      Head: Normocephalic and atraumatic.      Mouth/Throat:      Mouth: Mucous membranes are moist.      Pharynx: Oropharynx is clear.   Eyes:      Extraocular Movements:  Extraocular movements intact.      Conjunctiva/sclera: Conjunctivae normal.   Neck:      Comments: diffuse neck swelling with overlying skin discoloration and dressing in place.   Cardiovascular:      Rate and Rhythm: Tachycardia present.   Pulmonary:      Effort: Pulmonary effort is normal. No respiratory distress.   Abdominal:      General: There is no distension.      Palpations: Abdomen is soft.      Tenderness: There is no abdominal tenderness. There is no guarding or rebound.      Comments:  Midline incision well approximated without induration or erythema    Musculoskeletal:         General: No deformity.   Skin:     General: Skin is warm and dry.   Neurological:      Mental Status: She is alert and oriented to person, place, and time.          Significant Labs:  I have reviewed all pertinent lab results within the past 24 hours.    Significant Diagnostics:  I have reviewed all pertinent imaging results/findings within the past 24 hours.  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 F with history of SCC s/p radical resection in 2022 who now presents with recurrent, unresectable disease. General surgery consulted for PEG placement.    -Discussed with patient and  at bedside the options for feeding tube placement. Given head/neck SCC, there are concerns from ENT and anesthesia regarding NGT placement and airway management intra-operatively. From surgical standpoint, no reason to not move forward with NGT placement. Multiple discussions held between patient, family, and care teams over the last few days since initial consult. They are now agreeable to trach. Consent obtained for both procedures    - NPO  - To OR today for open G tube   - Remainder of care per primary team  - Please contact general surgery with any questions, concerns, or clinical status changes           Case discussed with Dr. Coffey.      Travis Lowe PA-C  General  Surgery  Con Nguyen - Telemetry Stepdown

## 2024-04-25 ENCOUNTER — ANESTHESIA EVENT (OUTPATIENT)
Dept: INTERVENTIONAL RADIOLOGY/VASCULAR | Facility: HOSPITAL | Age: 59
DRG: 579 | End: 2024-04-25
Payer: COMMERCIAL

## 2024-04-25 LAB
ANION GAP SERPL CALC-SCNC: 10 MMOL/L (ref 8–16)
BASOPHILS # BLD AUTO: 0.03 K/UL (ref 0–0.2)
BASOPHILS NFR BLD: 0.2 % (ref 0–1.9)
BUN SERPL-MCNC: 31 MG/DL (ref 6–20)
CA-I BLDV-SCNC: 1.28 MMOL/L (ref 1.06–1.42)
CALCIUM SERPL-MCNC: 11 MG/DL (ref 8.7–10.5)
CHLORIDE SERPL-SCNC: 94 MMOL/L (ref 95–110)
CO2 SERPL-SCNC: 32 MMOL/L (ref 23–29)
CREAT SERPL-MCNC: 0.8 MG/DL (ref 0.5–1.4)
DIFFERENTIAL METHOD BLD: ABNORMAL
EOSINOPHIL # BLD AUTO: 0 K/UL (ref 0–0.5)
EOSINOPHIL NFR BLD: 0 % (ref 0–8)
ERYTHROCYTE [DISTWIDTH] IN BLOOD BY AUTOMATED COUNT: 15 % (ref 11.5–14.5)
EST. GFR  (NO RACE VARIABLE): >60 ML/MIN/1.73 M^2
GLUCOSE SERPL-MCNC: 157 MG/DL (ref 70–110)
HCT VFR BLD AUTO: 40.3 % (ref 37–48.5)
HGB BLD-MCNC: 13.2 G/DL (ref 12–16)
IMM GRANULOCYTES # BLD AUTO: 0.1 K/UL (ref 0–0.04)
IMM GRANULOCYTES NFR BLD AUTO: 0.5 % (ref 0–0.5)
LYMPHOCYTES # BLD AUTO: 2.4 K/UL (ref 1–4.8)
LYMPHOCYTES NFR BLD: 12.8 % (ref 18–48)
MAGNESIUM SERPL-MCNC: 1.4 MG/DL (ref 1.6–2.6)
MCH RBC QN AUTO: 28 PG (ref 27–31)
MCHC RBC AUTO-ENTMCNC: 32.8 G/DL (ref 32–36)
MCV RBC AUTO: 85 FL (ref 82–98)
MONOCYTES # BLD AUTO: 1.3 K/UL (ref 0.3–1)
MONOCYTES NFR BLD: 6.7 % (ref 4–15)
NEUTROPHILS # BLD AUTO: 15.2 K/UL (ref 1.8–7.7)
NEUTROPHILS NFR BLD: 79.8 % (ref 38–73)
NRBC BLD-RTO: 0 /100 WBC
PHOSPHATE SERPL-MCNC: 2.7 MG/DL (ref 2.7–4.5)
PLATELET # BLD AUTO: 289 K/UL (ref 150–450)
PMV BLD AUTO: 11.2 FL (ref 9.2–12.9)
POTASSIUM SERPL-SCNC: 3.3 MMOL/L (ref 3.5–5.1)
RBC # BLD AUTO: 4.72 M/UL (ref 4–5.4)
SODIUM SERPL-SCNC: 136 MMOL/L (ref 136–145)
WBC # BLD AUTO: 19.08 K/UL (ref 3.9–12.7)

## 2024-04-25 PROCEDURE — 63600175 PHARM REV CODE 636 W HCPCS: Performed by: INTERNAL MEDICINE

## 2024-04-25 PROCEDURE — 25000003 PHARM REV CODE 250: Performed by: STUDENT IN AN ORGANIZED HEALTH CARE EDUCATION/TRAINING PROGRAM

## 2024-04-25 PROCEDURE — 80048 BASIC METABOLIC PNL TOTAL CA: CPT | Performed by: STUDENT IN AN ORGANIZED HEALTH CARE EDUCATION/TRAINING PROGRAM

## 2024-04-25 PROCEDURE — 63600175 PHARM REV CODE 636 W HCPCS: Performed by: STUDENT IN AN ORGANIZED HEALTH CARE EDUCATION/TRAINING PROGRAM

## 2024-04-25 PROCEDURE — 83735 ASSAY OF MAGNESIUM: CPT | Performed by: INTERNAL MEDICINE

## 2024-04-25 PROCEDURE — A4217 STERILE WATER/SALINE, 500 ML: HCPCS | Performed by: STUDENT IN AN ORGANIZED HEALTH CARE EDUCATION/TRAINING PROGRAM

## 2024-04-25 PROCEDURE — A4216 STERILE WATER/SALINE, 10 ML: HCPCS | Performed by: STUDENT IN AN ORGANIZED HEALTH CARE EDUCATION/TRAINING PROGRAM

## 2024-04-25 PROCEDURE — 85025 COMPLETE CBC W/AUTO DIFF WBC: CPT | Performed by: STUDENT IN AN ORGANIZED HEALTH CARE EDUCATION/TRAINING PROGRAM

## 2024-04-25 PROCEDURE — 20600001 HC STEP DOWN PRIVATE ROOM

## 2024-04-25 PROCEDURE — 84100 ASSAY OF PHOSPHORUS: CPT | Performed by: INTERNAL MEDICINE

## 2024-04-25 PROCEDURE — 25000003 PHARM REV CODE 250: Performed by: INTERNAL MEDICINE

## 2024-04-25 PROCEDURE — 82330 ASSAY OF CALCIUM: CPT | Performed by: INTERNAL MEDICINE

## 2024-04-25 RX ORDER — POTASSIUM CHLORIDE 7.45 MG/ML
10 INJECTION INTRAVENOUS
Status: COMPLETED | OUTPATIENT
Start: 2024-04-25 | End: 2024-04-25

## 2024-04-25 RX ADMIN — Medication 10 ML: at 06:04

## 2024-04-25 RX ADMIN — HYDROMORPHONE HYDROCHLORIDE 1 MG: 1 INJECTION, SOLUTION INTRAMUSCULAR; INTRAVENOUS; SUBCUTANEOUS at 07:04

## 2024-04-25 RX ADMIN — HYDROMORPHONE HYDROCHLORIDE 1 MG: 1 INJECTION, SOLUTION INTRAMUSCULAR; INTRAVENOUS; SUBCUTANEOUS at 12:04

## 2024-04-25 RX ADMIN — PROMETHAZINE HYDROCHLORIDE 12.5 MG: 25 INJECTION INTRAMUSCULAR; INTRAVENOUS at 06:04

## 2024-04-25 RX ADMIN — MAGNESIUM SULFATE HEPTAHYDRATE: 500 INJECTION, SOLUTION INTRAMUSCULAR; INTRAVENOUS at 11:04

## 2024-04-25 RX ADMIN — METOROPROLOL TARTRATE 5 MG: 5 INJECTION, SOLUTION INTRAVENOUS at 06:04

## 2024-04-25 RX ADMIN — LEVOTHYROXINE SODIUM 175 MCG: 150 TABLET ORAL at 05:04

## 2024-04-25 RX ADMIN — METOROPROLOL TARTRATE 5 MG: 5 INJECTION, SOLUTION INTRAVENOUS at 11:04

## 2024-04-25 RX ADMIN — LIOTHYRONINE SODIUM 5 MCG: 5 TABLET ORAL at 05:04

## 2024-04-25 RX ADMIN — PROCHLORPERAZINE EDISYLATE 5 MG: 5 INJECTION INTRAMUSCULAR; INTRAVENOUS at 11:04

## 2024-04-25 RX ADMIN — HYDROMORPHONE HYDROCHLORIDE 1 MG: 1 INJECTION, SOLUTION INTRAMUSCULAR; INTRAVENOUS; SUBCUTANEOUS at 06:04

## 2024-04-25 RX ADMIN — POTASSIUM CHLORIDE 10 MEQ: 7.46 INJECTION, SOLUTION INTRAVENOUS at 09:04

## 2024-04-25 RX ADMIN — PROMETHAZINE HYDROCHLORIDE 12.5 MG: 25 INJECTION INTRAMUSCULAR; INTRAVENOUS at 11:04

## 2024-04-25 RX ADMIN — Medication 10 ML: at 12:04

## 2024-04-25 RX ADMIN — HYDROMORPHONE HYDROCHLORIDE 1 MG: 1 INJECTION, SOLUTION INTRAMUSCULAR; INTRAVENOUS; SUBCUTANEOUS at 10:04

## 2024-04-25 RX ADMIN — POTASSIUM CHLORIDE 10 MEQ: 7.46 INJECTION, SOLUTION INTRAVENOUS at 11:04

## 2024-04-25 RX ADMIN — Medication 10 ML: at 11:04

## 2024-04-25 RX ADMIN — METOROPROLOL TARTRATE 5 MG: 5 INJECTION, SOLUTION INTRAVENOUS at 12:04

## 2024-04-25 RX ADMIN — PROCHLORPERAZINE EDISYLATE 5 MG: 5 INJECTION INTRAMUSCULAR; INTRAVENOUS at 07:04

## 2024-04-25 RX ADMIN — Medication 10 ML: at 05:04

## 2024-04-25 RX ADMIN — HYDROMORPHONE HYDROCHLORIDE 1 MG: 1 INJECTION, SOLUTION INTRAMUSCULAR; INTRAVENOUS; SUBCUTANEOUS at 11:04

## 2024-04-25 RX ADMIN — METOROPROLOL TARTRATE 5 MG: 5 INJECTION, SOLUTION INTRAVENOUS at 05:04

## 2024-04-25 NOTE — SUBJECTIVE & OBJECTIVE
Interval History: Patient unable to get trach yesterday. Gen surgery rec consulting IR for PEG placement. IR will need NGT placed today for procedure tomorrow. Orders for NGT place. NPO MN  Barium will need to be administered through the NG at midnight tonight by the floor RN.     Objective:     Vital Signs (Most Recent):  Temp: 97.8 °F (36.6 °C) (04/25/24 1158)  Pulse: 84 (04/25/24 1158)  Resp: 20 (04/25/24 1158)  BP: 127/81 (04/25/24 1158)  SpO2: 97 % (04/25/24 1158) Vital Signs (24h Range):  Temp:  [97.8 °F (36.6 °C)-98.9 °F (37.2 °C)] 97.8 °F (36.6 °C)  Pulse:  [] 84  Resp:  [18-20] 20  SpO2:  [97 %-99 %] 97 %  BP: (127-143)/(67-83) 127/81     Weight: 86.2 kg (190 lb 0.6 oz)  Body mass index is 30.67 kg/m².    Intake/Output Summary (Last 24 hours) at 4/25/2024 1459  Last data filed at 4/25/2024 0648  Gross per 24 hour   Intake 0 ml   Output --   Net 0 ml           Physical Exam  Vitals and nursing note reviewed.   Constitutional:       General: She is not in acute distress.     Appearance: She is ill-appearing. She is not toxic-appearing.   Eyes:      Conjunctiva/sclera: Conjunctivae normal.   Neck:      Comments: Dressing in place over entire circumference of neck. Dressing c/d/i  Cardiovascular:      Rate and Rhythm: Regular rhythm. Tachycardia present.      Heart sounds: Normal heart sounds.   Pulmonary:      Effort: Pulmonary effort is normal. No respiratory distress.      Breath sounds: Normal breath sounds. No wheezing.   Abdominal:      General: Bowel sounds are normal. There is no distension.      Palpations: Abdomen is soft.      Tenderness: There is no abdominal tenderness. There is no guarding.   Skin:     General: Skin is warm and dry.   Neurological:      General: No focal deficit present.      Mental Status: She is alert and oriented to person, place, and time. Mental status is at baseline.   Psychiatric:         Mood and Affect: Mood normal.         Behavior: Behavior normal.              Significant Labs: All pertinent labs within the past 24 hours have been reviewed.    Significant Imaging: I have reviewed all pertinent imaging results/findings within the past 24 hours.     Partial Purse String (Intermediate) Text: Given the location of the defect and the characteristics of the surrounding skin an intermediate purse string closure was deemed most appropriate.  Undermining was performed circumfirentially around the surgical defect.  A purse string suture was then placed and tightened. Wound tension only allowed a partial closure of the circular defect.

## 2024-04-25 NOTE — PROGRESS NOTES
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 She initially presented to [facility] Emergency Department with six days of epigastric pain with associated nausea, vomiting, and difficulty eating due to pain. However on presentation, she was noted to have a large necrotic and purulent wound located on her right neck with complaints of associated difficulty speaking, swallowing and shortness of breath. She was noted to be hypoxic 88% on room air which improved with 2L NC. Labs were notable for leukocytosis 14, hypokalemia 2.4, hypochloremia 89, CO2 36, hypercalcemia 13.5, Phos 2.5. . Troponin 0.046. Lactate 2.3. CT Soft Tissue Neck was concerning for 9.5 x 8.2 x 10 cm large lobulated mass in the right anterolateral neck extending to the deep spaces of the neck and abutting the right prevertebral space and paravertebral musculature, left midline shift, multiple bilateral necrotic cervical lymph nodes. Case was discussed and decision was made to transfer to [facility] for higher level of care.     Of additional note, she recently transitioned her care to [place] and underwent stem-cell transplant approximately 2 weeks ago in [place], has not been seen by a [place] physician since 2023.      On transfer: she was afebrile, mildly hypertensive /109, HR 98, RR 20, satting 96% on room  air. Complaining of mild headache and poor appetite associated with nausea; denies fever, chills, chest pain, palpitations, SOB, abdominal pain, dysuria. States wound has been draining for the past week initially thick white now more liquid. Mild dysphonia is apparently chronic from left vocal cord paralysis and at baseline. Some dysphagia with solids, none with pureed soft or liquids, denies odynophagia. Dressing on neck CDI, ENT consulted will be here shortly to assess patient.        Overview/Hospital Course:  Evaluated 4/12 in MICU by ENT, who do not feel patient is a candidate for surgical intervention. Palliative care and oncology consulted. Failed swallow study, SLP recommending NPO 4/13. Patient continues to protect airway. Stepped down from MICU on 4/13.    DC planning: awaiting PEG placement    Interval History: Patient unable to get trach yesterday. Gen surgery rec consulting IR for PEG placement. IR will need NGT placed today for procedure tomorrow. Orders for NGT place. NPO MN  Barium will need to be administered through the NG at midnight tonight by the floor RN.     Objective:     Vital Signs (Most Recent):  Temp: 97.8 °F (36.6 °C) (04/25/24 1158)  Pulse: 84 (04/25/24 1158)  Resp: 20 (04/25/24 1158)  BP: 127/81 (04/25/24 1158)  SpO2: 97 % (04/25/24 1158) Vital Signs (24h Range):  Temp:  [97.8 °F (36.6 °C)-98.9 °F (37.2 °C)] 97.8 °F (36.6 °C)  Pulse:  [] 84  Resp:  [18-20] 20  SpO2:  [97 %-99 %] 97 %  BP: (127-143)/(67-83) 127/81     Weight: 86.2 kg (190 lb 0.6 oz)  Body mass index is 30.67 kg/m².    Intake/Output Summary (Last 24 hours) at 4/25/2024 6042  Last data filed at 4/25/2024 0648  Gross per 24 hour   Intake 0 ml   Output --   Net 0 ml           Physical Exam  Vitals and nursing note reviewed.   Constitutional:       General: She is not in acute distress.     Appearance: She is ill-appearing. She is not toxic-appearing.   Eyes:      Conjunctiva/sclera: Conjunctivae normal.   Neck:       Comments: Dressing in place over entire circumference of neck. Dressing c/d/i  Cardiovascular:      Rate and Rhythm: Regular rhythm. Tachycardia present.      Heart sounds: Normal heart sounds.   Pulmonary:      Effort: Pulmonary effort is normal. No respiratory distress.      Breath sounds: Normal breath sounds. No wheezing.   Abdominal:      General: Bowel sounds are normal. There is no distension.      Palpations: Abdomen is soft.      Tenderness: There is no abdominal tenderness. There is no guarding.   Skin:     General: Skin is warm and dry.   Neurological:      General: No focal deficit present.      Mental Status: She is alert and oriented to person, place, and time. Mental status is at baseline.   Psychiatric:         Mood and Affect: Mood normal.         Behavior: Behavior normal.             Significant Labs: All pertinent labs within the past 24 hours have been reviewed.    Significant Imaging: I have reviewed all pertinent imaging results/findings within the past 24 hours.      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 Underwent repeat bx after failed tx (1/24/22) that showed moderately differentiated squamous cell carcinoma w/ suspicion for perineural invasion. CT soft tissue neck (Jan 2022) w/o e/o metastatic dx. PET-CT (Feb 2022) w/o e/o active local or distant dx- no abnormal activity within the tongue, no signs of cervical lymph node or distant metastasis. Declined chemotherapy &/or XRT. Underwent selective neck dissection levels 1, 2, 3, and partial glossectomy w/ split-thickness skin graft (3/3/2022 per Dr. Clif Olson).  Surgical pathology w/ unifocal squamous cell carcinoma on the dorsal surface of the tongue on left side measuring 1 cm, moderately differentiated, 6 mm depth of invasion, no lymphovascular invasion, positive for perineural invasion, margins  "negative, 0 of 39 lymph nodes with metastasis, pT2 pN0 noted.  Postop course complicated by infection requiring I&D and antibiotics.  Declined adjuvant XRT. Repeat imaging (June 2023) w/ 2.2 cm lesion deep to the R SCM c/f necrotic LN w/ few adjacent pathological appearing LNs suspicious for metastasis. Underwent R neck FNA (7/7/23) w/ pathology suspicious for metastatic SCC. PET-CT (8/21/2023) w/ large necrotic mass the angle of mandible posterior to submandibular gland on the right measuring 4.4 x 4.2 cm with SUV 9.78, just superior to the mass is a 2 cm hypermetabolic lymph node and no evidence of distant metastatic disease. Subsequently transferred care to [place] where she reportedly recently abx,"detox", hyperbaric chamber, & stem-cell transplant approximately 2 weeks ago PTA in [place]. Now presenting w/ progressive neck wound. CT neck soft tissue (4/10/24) w/ large bulky lobulated appearing mass right lateral and anterolateral neck extending to the D spaces of the neck having overall dimensions of approximately 9.5 x 8.2 x 10 cm, multiple addn'l b/l cervical necrotic nodes, & assoc mass effect results in displacement of midline structures to the patient's left. CT C/A/P with diffuse metastatic disease including mediastinal & hilar LAD, bilateral lungs, peritoneum, lumbar vertebrae, & likely liver.     - ENT consulted; appreciate recs - not surgical candidate for debridement  - Medical & Radiation Oncology consulted - pt declines chemo or XRT  - Palliative following; appreciate assistance  - Multimodal analgesia   - SLP following,remains NPO  - Gen surgery and ENT consulted. Unable to do do tracheostomy. Rec consulting IR for PEG  - Consulted IR for PEG placement. IR will need NGT placed today for procedure tomorrow. Orders for NGT place. NPO MN. Barium will need to be administered through the NG at midnight tonight by the floor RN.     Sinus tachycardia  Metoprolol recently discontinued, possible " withdrawal  Resume IV metoprolol  Correct electrolyte derangements  Pain control          Severe malnutrition  PEG tube placement is being discussed. IR and GI deferred the procedure. Surgery depending on anesthesia assessment. ENT updated with recs. Awaiting final recs.       Nutrition consulted. Most recent weight and BMI monitored-     Measurements:  Wt Readings from Last 1 Encounters:   04/24/24 86.2 kg (190 lb 0.6 oz)   Body mass index is 30.67 kg/m².    Patient has been screened and assessed by RD.    Malnutrition Type:  Context: chronic illness  Level: severe    Malnutrition Characteristic Summary:  Weight Loss (Malnutrition): greater than 10% in 6 months  Energy Intake (Malnutrition): less than or equal to 50% for greater than or equal to 1 month    Interventions/Recommendations (treatment strategy):  1.    Cont TPN  Planning for PEG today    Head and neck cancer        Hypercalcemia  On arrival to OSH, Ca 13.5 --> 11.0 on arrival to [facility]. Continued subsequent uptrend peaking at 13.1 on 4/15 (corrected Ca 14.5). Likely 2/2 malignancy.   - IVF  - Calcitonin BID x2 days  - Ionized Ca 1.59 4/21  - Monitor CMP  - IV hydration and cont to trend Ca level   - Ionized calcium WNL. Cont to monitor    ACP (advance care planning)        Pain        Palliative care encounter  Daily GOC discussions  Pt and family would like to proceed with aggressive care  Cont FULL code  Planning for PEG      Airway compromise        Hypokalemia  Patient has hypokalemia which is Acute and currently controlled. Most recent potassium levels reviewed-   Lab Results   Component Value Date    K 3.5 04/24/2024   . Will continue potassium replacement per protocol and recheck repeat levels after replacement completed.     Open neck wound  Pt with extensive hx of H/N cancer. Presents w/ continued/worsening drainage from the neck for months with recent worsening of symptoms for past 2 weeks w/ N/V. Pt returned from [place] where she received  "antibiotics, "detox" and hyperbaric chamber as treatment. Endorses difficulty with swallowing 2/2 to oral trush. Large neck mass on imaging w/ large central collection of air which appears to extend superficially to the skin surface right mid neck anterior laterally; unable to r/o superimposed abscess. Meeting 2/4 SIRS (HR & WBC) w/ elevated lactate, but nml BP. Blood cx negative. Pt not surgical candidate for debridement per ENT evaluation.   - ID consulted; appreciate recs  - Surgical debridement is not an option. Has completed course of antibiotics.      Sepsis  [place] [facility] meeting 2/4 SIRS (requiring 2L NC w/ RR 20s & WBC 13). Likely soft tissue/neck source. Received IVF at OSH prior to transfer (for lactate 2.3 at that time). Started on broad-spectrum abx. Blood cx negative.   - ID consulted; appreciate recs  - Monitor CBC & fever curve  - Mgmt of neck mass/wound as below   - Has completed treatment. Discontinue vancomycin + Flagyl + fluconazole     Postprocedural hypoparathyroidism  Last PTHi nml at 26 (2016). Home regimen includes calcium carbonate & vitamin D. Symptomatic hypercalcemia (Ca 13.5) on presentation to OSH w/ abdominal pain & N/V. Improved w/ NS ggt (although query if pt's hypercalcemia was re: hx of hypoparathyroidism vs hypercalcemia of malignancy.   - Holding home calcium carbonate  - Resume home vitamin D as PO tolerance improves   - Monitor CMP    GERD (gastroesophageal reflux disease)        Postsurgical hypothyroidism  Last TSH elevated at 5.264. Home regimen includes levothyroxine 175mcg daily & liothyronine 5mcg daily  - Continue home liothyronine & levothyroxine       HTN (hypertension)  Hx of essential HTN; home regimen includes metoprolol 25mg BID. On [place] [facility], SBP 140s-150s.   PRN labetalol/hydralazine      VTE Risk Mitigation (From admission, onward)           Ordered     enoxaparin injection 40 mg  Every 24 hours         04/12/24 1536     IP VTE HIGH RISK PATIENT  Once "         04/12/24 1324     Place sequential compression device  Until discontinued         04/12/24 1324                    Discharge Planning   ANDRES: 4/27/2024     Code Status: Full Code   Is the patient medically ready for discharge?: No    Reason for patient still in hospital (select all that apply): trending condition  Discharge Plan A: Hospice/home ([facility])                  Carissa Young MD  Department of Hospital Medicine   [facility] - Telemetry Stepdown

## 2024-04-25 NOTE — PLAN OF CARE
Problem: Adult Inpatient Plan of Care  Goal: Plan of Care Review  Outcome: Progressing  Goal: Patient-Specific Goal (Individualized)  Outcome: Progressing  Goal: Absence of Hospital-Acquired Illness or Injury  Outcome: Progressing  Goal: Optimal Comfort and Wellbeing  Outcome: Progressing  Goal: Readiness for Transition of Care  Outcome: Progressing  Patient in bed. No complaints voiced. NADN at this time. Safety measures in place. Call light in reach.

## 2024-04-25 NOTE — ASSESSMENT & PLAN NOTE
"Initial bx of ventral surface of tongue (12/24/21) showed atypia w/o diagnostic e/o dysplasia. Underwent repeat bx after failed tx (1/24/22) that showed moderately differentiated squamous cell carcinoma w/ suspicion for perineural invasion. CT soft tissue neck (Jan 2022) w/o e/o metastatic dx. PET-CT (Feb 2022) w/o e/o active local or distant dx- no abnormal activity within the tongue, no signs of cervical lymph node or distant metastasis. Declined chemotherapy &/or XRT. Underwent selective neck dissection levels 1, 2, 3, and partial glossectomy w/ split-thickness skin graft (3/3/2022 per Dr. Clif Olson).  Surgical pathology w/ unifocal squamous cell carcinoma on the dorsal surface of the tongue on left side measuring 1 cm, moderately differentiated, 6 mm depth of invasion, no lymphovascular invasion, positive for perineural invasion, margins negative, 0 of 39 lymph nodes with metastasis, pT2 pN0 noted.  Postop course complicated by infection requiring I&D and antibiotics.  Declined adjuvant XRT. Repeat imaging (June 2023) w/ 2.2 cm lesion deep to the R SCM c/f necrotic LN w/ few adjacent pathological appearing LNs suspicious for metastasis. Underwent R neck FNA (7/7/23) w/ pathology suspicious for metastatic SCC. PET-CT (8/21/2023) w/ large necrotic mass the angle of mandible posterior to submandibular gland on the right measuring 4.4 x 4.2 cm with SUV 9.78, just superior to the mass is a 2 cm hypermetabolic lymph node and no evidence of distant metastatic disease. Subsequently transferred care to [place] where she reportedly recently abx,"detox", hyperbaric chamber, & stem-cell transplant approximately 2 weeks ago PTA in [place]. Now presenting w/ progressive neck wound. CT neck soft tissue (4/10/24) w/ large bulky lobulated appearing mass right lateral and anterolateral neck extending to the D spaces of the neck having overall dimensions of approximately 9.5 x 8.2 x 10 cm, multiple addn'l b/l cervical " necrotic nodes, & assoc mass effect results in displacement of midline structures to the patient's left. CT C/A/P with diffuse metastatic disease including mediastinal & hilar LAD, bilateral lungs, peritoneum, lumbar vertebrae, & likely liver.     - ENT consulted; appreciate recs - not surgical candidate for debridement  - Medical & Radiation Oncology consulted - pt declines chemo or XRT  - Palliative following; appreciate assistance  - Multimodal analgesia   - SLP following,remains NPO  - Gen surgery and ENT consulted. Unable to do do tracheostomy. Rec consulting IR for PEG  - Consulted IR for PEG placement. IR will need NGT placed today for procedure tomorrow. Orders for NGT place. NPO MN. Barium will need to be administered through the NG at midnight tonight by the floor RN.

## 2024-04-25 NOTE — CONSULTS
Please see IR G tube consult note dated 4/18/24 for full details.     Patient still does not have an NGT in place. Will need NG for IR procedure. If NG is placed, will place IR G tube tomorrow 04/26/24 using IV/moderate sedation. Anesthesia will need to monitor the pt's airway during the procedure (due to tumor in her entire neck).     Orders for barium and NPO placed (including holding any possible tube feeds) for midnight tonight. Barium will need to be administered through the NG at midnight tonight by the floor RN. Additionally the NG will need to remain in place for IR procedure.     Viviana Weber PA-C  Interventional Radiology  Spectra [phone]  4/25/2024

## 2024-04-25 NOTE — ANESTHESIA PREPROCEDURE EVALUATION
[facility]  Anesthesia Pre-Operative Evaluation         Patient Name: Marysol Cash  YOB: 1965  MRN: [id number]    SUBJECTIVE:     Pre-operative evaluation for * IR gastrostomy tube placement with imaging*     04/25/2024    Marysol Cash is a 58 y.o. 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 ENT unable to do tracheostomy creation on 4/24 due to distorted anatomy.     Patient now presents for the above procedure(s).      LDA:   PICC Double Lumen 04/20/24 1652 right brachial (Active)   Line Necessity Review Medication caustic to vasculature 04/22/24 1930   Verification by X-ray No 04/22/24 1930   Site Assessment No drainage;No redness;No swelling;No warmth 04/24/24 1400   Extremity Assessment Distal to IV No warmth;No swelling;No abnormal discoloration;No redness 04/24/24 1243   Line Securement Device Secured with sutures 04/24/24 1400   Dressing Type Central line dressing 04/24/24 1400   Dressing Status Dry;Clean;Intact 04/24/24 1243   Dressing Intervention Integrity maintained 04/24/24 1028   Date on Dressing 04/20/24 04/22/24 1930   Dressing Due to be Changed 04/27/24 04/22/24 1930   Distal Patency/Care infusing 04/24/24 1400   Proximal 1 Patency/Care infusing 04/24/24 1400   Current Insertion Depth (cm) 37 cm 04/20/24 1651   Current Exposed Catheter (cm) 0 cm 04/20/24 1651   Extremity Circumference (cm) 31 cm 04/20/24 1651   Number of days: 4       Prev airway: None documented.    Drips: None documented.      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    Pulmonary nodules    Nausea    Atrophic gastritis with hemorrhage    Intestinal metaplasia of gastric mucosa    Chronic pain of right knee    Pes anserine bursitis    B12 deficiency    Lymphedema    Class 2 severe obesity due to excess calories with serious comorbidity and body mass index (BMI) of 36.0 to 36.9 in adult    Malignant neoplasm of tip and lateral border of tongue    Sepsis    Open neck wound    Hypokalemia    Oral thrush    Airway compromise    Cellulitis and abscess of neck    SCC (squamous cell carcinoma), scalp/neck    Partial obstruction of airway    Palliative care encounter    Pain    ACP (advance care planning)    Hypercalcemia    Head and neck cancer    Severe malnutrition    Sinus tachycardia       Review of patient's allergies indicates:   Allergen Reactions    Ciprofloxacin Swelling    Codeine Swelling    Opioids - morphine analogues     Pcn [penicillins] Hives     Tolerated cefepime 04/2024    Percocet [oxycodone-acetaminophen] Swelling       Current Inpatient Medications:      Current Facility-Administered Medications on File Prior to Visit   Medication Dose Route Frequency Provider Last Rate Last Admin    acetaminophen oral solution 650 mg  650 mg Oral Q4H PRN Marnie River MD   650 mg at 04/15/24 1002    [START ON 4/26/2024] barium (READI-CAT 2) suspension 450 mL  450 mL Per NG tube Once Viviana Weber PA-C        [START ON 4/26/2024] barium (READI-CAT 2) suspension 450 mL  450 mL Per NG tube Once Viviana Weber PA-C        dextrose 10% bolus 125 mL 125 mL  12.5 g Intravenous PRN Samia Gill MD        dextrose 10% bolus 250 mL 250 mL  25 g Intravenous PRN  Samia Gill MD        enoxaparin injection 40 mg  40 mg Subcutaneous Q24H (prophylaxis, 1700) Samia Gill MD   40 mg at 04/24/24 1829    glucose chewable tablet 16 g  16 g Oral PRN Samia Gill MD        glucose chewable tablet 24 g  24 g Oral PRN aSmia Gill MD        hydrALAZINE injection 10 mg  10 mg Intravenous Q6H PRN Pasquale Haines MD        HYDROmorphone injection 1 mg  1 mg Intravenous Q2H PRN Gregoria Donnelly, DO   1 mg at 04/25/24 1136    hydrOXYzine pamoate capsule 25 mg  25 mg Oral Q8H PRN Samia Gill MD   25 mg at 04/18/24 1627    labetalol 20 mg/4 mL (5 mg/mL) IV syring  10 mg Intravenous Q6H PRN Pasquale Haines MD        levothyroxine tablet 175 mcg  175 mcg Oral Before breakfast Marnie River MD   175 mcg at 04/25/24 0554    liothyronine tablet 5 mcg  5 mcg Oral Before breakfast Samia Gill MD   5 mcg at 04/25/24 0554    LORazepam injection 2 mg  2 mg Intravenous Nightly PRN Gregoria Donnelly, DO   2 mg at 04/17/24 1930    metoprolol injection 5 mg  5 mg Intravenous Q6H Pasquale Haines MD   5 mg at 04/25/24 1135    naloxone 0.4 mg/mL injection 0.02 mg  0.02 mg Intravenous PRN Samia Gill MD        prochlorperazine injection Soln 5 mg  5 mg Intravenous Q6H PRN Pasquale Haines MD   5 mg at 04/25/24 1135    promethazine (PHENERGAN) 12.5 mg in dextrose 5 % (D5W) 50 mL IVPB  12.5 mg Intravenous Q6H PRN Pasquale Haines MD   Stopped at 04/25/24 0636    senna tablet 8.6 mg  8.6 mg Oral BID Marnie River MD   8.6 mg at 04/21/24 2056    sodium chloride 0.9% flush 10 mL  10 mL Intravenous Q6H Gregoria Donnelly,    10 mL at 04/25/24 1135    And    sodium chloride 0.9% flush 10 mL  10 mL Intravenous PRN Gregoria Donnelly, DO        TPN ADULT CENTRAL LINE CUSTOM   Intravenous Continuous Zaki, Pasquale SINCLAIR MD 75 mL/hr at 04/24/24 2244 New Bag at 04/24/24 2244    TPN ADULT CENTRAL LINE CUSTOM   Intravenous Continuous Hector,  MD Carissa         Current Outpatient Medications on File Prior to Visit   Medication Sig Dispense Refill    calcium carbonate (CALCIUM ANTACID) 300 mg (750 mg) Chew Take 2 tablets by mouth 2 (two) times daily.      cholecalciferol, vitamin D3, (VITAMIN D3) 25 mcg (1,000 unit) capsule Take 1,000 Units by mouth once daily.      flaxseed 1,000 mg Cap Take by mouth Daily.      HYDROcodone-acetaminophen (NORCO) 5-325 mg per tablet       levothyroxine (SYNTHROID) 175 MCG tablet Take 1 tablet (175 mcg total) by mouth before breakfast. Take on an empty stomach. Wait 4 hours after taking LT4 to take any multivitamins or nutritional supplements and wait 1 hour to take other medications. 90 tablet 3    liothyronine (CYTOMEL) 5 MCG Tab Take 1 tablet (5 mcg total) by mouth before breakfast. Take on an empty stomach. Wait 4 hours after taking T3 to take any multivitamins or nutritional supplements and wait 1 hour to take other medications. 90 tablet 3    metoprolol tartrate (LOPRESSOR) 25 MG tablet Take 1 tablet (25 mg total) by mouth 2 (two) times daily. 180 tablet 3    multivitamin capsule Take 1 capsule by mouth once daily.      ondansetron (ZOFRAN-ODT) 4 MG TbDL DISSOLVE 1 TABLET (4 MG TOTAL) BY MOUTH EVERY 8 (EIGHT) HOURS AS NEEDED. 30 tablet 2       Past Surgical History:   Procedure Laterality Date    cesaean section  1991, 1993    CHOLECYSTECTOMY  2008    COLONOSCOPY  09/2013    ESOPHAGOGASTRODUODENOSCOPY N/A 5/11/2020    Procedure: EGD (ESOPHAGOGASTRODUODENOSCOPY);  Surgeon: Nahed Zabala MD;  Location: [facility];  Service: Endoscopy;  Laterality: N/A;  covid 5/8/2020    ESOPHAGOGASTRODUODENOSCOPY W/ PEG N/A 4/22/2024    Procedure: EGD, WITH PEG TUBE INSERTION;  Surgeon: Jodie Coffey MD;  Location: [address];  Service: General;  Laterality: N/A;  Awake intubation, need ENT available at bedside  High chance of conversion to open g-tube    HYSTERECTOMY  age 27    for fibroids    NECK EXPLORATION  N/A 4/24/2024    Procedure: EXPLORATION, NECK;  Surgeon: Nathan Madison MD;  Location: [facility] OR [address] [place] [address];  Service: ENT;  Laterality: N/A;    SALPINGECTOMY  1992    for ectopic pregnancy    TONGUE SURGERY  03/04/2022    Cancer removed on tongue and lymphs    TOTAL THYROIDECTOMY  2012    UPPER GASTROINTESTINAL ENDOSCOPY  09/2013    gastritis-hp neg       Social History     Socioeconomic History    Marital status:    Tobacco Use    Smoking status: Never    Smokeless tobacco: Never   Substance and Sexual Activity    Alcohol use: No     Alcohol/week: 0.0 standard drinks of alcohol    Drug use: No    Sexual activity: Yes     Partners: Male     Birth control/protection: See Surgical Hx     Social Determinants of Health     Financial Resource Strain: Low Risk  (4/12/2024)    Overall Financial Resource Strain (CARDIA)     Difficulty of Paying Living Expenses: Not very hard   Food Insecurity: No Food Insecurity (4/12/2024)    Hunger Vital Sign     Worried About Running Out of Food in the Last Year: Never true     Ran Out of Food in the Last Year: Never true   Transportation Needs: No Transportation Needs (4/12/2024)    PRAPARE - Transportation     Lack of Transportation (Medical): No     Lack of Transportation (Non-Medical): No   Physical Activity: Inactive (4/12/2024)    Exercise Vital Sign     Days of Exercise per Week: 0 days     Minutes of Exercise per Session: 0 min   Stress: Stress Concern Present (4/12/2024)    [employer] - Occupational Stress Questionnaire     Feeling of Stress : Rather much   Social Connections: Moderately Integrated (4/12/2024)    Social Connection and Isolation Panel [NHANES]     Frequency of Communication with Friends and Family: More than three times a week     Frequency of Social Gatherings with Friends and Family: More than three times a week     Attends Hoahaoism Services: More than 4 times per year     Active Member of Clubs or Organizations: No      Attends Club or Organization Meetings: Never     Marital Status:    Housing Stability: Low Risk  (4/12/2024)    Housing Stability Vital Sign     Unable to Pay for Housing in the Last Year: No     Number of Places Lived in the Last Year: 1     Unstable Housing in the Last Year: No       OBJECTIVE:     Vital Signs Range (Last 24H):  Temp:  [36.6 °C (97.8 °F)-37.2 °C (98.9 °F)]   Pulse:  []   Resp:  [18-20]   BP: (127-143)/(67-83)   SpO2:  [97 %-99 %]       Significant Labs:  Lab Results   Component Value Date    WBC 19.08 (H) 04/25/2024    HGB 13.2 04/25/2024    HCT 40.3 04/25/2024     04/25/2024    CHOL 200 (H) 08/29/2023    TRIG 154 (H) 04/24/2024    HDL 41 08/29/2023    ALT 13 04/23/2024    AST 18 04/23/2024     04/25/2024    K 3.3 (L) 04/25/2024    CL 94 (L) 04/25/2024    CREATININE 0.8 04/25/2024    BUN 31 (H) 04/25/2024    CO2 32 (H) 04/25/2024    TSH 0.018 (L) 04/17/2024    INR 1.1 04/10/2024    HGBA1C 5.8 (H) 12/04/2023       Diagnostic Studies: No relevant studies.    EKG:   Results for orders placed or performed during the hospital encounter of 04/12/24   EKG 12-lead    Collection Time: 04/24/24  8:28 AM   Result Value Ref Range    QRS Duration 74 ms    [facility]HS QTC Calculation 421 ms    Narrative    Test Reason : R00.0,    Vent. Rate : 098 BPM     Atrial Rate : 098 BPM     P-R Int : 150 ms          QRS Dur : 074 ms      QT Int : 330 ms       P-R-T Axes : 014 044 -33 degrees     QTc Int : 421 ms    Normal sinus rhythm  Nonspecific ST abnormality  Abnormal QRS-T angle, consider primary T wave abnormality  Abnormal ECG  When compared with ECG of 24-APR-2024 05:02,  ST no longer depressed in Lateral leads  T wave inversion now evident in Inferior leads  Confirmed by SLAVA BORRERO MD ([id number]) on 4/24/2024 9:48:14 AM    Referred By: SRINIVAS HERNANDEZ           Confirmed By:SLAVA BORRERO MD       2D ECHO:  TTE:  No results found for this or any previous visit.    REAGAN:  No results found for this  or any previous visit.    ASSESSMENT/PLAN:           Pre-op Assessment    I have reviewed the Patient Summary Reports.     I have reviewed the Nursing Notes. I have reviewed the NPO Status.   I have reviewed the Medications.     Review of Systems  Anesthesia Hx:  No problems with previous Anesthesia   History of prior surgery of interest to airway management or planning:            Denies Personal Hx of Anesthesia complications.                    Hematology/Oncology:       -- Anemia:                  Current/Recent Cancer.                EENT/Dental:  EENT/Dental Normal           Cardiovascular:     Hypertension                                        Pulmonary:  Pulmonary Normal                       Renal/:  Renal/ Normal                 Hepatic/GI:     GERD             Neurological:    Neuromuscular Disease,                                   Endocrine:   Hypothyroidism          Psych:  Psychiatric Normal                    Physical Exam  General: Well nourished, Somnolent, Cooperative, Alert and Oriented    Airway:  Mallampati: IV   Mouth Opening: Small, but > 3cm  TM Distance: 4 - 6 cm  Tongue: Normal  Neck ROM: Extension Decreased, Flexion Decreased, Left Lateral Motion Decreased, Right Lateral Motion Decreased    Dental:  Intact        Anesthesia Plan  Type of Anesthesia, risks & benefits discussed:    Anesthesia Type: MAC  Intra-op Monitoring Plan: Standard ASA Monitors  Post Op Pain Control Plan: multimodal analgesia and IV/PO Opioids PRN  Induction:  IV  Informed Consent: Informed consent signed with the Patient and all parties understand the risks and agree with anesthesia plan.  All questions answered.   ASA Score: 4  Day of Surgery Review of History & Physical: H&P Update referred to the surgeon/provider.  Anesthesia Plan Notes: Discussed with patient plan for minimal sedation. If unable to tolerate procedure, will then proceed with awake fiberoptic. Discussed aspiration risk, respiratory/cardiac  code, and death. Also told patient that there is a possibility she may not be able to be extubated immediately and would have to go to ICU until it is safe to extubate. Patient aware of all risks and would like to proceed with minimal sedation at this time.     Ready For Surgery From Anesthesia Perspective.     .

## 2024-04-26 ENCOUNTER — ANESTHESIA (OUTPATIENT)
Dept: INTERVENTIONAL RADIOLOGY/VASCULAR | Facility: HOSPITAL | Age: 59
DRG: 579 | End: 2024-04-26
Payer: COMMERCIAL

## 2024-04-26 LAB
ANION GAP SERPL CALC-SCNC: 11 MMOL/L (ref 8–16)
BASOPHILS # BLD AUTO: 0.03 K/UL (ref 0–0.2)
BASOPHILS NFR BLD: 0.2 % (ref 0–1.9)
BUN SERPL-MCNC: 37 MG/DL (ref 6–20)
CALCIUM SERPL-MCNC: 11.4 MG/DL (ref 8.7–10.5)
CHLORIDE SERPL-SCNC: 95 MMOL/L (ref 95–110)
CO2 SERPL-SCNC: 30 MMOL/L (ref 23–29)
CREAT SERPL-MCNC: 0.8 MG/DL (ref 0.5–1.4)
DIFFERENTIAL METHOD BLD: ABNORMAL
EOSINOPHIL # BLD AUTO: 0 K/UL (ref 0–0.5)
EOSINOPHIL NFR BLD: 0 % (ref 0–8)
ERYTHROCYTE [DISTWIDTH] IN BLOOD BY AUTOMATED COUNT: 15 % (ref 11.5–14.5)
EST. GFR  (NO RACE VARIABLE): >60 ML/MIN/1.73 M^2
GLUCOSE SERPL-MCNC: 135 MG/DL (ref 70–110)
HCT VFR BLD AUTO: 41.9 % (ref 37–48.5)
HGB BLD-MCNC: 13.4 G/DL (ref 12–16)
IMM GRANULOCYTES # BLD AUTO: 0.1 K/UL (ref 0–0.04)
IMM GRANULOCYTES NFR BLD AUTO: 0.5 % (ref 0–0.5)
LYMPHOCYTES # BLD AUTO: 2.1 K/UL (ref 1–4.8)
LYMPHOCYTES NFR BLD: 11.5 % (ref 18–48)
MAGNESIUM SERPL-MCNC: 1.5 MG/DL (ref 1.6–2.6)
MCH RBC QN AUTO: 27.5 PG (ref 27–31)
MCHC RBC AUTO-ENTMCNC: 32 G/DL (ref 32–36)
MCV RBC AUTO: 86 FL (ref 82–98)
MONOCYTES # BLD AUTO: 1.3 K/UL (ref 0.3–1)
MONOCYTES NFR BLD: 6.7 % (ref 4–15)
NEUTROPHILS # BLD AUTO: 15 K/UL (ref 1.8–7.7)
NEUTROPHILS NFR BLD: 81.1 % (ref 38–73)
NRBC BLD-RTO: 0 /100 WBC
PHOSPHATE SERPL-MCNC: 2.6 MG/DL (ref 2.7–4.5)
PLATELET # BLD AUTO: 289 K/UL (ref 150–450)
PMV BLD AUTO: 10.7 FL (ref 9.2–12.9)
POCT GLUCOSE: 132 MG/DL (ref 70–110)
POCT GLUCOSE: 133 MG/DL (ref 70–110)
POTASSIUM SERPL-SCNC: 3.7 MMOL/L (ref 3.5–5.1)
RBC # BLD AUTO: 4.88 M/UL (ref 4–5.4)
SODIUM SERPL-SCNC: 136 MMOL/L (ref 136–145)
TRIGL SERPL-MCNC: 162 MG/DL (ref 30–150)
WBC # BLD AUTO: 18.55 K/UL (ref 3.9–12.7)

## 2024-04-26 PROCEDURE — A4217 STERILE WATER/SALINE, 500 ML: HCPCS | Performed by: STUDENT IN AN ORGANIZED HEALTH CARE EDUCATION/TRAINING PROGRAM

## 2024-04-26 PROCEDURE — 25000003 PHARM REV CODE 250: Performed by: STUDENT IN AN ORGANIZED HEALTH CARE EDUCATION/TRAINING PROGRAM

## 2024-04-26 PROCEDURE — 63600175 PHARM REV CODE 636 W HCPCS

## 2024-04-26 PROCEDURE — A4216 STERILE WATER/SALINE, 10 ML: HCPCS | Performed by: STUDENT IN AN ORGANIZED HEALTH CARE EDUCATION/TRAINING PROGRAM

## 2024-04-26 PROCEDURE — 99900035 HC TECH TIME PER 15 MIN (STAT)

## 2024-04-26 PROCEDURE — B4185 PARENTERAL SOL 10 GM LIPIDS: HCPCS | Performed by: STUDENT IN AN ORGANIZED HEALTH CARE EDUCATION/TRAINING PROGRAM

## 2024-04-26 PROCEDURE — 36415 COLL VENOUS BLD VENIPUNCTURE: CPT | Performed by: STUDENT IN AN ORGANIZED HEALTH CARE EDUCATION/TRAINING PROGRAM

## 2024-04-26 PROCEDURE — 84478 ASSAY OF TRIGLYCERIDES: CPT | Performed by: INTERNAL MEDICINE

## 2024-04-26 PROCEDURE — 0DH63UZ INSERTION OF FEEDING DEVICE INTO STOMACH, PERCUTANEOUS APPROACH: ICD-10-PCS | Performed by: STUDENT IN AN ORGANIZED HEALTH CARE EDUCATION/TRAINING PROGRAM

## 2024-04-26 PROCEDURE — 94761 N-INVAS EAR/PLS OXIMETRY MLT: CPT

## 2024-04-26 PROCEDURE — 25000003 PHARM REV CODE 250: Performed by: INTERNAL MEDICINE

## 2024-04-26 PROCEDURE — 27000221 HC OXYGEN, UP TO 24 HOURS

## 2024-04-26 PROCEDURE — 25500020 PHARM REV CODE 255

## 2024-04-26 PROCEDURE — 63600175 PHARM REV CODE 636 W HCPCS: Performed by: INTERNAL MEDICINE

## 2024-04-26 PROCEDURE — 80048 BASIC METABOLIC PNL TOTAL CA: CPT | Performed by: STUDENT IN AN ORGANIZED HEALTH CARE EDUCATION/TRAINING PROGRAM

## 2024-04-26 PROCEDURE — 84100 ASSAY OF PHOSPHORUS: CPT | Performed by: STUDENT IN AN ORGANIZED HEALTH CARE EDUCATION/TRAINING PROGRAM

## 2024-04-26 PROCEDURE — 63600175 PHARM REV CODE 636 W HCPCS: Performed by: STUDENT IN AN ORGANIZED HEALTH CARE EDUCATION/TRAINING PROGRAM

## 2024-04-26 PROCEDURE — D9220A PRA ANESTHESIA: Mod: ,,, | Performed by: STUDENT IN AN ORGANIZED HEALTH CARE EDUCATION/TRAINING PROGRAM

## 2024-04-26 PROCEDURE — 83735 ASSAY OF MAGNESIUM: CPT | Performed by: STUDENT IN AN ORGANIZED HEALTH CARE EDUCATION/TRAINING PROGRAM

## 2024-04-26 PROCEDURE — A9698 NON-RAD CONTRAST MATERIALNOC: HCPCS

## 2024-04-26 PROCEDURE — 63600175 PHARM REV CODE 636 W HCPCS: Mod: JZ,JG | Performed by: STUDENT IN AN ORGANIZED HEALTH CARE EDUCATION/TRAINING PROGRAM

## 2024-04-26 PROCEDURE — 85025 COMPLETE CBC W/AUTO DIFF WBC: CPT | Performed by: STUDENT IN AN ORGANIZED HEALTH CARE EDUCATION/TRAINING PROGRAM

## 2024-04-26 PROCEDURE — 20600001 HC STEP DOWN PRIVATE ROOM

## 2024-04-26 RX ORDER — MAGNESIUM SULFATE HEPTAHYDRATE 40 MG/ML
2 INJECTION, SOLUTION INTRAVENOUS ONCE
Status: COMPLETED | OUTPATIENT
Start: 2024-04-26 | End: 2024-04-26

## 2024-04-26 RX ORDER — LIDOCAINE HYDROCHLORIDE 10 MG/ML
INJECTION INFILTRATION; PERINEURAL
Status: COMPLETED | OUTPATIENT
Start: 2024-04-26 | End: 2024-04-26

## 2024-04-26 RX ORDER — GLUCAGON 1 MG
KIT INJECTION
Status: DISCONTINUED | OUTPATIENT
Start: 2024-04-26 | End: 2024-04-26

## 2024-04-26 RX ORDER — DEXMEDETOMIDINE HYDROCHLORIDE 100 UG/ML
INJECTION, SOLUTION INTRAVENOUS
Status: DISCONTINUED | OUTPATIENT
Start: 2024-04-26 | End: 2024-04-26

## 2024-04-26 RX ORDER — SODIUM CHLORIDE 0.9 % (FLUSH) 0.9 %
10 SYRINGE (ML) INJECTION
Status: DISCONTINUED | OUTPATIENT
Start: 2024-04-26 | End: 2024-05-08 | Stop reason: HOSPADM

## 2024-04-26 RX ORDER — KETOROLAC TROMETHAMINE 30 MG/ML
30 INJECTION, SOLUTION INTRAMUSCULAR; INTRAVENOUS ONCE AS NEEDED
Status: COMPLETED | OUTPATIENT
Start: 2024-04-26 | End: 2024-04-26

## 2024-04-26 RX ADMIN — LIDOCAINE HYDROCHLORIDE 10 ML: 10 INJECTION, SOLUTION INFILTRATION; PERINEURAL at 09:04

## 2024-04-26 RX ADMIN — BARIUM SULFATE 450 ML: 20 SUSPENSION ORAL at 01:04

## 2024-04-26 RX ADMIN — KETOROLAC TROMETHAMINE 30 MG: 30 INJECTION, SOLUTION INTRAMUSCULAR; INTRAVENOUS at 10:04

## 2024-04-26 RX ADMIN — I.V. FAT EMULSION 250 ML: 20 EMULSION INTRAVENOUS at 11:04

## 2024-04-26 RX ADMIN — Medication 10 ML: at 07:04

## 2024-04-26 RX ADMIN — MAGNESIUM SULFATE HEPTAHYDRATE 2 G: 40 INJECTION, SOLUTION INTRAVENOUS at 01:04

## 2024-04-26 RX ADMIN — MAGNESIUM SULFATE HEPTAHYDRATE: 500 INJECTION, SOLUTION INTRAMUSCULAR; INTRAVENOUS at 11:04

## 2024-04-26 RX ADMIN — PROMETHAZINE HYDROCHLORIDE 12.5 MG: 25 INJECTION INTRAMUSCULAR; INTRAVENOUS at 02:04

## 2024-04-26 RX ADMIN — METOROPROLOL TARTRATE 5 MG: 5 INJECTION, SOLUTION INTRAVENOUS at 07:04

## 2024-04-26 RX ADMIN — HYDROMORPHONE HYDROCHLORIDE 1 MG: 1 INJECTION, SOLUTION INTRAMUSCULAR; INTRAVENOUS; SUBCUTANEOUS at 05:04

## 2024-04-26 RX ADMIN — PROCHLORPERAZINE EDISYLATE 5 MG: 5 INJECTION INTRAMUSCULAR; INTRAVENOUS at 02:04

## 2024-04-26 RX ADMIN — METOROPROLOL TARTRATE 5 MG: 5 INJECTION, SOLUTION INTRAVENOUS at 05:04

## 2024-04-26 RX ADMIN — DEXMEDETOMIDINE 4 MCG: 100 INJECTION, SOLUTION, CONCENTRATE INTRAVENOUS at 09:04

## 2024-04-26 RX ADMIN — LEVOTHYROXINE SODIUM 175 MCG: 150 TABLET ORAL at 05:04

## 2024-04-26 RX ADMIN — DEXMEDETOMIDINE 8 MCG: 100 INJECTION, SOLUTION, CONCENTRATE INTRAVENOUS at 09:04

## 2024-04-26 RX ADMIN — Medication 10 ML: at 05:04

## 2024-04-26 RX ADMIN — HYDROMORPHONE HYDROCHLORIDE 1 MG: 1 INJECTION, SOLUTION INTRAMUSCULAR; INTRAVENOUS; SUBCUTANEOUS at 02:04

## 2024-04-26 RX ADMIN — METOROPROLOL TARTRATE 5 MG: 5 INJECTION, SOLUTION INTRAVENOUS at 12:04

## 2024-04-26 RX ADMIN — GLUCAGON 1 MG: 1 INJECTION, POWDER, LYOPHILIZED, FOR SOLUTION INTRAMUSCULAR; INTRAVENOUS at 09:04

## 2024-04-26 RX ADMIN — PROCHLORPERAZINE EDISYLATE 5 MG: 5 INJECTION INTRAMUSCULAR; INTRAVENOUS at 10:04

## 2024-04-26 RX ADMIN — LIOTHYRONINE SODIUM 5 MCG: 5 TABLET ORAL at 05:04

## 2024-04-26 RX ADMIN — VANCOMYCIN HYDROCHLORIDE 1000 MG: 1 INJECTION, POWDER, LYOPHILIZED, FOR SOLUTION INTRAVENOUS at 09:04

## 2024-04-26 RX ADMIN — Medication 10 ML: at 12:04

## 2024-04-26 NOTE — TRANSFER OF CARE
"Anesthesia Transfer of Care Note    Patient: Marysol Cash    Procedure(s) Performed: * No procedures listed *    Patient location: PACU    Anesthesia Type: MAC    Transport from OR: Transported from OR on 2-3 L/min O2 by NC with adequate spontaneous ventilation    Post pain: adequate analgesia    Post assessment: no apparent anesthetic complications and tolerated procedure well    Post vital signs: stable    Level of consciousness: awake, alert and oriented    Nausea/Vomiting: no nausea/vomiting    Complications: none    Transfer of care protocol was followed      Last vitals: Visit Vitals  /72 (BP Location: Left arm, Patient Position: Lying)   Pulse 103   Temp 36.7 °C (98.1 °F) (Temporal)   Resp 19   Ht 5' 6" (1.676 m)   Wt 86.2 kg (190 lb 0.6 oz)   LMP  (LMP Unknown)   SpO2 98%   Breastfeeding No   BMI 30.67 kg/m²     "

## 2024-04-26 NOTE — PLAN OF CARE
Problem: Adult Inpatient Plan of Care  Goal: Plan of Care Review  Outcome: Progressing  Goal: Patient-Specific Goal (Individualized)  Outcome: Progressing  Goal: Absence of Hospital-Acquired Illness or Injury  Outcome: Progressing  Goal: Optimal Comfort and Wellbeing  Outcome: Progressing  Goal: Readiness for Transition of Care  Outcome: Progressing     Problem: Adjustment to Illness (Sepsis/Septic Shock)  Goal: Optimal Coping  Outcome: Progressing

## 2024-04-26 NOTE — PLAN OF CARE
Problem: Adult Inpatient Plan of Care  Goal: Plan of Care Review  Outcome: Progressing  Goal: Patient-Specific Goal (Individualized)  Outcome: Progressing  Goal: Absence of Hospital-Acquired Illness or Injury  Outcome: Progressing  Goal: Optimal Comfort and Wellbeing  Outcome: Progressing  Goal: Readiness for Transition of Care  Outcome: Progressing  Patient in bed. No complaints voiced. NADN at this time. Safety measures in place. Call light in reach. Family remains at the bedside.

## 2024-04-26 NOTE — PLAN OF CARE
Procedure completed. Patient tolerated well; monitoring maintained per anesthesia. LUQ site CDI. Patient to be transported to PACU for recovery accompanied by this RN and anesthesia; report to be given at the bedside.

## 2024-04-26 NOTE — NURSING
Patient presents for pre-procedure for IR G-tube placement. Patient is alert and oriented.  is at bedside. VSS. PICC flushed/patent. Procedure and anesthesia consents witnessed. Patient ready for procedure.

## 2024-04-26 NOTE — PROGRESS NOTES
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 She initially presented to [facility] Emergency Department with six days of epigastric pain with associated nausea, vomiting, and difficulty eating due to pain. However on presentation, she was noted to have a large necrotic and purulent wound located on her right neck with complaints of associated difficulty speaking, swallowing and shortness of breath. She was noted to be hypoxic 88% on room air which improved with 2L NC. Labs were notable for leukocytosis 14, hypokalemia 2.4, hypochloremia 89, CO2 36, hypercalcemia 13.5, Phos 2.5. . Troponin 0.046. Lactate 2.3. CT Soft Tissue Neck was concerning for 9.5 x 8.2 x 10 cm large lobulated mass in the right anterolateral neck extending to the deep spaces of the neck and abutting the right prevertebral space and paravertebral musculature, left midline shift, multiple bilateral necrotic cervical lymph nodes. Case was discussed and decision was made to transfer to [facility] for higher level of care.     Of additional note, she recently transitioned her care to [place] and underwent stem-cell transplant approximately 2 weeks ago in [place], has not been seen by a [place] physician since 2023.      On transfer: she was afebrile, mildly hypertensive /109, HR 98, RR 20, satting 96% on room  air. Complaining of mild headache and poor appetite associated with nausea; denies fever, chills, chest pain, palpitations, SOB, abdominal pain, dysuria. States wound has been draining for the past week initially thick white now more liquid. Mild dysphonia is apparently chronic from left vocal cord paralysis and at baseline. Some dysphagia with solids, none with pureed soft or liquids, denies odynophagia. Dressing on neck CDI, ENT consulted will be here shortly to assess patient.        Overview/Hospital Course:  Evaluated 4/12 in MICU by ENT, who do not feel patient is a candidate for surgical intervention. Palliative care and oncology consulted. Failed swallow study, SLP recommending NPO 4/13. Patient continues to protect airway. Stepped down from MICU on 4/13.    DC planning: awaiting PEG placement    Interval History: NAEON.  Patient OFT for G tube placement by IR. No family present at bedside.       Objective:     Vital Signs (Most Recent):  Temp: 98.2 °F (36.8 °C) (04/26/24 1030)  Pulse: 105 (04/26/24 1045)  Resp: 20 (04/26/24 1045)  BP: 127/69 (04/26/24 1045)  SpO2: 97 % (04/26/24 1045) Vital Signs (24h Range):  Temp:  [97.8 °F (36.6 °C)-98.9 °F (37.2 °C)] 98.2 °F (36.8 °C)  Pulse:  [] 105  Resp:  [18-23] 20  SpO2:  [96 %-98 %] 97 %  BP: (122-147)/(69-85) 127/69     Weight: 86.2 kg (190 lb 0.6 oz)  Body mass index is 30.67 kg/m².    Intake/Output Summary (Last 24 hours) at 4/26/2024 1108  Last data filed at 4/26/2024 0918  Gross per 24 hour   Intake 1000 ml   Output --   Net 1000 ml         Physical Exam      JENIFFER for procedure    Significant Labs: All pertinent labs within the past 24 hours have been reviewed.  CBC:   Recent Labs   Lab 04/25/24  0630 04/26/24  0436   WBC 19.08* 18.55*   HGB 13.2 13.4   HCT 40.3 41.9    289     CMP:   Recent Labs   Lab 04/25/24  0630 04/26/24  0436    136   K 3.3* 3.7   CL 94* 95   CO2 32* 30*   * 135*   BUN 31* 37*   CREATININE 0.8 0.8   CALCIUM  "11.0* 11.4*   ANIONGAP 10 11       Significant Imaging: I have reviewed all pertinent imaging results/findings within the past 24 hours.    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 Underwent repeat bx after failed tx (1/24/22) that showed moderately differentiated squamous cell carcinoma w/ suspicion for perineural invasion. CT soft tissue neck (Jan 2022) w/o e/o metastatic dx. PET-CT (Feb 2022) w/o e/o active local or distant dx- no abnormal activity within the tongue, no signs of cervical lymph node or distant metastasis. Declined chemotherapy &/or XRT. Underwent selective neck dissection levels 1, 2, 3, and partial glossectomy w/ split-thickness skin graft (3/3/2022 per Dr. Clif Olson).  Surgical pathology w/ unifocal squamous cell carcinoma on the dorsal surface of the tongue on left side measuring 1 cm, moderately differentiated, 6 mm depth of invasion, no lymphovascular invasion, positive for perineural invasion, margins negative, 0 of 39 lymph nodes with metastasis, pT2 pN0 noted.  Postop course complicated by infection requiring I&D and antibiotics.  Declined adjuvant XRT. Repeat imaging (June 2023) w/ 2.2 cm lesion deep to the R SCM c/f necrotic LN w/ few adjacent pathological appearing LNs suspicious for metastasis. Underwent R neck FNA (7/7/23) w/ pathology suspicious for metastatic SCC. PET-CT (8/21/2023) w/ large necrotic mass the angle of mandible posterior to submandibular gland on the right measuring 4.4 x 4.2 cm with SUV 9.78, just superior to the mass is a 2 cm hypermetabolic lymph node and no evidence of distant metastatic disease. Subsequently transferred care to [place] where she reportedly recently abx,"detox", hyperbaric chamber, & stem-cell transplant approximately 2 weeks ago PTA in [place]. Now presenting w/ progressive neck wound. CT neck soft tissue " (4/10/24) w/ large bulky lobulated appearing mass right lateral and anterolateral neck extending to the D spaces of the neck having overall dimensions of approximately 9.5 x 8.2 x 10 cm, multiple addn'l b/l cervical necrotic nodes, & assoc mass effect results in displacement of midline structures to the patient's left. CT C/A/P with diffuse metastatic disease including mediastinal & hilar LAD, bilateral lungs, peritoneum, lumbar vertebrae, & likely liver.     - ENT consulted; appreciate recs - not surgical candidate for debridement  - Medical & Radiation Oncology consulted - pt declines chemo or XRT  - Palliative following; appreciate assistance  - Multimodal analgesia   - SLP following,remains NPO  - Gen surgery and ENT consulted. Unable to do do tracheostomy. Rec consulting IR for PEG  - Consulted IR for PEG placement.      Sinus tachycardia  Metoprolol recently discontinued, possible withdrawal  Resume IV metoprolol  Correct electrolyte derangements  Pain control          Severe malnutrition  PEG tube placement is being discussed. IR and GI deferred the procedure. Surgery depending on anesthesia assessment. ENT updated with recs. Awaiting final recs.       Nutrition consulted. Most recent weight and BMI monitored-     Measurements:  Wt Readings from Last 1 Encounters:   04/24/24 86.2 kg (190 lb 0.6 oz)   Body mass index is 30.67 kg/m².    Patient has been screened and assessed by RD.    Malnutrition Type:  Context: chronic illness  Level: severe    Malnutrition Characteristic Summary:  Weight Loss (Malnutrition): greater than 10% in 6 months  Energy Intake (Malnutrition): less than or equal to 50% for greater than or equal to 1 month    Interventions/Recommendations (treatment strategy):  1.    Cont TPN  Planning for PEG today    Head and neck cancer        Hypercalcemia  On arrival to OSH, Ca 13.5 --> 11.0 on arrival to C. Continued subsequent uptrend peaking at 13.1 on 4/15 (corrected Ca 14.5). Likely 2/2  "malignancy.   - IVF  - Calcitonin BID x2 days  - Ionized Ca 1.59 4/21  - Monitor CMP  - IV hydration and cont to trend Ca level   - Ionized calcium WNL. Cont to monitor      Palliative care encounter  Daily GOC discussions  Pt and family would like to proceed with aggressive care  Cont FULL code  Planning for PEG    Hypokalemia  Patient has hypokalemia which is Acute and currently controlled. Most recent potassium levels reviewed-   Lab Results   Component Value Date    K 3.5 04/24/2024   . Will continue potassium replacement per protocol and recheck repeat levels after replacement completed.     Open neck wound  Pt with extensive hx of H/N cancer. Presents w/ continued/worsening drainage from the neck for months with recent worsening of symptoms for past 2 weeks w/ N/V. Pt returned from [place] where she received antibiotics, "detox" and hyperbaric chamber as treatment. Endorses difficulty with swallowing 2/2 to oral trush. Large neck mass on imaging w/ large central collection of air which appears to extend superficially to the skin surface right mid neck anterior laterally; unable to r/o superimposed abscess. Meeting 2/4 SIRS (HR & WBC) w/ elevated lactate, but nml BP. Blood cx negative. Pt not surgical candidate for debridement per ENT evaluation.   - ID consulted; appreciate recs  - Surgical debridement is not an option. Has completed course of antibiotics.      Sepsis  On arrival to [facility], meeting 2/4 SIRS (requiring 2L NC w/ RR 20s & WBC 13). Likely soft tissue/neck source. Received IVF at OSH prior to transfer (for lactate 2.3 at that time). Started on broad-spectrum abx. Blood cx negative.   - ID consulted; appreciate recs  - Monitor CBC & fever curve  - Mgmt of neck mass/wound as below   - Has completed treatment. Discontinue vancomycin + Flagyl + fluconazole     Postprocedural hypoparathyroidism  Last PTHi nml at 26 (2016). Home regimen includes calcium carbonate & vitamin D. Symptomatic hypercalcemia (Ca " 13.5) on presentation to OSH w/ abdominal pain & N/V. Improved w/ NS ggt (although query if pt's hypercalcemia was re: hx of hypoparathyroidism vs hypercalcemia of malignancy.   - Holding home calcium carbonate  - Resume home vitamin D as PO tolerance improves   - Monitor CMP    Postsurgical hypothyroidism  Last TSH elevated at 5.264. Home regimen includes levothyroxine 175mcg daily & liothyronine 5mcg daily  - Continue home liothyronine & levothyroxine       HTN (hypertension)  Hx of essential HTN; home regimen includes metoprolol 25mg BID. On arrival to [facility], SBP 140s-150s.   PRN labetalol/hydralazine      VTE Risk Mitigation (From admission, onward)           Ordered     enoxaparin injection 40 mg  Every 24 hours         04/12/24 1536     IP VTE HIGH RISK PATIENT  Once         04/12/24 1324     Place sequential compression device  Until discontinued         04/12/24 1324                    Discharge Planning   ANDRES: 4/27/2024     Code Status: Full Code   Is the patient medically ready for discharge?: No    Reason for patient still in hospital (select all that apply): trending condition   Discharge Plan A: Hospice/home ([facility])                  Carissa Young MD  Department of Hospital Medicine   Con Nguyen - Telemetry Stepdown

## 2024-04-26 NOTE — NURSING TRANSFER
Nursing Transfer Note      4/26/2024   10:50 AM    Transfer To: Room 8094    Transfer via stretcher    Transfer with 2L NC to O2    Transported by RN    Telemetry: Rate 98  Order for Tele Monitor? Yes    Additional Lines: Oxygen    4eyes on Skin: yes    Medicines sent: TPN    Any special needs or follow-up needed: GTube to dependent drainage for 24 hours    Patient belongings transferred with patient: No    Chart send with patient: Yes    Notified: spouse    Patient reassessed at: 4/26/2024  10:49 (date, time)

## 2024-04-26 NOTE — PLAN OF CARE
Pt arrived to IR [id number] for G-tube placement with anesthesia. Pt oriented to unit and staff, Pt safely transferred from stretcher to procedural table. Fall risk reviewed and comfort measures utilized with interventions. Safety strap applied, position pillows to minimize pressure points. Blankets applied. Pt prepped and draped utilizing standard sterile technique. Patient placed on continuous monitoring, as required by sedation policy. Timeouts implemented utilizing standard universal time-out per department and facility policy. CRNA to remain at bedside with continuous monitoring. Pt resting comfortably. Denies pain/discomfort. Will continue to monitor. See anesthesia flow sheets for monitoring, medication administration, and updates. patient verbalizes understanding.

## 2024-04-26 NOTE — ASSESSMENT & PLAN NOTE
"Initial bx of ventral surface of tongue (12/24/21) showed atypia w/o diagnostic e/o dysplasia. Underwent repeat bx after failed tx (1/24/22) that showed moderately differentiated squamous cell carcinoma w/ suspicion for perineural invasion. CT soft tissue neck (Jan 2022) w/o e/o metastatic dx. PET-CT (Feb 2022) w/o e/o active local or distant dx- no abnormal activity within the tongue, no signs of cervical lymph node or distant metastasis. Declined chemotherapy &/or XRT. Underwent selective neck dissection levels 1, 2, 3, and partial glossectomy w/ split-thickness skin graft (3/3/2022 per Dr. Clif Olson).  Surgical pathology w/ unifocal squamous cell carcinoma on the dorsal surface of the tongue on left side measuring 1 cm, moderately differentiated, 6 mm depth of invasion, no lymphovascular invasion, positive for perineural invasion, margins negative, 0 of 39 lymph nodes with metastasis, pT2 pN0 noted.  Postop course complicated by infection requiring I&D and antibiotics.  Declined adjuvant XRT. Repeat imaging (June 2023) w/ 2.2 cm lesion deep to the R SCM c/f necrotic LN w/ few adjacent pathological appearing LNs suspicious for metastasis. Underwent R neck FNA (7/7/23) w/ pathology suspicious for metastatic SCC. PET-CT (8/21/2023) w/ large necrotic mass the angle of mandible posterior to submandibular gland on the right measuring 4.4 x 4.2 cm with SUV 9.78, just superior to the mass is a 2 cm hypermetabolic lymph node and no evidence of distant metastatic disease. Subsequently transferred care to [place] where she reportedly recently abx,"detox", hyperbaric chamber, & stem-cell transplant approximately 2 weeks ago PTA in [place]. Now presenting w/ progressive neck wound. CT neck soft tissue (4/10/24) w/ large bulky lobulated appearing mass right lateral and anterolateral neck extending to the D spaces of the neck having overall dimensions of approximately 9.5 x 8.2 x 10 cm, multiple addn'l b/l cervical " necrotic nodes, & assoc mass effect results in displacement of midline structures to the patient's left. CT C/A/P with diffuse metastatic disease including mediastinal & hilar LAD, bilateral lungs, peritoneum, lumbar vertebrae, & likely liver.     - ENT consulted; appreciate recs - not surgical candidate for debridement  - Medical & Radiation Oncology consulted - pt declines chemo or XRT  - Palliative following; appreciate assistance  - Multimodal analgesia   - SLP following,remains NPO  - Gen surgery and ENT consulted. Unable to do do tracheostomy. Rec consulting IR for PEG  - Consulted IR for PEG placement.

## 2024-04-26 NOTE — H&P
Inpatient Radiology Pre-procedure Note    History of Present Illness:  Marysol Cash is a 58 y.o. female who presents for G tube placement. 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  Past Medical History:   Diagnosis Date    GERD (gastroesophageal reflux disease)     Heart valve problem     Hypertension     Obesity (BMI 30-39.9) 10/20/2014    Thyroid disease      Past Surgical History:   Procedure Laterality Date    cesaean section  1991, 1993    CHOLECYSTECTOMY  2008    COLONOSCOPY  09/2013    ESOPHAGOGASTRODUODENOSCOPY N/A 5/11/2020    Procedure: EGD (ESOPHAGOGASTRODUODENOSCOPY);  Surgeon: Nahed Zabala MD;  Location: [facility];  Service: Endoscopy;  Laterality: N/A;  covid 5/8/2020    ESOPHAGOGASTRODUODENOSCOPY W/ PEG N/A 4/22/2024    Procedure: EGD, WITH PEG TUBE INSERTION;  Surgeon: Jodie Coffey MD;  Location: [address];  Service: General;  Laterality: N/A;  Awake intubation, need ENT available at bedside  High chance of conversion to open g-tube    HYSTERECTOMY  age 27    for fibroids    NECK EXPLORATION N/A 4/24/2024    Procedure: EXPLORATION, NECK;  Surgeon: Nathan Madison MD;  Location: [facility] OR [address] [place] [address];  Service: ENT;  Laterality: N/A;    SALPINGECTOMY  1992    for ectopic pregnancy    TONGUE SURGERY  03/04/2022    Cancer removed on tongue and lymphs    TOTAL THYROIDECTOMY  2012    UPPER GASTROINTESTINAL ENDOSCOPY  09/2013    gastritis-hp neg       Review of Systems:   As documented in primary team H&P    Home Meds:   Prior to Admission medications    Medication Sig Start Date End Date Taking? Authorizing Provider   metoprolol tartrate (LOPRESSOR) 25 MG tablet Take 1 tablet (25 mg total) by mouth 2 (two) times daily.  3/1/23  Yes Bakari Deleon MD   calcium carbonate (CALCIUM ANTACID) 300 mg (750 mg) Chew Take 2 tablets by mouth 2 (two) times daily. 12/5/18   Oralia Newby MD   cholecalciferol, vitamin D3, (VITAMIN D3) 25 mcg (1,000 unit) capsule Take 1,000 Units by mouth once daily.    Provider, Historical   flaxseed 1,000 mg Cap Take by mouth Daily.    Provider, Historical   HYDROcodone-acetaminophen (NORCO) 5-325 mg per tablet  8/25/23   Provider, Historical   levothyroxine (SYNTHROID) 175 MCG tablet Take 1 tablet (175 mcg total) by mouth before breakfast. Take on an empty stomach. Wait 4 hours after taking LT4 to take any multivitamins or nutritional supplements and wait 1 hour to take other medications. 3/14/24 3/14/25  Oralia Newby MD   liothyronine (CYTOMEL) 5 MCG Tab Take 1 tablet (5 mcg total) by mouth before breakfast. Take on an empty stomach. Wait 4 hours after taking T3 to take any multivitamins or nutritional supplements and wait 1 hour to take other medications. 12/11/23 12/10/24  Oralia Newby MD   multivitamin capsule Take 1 capsule by mouth once daily.    Provider, Historical   ondansetron (ZOFRAN-ODT) 4 MG TbDL DISSOLVE 1 TABLET (4 MG TOTAL) BY MOUTH EVERY 8 (EIGHT) HOURS AS NEEDED. 11/30/23 11/29/24  Bakari Deleon MD     Scheduled Meds:   Current Facility-Administered Medications   Medication Dose Route Frequency    enoxparin  40 mg Subcutaneous Q24H (prophylaxis, 1700)    levothyroxine  175 mcg Oral Before breakfast    liothyronine  5 mcg Oral Before breakfast    metoprolol  5 mg Intravenous Q6H    senna  8.6 mg Oral BID    sodium chloride 0.9%  10 mL Intravenous Q6H     Continuous Infusions:   Current Facility-Administered Medications   Medication Dose Route Frequency Last Rate Last Admin    TPN ADULT CENTRAL LINE CUSTOM   Intravenous Continuous 75 mL/hr at 04/25/24 2303 New Bag at 04/25/24 2303     PRN Meds:  Current Facility-Administered Medications:     acetaminophen, 650 mg, Oral, Q4H PRN    dextrose  "10%, 12.5 g, Intravenous, PRN    dextrose 10%, 25 g, Intravenous, PRN    glucose, 16 g, Oral, PRN    glucose, 24 g, Oral, PRN    hydrALAZINE, 10 mg, Intravenous, Q6H PRN    HYDROmorphone, 1 mg, Intravenous, Q2H PRN    hydrOXYzine pamoate, 25 mg, Oral, Q8H PRN    labetalol, 10 mg, Intravenous, Q6H PRN    lorazepam, 2 mg, Intravenous, Nightly PRN    naloxone, 0.02 mg, Intravenous, PRN    prochlorperazine, 5 mg, Intravenous, Q6H PRN    promethazine (PHENERGAN) 12.5 mg in dextrose 5 % (D5W) 50 mL IVPB, 12.5 mg, Intravenous, Q6H PRN    Flushing PICC/Midline Protocol, , , Until Discontinued **AND** sodium chloride 0.9%, 10 mL, Intravenous, Q6H **AND** sodium chloride 0.9%, 10 mL, Intravenous, PRN  Anticoagulants/Antiplatelets: Lovenox (not given yesterday)    Allergies:   Review of patient's allergies indicates:   Allergen Reactions    Ciprofloxacin Swelling    Codeine Swelling    Opioids - morphine analogues     Pcn [penicillins] Hives     Tolerated cefepime 04/2024    Percocet [oxycodone-acetaminophen] Swelling     Sedation Hx: have not been any systemic reactions    Labs:  No results for input(s): "INR", "PT", "PTT" in the last 168 hours.    Recent Labs   Lab 04/26/24  0436   WBC 18.55*   HGB 13.4   HCT 41.9   MCV 86         Recent Labs   Lab 04/23/24  1038 04/23/24  1931 04/26/24  0436   *   < > 135*      < > 136   K 2.8*   < > 3.7   CL 96   < > 95   CO2 38*   < > 30*   BUN 15   < > 37*   CREATININE 0.8   < > 0.8   CALCIUM 12.2*   < > 11.4*   MG  --    < > 1.5*   ALT 13  --   --    AST 18  --   --    ALBUMIN 2.4*  --   --    BILITOT 0.3  --   --     < > = values in this interval not displayed.         Vitals:  Temp: 98.4 °F (36.9 °C) (04/26/24 0700)  Pulse: 101 (04/26/24 0700)  Resp: 20 (04/26/24 0700)  BP: 135/78 (04/26/24 0700)  SpO2: 97 % (04/26/24 0700)     Physical Exam:  ASA: per anesthesia  Mallampati: per anesthesia    General: no acute distress  Mental Status: alert and oriented to person, " place and time  HEENT: normocephalic, atraumatic  Chest: unlabored breathing  Heart: regular heart rate  Abdomen: nondistended  Extremity: moves all extremities    Plan: Gtube placement  Sedation Plan: per anesthesia    Laine Rawls MD MSCR  PGY-5 Radiology Resident

## 2024-04-26 NOTE — SUBJECTIVE & OBJECTIVE
Interval History: NAEON.  Patient OFT for G tube placement by IR. No family present at bedside.       Objective:     Vital Signs (Most Recent):  Temp: 98.2 °F (36.8 °C) (04/26/24 1030)  Pulse: 105 (04/26/24 1045)  Resp: 20 (04/26/24 1045)  BP: 127/69 (04/26/24 1045)  SpO2: 97 % (04/26/24 1045) Vital Signs (24h Range):  Temp:  [97.8 °F (36.6 °C)-98.9 °F (37.2 °C)] 98.2 °F (36.8 °C)  Pulse:  [] 105  Resp:  [18-23] 20  SpO2:  [96 %-98 %] 97 %  BP: (122-147)/(69-85) 127/69     Weight: 86.2 kg (190 lb 0.6 oz)  Body mass index is 30.67 kg/m².    Intake/Output Summary (Last 24 hours) at 4/26/2024 1108  Last data filed at 4/26/2024 0918  Gross per 24 hour   Intake 1000 ml   Output --   Net 1000 ml         Physical Exam      JENIFFER for procedure    Significant Labs: All pertinent labs within the past 24 hours have been reviewed.  CBC:   Recent Labs   Lab 04/25/24  0630 04/26/24  0436   WBC 19.08* 18.55*   HGB 13.2 13.4   HCT 40.3 41.9    289     CMP:   Recent Labs   Lab 04/25/24  0630 04/26/24  0436    136   K 3.3* 3.7   CL 94* 95   CO2 32* 30*   * 135*   BUN 31* 37*   CREATININE 0.8 0.8   CALCIUM 11.0* 11.4*   ANIONGAP 10 11       Significant Imaging: I have reviewed all pertinent imaging results/findings within the past 24 hours.

## 2024-04-26 NOTE — ANESTHESIA POSTPROCEDURE EVALUATION
Anesthesia Post Evaluation    Patient: Marysol Cash    Procedure(s) Performed: * No procedures listed *    Final Anesthesia Type: MAC      Patient location during evaluation: PACU  Patient participation: Yes- Able to Participate  Level of consciousness: awake and alert  Post-procedure vital signs: reviewed and stable  Pain management: adequate  Airway patency: patent    PONV status at discharge: No PONV  Anesthetic complications: no      Cardiovascular status: blood pressure returned to baseline and hemodynamically stable  Respiratory status: unassisted  Hydration status: euvolemic                Vitals Value Taken Time   /69 04/26/24 1157   Temp 36.6 °C (97.9 °F) 04/26/24 1157   Pulse 110 04/26/24 1157   Resp 20 04/26/24 1157   SpO2 98 % 04/26/24 1157         Event Time   Out of Recovery 04/26/2024 10:52:42         Pain/Elysia Score: Pain Rating Prior to Med Admin: 6 (4/26/2024 10:25 AM)  Pain Rating Post Med Admin: 4 (4/26/2024 10:34 AM)  Elysia Score: 9 (4/26/2024 10:30 AM)

## 2024-04-27 PROBLEM — Z93.1 PEG (PERCUTANEOUS ENDOSCOPIC GASTROSTOMY) STATUS: Status: ACTIVE | Noted: 2024-04-27

## 2024-04-27 LAB
ANION GAP SERPL CALC-SCNC: 11 MMOL/L (ref 8–16)
BASOPHILS # BLD AUTO: 0.05 K/UL (ref 0–0.2)
BASOPHILS NFR BLD: 0.3 % (ref 0–1.9)
BUN SERPL-MCNC: 34 MG/DL (ref 6–20)
CALCIUM SERPL-MCNC: 11.4 MG/DL (ref 8.7–10.5)
CHLORIDE SERPL-SCNC: 96 MMOL/L (ref 95–110)
CO2 SERPL-SCNC: 27 MMOL/L (ref 23–29)
CREAT SERPL-MCNC: 0.7 MG/DL (ref 0.5–1.4)
DIFFERENTIAL METHOD BLD: ABNORMAL
EOSINOPHIL # BLD AUTO: 0 K/UL (ref 0–0.5)
EOSINOPHIL NFR BLD: 0 % (ref 0–8)
ERYTHROCYTE [DISTWIDTH] IN BLOOD BY AUTOMATED COUNT: 15 % (ref 11.5–14.5)
EST. GFR  (NO RACE VARIABLE): >60 ML/MIN/1.73 M^2
GLUCOSE SERPL-MCNC: 124 MG/DL (ref 70–110)
HCT VFR BLD AUTO: 39.9 % (ref 37–48.5)
HGB BLD-MCNC: 13.4 G/DL (ref 12–16)
IMM GRANULOCYTES # BLD AUTO: 0.09 K/UL (ref 0–0.04)
IMM GRANULOCYTES NFR BLD AUTO: 0.5 % (ref 0–0.5)
LYMPHOCYTES # BLD AUTO: 2.9 K/UL (ref 1–4.8)
LYMPHOCYTES NFR BLD: 15.6 % (ref 18–48)
MAGNESIUM SERPL-MCNC: 1.9 MG/DL (ref 1.6–2.6)
MCH RBC QN AUTO: 28 PG (ref 27–31)
MCHC RBC AUTO-ENTMCNC: 33.6 G/DL (ref 32–36)
MCV RBC AUTO: 83 FL (ref 82–98)
MONOCYTES # BLD AUTO: 1.1 K/UL (ref 0.3–1)
MONOCYTES NFR BLD: 5.9 % (ref 4–15)
NEUTROPHILS # BLD AUTO: 14.4 K/UL (ref 1.8–7.7)
NEUTROPHILS NFR BLD: 77.7 % (ref 38–73)
NRBC BLD-RTO: 0 /100 WBC
PHOSPHATE SERPL-MCNC: 2.5 MG/DL (ref 2.7–4.5)
PLATELET # BLD AUTO: 320 K/UL (ref 150–450)
PMV BLD AUTO: 10.4 FL (ref 9.2–12.9)
POTASSIUM SERPL-SCNC: 4 MMOL/L (ref 3.5–5.1)
RBC # BLD AUTO: 4.79 M/UL (ref 4–5.4)
SODIUM SERPL-SCNC: 134 MMOL/L (ref 136–145)
WBC # BLD AUTO: 18.47 K/UL (ref 3.9–12.7)

## 2024-04-27 PROCEDURE — 63600175 PHARM REV CODE 636 W HCPCS: Performed by: STUDENT IN AN ORGANIZED HEALTH CARE EDUCATION/TRAINING PROGRAM

## 2024-04-27 PROCEDURE — 25000003 PHARM REV CODE 250: Performed by: STUDENT IN AN ORGANIZED HEALTH CARE EDUCATION/TRAINING PROGRAM

## 2024-04-27 PROCEDURE — 25000003 PHARM REV CODE 250: Performed by: INTERNAL MEDICINE

## 2024-04-27 PROCEDURE — 80048 BASIC METABOLIC PNL TOTAL CA: CPT | Performed by: STUDENT IN AN ORGANIZED HEALTH CARE EDUCATION/TRAINING PROGRAM

## 2024-04-27 PROCEDURE — 63600175 PHARM REV CODE 636 W HCPCS: Performed by: INTERNAL MEDICINE

## 2024-04-27 PROCEDURE — A4216 STERILE WATER/SALINE, 10 ML: HCPCS | Performed by: STUDENT IN AN ORGANIZED HEALTH CARE EDUCATION/TRAINING PROGRAM

## 2024-04-27 PROCEDURE — 36415 COLL VENOUS BLD VENIPUNCTURE: CPT | Performed by: STUDENT IN AN ORGANIZED HEALTH CARE EDUCATION/TRAINING PROGRAM

## 2024-04-27 PROCEDURE — 20600001 HC STEP DOWN PRIVATE ROOM

## 2024-04-27 PROCEDURE — 84100 ASSAY OF PHOSPHORUS: CPT | Performed by: STUDENT IN AN ORGANIZED HEALTH CARE EDUCATION/TRAINING PROGRAM

## 2024-04-27 PROCEDURE — 85025 COMPLETE CBC W/AUTO DIFF WBC: CPT | Performed by: STUDENT IN AN ORGANIZED HEALTH CARE EDUCATION/TRAINING PROGRAM

## 2024-04-27 PROCEDURE — 83735 ASSAY OF MAGNESIUM: CPT | Performed by: STUDENT IN AN ORGANIZED HEALTH CARE EDUCATION/TRAINING PROGRAM

## 2024-04-27 RX ADMIN — ENOXAPARIN SODIUM 40 MG: 40 INJECTION SUBCUTANEOUS at 06:04

## 2024-04-27 RX ADMIN — LEVOTHYROXINE SODIUM 175 MCG: 150 TABLET ORAL at 06:04

## 2024-04-27 RX ADMIN — Medication 10 ML: at 12:04

## 2024-04-27 RX ADMIN — HYDROMORPHONE HYDROCHLORIDE 1 MG: 1 INJECTION, SOLUTION INTRAMUSCULAR; INTRAVENOUS; SUBCUTANEOUS at 12:04

## 2024-04-27 RX ADMIN — METOROPROLOL TARTRATE 5 MG: 5 INJECTION, SOLUTION INTRAVENOUS at 12:04

## 2024-04-27 RX ADMIN — PROCHLORPERAZINE EDISYLATE 5 MG: 5 INJECTION INTRAMUSCULAR; INTRAVENOUS at 08:04

## 2024-04-27 RX ADMIN — Medication 10 ML: at 06:04

## 2024-04-27 RX ADMIN — SENNOSIDES 8.6 MG: 8.6 TABLET, FILM COATED ORAL at 08:04

## 2024-04-27 RX ADMIN — HYDROMORPHONE HYDROCHLORIDE 1 MG: 1 INJECTION, SOLUTION INTRAMUSCULAR; INTRAVENOUS; SUBCUTANEOUS at 06:04

## 2024-04-27 RX ADMIN — PROCHLORPERAZINE EDISYLATE 5 MG: 5 INJECTION INTRAMUSCULAR; INTRAVENOUS at 12:04

## 2024-04-27 RX ADMIN — HYDROMORPHONE HYDROCHLORIDE 1 MG: 1 INJECTION, SOLUTION INTRAMUSCULAR; INTRAVENOUS; SUBCUTANEOUS at 08:04

## 2024-04-27 RX ADMIN — LIOTHYRONINE SODIUM 5 MCG: 5 TABLET ORAL at 06:04

## 2024-04-27 RX ADMIN — METOROPROLOL TARTRATE 5 MG: 5 INJECTION, SOLUTION INTRAVENOUS at 01:04

## 2024-04-27 RX ADMIN — PROCHLORPERAZINE EDISYLATE 5 MG: 5 INJECTION INTRAMUSCULAR; INTRAVENOUS at 06:04

## 2024-04-27 RX ADMIN — METOROPROLOL TARTRATE 5 MG: 5 INJECTION, SOLUTION INTRAVENOUS at 06:04

## 2024-04-27 NOTE — ASSESSMENT & PLAN NOTE
Patient noted to have a percutaneous endoscopic gastrostomy tube in place. I have personally inspected the tube.Tube was placed on this admission, with date of procedure- 4/26  There are no signs of drainage or infection around the site. Routine care to be done by wound care and nursing staff.     Start tube feeds with 10ml /hr. Increase rate by 10ml every hour as tolerable until reach goal 50ml/hr.    Consulted RD  Stop TPN and remove NGT

## 2024-04-27 NOTE — PROGRESS NOTES
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    History:     Past Medical History:   Diagnosis Date    GERD (gastroesophageal reflux disease)     Heart valve problem     Hypertension     Obesity (BMI 30-39.9) 10/20/2014    Thyroid disease        Social History     Socioeconomic History    Marital status:    Tobacco Use    Smoking status: Never    Smokeless tobacco: Never   Substance and Sexual Activity    Alcohol use: No     Alcohol/week: 0.0 standard drinks of alcohol    Drug use: No    Sexual activity: Yes     Partners: Male     Birth control/protection: See Surgical Hx     Social Determinants of Health     Financial Resource Strain: Low Risk  (4/12/2024)    Overall Financial Resource Strain (CARDIA)     Difficulty of Paying Living Expenses: Not very hard   Food Insecurity: No Food Insecurity (4/12/2024)    Hunger Vital Sign     Worried About Running Out of Food in the Last Year: Never true     Ran Out of Food in the Last Year: Never true   Transportation Needs: No Transportation Needs (4/12/2024)    PRAPARE - Transportation     Lack of Transportation (Medical): No     Lack of Transportation (Non-Medical): No   Physical Activity: Inactive (4/12/2024)    Exercise Vital Sign     Days of Exercise per Week: 0 days     Minutes of Exercise per Session: 0 min   Stress: Stress Concern Present (4/12/2024)    [employer] - Occupational Stress Questionnaire     Feeling of Stress : Rather much   Social Connections: Moderately Integrated (4/12/2024)    Social Connection and Isolation Panel [NHANES]     Frequency of Communication with Friends and Family: More than three times a week     Frequency of Social Gatherings with Friends and Family: More than three times a week     Attends Cheondo[employer] Services: More than 4 times per year     Active Member of Clubs or  Organizations: No     Attends Club or Organization Meetings: Never     Marital Status:    Housing Stability: Low Risk  (4/12/2024)    Housing Stability Vital Sign     Unable to Pay for Housing in the Last Year: No     Number of Places Lived in the Last Year: 1     Unstable Housing in the Last Year: No       Precautions:     Allergies as of 04/10/2024 - Reviewed 04/10/2024   Allergen Reaction Noted    Ciprofloxacin Swelling 07/08/2014    Codeine Swelling 07/08/2014    Pcn [penicillins] Hives 07/08/2014    Percocet [oxycodone-acetaminophen] Swelling 07/08/2014       WOC Assessment Details/Treatment   Patient seen for wound care follow up  Reviewed chart for this encounter.   See Flow Sheet for findings.    Pt resting in bed in NAD.  AAOx3.   present for dressing change.  Current dressing removed with moderate serous drainage noted.  Wound stable.  Red, maroon, and gray discoloration noted.  Moist wound.  Cleaned with Vashe antimicrobial wound cleanser.  Applied Aquacel ag, Drawtex, and covered with Mextra superabsorbant dressing.  Wrapped with Kerlex.  Pt tolerated dressing change with no complaints of pain.      RECOMMENDATIONS:  Right neck--Surgical wound-clean with Vashe antimicrobial wound cleanser.  Apply Aquacel ag with Drawtex or gauze.  Cover with Mextra dressing.  Change dressing BID and PRN soilage.      Discussed POC with patient and primary nurse.   See EMR for orders & patient education.    Bedside nursing to continue care & monitoring.  Bedside nursing to maintain pressure injury prevention interventions.     04/27/24 1215   WOCN Assessment   WOCN Total Time (mins) 30   Visit Date 04/27/24   Visit Time 1215   Consult Type Follow Up   WOCN Speciality Wound   Wound surgical   Intervention assessed;changed;applied;chart review;coordination of care   Teaching on-going   Skin Interventions   Device Skin Pressure Protection absorbent pad utilized/changed;adhesive use limited;positioning supports  utilized;pressure points protected   Pressure Reduction Devices positioning supports utilized   Pressure Reduction Techniques frequent weight shift encouraged   Positioning   Body Position position changed independently   Head of Bed (HOB) Positioning HOB elevated   Positioning/Transfer Devices pillows   Pressure Injury Prevention    Check Moisture Management Pad Done   Check Medical Devices Done        Wound 04/24/24 1229 Incision anterior Neck   Date First Assessed/Time First Assessed: 04/24/24 1229   Present on Original Admission: No  Primary Wound Type: Incision  Orientation: anterior  Location: Neck  Is this injury device related?: (c)   Closure Method: Staples  Additional Comments: bacitr...   Wound Image     Dressing Appearance Intact;Moist drainage   Drainage Amount Moderate   Drainage Characteristics/Odor Serous   Appearance Red;Maroon;Gray;Moist;Ecchymotic   Care Cleansed with:;Antimicrobial agent;Wound cleanser   Dressing Applied;Hydrofiber;Absorptive Pad;Gauze;Rolled gauze   Periwound Care Absorptive dressing applied;Cleansed with pH balanced cleanser;Dry periwound area maintained       Taina Pete RN, BSN, CWON  04/27/2024

## 2024-04-27 NOTE — PLAN OF CARE
Problem: Adult Inpatient Plan of Care  Goal: Plan of Care Review  4/27/2024 0504 by Juanis Peter RN  Outcome: Progressing  4/27/2024 0503 by Juanis Peter RN  Outcome: Progressing  Goal: Patient-Specific Goal (Individualized)  4/27/2024 0504 by Juanis Peter RN  Outcome: Progressing  4/27/2024 0503 by Juanis Peter RN  Outcome: Progressing  Goal: Absence of Hospital-Acquired Illness or Injury  4/27/2024 0504 by Juanis Peter RN  Outcome: Progressing  4/27/2024 0503 by Juanis Peter RN  Outcome: Progressing  Goal: Optimal Comfort and Wellbeing  4/27/2024 0504 by Juanis Peter RN  Outcome: Progressing  4/27/2024 0503 by Juanis Peter RN  Outcome: Progressing  Goal: Readiness for Transition of Care  4/27/2024 0504 by Juanis Peter RN  Outcome: Progressing  4/27/2024 0503 by Juanis Peter RN  Outcome: Progressing   Patient in bed. No complaints voiced. NADN at this time. Safety measures in place. Call light in reach.  remains at the bedside.

## 2024-04-27 NOTE — SUBJECTIVE & OBJECTIVE
Interval History: NAEON.  S/p PEG tube on 4/26. Will start tube feeds today via PEG. RD consulted. Stop TPN and remove NGT  Objective:     Vital Signs (Most Recent):  Temp: 98.8 °F (37.1 °C) (04/27/24 1138)  Pulse: (!) 119 (04/27/24 1138)  Resp: 19 (04/27/24 1221)  BP: (!) 143/73 (04/27/24 1138)  SpO2: (!) 93 % (04/27/24 1138) Vital Signs (24h Range):  Temp:  [97.5 °F (36.4 °C)-98.8 °F (37.1 °C)] 98.8 °F (37.1 °C)  Pulse:  [] 119  Resp:  [16-20] 19  SpO2:  [93 %-97 %] 93 %  BP: (122-144)/(56-81) 143/73     Weight: 86.2 kg (190 lb 0.6 oz)  Body mass index is 30.67 kg/m².  No intake or output data in the 24 hours ending 04/27/24 1240        Physical Exam  Vitals and nursing note reviewed.   Constitutional:       General: She is not in acute distress.     Appearance: She is ill-appearing. She is not toxic-appearing.   Eyes:      Conjunctiva/sclera: Conjunctivae normal.   Neck:      Comments: Dressing in place over entire circumference of neck. Dressing c/d/i  Cardiovascular:      Rate and Rhythm: Regular rhythm. Tachycardia present.      Heart sounds: Normal heart sounds.   Pulmonary:      Effort: Pulmonary effort is normal. No respiratory distress.      Breath sounds: Normal breath sounds. No wheezing.   Abdominal:      General: Bowel sounds are normal. There is no distension.      Palpations: Abdomen is soft.      Tenderness: There is no abdominal tenderness. There is no guarding.      Comments: PEG tube in place   Skin:     General: Skin is warm and dry.   Neurological:      General: No focal deficit present.      Mental Status: She is alert and oriented to person, place, and time. Mental status is at baseline.   Psychiatric:         Mood and Affect: Mood normal.         Behavior: Behavior normal.               Significant Labs: All pertinent labs within the past 24 hours have been reviewed.  CBC:   Recent Labs   Lab 04/26/24  0436 04/27/24  0230   WBC 18.55* 18.47*   HGB 13.4 13.4   HCT 41.9 39.9     320     CMP:   Recent Labs   Lab 04/26/24  0436 04/27/24  0230    134*   K 3.7 4.0   CL 95 96   CO2 30* 27   * 124*   BUN 37* 34*   CREATININE 0.8 0.7   CALCIUM 11.4* 11.4*   ANIONGAP 11 11       Significant Imaging: I have reviewed all pertinent imaging results/findings within the past 24 hours.

## 2024-04-27 NOTE — CONSULTS
"Nutrition consult received stating "On tube feeds".  RD following, please see note from 4/24 for full assessment.   Noted PEG placed 4/24.    Recommendations  Initiate TFs via PEG when able. Rec'd Impact Peptide @ 50 mL/hr to provide 1800 kcals, 113 g of protein, 924 mL fluid.   - If bolus TFs warranted for discharge, rec'd Impact Peptide - 5 cartons/day = 1875 kcals, 118 g of protein, 965 mL fluid.   RD to monitor & follow-up.     Goals: Meet % EEN, EPN by RD f/u date  Nutrition Goal Status: progressing towards goal  Communication of RD Recs: reviewed with physician    Thanks,  Idalia MS, RD, LDN   "

## 2024-04-27 NOTE — PROGRESS NOTES
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 She initially presented to [facility] Emergency Department with six days of epigastric pain with associated nausea, vomiting, and difficulty eating due to pain. However on presentation, she was noted to have a large necrotic and purulent wound located on her right neck with complaints of associated difficulty speaking, swallowing and shortness of breath. She was noted to be hypoxic 88% on room air which improved with 2L NC. Labs were notable for leukocytosis 14, hypokalemia 2.4, hypochloremia 89, CO2 36, hypercalcemia 13.5, Phos 2.5. . Troponin 0.046. Lactate 2.3. CT Soft Tissue Neck was concerning for 9.5 x 8.2 x 10 cm large lobulated mass in the right anterolateral neck extending to the deep spaces of the neck and abutting the right prevertebral space and paravertebral musculature, left midline shift, multiple bilateral necrotic cervical lymph nodes. Case was discussed and decision was made to transfer to [facility] for higher level of care.     Of additional note, she recently transitioned her care to [place] and underwent stem-cell transplant approximately 2 weeks ago in [place], has not been seen by a [place] physician since 2023.      On transfer: she was afebrile, mildly hypertensive /109, HR 98, RR 20, satting 96% on room  air. Complaining of mild headache and poor appetite associated with nausea; denies fever, chills, chest pain, palpitations, SOB, abdominal pain, dysuria. States wound has been draining for the past week initially thick white now more liquid. Mild dysphonia is apparently chronic from left vocal cord paralysis and at baseline. Some dysphagia with solids, none with pureed soft or liquids, denies odynophagia. Dressing on neck CDI, ENT consulted will be here shortly to assess patient.        Overview/Hospital Course:  Evaluated 4/12 in MICU by ENT, who do not feel patient is a candidate for surgical intervention. Palliative care and oncology consulted. Failed swallow study, SLP recommending NPO 4/13. Patient continues to protect airway. Stepped down from MICU on 4/13.    DC planning: awaiting PEG placement    Interval History: NAEON.  S/p PEG tube on 4/26. Will start tube feeds today via PEG. RD consulted. Stop TPN and remove NGT  Objective:     Vital Signs (Most Recent):  Temp: 98.8 °F (37.1 °C) (04/27/24 1138)  Pulse: (!) 119 (04/27/24 1138)  Resp: 19 (04/27/24 1221)  BP: (!) 143/73 (04/27/24 1138)  SpO2: (!) 93 % (04/27/24 1138) Vital Signs (24h Range):  Temp:  [97.5 °F (36.4 °C)-98.8 °F (37.1 °C)] 98.8 °F (37.1 °C)  Pulse:  [] 119  Resp:  [16-20] 19  SpO2:  [93 %-97 %] 93 %  BP: (122-144)/(56-81) 143/73     Weight: 86.2 kg (190 lb 0.6 oz)  Body mass index is 30.67 kg/m².  No intake or output data in the 24 hours ending 04/27/24 1240        Physical Exam  Vitals and nursing note reviewed.   Constitutional:       General: She is not in acute distress.     Appearance: She is ill-appearing. She is not toxic-appearing.   Eyes:      Conjunctiva/sclera: Conjunctivae normal.   Neck:      Comments: Dressing in place over entire circumference of neck. Dressing c/d/i  Cardiovascular:      Rate and Rhythm: Regular rhythm. Tachycardia present.      Heart sounds: Normal heart sounds.   Pulmonary:      Effort: Pulmonary  effort is normal. No respiratory distress.      Breath sounds: Normal breath sounds. No wheezing.   Abdominal:      General: Bowel sounds are normal. There is no distension.      Palpations: Abdomen is soft.      Tenderness: There is no abdominal tenderness. There is no guarding.      Comments: PEG tube in place   Skin:     General: Skin is warm and dry.   Neurological:      General: No focal deficit present.      Mental Status: She is alert and oriented to person, place, and time. Mental status is at baseline.   Psychiatric:         Mood and Affect: Mood normal.         Behavior: Behavior normal.               Significant Labs: All pertinent labs within the past 24 hours have been reviewed.  CBC:   Recent Labs   Lab 04/26/24  0436 04/27/24  0230   WBC 18.55* 18.47*   HGB 13.4 13.4   HCT 41.9 39.9    320     CMP:   Recent Labs   Lab 04/26/24  0436 04/27/24  0230    134*   K 3.7 4.0   CL 95 96   CO2 30* 27   * 124*   BUN 37* 34*   CREATININE 0.8 0.7   CALCIUM 11.4* 11.4*   ANIONGAP 11 11       Significant Imaging: I have reviewed all pertinent imaging results/findings within the past 24 hours.      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 Underwent repeat bx after failed tx (1/24/22) that showed moderately differentiated squamous cell carcinoma w/ suspicion for perineural invasion. CT soft tissue neck (Jan 2022) w/o e/o metastatic dx. PET-CT (Feb 2022) w/o e/o active local or distant dx- no abnormal activity within the tongue, no signs of cervical lymph node or distant metastasis. Declined chemotherapy &/or XRT. Underwent selective neck dissection levels 1, 2, 3, and partial glossectomy w/ split-thickness skin graft (3/3/2022 per Dr. Clif Olson).  Surgical pathology w/ unifocal squamous cell carcinoma on the dorsal surface of the tongue on left side measuring  "1 cm, moderately differentiated, 6 mm depth of invasion, no lymphovascular invasion, positive for perineural invasion, margins negative, 0 of 39 lymph nodes with metastasis, pT2 pN0 noted.  Postop course complicated by infection requiring I&D and antibiotics.  Declined adjuvant XRT. Repeat imaging (June 2023) w/ 2.2 cm lesion deep to the R SCM c/f necrotic LN w/ few adjacent pathological appearing LNs suspicious for metastasis. Underwent R neck FNA (7/7/23) w/ pathology suspicious for metastatic SCC. PET-CT (8/21/2023) w/ large necrotic mass the angle of mandible posterior to submandibular gland on the right measuring 4.4 x 4.2 cm with SUV 9.78, just superior to the mass is a 2 cm hypermetabolic lymph node and no evidence of distant metastatic disease. Subsequently transferred care to [place] where she reportedly recently abx,"detox", hyperbaric chamber, & stem-cell transplant approximately 2 weeks ago PTA in [place]. Now presenting w/ progressive neck wound. CT neck soft tissue (4/10/24) w/ large bulky lobulated appearing mass right lateral and anterolateral neck extending to the D spaces of the neck having overall dimensions of approximately 9.5 x 8.2 x 10 cm, multiple addn'l b/l cervical necrotic nodes, & assoc mass effect results in displacement of midline structures to the patient's left. CT C/A/P with diffuse metastatic disease including mediastinal & hilar LAD, bilateral lungs, peritoneum, lumbar vertebrae, & likely liver.     - ENT consulted; appreciate recs - not surgical candidate for debridement  - Medical & Radiation Oncology consulted - pt declines chemo or XRT  - Palliative following; appreciate assistance  - Multimodal analgesia   - SLP following,remains NPO  - Gen surgery and ENT consulted. Unable to do do tracheostomy. Rec consulting IR for PEG  - Consulted IR for PEG placement.      PEG (percutaneous endoscopic gastrostomy) status  Patient noted to have a percutaneous endoscopic gastrostomy tube in " place. I have personally inspected the tube.Tube was placed on this admission, with date of procedure- 4/26  There are no signs of drainage or infection around the site. Routine care to be done by wound care and nursing staff.     Start tube feeds with 10ml /hr. Increase rate by 10ml every hour as tolerable until reach goal 50ml/hr.    Consulted RD  Stop TPN and remove NGT       Sinus tachycardia  Metoprolol recently discontinued, possible withdrawal  Resume IV metoprolol  Correct electrolyte derangements  Pain control          Severe malnutrition  PEG tube placement is being discussed. IR and GI deferred the procedure. Surgery depending on anesthesia assessment. ENT updated with recs. Awaiting final recs.       Nutrition consulted. Most recent weight and BMI monitored-     Measurements:  Wt Readings from Last 1 Encounters:   04/24/24 86.2 kg (190 lb 0.6 oz)   Body mass index is 30.67 kg/m².    Patient has been screened and assessed by RD.    Malnutrition Type:  Context: chronic illness  Level: severe    Malnutrition Characteristic Summary:  Weight Loss (Malnutrition): greater than 10% in 6 months  Energy Intake (Malnutrition): less than or equal to 50% for greater than or equal to 1 month      Hypercalcemia  On arrival to OSH, Ca 13.5 --> 11.0 on arrival to [facility]. Continued subsequent uptrend peaking at 13.1 on 4/15 (corrected Ca 14.5). Likely 2/2 malignancy.   - IVF  - Calcitonin BID x2 days  - Ionized Ca 1.59 4/21  - Monitor CMP  - IV hydration and cont to trend Ca level   - Ionized calcium WNL. Cont to monitor        Palliative care encounter  Daily GOC discussions  Pt and family would like to proceed with aggressive care  Cont FULL code  Planning for PEG      Hypokalemia  Patient has hypokalemia which is Acute and currently controlled. Most recent potassium levels reviewed-   Lab Results   Component Value Date    K 3.5 04/24/2024   . Will continue potassium replacement per protocol and recheck repeat levels after  "replacement completed.     Open neck wound  Pt with extensive hx of H/N cancer. Presents w/ continued/worsening drainage from the neck for months with recent worsening of symptoms for past 2 weeks w/ N/V. Pt returned from [place] where she received antibiotics, "detox" and hyperbaric chamber as treatment. Endorses difficulty with swallowing 2/2 to oral trush. Large neck mass on imaging w/ large central collection of air which appears to extend superficially to the skin surface right mid neck anterior laterally; unable to r/o superimposed abscess. Meeting 2/4 SIRS (HR & WBC) w/ elevated lactate, but nml BP. Blood cx negative. Pt not surgical candidate for debridement per ENT evaluation.   - ID consulted; appreciate recs  - Surgical debridement is not an option. Has completed course of antibiotics.      Sepsis  [place] [facility] meeting 2/4 SIRS (requiring 2L NC w/ RR 20s & WBC 13). Likely soft tissue/neck source. Received IVF at OSH prior to transfer (for lactate 2.3 at that time). Started on broad-spectrum abx. Blood cx negative.   - ID consulted; appreciate recs  - Monitor CBC & fever curve  - Mgmt of neck mass/wound as below   - Has completed treatment. Discontinue vancomycin + Flagyl + fluconazole     Postprocedural hypoparathyroidism  Last PTHi nml at 26 (2016). Home regimen includes calcium carbonate & vitamin D. Symptomatic hypercalcemia (Ca 13.5) on presentation to OSH w/ abdominal pain & N/V. Improved w/ NS ggt (although query if pt's hypercalcemia was re: hx of hypoparathyroidism vs hypercalcemia of malignancy.   - Holding home calcium carbonate  - Resume home vitamin D as PO tolerance improves   - Monitor CMP      Postsurgical hypothyroidism  Last TSH elevated at 5.264. Home regimen includes levothyroxine 175mcg daily & liothyronine 5mcg daily  - Continue home liothyronine & levothyroxine       HTN (hypertension)  Hx of essential HTN; home regimen includes metoprolol 25mg BID. On arrival [place] [facility], SBP " 140s-150s.   PRN labetalol/hydralazine      VTE Risk Mitigation (From admission, onward)           Ordered     enoxaparin injection 40 mg  Every 24 hours         04/12/24 1536     IP VTE HIGH RISK PATIENT  Once         04/12/24 1324     Place sequential compression device  Until discontinued         04/12/24 1324                    Discharge Planning   ANDRES: 4/29/2024     Code Status: Full Code   Is the patient medically ready for discharge?: No    Reason for patient still in hospital (select all that apply): trending condition   Discharge Plan A: Hospice/home                  Carissa Young MD  Department of Hospital Medicine   [facility] - Telemetry Stepdown

## 2024-04-28 LAB
ANION GAP SERPL CALC-SCNC: 11 MMOL/L (ref 8–16)
BASOPHILS # BLD AUTO: 0.02 K/UL (ref 0–0.2)
BASOPHILS NFR BLD: 0.1 % (ref 0–1.9)
BUN SERPL-MCNC: 39 MG/DL (ref 6–20)
CA-I BLDV-SCNC: 1.33 MMOL/L (ref 1.06–1.42)
CALCIUM SERPL-MCNC: 12.4 MG/DL (ref 8.7–10.5)
CHLORIDE SERPL-SCNC: 96 MMOL/L (ref 95–110)
CO2 SERPL-SCNC: 28 MMOL/L (ref 23–29)
CREAT SERPL-MCNC: 0.7 MG/DL (ref 0.5–1.4)
DIFFERENTIAL METHOD BLD: ABNORMAL
EOSINOPHIL # BLD AUTO: 0 K/UL (ref 0–0.5)
EOSINOPHIL NFR BLD: 0 % (ref 0–8)
ERYTHROCYTE [DISTWIDTH] IN BLOOD BY AUTOMATED COUNT: 15.2 % (ref 11.5–14.5)
EST. GFR  (NO RACE VARIABLE): >60 ML/MIN/1.73 M^2
GLUCOSE SERPL-MCNC: 114 MG/DL (ref 70–110)
HCT VFR BLD AUTO: 40.9 % (ref 37–48.5)
HGB BLD-MCNC: 13.3 G/DL (ref 12–16)
IMM GRANULOCYTES # BLD AUTO: 0.08 K/UL (ref 0–0.04)
IMM GRANULOCYTES NFR BLD AUTO: 0.6 % (ref 0–0.5)
LYMPHOCYTES # BLD AUTO: 2.2 K/UL (ref 1–4.8)
LYMPHOCYTES NFR BLD: 14.8 % (ref 18–48)
MAGNESIUM SERPL-MCNC: 1.6 MG/DL (ref 1.6–2.6)
MCH RBC QN AUTO: 27.3 PG (ref 27–31)
MCHC RBC AUTO-ENTMCNC: 32.5 G/DL (ref 32–36)
MCV RBC AUTO: 84 FL (ref 82–98)
MONOCYTES # BLD AUTO: 1 K/UL (ref 0.3–1)
MONOCYTES NFR BLD: 7.2 % (ref 4–15)
NEUTROPHILS # BLD AUTO: 11.2 K/UL (ref 1.8–7.7)
NEUTROPHILS NFR BLD: 77.3 % (ref 38–73)
NRBC BLD-RTO: 0 /100 WBC
PHOSPHATE SERPL-MCNC: 2.7 MG/DL (ref 2.7–4.5)
PLATELET # BLD AUTO: 327 K/UL (ref 150–450)
PMV BLD AUTO: 10.4 FL (ref 9.2–12.9)
POTASSIUM SERPL-SCNC: 3.9 MMOL/L (ref 3.5–5.1)
RBC # BLD AUTO: 4.88 M/UL (ref 4–5.4)
SODIUM SERPL-SCNC: 135 MMOL/L (ref 136–145)
WBC # BLD AUTO: 14.53 K/UL (ref 3.9–12.7)

## 2024-04-28 PROCEDURE — 85025 COMPLETE CBC W/AUTO DIFF WBC: CPT | Performed by: STUDENT IN AN ORGANIZED HEALTH CARE EDUCATION/TRAINING PROGRAM

## 2024-04-28 PROCEDURE — 25000003 PHARM REV CODE 250: Performed by: STUDENT IN AN ORGANIZED HEALTH CARE EDUCATION/TRAINING PROGRAM

## 2024-04-28 PROCEDURE — 63600175 PHARM REV CODE 636 W HCPCS: Performed by: STUDENT IN AN ORGANIZED HEALTH CARE EDUCATION/TRAINING PROGRAM

## 2024-04-28 PROCEDURE — 83735 ASSAY OF MAGNESIUM: CPT | Performed by: STUDENT IN AN ORGANIZED HEALTH CARE EDUCATION/TRAINING PROGRAM

## 2024-04-28 PROCEDURE — 25000003 PHARM REV CODE 250: Performed by: INTERNAL MEDICINE

## 2024-04-28 PROCEDURE — 63600175 PHARM REV CODE 636 W HCPCS: Performed by: INTERNAL MEDICINE

## 2024-04-28 PROCEDURE — 82330 ASSAY OF CALCIUM: CPT | Performed by: STUDENT IN AN ORGANIZED HEALTH CARE EDUCATION/TRAINING PROGRAM

## 2024-04-28 PROCEDURE — 20600001 HC STEP DOWN PRIVATE ROOM

## 2024-04-28 PROCEDURE — 80048 BASIC METABOLIC PNL TOTAL CA: CPT | Performed by: STUDENT IN AN ORGANIZED HEALTH CARE EDUCATION/TRAINING PROGRAM

## 2024-04-28 PROCEDURE — A4216 STERILE WATER/SALINE, 10 ML: HCPCS | Performed by: STUDENT IN AN ORGANIZED HEALTH CARE EDUCATION/TRAINING PROGRAM

## 2024-04-28 PROCEDURE — 84100 ASSAY OF PHOSPHORUS: CPT | Performed by: STUDENT IN AN ORGANIZED HEALTH CARE EDUCATION/TRAINING PROGRAM

## 2024-04-28 PROCEDURE — 36415 COLL VENOUS BLD VENIPUNCTURE: CPT | Performed by: STUDENT IN AN ORGANIZED HEALTH CARE EDUCATION/TRAINING PROGRAM

## 2024-04-28 RX ORDER — SENNOSIDES 8.6 MG/1
8.6 TABLET ORAL 2 TIMES DAILY
Status: DISCONTINUED | OUTPATIENT
Start: 2024-04-28 | End: 2024-05-08 | Stop reason: HOSPADM

## 2024-04-28 RX ORDER — LIOTHYRONINE SODIUM 5 UG/1
5 TABLET ORAL
Status: DISCONTINUED | OUTPATIENT
Start: 2024-04-29 | End: 2024-05-08 | Stop reason: HOSPADM

## 2024-04-28 RX ORDER — METOPROLOL TARTRATE 25 MG/1
25 TABLET, FILM COATED ORAL 2 TIMES DAILY
Status: DISCONTINUED | OUTPATIENT
Start: 2024-04-28 | End: 2024-05-08 | Stop reason: HOSPADM

## 2024-04-28 RX ADMIN — PROMETHAZINE HYDROCHLORIDE 12.5 MG: 25 INJECTION INTRAMUSCULAR; INTRAVENOUS at 12:04

## 2024-04-28 RX ADMIN — PROMETHAZINE HYDROCHLORIDE 12.5 MG: 25 INJECTION INTRAMUSCULAR; INTRAVENOUS at 06:04

## 2024-04-28 RX ADMIN — METOPROLOL TARTRATE 25 MG: 25 TABLET, FILM COATED ORAL at 09:04

## 2024-04-28 RX ADMIN — Medication 10 ML: at 12:04

## 2024-04-28 RX ADMIN — Medication 10 ML: at 06:04

## 2024-04-28 RX ADMIN — METOPROLOL TARTRATE 25 MG: 25 TABLET, FILM COATED ORAL at 11:04

## 2024-04-28 RX ADMIN — HYDROMORPHONE HYDROCHLORIDE 1 MG: 1 INJECTION, SOLUTION INTRAMUSCULAR; INTRAVENOUS; SUBCUTANEOUS at 11:04

## 2024-04-28 RX ADMIN — PROCHLORPERAZINE EDISYLATE 5 MG: 5 INJECTION INTRAMUSCULAR; INTRAVENOUS at 01:04

## 2024-04-28 RX ADMIN — HYDROMORPHONE HYDROCHLORIDE 1 MG: 1 INJECTION, SOLUTION INTRAMUSCULAR; INTRAVENOUS; SUBCUTANEOUS at 08:04

## 2024-04-28 RX ADMIN — LEVOTHYROXINE SODIUM 175 MCG: 150 TABLET ORAL at 06:04

## 2024-04-28 RX ADMIN — ENOXAPARIN SODIUM 40 MG: 40 INJECTION SUBCUTANEOUS at 04:04

## 2024-04-28 RX ADMIN — HYDROMORPHONE HYDROCHLORIDE 1 MG: 1 INJECTION, SOLUTION INTRAMUSCULAR; INTRAVENOUS; SUBCUTANEOUS at 04:04

## 2024-04-28 RX ADMIN — PROCHLORPERAZINE EDISYLATE 5 MG: 5 INJECTION INTRAMUSCULAR; INTRAVENOUS at 08:04

## 2024-04-28 RX ADMIN — PROCHLORPERAZINE EDISYLATE 5 MG: 5 INJECTION INTRAMUSCULAR; INTRAVENOUS at 11:04

## 2024-04-28 RX ADMIN — METOROPROLOL TARTRATE 5 MG: 5 INJECTION, SOLUTION INTRAVENOUS at 12:04

## 2024-04-28 RX ADMIN — LIOTHYRONINE SODIUM 5 MCG: 5 TABLET ORAL at 06:04

## 2024-04-28 RX ADMIN — METOROPROLOL TARTRATE 5 MG: 5 INJECTION, SOLUTION INTRAVENOUS at 06:04

## 2024-04-28 NOTE — PLAN OF CARE
Problem: Adult Inpatient Plan of Care  Goal: Plan of Care Review  Outcome: Progressing  Flowsheets (Taken 4/28/2024 0250)  Plan of Care Reviewed With:   patient   spouse  Goal: Patient-Specific Goal (Individualized)  Outcome: Progressing  Goal: Absence of Hospital-Acquired Illness or Injury  Outcome: Progressing  Intervention: Identify and Manage Fall Risk  Flowsheets (Taken 4/28/2024 0250)  Safety Promotion/Fall Prevention:   assistive device/personal item within reach   bedside commode chair   commode/urinal/bedpan at bedside   family to remain at bedside   Fall Risk reviewed with patient/family   lighting adjusted   medications reviewed   nonskid shoes/socks when out of bed   room near unit station   side rails raised x 2   instructed to call staff for mobility  Intervention: Prevent Skin Injury  Flowsheets (Taken 4/28/2024 0250)  Body Position: position changed independently  Skin Protection: incontinence pads utilized  Device Skin Pressure Protection:   absorbent pad utilized/changed   adhesive use limited   tubing/devices free from skin contact  Intervention: Prevent and Manage VTE (Venous Thromboembolism) Risk  Flowsheets (Taken 4/28/2024 0250)  VTE Prevention/Management:   ambulation promoted   bleeding risk assessed   bleeding precations maintained  Intervention: Prevent Infection  Flowsheets (Taken 4/28/2024 0250)  Infection Prevention:   environmental surveillance performed   hand hygiene promoted   rest/sleep promoted   single patient room provided       Patient awake, alert , oriented.  Pain assessed, pt denies having pain at this time.  pt educated on safety, and medication use. Pt verbalized understanding.  PRN nausea medication administered as ordered x2.   Pt on Impact Peptide 1.5 at 40 ml/hr in beginning of shift. Pt stated she was more nauseous than usual and asked for feed rate to be lowered.  RN lowered feed rate per MD order to 30 ml/hr for six hours.   Plan of care discussed with patient and  spouse, both verbalized understanding.  Bed alarm on. Call light within reach.  Bed  low and locked.

## 2024-04-28 NOTE — NURSING
On call notified via secure chat that pt had a 7 beat run of Vtach.  Pt is asymptomatic, VS stable /75  but she had just gotten back from bathroom.

## 2024-04-28 NOTE — PROGRESS NOTES
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 She initially presented to [facility] Emergency Department with six days of epigastric pain with associated nausea, vomiting, and difficulty eating due to pain. However on presentation, she was noted to have a large necrotic and purulent wound located on her right neck with complaints of associated difficulty speaking, swallowing and shortness of breath. She was noted to be hypoxic 88% on room air which improved with 2L NC. Labs were notable for leukocytosis 14, hypokalemia 2.4, hypochloremia 89, CO2 36, hypercalcemia 13.5, Phos 2.5. . Troponin 0.046. Lactate 2.3. CT Soft Tissue Neck was concerning for 9.5 x 8.2 x 10 cm large lobulated mass in the right anterolateral neck extending to the deep spaces of the neck and abutting the right prevertebral space and paravertebral musculature, left midline shift, multiple bilateral necrotic cervical lymph nodes. Case was discussed and decision was made to transfer to [facility] for higher level of care.     Of additional note, she recently transitioned her care to [place] and underwent stem-cell transplant approximately 2 weeks ago in [place], has not been seen by a US physician since 2023.      On transfer: she was afebrile, mildly hypertensive /109, HR 98, RR 20, satting 96% on room  air. Complaining of mild headache and poor appetite associated with nausea; denies fever, chills, chest pain, palpitations, SOB, abdominal pain, dysuria. States wound has been draining for the past week initially thick white now more liquid. Mild dysphonia is apparently chronic from left vocal cord paralysis and at baseline. Some dysphagia with solids, none with pureed soft or liquids, denies odynophagia. Dressing on neck CDI, ENT consulted will be here shortly to assess patient.        Overview/Hospital Course:  Evaluated 4/12 in MICU by ENT, who do not feel patient is a candidate for surgical intervention. Palliative care and oncology consulted. Failed swallow study, SLP recommending NPO 4/13. Patient continues to protect airway. Stepped down from MICU on 4/13.    DC planning: awaiting PEG placement    Interval History:   Patient tolerating TF well on 30ml/hr. Plan to increase rate further as tolerable to reach goal 50ml/hr per RD recs. Resumed home meds to be given via PEG.     Objective:     Vital Signs (Most Recent):  Temp: 98.3 °F (36.8 °C) (04/28/24 1152)  Pulse: (!) 118 (04/28/24 1153)  Resp: 17 (04/28/24 1152)  BP: 137/72 (04/28/24 1152)  SpO2: (!) 93 % (04/28/24 1153) Vital Signs (24h Range):  Temp:  [97 °F (36.1 °C)-98.9 °F (37.2 °C)] 98.3 °F (36.8 °C)  Pulse:  [] 118  Resp:  [16-22] 17  SpO2:  [89 %-98 %] 93 %  BP: (125-149)/(65-82) 137/72     Weight: 82.5 kg (181 lb 14.1 oz)  Body mass index is 29.36 kg/m².    Intake/Output Summary (Last 24 hours) at 4/28/2024 1200  Last data filed at 4/28/2024 0636  Gross per 24 hour   Intake 850 ml   Output --   Net 850 ml           Physical Exam  Vitals and nursing note reviewed.   Constitutional:       General: She is not in acute distress.     Appearance: She is ill-appearing. She is not toxic-appearing.   Eyes:      Conjunctiva/sclera: Conjunctivae normal.   Neck:      Comments: Dressing in place over entire circumference of neck. Dressing  c/d/i  Cardiovascular:      Rate and Rhythm: Regular rhythm. Tachycardia present.      Heart sounds: Normal heart sounds.   Pulmonary:      Effort: Pulmonary effort is normal. No respiratory distress.      Breath sounds: Normal breath sounds. No wheezing.   Abdominal:      General: Bowel sounds are normal. There is no distension.      Palpations: Abdomen is soft.      Tenderness: There is no abdominal tenderness. There is no guarding.      Comments: PEG tube in place   Skin:     General: Skin is warm and dry.   Neurological:      General: No focal deficit present.      Mental Status: She is alert and oriented to person, place, and time. Mental status is at baseline.   Psychiatric:         Mood and Affect: Mood normal.         Behavior: Behavior normal.               Significant Labs: All pertinent labs within the past 24 hours have been reviewed.  CBC:   Recent Labs   Lab 04/27/24  0230 04/28/24  0300   WBC 18.47* 14.53*   HGB 13.4 13.3   HCT 39.9 40.9    327     CMP:   Recent Labs   Lab 04/27/24  0230 04/28/24  0300   * 135*   K 4.0 3.9   CL 96 96   CO2 27 28   * 114*   BUN 34* 39*   CREATININE 0.7 0.7   CALCIUM 11.4* 12.4*   ANIONGAP 11 11       Significant Imaging: I have reviewed all pertinent imaging results/findings within the past 24 hours.        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 Underwent repeat bx after failed tx (1/24/22) that showed moderately differentiated squamous cell carcinoma w/ suspicion for perineural invasion. CT soft tissue neck (Jan 2022) w/o e/o metastatic dx. PET-CT (Feb 2022) w/o e/o active local or distant dx- no abnormal activity within the tongue, no signs of cervical lymph node or distant metastasis. Declined chemotherapy &/or XRT. Underwent selective neck dissection levels 1, 2, 3, and partial glossectomy w/ split-thickness  "skin graft (3/3/2022 per Dr. Clif Olson).  Surgical pathology w/ unifocal squamous cell carcinoma on the dorsal surface of the tongue on left side measuring 1 cm, moderately differentiated, 6 mm depth of invasion, no lymphovascular invasion, positive for perineural invasion, margins negative, 0 of 39 lymph nodes with metastasis, pT2 pN0 noted.  Postop course complicated by infection requiring I&D and antibiotics.  Declined adjuvant XRT. Repeat imaging (June 2023) w/ 2.2 cm lesion deep to the R SCM c/f necrotic LN w/ few adjacent pathological appearing LNs suspicious for metastasis. Underwent R neck FNA (7/7/23) w/ pathology suspicious for metastatic SCC. PET-CT (8/21/2023) w/ large necrotic mass the angle of mandible posterior to submandibular gland on the right measuring 4.4 x 4.2 cm with SUV 9.78, just superior to the mass is a 2 cm hypermetabolic lymph node and no evidence of distant metastatic disease. Subsequently transferred care to [place] where she reportedly recently abx,"detox", hyperbaric chamber, & stem-cell transplant approximately 2 weeks ago PTA in [place]. Now presenting w/ progressive neck wound. CT neck soft tissue (4/10/24) w/ large bulky lobulated appearing mass right lateral and anterolateral neck extending to the D spaces of the neck having overall dimensions of approximately 9.5 x 8.2 x 10 cm, multiple addn'l b/l cervical necrotic nodes, & assoc mass effect results in displacement of midline structures to the patient's left. CT C/A/P with diffuse metastatic disease including mediastinal & hilar LAD, bilateral lungs, peritoneum, lumbar vertebrae, & likely liver.     - ENT consulted; appreciate recs - not surgical candidate for debridement  - Medical & Radiation Oncology consulted - pt declines chemo or XRT  - Palliative following; appreciate assistance  - Multimodal analgesia   - SLP following,remains NPO  - Gen surgery and ENT consulted. Unable to do do tracheostomy. Rec consulting IR for " PEG  - Consulted IR for PEG placement.      PEG (percutaneous endoscopic gastrostomy) status  Patient noted to have a percutaneous endoscopic gastrostomy tube in place. I have personally inspected the tube.Tube was placed on this admission, with date of procedure- 4/26  There are no signs of drainage or infection around the site. Routine care to be done by wound care and nursing staff.     Start tube feeds on 2/27 with 10ml /hr. Increase rate by 10ml every hour as tolerable until reach goal 50ml/hr.    Consulted RD  Stop TPN and remove NGT       Sinus tachycardia  Metoprolol recently discontinued, possible withdrawal  Resume IV metoprolol  Correct electrolyte derangements  Pain control          Severe malnutrition  PEG tube placement is being discussed. IR and GI deferred the procedure. Surgery depending on anesthesia assessment. ENT updated with recs. Awaiting final recs.       Nutrition consulted. Most recent weight and BMI monitored-     Measurements:  Wt Readings from Last 1 Encounters:   04/24/24 86.2 kg (190 lb 0.6 oz)   Body mass index is 30.67 kg/m².    Patient has been screened and assessed by RD.    Malnutrition Type:  Context: chronic illness  Level: severe    Malnutrition Characteristic Summary:  Weight Loss (Malnutrition): greater than 10% in 6 months  Energy Intake (Malnutrition): less than or equal to 50% for greater than or equal to 1 month          Hypercalcemia  On arrival to OSH, Ca 13.5 --> 11.0 on arrival to [facility]. Continued subsequent uptrend peaking at 13.1 on 4/15 (corrected Ca 14.5). Likely 2/2 malignancy.   - IVF  - Calcitonin BID x2 days  - Ionized Ca 1.59 4/21  - Monitor CMP  - IV hydration and cont to trend Ca level   - Ionized calcium WNL. Cont to monitor        Palliative care encounter  Daily GOC discussions  Pt and family would like to proceed with aggressive care  Cont FULL code  Planning for PEG      Hypokalemia  Patient has hypokalemia which is Acute and currently controlled. Most  "recent potassium levels reviewed-   Lab Results   Component Value Date    K 3.5 04/24/2024   . Will continue potassium replacement per protocol and recheck repeat levels after replacement completed.     Open neck wound  Pt with extensive hx of H/N cancer. Presents w/ continued/worsening drainage from the neck for months with recent worsening of symptoms for past 2 weeks w/ N/V. Pt returned from [place] where she received antibiotics, "detox" and hyperbaric chamber as treatment. Endorses difficulty with swallowing 2/2 to oral trush. Large neck mass on imaging w/ large central collection of air which appears to extend superficially to the skin surface right mid neck anterior laterally; unable to r/o superimposed abscess. Meeting 2/4 SIRS (HR & WBC) w/ elevated lactate, but nml BP. Blood cx negative. Pt not surgical candidate for debridement per ENT evaluation.   - ID consulted; appreciate recs  - Surgical debridement is not an option. Has completed course of antibiotics.      Sepsis  On arrival to [facility], meeting 2/4 SIRS (requiring 2L NC w/ RR 20s & WBC 13). Likely soft tissue/neck source. Received IVF at OSH prior to transfer (for lactate 2.3 at that time). Started on broad-spectrum abx. Blood cx negative.   - ID consulted; appreciate recs  - Monitor CBC & fever curve  - Mgmt of neck mass/wound as below   - Has completed treatment. Discontinue vancomycin + Flagyl + fluconazole     Postprocedural hypoparathyroidism  Last PTHi nml at 26 (2016). Home regimen includes calcium carbonate & vitamin D. Symptomatic hypercalcemia (Ca 13.5) on presentation to OSH w/ abdominal pain & N/V. Improved w/ NS ggt (although query if pt's hypercalcemia was re: hx of hypoparathyroidism vs hypercalcemia of malignancy.   - Holding home calcium carbonate  - Resume home vitamin D as PO tolerance improves   - Monitor CMP    GERD (gastroesophageal reflux disease)        Postsurgical hypothyroidism  Last TSH elevated at 5.264. Home regimen " includes levothyroxine 175mcg daily & liothyronine 5mcg daily  - Continue home liothyronine & levothyroxine       HTN (hypertension)  Hx of essential HTN; home regimen includes metoprolol 25mg BID. [place] [facility] 140s-150s.   PRN labetalol/hydralazine      VTE Risk Mitigation (From admission, onward)           Ordered     enoxaparin injection 40 mg  Every 24 hours         04/12/24 1536     IP VTE HIGH RISK PATIENT  Once         04/12/24 1324     Place sequential compression device  Until discontinued         04/12/24 1324                    Discharge Planning   ANDRES: 4/29/2024     Code Status: Full Code   Is the patient medically ready for discharge?: No    Reason for patient still in hospital (select all that apply): Patient trending condition  Discharge Plan A: Hospice/home                  Carissa Young MD  Department of Hospital Medicine   [facility] - Telemetry Stepdown

## 2024-04-28 NOTE — ASSESSMENT & PLAN NOTE
Patient noted to have a percutaneous endoscopic gastrostomy tube in place. I have personally inspected the tube.Tube was placed on this admission, with date of procedure- 4/26  There are no signs of drainage or infection around the site. Routine care to be done by wound care and nursing staff.     Start tube feeds on 2/27 with 10ml /hr. Increase rate by 10ml every hour as tolerable until reach goal 50ml/hr.    Consulted RD  Stop TPN and remove NGT

## 2024-04-28 NOTE — PLAN OF CARE
Problem: Adult Inpatient Plan of Care  Goal: Plan of Care Review  Outcome: Progressing  Goal: Patient-Specific Goal (Individualized)  Outcome: Progressing  Goal: Absence of Hospital-Acquired Illness or Injury  Outcome: Progressing  Goal: Optimal Comfort and Wellbeing  Outcome: Progressing  Goal: Readiness for Transition of Care  Outcome: Progressing   NGT/TPN/Lipids d/shayla. TF started with 50/hr as goal. Pain management.

## 2024-04-28 NOTE — SUBJECTIVE & OBJECTIVE
Interval History:   Patient tolerating TF well on 30ml/hr. Plan to increase rate further as tolerable to reach goal 50ml/hr per RD recs. Resumed home meds to be given via PEG.     Objective:     Vital Signs (Most Recent):  Temp: 98.3 °F (36.8 °C) (04/28/24 1152)  Pulse: (!) 118 (04/28/24 1153)  Resp: 17 (04/28/24 1152)  BP: 137/72 (04/28/24 1152)  SpO2: (!) 93 % (04/28/24 1153) Vital Signs (24h Range):  Temp:  [97 °F (36.1 °C)-98.9 °F (37.2 °C)] 98.3 °F (36.8 °C)  Pulse:  [] 118  Resp:  [16-22] 17  SpO2:  [89 %-98 %] 93 %  BP: (125-149)/(65-82) 137/72     Weight: 82.5 kg (181 lb 14.1 oz)  Body mass index is 29.36 kg/m².    Intake/Output Summary (Last 24 hours) at 4/28/2024 1200  Last data filed at 4/28/2024 0636  Gross per 24 hour   Intake 850 ml   Output --   Net 850 ml           Physical Exam  Vitals and nursing note reviewed.   Constitutional:       General: She is not in acute distress.     Appearance: She is ill-appearing. She is not toxic-appearing.   Eyes:      Conjunctiva/sclera: Conjunctivae normal.   Neck:      Comments: Dressing in place over entire circumference of neck. Dressing c/d/i  Cardiovascular:      Rate and Rhythm: Regular rhythm. Tachycardia present.      Heart sounds: Normal heart sounds.   Pulmonary:      Effort: Pulmonary effort is normal. No respiratory distress.      Breath sounds: Normal breath sounds. No wheezing.   Abdominal:      General: Bowel sounds are normal. There is no distension.      Palpations: Abdomen is soft.      Tenderness: There is no abdominal tenderness. There is no guarding.      Comments: PEG tube in place   Skin:     General: Skin is warm and dry.   Neurological:      General: No focal deficit present.      Mental Status: She is alert and oriented to person, place, and time. Mental status is at baseline.   Psychiatric:         Mood and Affect: Mood normal.         Behavior: Behavior normal.               Significant Labs: All pertinent labs within the past 24  hours have been reviewed.  CBC:   Recent Labs   Lab 04/27/24  0230 04/28/24  0300   WBC 18.47* 14.53*   HGB 13.4 13.3   HCT 39.9 40.9    327     CMP:   Recent Labs   Lab 04/27/24  0230 04/28/24  0300   * 135*   K 4.0 3.9   CL 96 96   CO2 27 28   * 114*   BUN 34* 39*   CREATININE 0.7 0.7   CALCIUM 11.4* 12.4*   ANIONGAP 11 11       Significant Imaging: I have reviewed all pertinent imaging results/findings within the past 24 hours.

## 2024-04-29 LAB
ALBUMIN SERPL BCP-MCNC: 2.4 G/DL (ref 3.5–5.2)
ALP SERPL-CCNC: 227 U/L (ref 55–135)
ALT SERPL W/O P-5'-P-CCNC: 60 U/L (ref 10–44)
ANION GAP SERPL CALC-SCNC: 11 MMOL/L (ref 8–16)
ANION GAP SERPL CALC-SCNC: 11 MMOL/L (ref 8–16)
ANION GAP SERPL CALC-SCNC: 8 MMOL/L (ref 8–16)
AST SERPL-CCNC: 37 U/L (ref 10–40)
BASOPHILS # BLD AUTO: 0.03 K/UL (ref 0–0.2)
BASOPHILS NFR BLD: 0.2 % (ref 0–1.9)
BILIRUB SERPL-MCNC: 0.5 MG/DL (ref 0.1–1)
BUN SERPL-MCNC: 30 MG/DL (ref 6–20)
BUN SERPL-MCNC: 37 MG/DL (ref 6–20)
BUN SERPL-MCNC: 37 MG/DL (ref 6–20)
CALCIUM SERPL-MCNC: 11.9 MG/DL (ref 8.7–10.5)
CALCIUM SERPL-MCNC: 13.3 MG/DL (ref 8.7–10.5)
CALCIUM SERPL-MCNC: 13.3 MG/DL (ref 8.7–10.5)
CHLORIDE SERPL-SCNC: 94 MMOL/L (ref 95–110)
CHLORIDE SERPL-SCNC: 94 MMOL/L (ref 95–110)
CHLORIDE SERPL-SCNC: 97 MMOL/L (ref 95–110)
CO2 SERPL-SCNC: 31 MMOL/L (ref 23–29)
CO2 SERPL-SCNC: 31 MMOL/L (ref 23–29)
CO2 SERPL-SCNC: 32 MMOL/L (ref 23–29)
CREAT SERPL-MCNC: 0.7 MG/DL (ref 0.5–1.4)
CREAT SERPL-MCNC: 0.8 MG/DL (ref 0.5–1.4)
CREAT SERPL-MCNC: 0.8 MG/DL (ref 0.5–1.4)
DIFFERENTIAL METHOD BLD: ABNORMAL
EOSINOPHIL # BLD AUTO: 0 K/UL (ref 0–0.5)
EOSINOPHIL NFR BLD: 0 % (ref 0–8)
ERYTHROCYTE [DISTWIDTH] IN BLOOD BY AUTOMATED COUNT: 15.3 % (ref 11.5–14.5)
EST. GFR  (NO RACE VARIABLE): >60 ML/MIN/1.73 M^2
GLUCOSE SERPL-MCNC: 125 MG/DL (ref 70–110)
GLUCOSE SERPL-MCNC: 125 MG/DL (ref 70–110)
GLUCOSE SERPL-MCNC: 160 MG/DL (ref 70–110)
HCT VFR BLD AUTO: 39.9 % (ref 37–48.5)
HGB BLD-MCNC: 12.9 G/DL (ref 12–16)
IMM GRANULOCYTES # BLD AUTO: 0.09 K/UL (ref 0–0.04)
IMM GRANULOCYTES NFR BLD AUTO: 0.6 % (ref 0–0.5)
LYMPHOCYTES # BLD AUTO: 2.6 K/UL (ref 1–4.8)
LYMPHOCYTES NFR BLD: 15.9 % (ref 18–48)
MAGNESIUM SERPL-MCNC: 1.6 MG/DL (ref 1.6–2.6)
MCH RBC QN AUTO: 27.7 PG (ref 27–31)
MCHC RBC AUTO-ENTMCNC: 32.3 G/DL (ref 32–36)
MCV RBC AUTO: 86 FL (ref 82–98)
MONOCYTES # BLD AUTO: 1.2 K/UL (ref 0.3–1)
MONOCYTES NFR BLD: 7.6 % (ref 4–15)
NEUTROPHILS # BLD AUTO: 12.3 K/UL (ref 1.8–7.7)
NEUTROPHILS NFR BLD: 75.7 % (ref 38–73)
NRBC BLD-RTO: 0 /100 WBC
PHOSPHATE SERPL-MCNC: 3.3 MG/DL (ref 2.7–4.5)
PLATELET # BLD AUTO: 368 K/UL (ref 150–450)
PMV BLD AUTO: 10.8 FL (ref 9.2–12.9)
POTASSIUM SERPL-SCNC: 3.2 MMOL/L (ref 3.5–5.1)
POTASSIUM SERPL-SCNC: 3.7 MMOL/L (ref 3.5–5.1)
POTASSIUM SERPL-SCNC: 3.7 MMOL/L (ref 3.5–5.1)
PROT SERPL-MCNC: 6.2 G/DL (ref 6–8.4)
RBC # BLD AUTO: 4.66 M/UL (ref 4–5.4)
SODIUM SERPL-SCNC: 136 MMOL/L (ref 136–145)
SODIUM SERPL-SCNC: 136 MMOL/L (ref 136–145)
SODIUM SERPL-SCNC: 137 MMOL/L (ref 136–145)
TRIGL SERPL-MCNC: 265 MG/DL (ref 30–150)
WBC # BLD AUTO: 16.18 K/UL (ref 3.9–12.7)

## 2024-04-29 PROCEDURE — 80053 COMPREHEN METABOLIC PANEL: CPT | Performed by: STUDENT IN AN ORGANIZED HEALTH CARE EDUCATION/TRAINING PROGRAM

## 2024-04-29 PROCEDURE — 25000003 PHARM REV CODE 250: Performed by: INTERNAL MEDICINE

## 2024-04-29 PROCEDURE — 63600175 PHARM REV CODE 636 W HCPCS: Performed by: STUDENT IN AN ORGANIZED HEALTH CARE EDUCATION/TRAINING PROGRAM

## 2024-04-29 PROCEDURE — 80048 BASIC METABOLIC PNL TOTAL CA: CPT | Mod: XB | Performed by: STUDENT IN AN ORGANIZED HEALTH CARE EDUCATION/TRAINING PROGRAM

## 2024-04-29 PROCEDURE — 84478 ASSAY OF TRIGLYCERIDES: CPT | Performed by: INTERNAL MEDICINE

## 2024-04-29 PROCEDURE — 36415 COLL VENOUS BLD VENIPUNCTURE: CPT | Performed by: STUDENT IN AN ORGANIZED HEALTH CARE EDUCATION/TRAINING PROGRAM

## 2024-04-29 PROCEDURE — 85025 COMPLETE CBC W/AUTO DIFF WBC: CPT | Performed by: STUDENT IN AN ORGANIZED HEALTH CARE EDUCATION/TRAINING PROGRAM

## 2024-04-29 PROCEDURE — 20600001 HC STEP DOWN PRIVATE ROOM

## 2024-04-29 PROCEDURE — 84100 ASSAY OF PHOSPHORUS: CPT | Performed by: STUDENT IN AN ORGANIZED HEALTH CARE EDUCATION/TRAINING PROGRAM

## 2024-04-29 PROCEDURE — 25000003 PHARM REV CODE 250: Performed by: HOSPITALIST

## 2024-04-29 PROCEDURE — 25000003 PHARM REV CODE 250: Performed by: STUDENT IN AN ORGANIZED HEALTH CARE EDUCATION/TRAINING PROGRAM

## 2024-04-29 PROCEDURE — 83735 ASSAY OF MAGNESIUM: CPT | Performed by: STUDENT IN AN ORGANIZED HEALTH CARE EDUCATION/TRAINING PROGRAM

## 2024-04-29 PROCEDURE — 63600175 PHARM REV CODE 636 W HCPCS: Performed by: INTERNAL MEDICINE

## 2024-04-29 PROCEDURE — A4216 STERILE WATER/SALINE, 10 ML: HCPCS | Performed by: STUDENT IN AN ORGANIZED HEALTH CARE EDUCATION/TRAINING PROGRAM

## 2024-04-29 PROCEDURE — 63600175 PHARM REV CODE 636 W HCPCS: Performed by: HOSPITALIST

## 2024-04-29 RX ORDER — CALCITONIN SALMON 200 [USP'U]/ML
4 INJECTION, SOLUTION INTRAMUSCULAR; SUBCUTANEOUS 2 TIMES DAILY
Status: COMPLETED | OUTPATIENT
Start: 2024-04-29 | End: 2024-04-30

## 2024-04-29 RX ORDER — SODIUM CHLORIDE 9 MG/ML
INJECTION, SOLUTION INTRAVENOUS CONTINUOUS
Status: DISCONTINUED | OUTPATIENT
Start: 2024-04-29 | End: 2024-05-01

## 2024-04-29 RX ADMIN — ENOXAPARIN SODIUM 40 MG: 40 INJECTION SUBCUTANEOUS at 05:04

## 2024-04-29 RX ADMIN — METOPROLOL TARTRATE 25 MG: 25 TABLET, FILM COATED ORAL at 09:04

## 2024-04-29 RX ADMIN — HYDROMORPHONE HYDROCHLORIDE 1 MG: 1 INJECTION, SOLUTION INTRAMUSCULAR; INTRAVENOUS; SUBCUTANEOUS at 03:04

## 2024-04-29 RX ADMIN — HYDROMORPHONE HYDROCHLORIDE 1 MG: 1 INJECTION, SOLUTION INTRAMUSCULAR; INTRAVENOUS; SUBCUTANEOUS at 09:04

## 2024-04-29 RX ADMIN — LIOTHYRONINE SODIUM 5 MCG: 5 TABLET ORAL at 06:04

## 2024-04-29 RX ADMIN — HYDROMORPHONE HYDROCHLORIDE 1 MG: 1 INJECTION, SOLUTION INTRAMUSCULAR; INTRAVENOUS; SUBCUTANEOUS at 05:04

## 2024-04-29 RX ADMIN — Medication 10 ML: at 06:04

## 2024-04-29 RX ADMIN — SODIUM CHLORIDE 1000 ML: 9 INJECTION, SOLUTION INTRAVENOUS at 09:04

## 2024-04-29 RX ADMIN — Medication 10 ML: at 12:04

## 2024-04-29 RX ADMIN — PROCHLORPERAZINE EDISYLATE 5 MG: 5 INJECTION INTRAMUSCULAR; INTRAVENOUS at 09:04

## 2024-04-29 RX ADMIN — SODIUM CHLORIDE: 9 INJECTION, SOLUTION INTRAVENOUS at 05:04

## 2024-04-29 RX ADMIN — LEVOTHYROXINE SODIUM 175 MCG: 150 TABLET ORAL at 06:04

## 2024-04-29 RX ADMIN — PROMETHAZINE HYDROCHLORIDE 12.5 MG: 25 INJECTION INTRAMUSCULAR; INTRAVENOUS at 12:04

## 2024-04-29 RX ADMIN — SENNOSIDES 8.6 MG: 8.6 TABLET, FILM COATED ORAL at 09:04

## 2024-04-29 RX ADMIN — CALCITONIN SALMON 330 UNITS: 200 INJECTION, SOLUTION INTRAMUSCULAR; SUBCUTANEOUS at 09:04

## 2024-04-29 RX ADMIN — PROCHLORPERAZINE EDISYLATE 5 MG: 5 INJECTION INTRAMUSCULAR; INTRAVENOUS at 05:04

## 2024-04-29 RX ADMIN — PROMETHAZINE HYDROCHLORIDE 12.5 MG: 25 INJECTION INTRAMUSCULAR; INTRAVENOUS at 03:04

## 2024-04-29 RX ADMIN — ZOLEDRONIC ACID 4 MG: 4 INJECTION, SOLUTION, CONCENTRATE INTRAVENOUS at 09:04

## 2024-04-29 NOTE — PLAN OF CARE
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 Transferred to MICU at [facility] for ENT evaluation. No stridor, change in chronic mild dysphonia, able to tolerate liquids and pureed with subjective mild dysphagia of solids. Pt taking pills and liquids this morning at OSH, tolerated well. No stridor, normal breath sounds on exam.    Plan:  -- Will let ENT evaluate, possibly speech for MBSS  -- Low concern for airway compromise at this time        Hypokalemia [E87.6]  Yes    Sepsis [A41.9]  Yes    Open neck wound [S11.90XA]  Yes    Postprocedural hypoparathyroidism [E89.2]  Yes    Postsurgical hypothyroidism [E89.0]  Yes    GERD (gastroesophageal reflux disease) [K21.9]  Yes    HTN (hypertension) [I10]  Yes      Resolved Hospital Problems   No resolved problems to display.       Hospice Qualifying Diagnoses:        Patient has a life expectancy < 6 months due to:  Primary Hospice Diagnosis:    SCC of the tongue   Comorbid Conditions Contributing to Decline:    left vocal cord paralysis, GERD, Hypertension, obesity, post-surgical hypothyroidism and hypoparathyroidism     Vital Signs: Routine per Hospice Protocol.    Code Status:  FULL    Allergies:   Review of patient's allergies indicates:   Allergen Reactions    Ciprofloxacin Swelling    Codeine Swelling    Opioids - morphine analogues     Pcn [penicillins] Hives     Tolerated cefepime 04/2024    Percocet [oxycodone-acetaminophen] Swelling       Diet:   Impact Peptide - 5 cartons/day (equals to 1875 kcals, 118 g of protein, 965 mL fluid. )    Activities: As tolerated    Goals of Care   Code Status: Full Code      Nursing: Per Hospice Routine.    PEG Care:  Clean site daily.  Monitor skin integrity.  Routine Skin for Bedridden Patients: Apply moisture barrier cream to all skin folds and   wet areas in perineal area daily and after baths and all bowel movements.           Medication List        CONTINUE taking these medications      flaxseed 1,000 mg Cap  Take by mouth Daily.     HYDROcodone-acetaminophen 5-325 mg per tablet  Commonly known as: NORCO     levothyroxine 175 MCG tablet  Commonly known as: SYNTHROID  Take 1 tablet (175 mcg total) by mouth before breakfast. Take on an empty stomach. Wait 4 hours after taking LT4 to take any multivitamins or nutritional supplements and wait 1 hour to take other medications.     liothyronine 5 MCG Tab  Commonly known as: CYTOMEL  Take 1 tablet (5 mcg total) by mouth before breakfast. Take on an empty stomach. Wait 4 hours after taking T3 to take any multivitamins or nutritional supplements and wait 1 hour to take other medications.     metoprolol tartrate 25 MG tablet  Commonly known as: LOPRESSOR  Take 1 tablet (25 mg total) by mouth 2 (two) times daily.     multivitamin capsule  Take 1 capsule by mouth once daily.     ondansetron 4 MG Tbdl  Commonly known as: ZOFRAN-ODT  DISSOLVE 1 TABLET (4 MG TOTAL) BY MOUTH EVERY 8 (EIGHT) HOURS AS NEEDED.            STOP taking these medications      calcium carbonate 300 mg (750 mg) Chew  Commonly known as: CALCIUM ANTACID     cholecalciferol (vitamin D3) 25 mcg (1,000 unit) capsule  Commonly known as:  VITAMIN D3                    Future Orders:  Hospice Medical Director may dictate new orders for comfortable care measures & sign death certificate.        _________________________________  Carissa Young MD  04/29/2024

## 2024-04-29 NOTE — PLAN OF CARE
Updated home hospice orders sent to [facility]. Patient is expected to discharge tomorrow. Will continue to follow.    11:30AM  CM spoke with Lydia at [facility]  to discuss patient's discharge plan. She informed this CM that she would contact the family to make arrangements for equipment delivery.    Nisha Hightower RN  Ext [phone]

## 2024-04-29 NOTE — ASSESSMENT & PLAN NOTE
On arrival to OSH, Ca 13.5 --> 11.0 on arrival to [facility]. Continued subsequent uptrend peaking at 13.1 on 4/15 (corrected Ca 14.5). Likely 2/2 malignancy.   - IVF  - Calcitonin BID x2 days  - Ionized Ca 1.59 4/21  - Monitor CMP  - IV hydration and cont to trend Ca level   - Ionized calcium WNL. Cont to monitor    - Serum calcium again noted to be 13.3 on 4/29. Received IVF, Calcitonin and zolidronic acid. Continue IVF.   - D/c home calcium and vit D supplementation on d/c

## 2024-04-29 NOTE — PLAN OF CARE
Problem: Adult Inpatient Plan of Care  Goal: Plan of Care Review  Outcome: Progressing  Goal: Patient-Specific Goal (Individualized)  Outcome: Progressing  Goal: Absence of Hospital-Acquired Illness or Injury  Outcome: Progressing  Intervention: Identify and Manage Fall Risk  Flowsheets (Taken 4/29/2024 0252)  Safety Promotion/Fall Prevention:   assistive device/personal item within reach   bed alarm refused   commode/urinal/bedpan at bedside   Fall Risk reviewed with patient/family   family to remain at bedside   lighting adjusted   medications reviewed   nonskid shoes/socks when out of bed   room near unit station   side rails raised x 2   instructed to call staff for mobility  Intervention: Prevent Skin Injury  Flowsheets (Taken 4/29/2024 0252)  Body Position: position changed independently  Skin Protection: incontinence pads utilized  Device Skin Pressure Protection: absorbent pad utilized/changed  Intervention: Prevent and Manage VTE (Venous Thromboembolism) Risk  Flowsheets (Taken 4/29/2024 0252)  VTE Prevention/Management: bleeding risk assessed  Intervention: Prevent Infection  Flowsheets (Taken 4/29/2024 0252)  Infection Prevention:   environmental surveillance performed   hand hygiene promoted   rest/sleep promoted   single patient room provided       atient awake, alert , oriented.  Pain assessed, PRN pain medication administered as ordered.    pt  and spouse educated on safety, and medication use. Pt verbalized understanding.  PRN nausea medication administered as ordered.   Pt on Impact Peptide 1.5 at 30 ml/hr, will increase as tolerated. Pt noted to sat 87% on room air, pt placed on 2L O2 per nasal cannula.  Pt sats 95-98% on 2L. Plan of care discussed with patient and spouse, both verbalized understanding.  Bed alarm refused. Call light within reach.  Bed  low and locked.

## 2024-04-29 NOTE — PLAN OF CARE
Problem: Adult Inpatient Plan of Care  Goal: Plan of Care Review  Outcome: Progressing  Goal: Patient-Specific Goal (Individualized)  Outcome: Progressing  Goal: Absence of Hospital-Acquired Illness or Injury  Outcome: Progressing  Goal: Optimal Comfort and Wellbeing  Outcome: Progressing  Goal: Readiness for Transition of Care  Outcome: Progressing     Problem: Adjustment to Illness (Sepsis/Septic Shock)  Goal: Optimal Coping  Outcome: Progressing     Problem: Bleeding (Sepsis/Septic Shock)  Goal: Absence of Bleeding  Outcome: Progressing     Problem: Glycemic Control Impaired (Sepsis/Septic Shock)  Goal: Blood Glucose Level Within Desired Range  Outcome: Progressing   TF restarted at 0730 at 30 cc/hr. Held for nausea, abdominal full 1330. Recheck 1630 5 cc residual and restart at 20 cc/hr. Pain management with Dilaudid and nausea controlled with Compazine and Phenergan x1

## 2024-04-29 NOTE — SUBJECTIVE & OBJECTIVE
Interval History:   Patient reported to have nausea with vomiting this morning. Hold TF for now. Resume after 4 hrs at a rate of 10ml/hr and increase rate as tolerable. Noted to be hypercalcemic with Ca13.3 with am labs. Received IVF, Calcitonin and zolidronic acid. Continue IVF.          Objective:     Vital Signs (Most Recent):  Temp: 97.9 °F (36.6 °C) (04/29/24 1520)  Pulse: 102 (04/29/24 1520)  Resp: 18 (04/29/24 1520)  BP: (!) 161/98 (04/29/24 1520)  SpO2: 98 % (04/29/24 1520) Vital Signs (24h Range):  Temp:  [97.4 °F (36.3 °C)-99.1 °F (37.3 °C)] 97.9 °F (36.6 °C)  Pulse:  [102-116] 102  Resp:  [16-20] 18  SpO2:  [87 %-98 %] 98 %  BP: (109-169)/(61-98) 161/98     Weight: 82.6 kg (182 lb 1.6 oz)  Body mass index is 29.39 kg/m².    Intake/Output Summary (Last 24 hours) at 4/29/2024 1547  Last data filed at 4/28/2024 2315  Gross per 24 hour   Intake 160 ml   Output 80 ml   Net 80 ml           Physical Exam  Vitals and nursing note reviewed.   Constitutional:       General: She is not in acute distress.     Appearance: She is ill-appearing. She is not toxic-appearing.   Eyes:      Conjunctiva/sclera: Conjunctivae normal.   Neck:      Comments: Dressing in place over entire circumference of neck. Dressing c/d/i  Cardiovascular:      Rate and Rhythm: Regular rhythm. Tachycardia present.      Heart sounds: Normal heart sounds.   Pulmonary:      Effort: Pulmonary effort is normal. No respiratory distress.      Breath sounds: Normal breath sounds. No wheezing.   Abdominal:      General: Bowel sounds are normal. There is no distension.      Palpations: Abdomen is soft.      Tenderness: There is no abdominal tenderness. There is no guarding.      Comments: PEG tube in place   Skin:     General: Skin is warm and dry.   Neurological:      General: No focal deficit present.      Mental Status: She is alert and oriented to person, place, and time. Mental status is at baseline.   Psychiatric:         Mood and Affect: Mood  normal.         Behavior: Behavior normal.               Significant Labs: All pertinent labs within the past 24 hours have been reviewed.  CBC:   Recent Labs   Lab 04/28/24 0300 04/29/24 0452   WBC 14.53* 16.18*   HGB 13.3 12.9   HCT 40.9 39.9    368     CMP:   Recent Labs   Lab 04/28/24 0300 04/29/24 0452 04/29/24  1246   * 136  136 137   K 3.9 3.7  3.7 3.2*   CL 96 94*  94* 97   CO2 28 31*  31* 32*   * 125*  125* 160*   BUN 39* 37*  37* 30*   CREATININE 0.7 0.8  0.8 0.7   CALCIUM 12.4* 13.3*  13.3* 11.9*   PROT  --  6.2  --    ALBUMIN  --  2.4*  --    BILITOT  --  0.5  --    ALKPHOS  --  227*  --    AST  --  37  --    ALT  --  60*  --    ANIONGAP 11 11  11 8       Significant Imaging: I have reviewed all pertinent imaging results/findings within the past 24 hours.      Review of Systems

## 2024-04-29 NOTE — NURSING
Notified on call via secure chat of pt O2 sats were 87% on room air. RN put pt on oxygen 2L  Pt  is currently satting 95% on 2L per nasal cannula. Pt is on continuous pulse ox.

## 2024-04-29 NOTE — PROGRESS NOTES
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 She initially presented to [facility] Department with six days of epigastric pain with associated nausea, vomiting, and difficulty eating due to pain. However on presentation, she was noted to have a large necrotic and purulent wound located on her right neck with complaints of associated difficulty speaking, swallowing and shortness of breath. She was noted to be hypoxic 88% on room air which improved with 2L NC. Labs were notable for leukocytosis 14, hypokalemia 2.4, hypochloremia 89, CO2 36, hypercalcemia 13.5, Phos 2.5. . Troponin 0.046. Lactate 2.3. CT Soft Tissue Neck was concerning for 9.5 x 8.2 x 10 cm large lobulated mass in the right anterolateral neck extending to the deep spaces of the neck and abutting the right prevertebral space and paravertebral musculature, left midline shift, multiple bilateral necrotic cervical lymph nodes. Case was discussed and decision was made to [place] [facility] [place].     Of additional note, she recently transitioned her care to [place] and underwent stem-cell transplant approximately 2 weeks ago in [place], has not been seen by a [place] physician since 2023.      On transfer: she was afebrile, mildly hypertensive /109, HR 98, RR 20, satting 96% on room  air. Complaining of mild headache and poor appetite associated with nausea; denies fever, chills, chest pain, palpitations, SOB, abdominal pain, dysuria. States wound has been draining for the past week initially thick white now more liquid. Mild dysphonia is apparently chronic from left vocal cord paralysis and at baseline. Some dysphagia with solids, none with pureed soft or liquids, denies odynophagia. Dressing on neck CDI, ENT consulted will be here shortly to assess patient.        Overview/Hospital Course:  Evaluated 4/12 in MICU by ENT, who do not feel patient is a candidate for surgical intervention. Palliative care and oncology consulted. Failed swallow study, SLP recommending NPO 4/13. Patient continues to protect airway. Stepped down from MICU on 4/13.    DC planning: awaiting PEG placement    Interval History:   Patient reported to have nausea with vomiting this morning. Hold TF for now. Resume after 4 hrs at a rate of 10ml/hr and increase rate as tolerable. Noted to be hypercalcemic with Ca13.3 with am labs. Received IVF, Calcitonin and zolidronic acid. Continue IVF.          Objective:     Vital Signs (Most Recent):  Temp: 97.9 °F (36.6 °C) (04/29/24 1520)  Pulse: 102 (04/29/24 1520)  Resp: 18 (04/29/24 1520)  BP: (!) 161/98 (04/29/24 1520)  SpO2: 98 % (04/29/24 1520) Vital Signs (24h Range):  Temp:  [97.4 °F (36.3 °C)-99.1 °F (37.3 °C)] 97.9 °F (36.6 °C)  Pulse:  [102-116] 102  Resp:  [16-20] 18  SpO2:  [87 %-98 %] 98 %  BP: (109-169)/(61-98) 161/98     Weight: 82.6 kg (182 lb 1.6 oz)  Body mass index is 29.39 kg/m².    Intake/Output Summary (Last 24 hours) at 4/29/2024 1547  Last data filed at 4/28/2024 2315  Gross per 24 hour   Intake 160 ml   Output 80 ml   Net 80 ml           Physical Exam  Vitals and nursing note reviewed.   Constitutional:       General: She is not in acute distress.     Appearance: She is ill-appearing. She is not toxic-appearing.   Eyes:      Conjunctiva/sclera: Conjunctivae  normal.   Neck:      Comments: Dressing in place over entire circumference of neck. Dressing c/d/i  Cardiovascular:      Rate and Rhythm: Regular rhythm. Tachycardia present.      Heart sounds: Normal heart sounds.   Pulmonary:      Effort: Pulmonary effort is normal. No respiratory distress.      Breath sounds: Normal breath sounds. No wheezing.   Abdominal:      General: Bowel sounds are normal. There is no distension.      Palpations: Abdomen is soft.      Tenderness: There is no abdominal tenderness. There is no guarding.      Comments: PEG tube in place   Skin:     General: Skin is warm and dry.   Neurological:      General: No focal deficit present.      Mental Status: She is alert and oriented to person, place, and time. Mental status is at baseline.   Psychiatric:         Mood and Affect: Mood normal.         Behavior: Behavior normal.               Significant Labs: All pertinent labs within the past 24 hours have been reviewed.  CBC:   Recent Labs   Lab 04/28/24 0300 04/29/24 0452   WBC 14.53* 16.18*   HGB 13.3 12.9   HCT 40.9 39.9    368     CMP:   Recent Labs   Lab 04/28/24 0300 04/29/24 0452 04/29/24  1246   * 136  136 137   K 3.9 3.7  3.7 3.2*   CL 96 94*  94* 97   CO2 28 31*  31* 32*   * 125*  125* 160*   BUN 39* 37*  37* 30*   CREATININE 0.7 0.8  0.8 0.7   CALCIUM 12.4* 13.3*  13.3* 11.9*   PROT  --  6.2  --    ALBUMIN  --  2.4*  --    BILITOT  --  0.5  --    ALKPHOS  --  227*  --    AST  --  37  --    ALT  --  60*  --    ANIONGAP 11 11  11 8       Significant Imaging: I have reviewed all pertinent imaging results/findings within the past 24 hours.      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 Underwent repeat bx after failed tx (1/24/22) that showed moderately differentiated squamous cell carcinoma w/ suspicion for  "perineural invasion. CT soft tissue neck (Jan 2022) w/o e/o metastatic dx. PET-CT (Feb 2022) w/o e/o active local or distant dx- no abnormal activity within the tongue, no signs of cervical lymph node or distant metastasis. Declined chemotherapy &/or XRT. Underwent selective neck dissection levels 1, 2, 3, and partial glossectomy w/ split-thickness skin graft (3/3/2022 per Dr. Clif Olson).  Surgical pathology w/ unifocal squamous cell carcinoma on the dorsal surface of the tongue on left side measuring 1 cm, moderately differentiated, 6 mm depth of invasion, no lymphovascular invasion, positive for perineural invasion, margins negative, 0 of 39 lymph nodes with metastasis, pT2 pN0 noted.  Postop course complicated by infection requiring I&D and antibiotics.  Declined adjuvant XRT. Repeat imaging (June 2023) w/ 2.2 cm lesion deep to the R SCM c/f necrotic LN w/ few adjacent pathological appearing LNs suspicious for metastasis. Underwent R neck FNA (7/7/23) w/ pathology suspicious for metastatic SCC. PET-CT (8/21/2023) w/ large necrotic mass the angle of mandible posterior to submandibular gland on the right measuring 4.4 x 4.2 cm with SUV 9.78, just superior to the mass is a 2 cm hypermetabolic lymph node and no evidence of distant metastatic disease. Subsequently transferred care to [place] where she reportedly recently abx,"detox", hyperbaric chamber, & stem-cell transplant approximately 2 weeks ago PTA in [place]. Now presenting w/ progressive neck wound. CT neck soft tissue (4/10/24) w/ large bulky lobulated appearing mass right lateral and anterolateral neck extending to the D spaces of the neck having overall dimensions of approximately 9.5 x 8.2 x 10 cm, multiple addn'l b/l cervical necrotic nodes, & assoc mass effect results in displacement of midline structures to the patient's left. CT C/A/P with diffuse metastatic disease including mediastinal & hilar LAD, bilateral lungs, peritoneum, lumbar vertebrae, " & likely liver.     - ENT consulted; appreciate recs - not surgical candidate for debridement  - Medical & Radiation Oncology consulted - pt declines chemo or XRT  - Palliative following; appreciate assistance  - Multimodal analgesia   - SLP following,remains NPO  - Gen surgery and ENT consulted. Unable to do do tracheostomy. Rec consulting IR for PEG  - Consulted IR for PEG placement.      PEG (percutaneous endoscopic gastrostomy) status  Patient noted to have a percutaneous endoscopic gastrostomy tube in place. I have personally inspected the tube.Tube was placed on this admission, with date of procedure- 4/26  There are no signs of drainage or infection around the site. Routine care to be done by wound care and nursing staff.     Start tube feeds on 2/27 with 10ml /hr. Increase rate by 10ml every hour as tolerable until reach goal 50ml/hr.    Consulted RD  Stop TPN and remove NGT       Sinus tachycardia  Metoprolol recently discontinued, possible withdrawal  Resume IV metoprolol  Correct electrolyte derangements  Pain control          Severe malnutrition  PEG tube placement is being discussed. IR and GI deferred the procedure. Surgery depending on anesthesia assessment. ENT updated with recs. Awaiting final recs.       Nutrition consulted. Most recent weight and BMI monitored-     Measurements:  Wt Readings from Last 1 Encounters:   04/24/24 86.2 kg (190 lb 0.6 oz)   Body mass index is 30.67 kg/m².    Patient has been screened and assessed by RD.    Malnutrition Type:  Context: chronic illness  Level: severe    Malnutrition Characteristic Summary:  Weight Loss (Malnutrition): greater than 10% in 6 months  Energy Intake (Malnutrition): less than or equal to 50% for greater than or equal to 1 month      Hypercalcemia  On arrival to OSH, Ca 13.5 --> 11.0 on arrival to C. Continued subsequent uptrend peaking at 13.1 on 4/15 (corrected Ca 14.5). Likely 2/2 malignancy.   - IVF  - Calcitonin BID x2 days  - Ionized Ca  "1.59 4/21  - Monitor CMP  - IV hydration and cont to trend Ca level   - Ionized calcium WNL. Cont to monitor    - Serum calcium again noted to be 13.3 on 4/29. Received IVF, Calcitonin and zolidronic acid. Continue IVF.   - D/c home calcium and vit D supplementation on d/c      Palliative care encounter  Daily GOC discussions  Pt and family would like to proceed with aggressive care  Cont FULL code  Planning for PEG      Hypokalemia  Patient has hypokalemia which is Acute and currently controlled. Most recent potassium levels reviewed-   Lab Results   Component Value Date    K 3.5 04/24/2024   . Will continue potassium replacement per protocol and recheck repeat levels after replacement completed.     Open neck wound  Pt with extensive hx of H/N cancer. Presents w/ continued/worsening drainage from the neck for months with recent worsening of symptoms for past 2 weeks w/ N/V. Pt returned from [place] where she received antibiotics, "detox" and hyperbaric chamber as treatment. Endorses difficulty with swallowing 2/2 to oral trush. Large neck mass on imaging w/ large central collection of air which appears to extend superficially to the skin surface right mid neck anterior laterally; unable to r/o superimposed abscess. Meeting 2/4 SIRS (HR & WBC) w/ elevated lactate, but nml BP. Blood cx negative. Pt not surgical candidate for debridement per ENT evaluation.   - ID consulted; appreciate recs  - Surgical debridement is not an option. Has completed course of antibiotics.      Sepsis  On arrival to [facility], meeting 2/4 SIRS (requiring 2L NC w/ RR 20s & WBC 13). Likely soft tissue/neck source. Received IVF at OSH prior to transfer (for lactate 2.3 at that time). Started on broad-spectrum abx. Blood cx negative.   - ID consulted; appreciate recs  - Monitor CBC & fever curve  - Mgmt of neck mass/wound as below   - Has completed treatment. Discontinue vancomycin + Flagyl + fluconazole     Postprocedural hypoparathyroidism  Last " PTHi nml at 26 (2016). Home regimen includes calcium carbonate & vitamin D. Symptomatic hypercalcemia (Ca 13.5) on presentation to OSH w/ abdominal pain & N/V. Improved w/ NS ggt (although query if pt's hypercalcemia was re: hx of hypoparathyroidism vs hypercalcemia of malignancy.   - Holding home calcium carbonate  - Resume home vitamin D as PO tolerance improves   - Monitor CMP      Postsurgical hypothyroidism  Last TSH elevated at 5.264. Home regimen includes levothyroxine 175mcg daily & liothyronine 5mcg daily  - Continue home liothyronine & levothyroxine       HTN (hypertension)  Hx of essential HTN; home regimen includes metoprolol 25mg BID. [place] [facility] [place] 140s[place]150s.   PRN labetalol/hydralazine      VTE Risk Mitigation (From admission, onward)           Ordered     enoxaparin injection 40 mg  Every 24 hours         04/12/24 1536     IP VTE HIGH RISK PATIENT  Once         04/12/24 1324     Place sequential compression device  Until discontinued         04/12/24 1324                    Discharge Planning   ANDRES: 4/30/2024     Code Status: Full Code   Is the patient medically ready for discharge?: No    Reason for patient still in hospital (select all that apply): trending condition   Discharge Plan A: Hospice/home                  Carissa Young MD  Department of Hospital Medicine   [facility] - Telemetry Stepdown

## 2024-04-29 NOTE — NURSING
On call notified of pt's critical lab calcium lab of 13.3.  Pt is asymptomatic at this time. New orders received.

## 2024-04-29 NOTE — CARE UPDATE
Hypercalcemia of malignancy with corrected Ca ~15. Neurovascular intact. Will treat with IVF, calcitonin and zoledronic acid 4 mg IVPB x 1.   Monitor Ca trend.     Ani Adamson DO  Staff Physician, Hospital Medicine.

## 2024-04-30 LAB
ANION GAP SERPL CALC-SCNC: 11 MMOL/L (ref 8–16)
BASOPHILS # BLD AUTO: 0.01 K/UL (ref 0–0.2)
BASOPHILS NFR BLD: 0.1 % (ref 0–1.9)
BUN SERPL-MCNC: 23 MG/DL (ref 6–20)
CALCIUM SERPL-MCNC: 10.4 MG/DL (ref 8.7–10.5)
CHLORIDE SERPL-SCNC: 97 MMOL/L (ref 95–110)
CO2 SERPL-SCNC: 31 MMOL/L (ref 23–29)
CREAT SERPL-MCNC: 0.7 MG/DL (ref 0.5–1.4)
DIFFERENTIAL METHOD BLD: ABNORMAL
EOSINOPHIL # BLD AUTO: 0 K/UL (ref 0–0.5)
EOSINOPHIL NFR BLD: 0 % (ref 0–8)
ERYTHROCYTE [DISTWIDTH] IN BLOOD BY AUTOMATED COUNT: 14.8 % (ref 11.5–14.5)
EST. GFR  (NO RACE VARIABLE): >60 ML/MIN/1.73 M^2
GLUCOSE SERPL-MCNC: 95 MG/DL (ref 70–110)
HCT VFR BLD AUTO: 39.1 % (ref 37–48.5)
HGB BLD-MCNC: 12.3 G/DL (ref 12–16)
IMM GRANULOCYTES # BLD AUTO: 0.07 K/UL (ref 0–0.04)
IMM GRANULOCYTES NFR BLD AUTO: 0.4 % (ref 0–0.5)
LYMPHOCYTES # BLD AUTO: 1.3 K/UL (ref 1–4.8)
LYMPHOCYTES NFR BLD: 7.3 % (ref 18–48)
MAGNESIUM SERPL-MCNC: 1.2 MG/DL (ref 1.6–2.6)
MCH RBC QN AUTO: 27.5 PG (ref 27–31)
MCHC RBC AUTO-ENTMCNC: 31.5 G/DL (ref 32–36)
MCV RBC AUTO: 87 FL (ref 82–98)
MONOCYTES # BLD AUTO: 0.9 K/UL (ref 0.3–1)
MONOCYTES NFR BLD: 5.1 % (ref 4–15)
NEUTROPHILS # BLD AUTO: 15 K/UL (ref 1.8–7.7)
NEUTROPHILS NFR BLD: 87.1 % (ref 38–73)
NRBC BLD-RTO: 0 /100 WBC
PHOSPHATE SERPL-MCNC: 1.8 MG/DL (ref 2.7–4.5)
PLATELET # BLD AUTO: 331 K/UL (ref 150–450)
PMV BLD AUTO: 10.1 FL (ref 9.2–12.9)
POTASSIUM SERPL-SCNC: 3.4 MMOL/L (ref 3.5–5.1)
RBC # BLD AUTO: 4.48 M/UL (ref 4–5.4)
SODIUM SERPL-SCNC: 139 MMOL/L (ref 136–145)
WBC # BLD AUTO: 17.19 K/UL (ref 3.9–12.7)

## 2024-04-30 PROCEDURE — A4216 STERILE WATER/SALINE, 10 ML: HCPCS | Performed by: STUDENT IN AN ORGANIZED HEALTH CARE EDUCATION/TRAINING PROGRAM

## 2024-04-30 PROCEDURE — 63600175 PHARM REV CODE 636 W HCPCS: Performed by: INTERNAL MEDICINE

## 2024-04-30 PROCEDURE — 83735 ASSAY OF MAGNESIUM: CPT | Performed by: STUDENT IN AN ORGANIZED HEALTH CARE EDUCATION/TRAINING PROGRAM

## 2024-04-30 PROCEDURE — 63600175 PHARM REV CODE 636 W HCPCS: Mod: JG | Performed by: HOSPITALIST

## 2024-04-30 PROCEDURE — 25000003 PHARM REV CODE 250: Performed by: INTERNAL MEDICINE

## 2024-04-30 PROCEDURE — 27000221 HC OXYGEN, UP TO 24 HOURS

## 2024-04-30 PROCEDURE — 36415 COLL VENOUS BLD VENIPUNCTURE: CPT | Performed by: STUDENT IN AN ORGANIZED HEALTH CARE EDUCATION/TRAINING PROGRAM

## 2024-04-30 PROCEDURE — 85025 COMPLETE CBC W/AUTO DIFF WBC: CPT | Performed by: STUDENT IN AN ORGANIZED HEALTH CARE EDUCATION/TRAINING PROGRAM

## 2024-04-30 PROCEDURE — 63600175 PHARM REV CODE 636 W HCPCS: Performed by: STUDENT IN AN ORGANIZED HEALTH CARE EDUCATION/TRAINING PROGRAM

## 2024-04-30 PROCEDURE — 20600001 HC STEP DOWN PRIVATE ROOM

## 2024-04-30 PROCEDURE — 94761 N-INVAS EAR/PLS OXIMETRY MLT: CPT

## 2024-04-30 PROCEDURE — 80048 BASIC METABOLIC PNL TOTAL CA: CPT | Performed by: STUDENT IN AN ORGANIZED HEALTH CARE EDUCATION/TRAINING PROGRAM

## 2024-04-30 PROCEDURE — 25000003 PHARM REV CODE 250: Performed by: STUDENT IN AN ORGANIZED HEALTH CARE EDUCATION/TRAINING PROGRAM

## 2024-04-30 PROCEDURE — 84100 ASSAY OF PHOSPHORUS: CPT | Performed by: STUDENT IN AN ORGANIZED HEALTH CARE EDUCATION/TRAINING PROGRAM

## 2024-04-30 RX ORDER — MAGNESIUM SULFATE HEPTAHYDRATE 40 MG/ML
2 INJECTION, SOLUTION INTRAVENOUS ONCE
Status: COMPLETED | OUTPATIENT
Start: 2024-04-30 | End: 2024-04-30

## 2024-04-30 RX ORDER — SODIUM,POTASSIUM PHOSPHATES 280-250MG
2 POWDER IN PACKET (EA) ORAL
Status: COMPLETED | OUTPATIENT
Start: 2024-04-30 | End: 2024-04-30

## 2024-04-30 RX ORDER — FAMOTIDINE 10 MG/ML
20 INJECTION INTRAVENOUS 2 TIMES DAILY
Status: COMPLETED | OUTPATIENT
Start: 2024-04-30 | End: 2024-05-01

## 2024-04-30 RX ORDER — DICYCLOMINE HYDROCHLORIDE 10 MG/1
10 CAPSULE ORAL 4 TIMES DAILY
Status: DISCONTINUED | OUTPATIENT
Start: 2024-04-30 | End: 2024-05-08 | Stop reason: HOSPADM

## 2024-04-30 RX ADMIN — PROCHLORPERAZINE EDISYLATE 5 MG: 5 INJECTION INTRAMUSCULAR; INTRAVENOUS at 04:04

## 2024-04-30 RX ADMIN — LEVOTHYROXINE SODIUM 175 MCG: 150 TABLET ORAL at 06:04

## 2024-04-30 RX ADMIN — DICYCLOMINE HYDROCHLORIDE 10 MG: 10 CAPSULE ORAL at 04:04

## 2024-04-30 RX ADMIN — HYDROMORPHONE HYDROCHLORIDE 1 MG: 1 INJECTION, SOLUTION INTRAMUSCULAR; INTRAVENOUS; SUBCUTANEOUS at 06:04

## 2024-04-30 RX ADMIN — PROMETHAZINE HYDROCHLORIDE 12.5 MG: 25 INJECTION INTRAMUSCULAR; INTRAVENOUS at 08:04

## 2024-04-30 RX ADMIN — Medication 10 ML: at 11:04

## 2024-04-30 RX ADMIN — SENNOSIDES 8.6 MG: 8.6 TABLET, FILM COATED ORAL at 09:04

## 2024-04-30 RX ADMIN — PROMETHAZINE HYDROCHLORIDE 12.5 MG: 25 INJECTION INTRAMUSCULAR; INTRAVENOUS at 06:04

## 2024-04-30 RX ADMIN — Medication 10 ML: at 06:04

## 2024-04-30 RX ADMIN — ENOXAPARIN SODIUM 40 MG: 40 INJECTION SUBCUTANEOUS at 04:04

## 2024-04-30 RX ADMIN — PROMETHAZINE HYDROCHLORIDE 12.5 MG: 25 INJECTION INTRAMUSCULAR; INTRAVENOUS at 01:04

## 2024-04-30 RX ADMIN — CALCITONIN SALMON 330 UNITS: 200 INJECTION, SOLUTION INTRAMUSCULAR; SUBCUTANEOUS at 11:04

## 2024-04-30 RX ADMIN — MAGNESIUM SULFATE HEPTAHYDRATE 2 G: 40 INJECTION, SOLUTION INTRAVENOUS at 09:04

## 2024-04-30 RX ADMIN — HYDROMORPHONE HYDROCHLORIDE 1 MG: 1 INJECTION, SOLUTION INTRAMUSCULAR; INTRAVENOUS; SUBCUTANEOUS at 12:04

## 2024-04-30 RX ADMIN — DICYCLOMINE HYDROCHLORIDE 10 MG: 10 CAPSULE ORAL at 09:04

## 2024-04-30 RX ADMIN — HYDROMORPHONE HYDROCHLORIDE 1 MG: 1 INJECTION, SOLUTION INTRAMUSCULAR; INTRAVENOUS; SUBCUTANEOUS at 01:04

## 2024-04-30 RX ADMIN — PROCHLORPERAZINE EDISYLATE 5 MG: 5 INJECTION INTRAMUSCULAR; INTRAVENOUS at 09:04

## 2024-04-30 RX ADMIN — FAMOTIDINE 20 MG: 10 INJECTION, SOLUTION INTRAVENOUS at 09:04

## 2024-04-30 RX ADMIN — POTASSIUM & SODIUM PHOSPHATES POWDER PACK 280-160-250 MG 2 PACKET: 280-160-250 PACK at 12:04

## 2024-04-30 RX ADMIN — POTASSIUM BICARBONATE 25 MEQ: 978 TABLET, EFFERVESCENT ORAL at 09:04

## 2024-04-30 RX ADMIN — SODIUM CHLORIDE: 9 INJECTION, SOLUTION INTRAVENOUS at 01:04

## 2024-04-30 RX ADMIN — PROCHLORPERAZINE EDISYLATE 5 MG: 5 INJECTION INTRAMUSCULAR; INTRAVENOUS at 12:04

## 2024-04-30 RX ADMIN — LIOTHYRONINE SODIUM 5 MCG: 5 TABLET ORAL at 06:04

## 2024-04-30 RX ADMIN — Medication 10 ML: at 12:04

## 2024-04-30 RX ADMIN — METOPROLOL TARTRATE 25 MG: 25 TABLET, FILM COATED ORAL at 09:04

## 2024-04-30 RX ADMIN — Medication 10 ML: at 01:04

## 2024-04-30 RX ADMIN — POTASSIUM & SODIUM PHOSPHATES POWDER PACK 280-160-250 MG 2 PACKET: 280-160-250 PACK at 04:04

## 2024-04-30 RX ADMIN — HYDROMORPHONE HYDROCHLORIDE 1 MG: 1 INJECTION, SOLUTION INTRAMUSCULAR; INTRAVENOUS; SUBCUTANEOUS at 09:04

## 2024-04-30 NOTE — SUBJECTIVE & OBJECTIVE
Interval History:   Patient unable to tolerate Impact peptide TF. Discussed with RD, rec switching to [employer] peptide 1.5. However patient again vomited on it. Holding TF for now. Adding bentyl and will get an Abdominal Xray.       Objective:     Vital Signs (Most Recent):  Temp: 98.1 °F (36.7 °C) (04/30/24 0720)  Pulse: 104 (04/30/24 1108)  Resp: 18 (04/30/24 1322)  BP: 138/67 (04/30/24 0720)  SpO2: 95 % (04/30/24 0900) Vital Signs (24h Range):  Temp:  [97.6 °F (36.4 °C)-98.8 °F (37.1 °C)] 98.1 °F (36.7 °C)  Pulse:  [] 104  Resp:  [18-22] 18  SpO2:  [92 %-98 %] 95 %  BP: (132-161)/(67-98) 138/67     Weight: 82.6 kg (182 lb 1.6 oz)  Body mass index is 29.39 kg/m².    Intake/Output Summary (Last 24 hours) at 4/30/2024 1439  Last data filed at 4/30/2024 0634  Gross per 24 hour   Intake 120 ml   Output --   Net 120 ml         Physical Exam  Vitals and nursing note reviewed.   Constitutional:       General: She is not in acute distress.     Appearance: She is ill-appearing. She is not toxic-appearing.   Eyes:      Conjunctiva/sclera: Conjunctivae normal.   Neck:      Comments: Dressing in place over entire circumference of neck. Dressing c/d/i  Cardiovascular:      Rate and Rhythm: Regular rhythm. Tachycardia present.      Heart sounds: Normal heart sounds.   Pulmonary:      Effort: Pulmonary effort is normal. No respiratory distress.      Breath sounds: Normal breath sounds. No wheezing.   Abdominal:      General: Bowel sounds are normal. There is no distension.      Palpations: Abdomen is soft.      Tenderness: There is no abdominal tenderness. There is no guarding.      Comments: PEG tube in place   Skin:     General: Skin is warm and dry.   Neurological:      General: No focal deficit present.      Mental Status: She is alert and oriented to person, place, and time. Mental status is at baseline.   Psychiatric:         Mood and Affect: Mood normal.         Behavior: Behavior normal.             Significant  Labs: All pertinent labs within the past 24 hours have been reviewed.  CBC:   Recent Labs   Lab 04/29/24  0452 04/30/24  0726   WBC 16.18* 17.19*   HGB 12.9 12.3   HCT 39.9 39.1    331     CMP:   Recent Labs   Lab 04/29/24  0452 04/29/24  1246 04/30/24  0406     136 137 139   K 3.7  3.7 3.2* 3.4*   CL 94*  94* 97 97   CO2 31*  31* 32* 31*   *  125* 160* 95   BUN 37*  37* 30* 23*   CREATININE 0.8  0.8 0.7 0.7   CALCIUM 13.3*  13.3* 11.9* 10.4   PROT 6.2  --   --    ALBUMIN 2.4*  --   --    BILITOT 0.5  --   --    ALKPHOS 227*  --   --    AST 37  --   --    ALT 60*  --   --    ANIONGAP 11  11 8 11       Significant Imaging: I have reviewed all pertinent imaging results/findings within the past 24 hours.

## 2024-04-30 NOTE — PLAN OF CARE
[facility] - Telemetry Stepdown  Discharge Reassessment    Primary Care Provider: James Coronel MD    Expected Discharge Date: 5/1/2024    Reassessment (most recent)       Discharge Reassessment - 04/30/24 1608          Discharge Reassessment    Assessment Type Discharge Planning Reassessment     Did the patient's condition or plan change since previous assessment? No     Discharge Plan discussed with: Patient;Spouse/sig other     Communicated ANDRES with patient/caregiver Yes     Discharge Plan A Hospice/home     Discharge Plan B Home with family     DME Needed Upon Discharge  other (see comments)   TBD    Transition of Care Barriers None     Why the patient remains in the hospital Requires continued medical care        Post-Acute Status    Post-Acute Authorization Hospice     Hospice Status Set-up Complete/Auth obtained   [facility]                  Discharge Plan A and Plan B have been determined by review of patient's clinical status, future medical and therapeutic needs, and coverage/benefits for post-acute care in coordination with multidisciplinary team members.     Nisha Hightower RN  Ext [phone]

## 2024-04-30 NOTE — ASSESSMENT & PLAN NOTE
Unable to tolerate TF  Patient noted to have a percutaneous endoscopic gastrostomy tube in place. I have personally inspected the tube.Tube was placed on this admission, with date of procedure- 4/26  There are no signs of drainage or infection around the site. Routine care to be done by wound care and nursing staff.     Patient unable to tolerate Impact peptide TF. Discussed with RD, rec switching to [employer] peptide 1.5. However patient again vomited on it. Holding TF for now. Adding bentyl and will get an Abdominal Xray.

## 2024-04-30 NOTE — PROGRESS NOTES
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 She initially presented to [facility] Emergency Department with six days of epigastric pain with associated nausea, vomiting, and difficulty eating due to pain. However on presentation, she was noted to have a large necrotic and purulent wound located on her right neck with complaints of associated difficulty speaking, swallowing and shortness of breath. She was noted to be hypoxic 88% on room air which improved with 2L NC. Labs were notable for leukocytosis 14, hypokalemia 2.4, hypochloremia 89, CO2 36, hypercalcemia 13.5, Phos 2.5. . Troponin 0.046. Lactate 2.3. CT Soft Tissue Neck was concerning for 9.5 x 8.2 x 10 cm large lobulated mass in the right anterolateral neck extending to the deep spaces of the neck and abutting the right prevertebral space and paravertebral musculature, left midline shift, multiple bilateral necrotic cervical lymph nodes. Case was discussed and [place] [facility] [place]     Of additional note, she recently transitioned her care to [place] and underwent stem-cell transplant approximately 2 weeks ago in [place], has not been seen by a [place] physician since 2023.      On transfer: she was afebrile, mildly hypertensive /109, HR 98, RR 20, satting 96% on room  air. Complaining of mild headache and poor appetite associated with nausea; denies fever, chills, chest pain, palpitations, SOB, abdominal pain, dysuria. States wound has been draining for the past week initially thick white now more liquid. Mild dysphonia is apparently chronic from left vocal cord paralysis and at baseline. Some dysphagia with solids, none with pureed soft or liquids, denies odynophagia. Dressing on neck CDI, ENT consulted will be here shortly to assess patient.        Overview/Hospital Course:  Evaluated 4/12 in MICU by ENT, who do not feel patient is a candidate for surgical intervention. Palliative care and oncology consulted. Failed swallow study, SLP recommending NPO 4/13. Patient continues to protect airway. Stepped down from MICU on 4/13.        Interval History:   Patient unable to tolerate Impact peptide TF. Discussed with RD, rec switching to [employer]* peptide 1.5. However patient again vomited on it. Holding TF for now. Adding bentyl and will get an Abdominal Xray.       Objective:     Vital Signs (Most Recent):  Temp: 98.1 °F (36.7 °C) (04/30/24 0720)  Pulse: 104 (04/30/24 1108)  Resp: 18 (04/30/24 1322)  BP: 138/67 (04/30/24 0720)  SpO2: 95 % (04/30/24 0900) Vital Signs (24h Range):  Temp:  [97.6 °F (36.4 °C)-98.8 °F (37.1 °C)] 98.1 °F (36.7 °C)  Pulse:  [] 104  Resp:  [18-22] 18  SpO2:  [92 %-98 %] 95 %  BP: (132-161)/(67-98) 138/67     Weight: 82.6 kg (182 lb 1.6 oz)  Body mass index is 29.39 kg/m².    Intake/Output Summary (Last 24 hours) at 4/30/2024 1439  Last data filed at 4/30/2024 0634  Gross per 24 hour   Intake 120 ml   Output --   Net 120 ml         Physical Exam  Vitals and nursing note reviewed.   Constitutional:       General: She is not in acute distress.     Appearance: She is ill-appearing. She is not toxic-appearing.   Eyes:      Conjunctiva/sclera: Conjunctivae normal.   Neck:      Comments: Dressing in place over entire circumference of neck. Dressing  c/d/i  Cardiovascular:      Rate and Rhythm: Regular rhythm. Tachycardia present.      Heart sounds: Normal heart sounds.   Pulmonary:      Effort: Pulmonary effort is normal. No respiratory distress.      Breath sounds: Normal breath sounds. No wheezing.   Abdominal:      General: Bowel sounds are normal. There is no distension.      Palpations: Abdomen is soft.      Tenderness: There is no abdominal tenderness. There is no guarding.      Comments: PEG tube in place   Skin:     General: Skin is warm and dry.   Neurological:      General: No focal deficit present.      Mental Status: She is alert and oriented to person, place, and time. Mental status is at baseline.   Psychiatric:         Mood and Affect: Mood normal.         Behavior: Behavior normal.             Significant Labs: All pertinent labs within the past 24 hours have been reviewed.  CBC:   Recent Labs   Lab 04/29/24 0452 04/30/24  0726   WBC 16.18* 17.19*   HGB 12.9 12.3   HCT 39.9 39.1    331     CMP:   Recent Labs   Lab 04/29/24  0452 04/29/24  1246 04/30/24  0406     136 137 139   K 3.7  3.7 3.2* 3.4*   CL 94*  94* 97 97   CO2 31*  31* 32* 31*   *  125* 160* 95   BUN 37*  37* 30* 23*   CREATININE 0.8  0.8 0.7 0.7   CALCIUM 13.3*  13.3* 11.9* 10.4   PROT 6.2  --   --    ALBUMIN 2.4*  --   --    BILITOT 0.5  --   --    ALKPHOS 227*  --   --    AST 37  --   --    ALT 60*  --   --    ANIONGAP 11  11 8 11       Significant Imaging: I have reviewed all pertinent imaging results/findings within the past 24 hours.    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 Underwent repeat bx after failed tx (1/24/22) that showed moderately differentiated squamous cell carcinoma w/ suspicion for perineural invasion. CT soft tissue neck (Jan 2022) w/o e/o metastatic dx. PET-CT (Feb 2022) w/o e/o active local or  "distant dx- no abnormal activity within the tongue, no signs of cervical lymph node or distant metastasis. Declined chemotherapy &/or XRT. Underwent selective neck dissection levels 1, 2, 3, and partial glossectomy w/ split-thickness skin graft (3/3/2022 per Dr. Clif Olson).  Surgical pathology w/ unifocal squamous cell carcinoma on the dorsal surface of the tongue on left side measuring 1 cm, moderately differentiated, 6 mm depth of invasion, no lymphovascular invasion, positive for perineural invasion, margins negative, 0 of 39 lymph nodes with metastasis, pT2 pN0 noted.  Postop course complicated by infection requiring I&D and antibiotics.  Declined adjuvant XRT. Repeat imaging (June 2023) w/ 2.2 cm lesion deep to the R SCM c/f necrotic LN w/ few adjacent pathological appearing LNs suspicious for metastasis. Underwent R neck FNA (7/7/23) w/ pathology suspicious for metastatic SCC. PET-CT (8/21/2023) w/ large necrotic mass the angle of mandible posterior to submandibular gland on the right measuring 4.4 x 4.2 cm with SUV 9.78, just superior to the mass is a 2 cm hypermetabolic lymph node and no evidence of distant metastatic disease. Subsequently transferred care to [place] where she reportedly recently abx,"detox", hyperbaric chamber, & stem-cell transplant approximately 2 weeks ago PTA in [place]. Now presenting w/ progressive neck wound. CT neck soft tissue (4/10/24) w/ large bulky lobulated appearing mass right lateral and anterolateral neck extending to the D spaces of the neck having overall dimensions of approximately 9.5 x 8.2 x 10 cm, multiple addn'l b/l cervical necrotic nodes, & assoc mass effect results in displacement of midline structures to the patient's left. CT C/A/P with diffuse metastatic disease including mediastinal & hilar LAD, bilateral lungs, peritoneum, lumbar vertebrae, & likely liver.     - ENT consulted; appreciate recs - not surgical candidate for debridement  - Medical & Radiation " Oncology consulted - pt declines chemo or XRT  - Palliative following; appreciate assistance  - Multimodal analgesia   - SLP following,remains NPO  - Gen surgery and ENT consulted. Unable to do do tracheostomy. Rec consulting IR for PEG  - Consulted IR for PEG placement.      PEG (percutaneous endoscopic gastrostomy) status  Unable to tolerate TF  Patient noted to have a percutaneous endoscopic gastrostomy tube in place. I have personally inspected the tube.Tube was placed on this admission, with date of procedure- 4/26  There are no signs of drainage or infection around the site. Routine care to be done by wound care and nursing staff.     Patient unable to tolerate Impact peptide TF. Discussed with RD, rec switching to [employer] peptide 1.5. However patient again vomited on it. Holding TF for now. Adding bentyl and will get an Abdominal Xray.        Sinus tachycardia  Metoprolol recently discontinued, possible withdrawal  Resume IV metoprolol  Correct electrolyte derangements  Pain control          Severe malnutrition  PEG tube placement is being discussed. IR and GI deferred the procedure. Surgery depending on anesthesia assessment. ENT updated with recs. Awaiting final recs.       Nutrition consulted. Most recent weight and BMI monitored-     Measurements:  Wt Readings from Last 1 Encounters:   04/24/24 86.2 kg (190 lb 0.6 oz)   Body mass index is 30.67 kg/m².    Patient has been screened and assessed by RD.    Malnutrition Type:  Context: chronic illness  Level: severe    Malnutrition Characteristic Summary:  Weight Loss (Malnutrition): greater than 10% in 6 months  Energy Intake (Malnutrition): less than or equal to 50% for greater than or equal to 1 month    Interventions/Recommendations (treatment strategy):  1.    Cont TPN  Planning for PEG today      Hypercalcemia  On arrival to OSH, Ca 13.5 --> 11.0 on arrival to [facility] [place]. Continued subsequent uptrend peaking at 13.1 on 4/15 (corrected Ca 14.5). Likely 2/2  "malignancy.   - IVF  - Calcitonin BID x2 days  - Ionized Ca 1.59 4/21  - Monitor CMP  - IV hydration and cont to trend Ca level   - Ionized calcium WNL. Cont to monitor    - Serum calcium again noted to be 13.3 on 4/29. Received IVF, Calcitonin and zolidronic acid. Continue IVF.   - D/c home calcium and vit D supplementation on d/c          Palliative care encounter  Daily GOC discussions  Pt and family would like to proceed with aggressive care  Cont FULL code  Planning for PEG      Hypokalemia  Patient has hypokalemia which is Acute and currently controlled. Most recent potassium levels reviewed-   Lab Results   Component Value Date    K 3.5 04/24/2024   . Will continue potassium replacement per protocol and recheck repeat levels after replacement completed.     Open neck wound  Pt with extensive hx of H/N cancer. Presents w/ continued/worsening drainage from the neck for months with recent worsening of symptoms for past 2 weeks w/ N/V. Pt returned from [place] where she received antibiotics, "detox" and hyperbaric chamber as treatment. Endorses difficulty with swallowing 2/2 to oral trush. Large neck mass on imaging w/ large central collection of air which appears to extend superficially to the skin surface right mid neck anterior laterally; unable to r/o superimposed abscess. Meeting 2/4 SIRS (HR & WBC) w/ elevated lactate, but nml BP. Blood cx negative. Pt not surgical candidate for debridement per ENT evaluation.   - ID consulted; appreciate recs  - Surgical debridement is not an option. Has completed course of antibiotics.      Sepsis  [place] [facility] [place] meeting 2/4 SIRS (requiring 2L NC w/ RR 20s & WBC 13). Likely soft tissue/neck source. Received IVF at OSH prior to transfer (for lactate 2.3 at that time). Started on broad-spectrum abx. Blood cx negative.   - ID consulted; appreciate recs  - Monitor CBC & fever curve  - Mgmt of neck mass/wound as below   - Has completed treatment. Discontinue vancomycin + " Flagyl + fluconazole     Postprocedural hypoparathyroidism  Last PTHi nml at 26 (2016). Home regimen includes calcium carbonate & vitamin D. Symptomatic hypercalcemia (Ca 13.5) on presentation to OSH w/ abdominal pain & N/V. Improved w/ NS ggt (although query if pt's hypercalcemia was re: hx of hypoparathyroidism vs hypercalcemia of malignancy.   - Holding home calcium carbonate  - Resume home vitamin D as PO tolerance improves   - Monitor CMP    GERD (gastroesophageal reflux disease)        Postsurgical hypothyroidism  Last TSH elevated at 5.264. Home regimen includes levothyroxine 175mcg daily & liothyronine 5mcg daily  - Continue home liothyronine & levothyroxine       HTN (hypertension)  Hx of essential HTN; home regimen includes metoprolol 25mg BID. On arrival to [facility] [place], SBP 140s-150s.   PRN labetalol/hydralazine      VTE Risk Mitigation (From admission, onward)           Ordered     enoxaparin injection 40 mg  Every 24 hours         04/12/24 1536     IP VTE HIGH RISK PATIENT  Once         04/12/24 1324     Place sequential compression device  Until discontinued         04/12/24 1324                    Discharge Planning   ANDRES: 5/1/2024     Code Status: Full Code   Is the patient medically ready for discharge?: No    Reason for patient still in hospital (select all that apply): trending condition   Discharge Plan A: Hospice/home                  Carissa Young MD  Department of Hospital Medicine   [facility] - Telemetry Stepdown

## 2024-05-01 LAB
ALBUMIN SERPL BCP-MCNC: 2.4 G/DL (ref 3.5–5.2)
ALP SERPL-CCNC: 191 U/L (ref 55–135)
ALT SERPL W/O P-5'-P-CCNC: 41 U/L (ref 10–44)
ANION GAP SERPL CALC-SCNC: 8 MMOL/L (ref 8–16)
AST SERPL-CCNC: 28 U/L (ref 10–40)
BASOPHILS # BLD AUTO: 0.02 K/UL (ref 0–0.2)
BASOPHILS NFR BLD: 0.1 % (ref 0–1.9)
BILIRUB SERPL-MCNC: 0.5 MG/DL (ref 0.1–1)
BUN SERPL-MCNC: 15 MG/DL (ref 6–20)
CALCIUM SERPL-MCNC: 8.5 MG/DL (ref 8.7–10.5)
CHLORIDE SERPL-SCNC: 98 MMOL/L (ref 95–110)
CO2 SERPL-SCNC: 34 MMOL/L (ref 23–29)
CREAT SERPL-MCNC: 0.6 MG/DL (ref 0.5–1.4)
DIFFERENTIAL METHOD BLD: ABNORMAL
EOSINOPHIL # BLD AUTO: 0 K/UL (ref 0–0.5)
EOSINOPHIL NFR BLD: 0 % (ref 0–8)
ERYTHROCYTE [DISTWIDTH] IN BLOOD BY AUTOMATED COUNT: 15.1 % (ref 11.5–14.5)
EST. GFR  (NO RACE VARIABLE): >60 ML/MIN/1.73 M^2
GLUCOSE SERPL-MCNC: 102 MG/DL (ref 70–110)
HCT VFR BLD AUTO: 36 % (ref 37–48.5)
HGB BLD-MCNC: 11.8 G/DL (ref 12–16)
IMM GRANULOCYTES # BLD AUTO: 0.09 K/UL (ref 0–0.04)
IMM GRANULOCYTES NFR BLD AUTO: 0.6 % (ref 0–0.5)
LYMPHOCYTES # BLD AUTO: 1.9 K/UL (ref 1–4.8)
LYMPHOCYTES NFR BLD: 11.8 % (ref 18–48)
MAGNESIUM SERPL-MCNC: 1.3 MG/DL (ref 1.6–2.6)
MCH RBC QN AUTO: 27.6 PG (ref 27–31)
MCHC RBC AUTO-ENTMCNC: 32.8 G/DL (ref 32–36)
MCV RBC AUTO: 84 FL (ref 82–98)
MONOCYTES # BLD AUTO: 1 K/UL (ref 0.3–1)
MONOCYTES NFR BLD: 6.1 % (ref 4–15)
NEUTROPHILS # BLD AUTO: 12.8 K/UL (ref 1.8–7.7)
NEUTROPHILS NFR BLD: 81.4 % (ref 38–73)
NRBC BLD-RTO: 0 /100 WBC
PHOSPHATE SERPL-MCNC: <1 MG/DL (ref 2.7–4.5)
PLATELET # BLD AUTO: 306 K/UL (ref 150–450)
PMV BLD AUTO: 9.9 FL (ref 9.2–12.9)
POTASSIUM SERPL-SCNC: 2.4 MMOL/L (ref 3.5–5.1)
PROT SERPL-MCNC: 5.7 G/DL (ref 6–8.4)
RBC # BLD AUTO: 4.27 M/UL (ref 4–5.4)
SODIUM SERPL-SCNC: 140 MMOL/L (ref 136–145)
WBC # BLD AUTO: 15.68 K/UL (ref 3.9–12.7)

## 2024-05-01 PROCEDURE — 25000003 PHARM REV CODE 250: Performed by: STUDENT IN AN ORGANIZED HEALTH CARE EDUCATION/TRAINING PROGRAM

## 2024-05-01 PROCEDURE — 25000242 PHARM REV CODE 250 ALT 637 W/ HCPCS: Performed by: STUDENT IN AN ORGANIZED HEALTH CARE EDUCATION/TRAINING PROGRAM

## 2024-05-01 PROCEDURE — 63600175 PHARM REV CODE 636 W HCPCS: Performed by: INTERNAL MEDICINE

## 2024-05-01 PROCEDURE — 85025 COMPLETE CBC W/AUTO DIFF WBC: CPT | Performed by: STUDENT IN AN ORGANIZED HEALTH CARE EDUCATION/TRAINING PROGRAM

## 2024-05-01 PROCEDURE — 84100 ASSAY OF PHOSPHORUS: CPT | Performed by: STUDENT IN AN ORGANIZED HEALTH CARE EDUCATION/TRAINING PROGRAM

## 2024-05-01 PROCEDURE — A4216 STERILE WATER/SALINE, 10 ML: HCPCS | Performed by: STUDENT IN AN ORGANIZED HEALTH CARE EDUCATION/TRAINING PROGRAM

## 2024-05-01 PROCEDURE — 99900035 HC TECH TIME PER 15 MIN (STAT)

## 2024-05-01 PROCEDURE — 80053 COMPREHEN METABOLIC PANEL: CPT | Performed by: STUDENT IN AN ORGANIZED HEALTH CARE EDUCATION/TRAINING PROGRAM

## 2024-05-01 PROCEDURE — 83735 ASSAY OF MAGNESIUM: CPT | Performed by: STUDENT IN AN ORGANIZED HEALTH CARE EDUCATION/TRAINING PROGRAM

## 2024-05-01 PROCEDURE — 27000221 HC OXYGEN, UP TO 24 HOURS

## 2024-05-01 PROCEDURE — 25000003 PHARM REV CODE 250: Performed by: INTERNAL MEDICINE

## 2024-05-01 PROCEDURE — 36415 COLL VENOUS BLD VENIPUNCTURE: CPT | Performed by: STUDENT IN AN ORGANIZED HEALTH CARE EDUCATION/TRAINING PROGRAM

## 2024-05-01 PROCEDURE — 20600001 HC STEP DOWN PRIVATE ROOM

## 2024-05-01 PROCEDURE — 63600175 PHARM REV CODE 636 W HCPCS: Performed by: STUDENT IN AN ORGANIZED HEALTH CARE EDUCATION/TRAINING PROGRAM

## 2024-05-01 PROCEDURE — 94640 AIRWAY INHALATION TREATMENT: CPT

## 2024-05-01 RX ORDER — MAGNESIUM SULFATE HEPTAHYDRATE 40 MG/ML
2 INJECTION, SOLUTION INTRAVENOUS ONCE
Status: COMPLETED | OUTPATIENT
Start: 2024-05-01 | End: 2024-05-01

## 2024-05-01 RX ORDER — POTASSIUM CHLORIDE 7.45 MG/ML
10 INJECTION INTRAVENOUS
Status: COMPLETED | OUTPATIENT
Start: 2024-05-01 | End: 2024-05-01

## 2024-05-01 RX ORDER — SODIUM CHLORIDE FOR INHALATION 3 %
4 VIAL, NEBULIZER (ML) INHALATION ONCE
Status: COMPLETED | OUTPATIENT
Start: 2024-05-01 | End: 2024-05-01

## 2024-05-01 RX ADMIN — LEVOTHYROXINE SODIUM 175 MCG: 150 TABLET ORAL at 06:05

## 2024-05-01 RX ADMIN — PROMETHAZINE HYDROCHLORIDE 12.5 MG: 25 INJECTION INTRAMUSCULAR; INTRAVENOUS at 09:05

## 2024-05-01 RX ADMIN — DICYCLOMINE HYDROCHLORIDE 10 MG: 10 CAPSULE ORAL at 12:05

## 2024-05-01 RX ADMIN — PROMETHAZINE HYDROCHLORIDE 12.5 MG: 25 INJECTION INTRAMUSCULAR; INTRAVENOUS at 12:05

## 2024-05-01 RX ADMIN — HYDROMORPHONE HYDROCHLORIDE 1 MG: 1 INJECTION, SOLUTION INTRAMUSCULAR; INTRAVENOUS; SUBCUTANEOUS at 05:05

## 2024-05-01 RX ADMIN — SODIUM CHLORIDE: 9 INJECTION, SOLUTION INTRAVENOUS at 12:05

## 2024-05-01 RX ADMIN — SODIUM CHLORIDE: 9 INJECTION, SOLUTION INTRAVENOUS at 06:05

## 2024-05-01 RX ADMIN — HYDROMORPHONE HYDROCHLORIDE 1 MG: 1 INJECTION, SOLUTION INTRAMUSCULAR; INTRAVENOUS; SUBCUTANEOUS at 06:05

## 2024-05-01 RX ADMIN — SENNOSIDES 8.6 MG: 8.6 TABLET, FILM COATED ORAL at 09:05

## 2024-05-01 RX ADMIN — DICYCLOMINE HYDROCHLORIDE 10 MG: 10 CAPSULE ORAL at 05:05

## 2024-05-01 RX ADMIN — FAMOTIDINE 20 MG: 10 INJECTION, SOLUTION INTRAVENOUS at 09:05

## 2024-05-01 RX ADMIN — ALTEPLASE 2 MG: 2.2 INJECTION, POWDER, LYOPHILIZED, FOR SOLUTION INTRAVENOUS at 12:05

## 2024-05-01 RX ADMIN — POTASSIUM CHLORIDE 10 MEQ: 7.46 INJECTION, SOLUTION INTRAVENOUS at 01:05

## 2024-05-01 RX ADMIN — SENNOSIDES 8.6 MG: 8.6 TABLET, FILM COATED ORAL at 08:05

## 2024-05-01 RX ADMIN — POTASSIUM PHOSPHATE, MONOBASIC AND POTASSIUM PHOSPHATE, DIBASIC 15 MMOL: 224; 236 INJECTION, SOLUTION, CONCENTRATE INTRAVENOUS at 09:05

## 2024-05-01 RX ADMIN — POTASSIUM BICARBONATE 50 MEQ: 978 TABLET, EFFERVESCENT ORAL at 09:05

## 2024-05-01 RX ADMIN — METOPROLOL TARTRATE 25 MG: 25 TABLET, FILM COATED ORAL at 08:05

## 2024-05-01 RX ADMIN — DICYCLOMINE HYDROCHLORIDE 10 MG: 10 CAPSULE ORAL at 08:05

## 2024-05-01 RX ADMIN — Medication 10 ML: at 06:05

## 2024-05-01 RX ADMIN — PROCHLORPERAZINE EDISYLATE 5 MG: 5 INJECTION INTRAMUSCULAR; INTRAVENOUS at 05:05

## 2024-05-01 RX ADMIN — MAGNESIUM SULFATE HEPTAHYDRATE 2 G: 40 INJECTION, SOLUTION INTRAVENOUS at 10:05

## 2024-05-01 RX ADMIN — DICYCLOMINE HYDROCHLORIDE 10 MG: 10 CAPSULE ORAL at 09:05

## 2024-05-01 RX ADMIN — HYDROMORPHONE HYDROCHLORIDE 1 MG: 1 INJECTION, SOLUTION INTRAMUSCULAR; INTRAVENOUS; SUBCUTANEOUS at 01:05

## 2024-05-01 RX ADMIN — METOPROLOL TARTRATE 25 MG: 25 TABLET, FILM COATED ORAL at 09:05

## 2024-05-01 RX ADMIN — LIOTHYRONINE SODIUM 5 MCG: 5 TABLET ORAL at 06:05

## 2024-05-01 RX ADMIN — PROCHLORPERAZINE EDISYLATE 5 MG: 5 INJECTION INTRAMUSCULAR; INTRAVENOUS at 06:05

## 2024-05-01 RX ADMIN — POTASSIUM CHLORIDE 10 MEQ: 7.46 INJECTION, SOLUTION INTRAVENOUS at 10:05

## 2024-05-01 RX ADMIN — Medication 10 ML: at 12:05

## 2024-05-01 RX ADMIN — ENOXAPARIN SODIUM 40 MG: 40 INJECTION SUBCUTANEOUS at 05:05

## 2024-05-01 RX ADMIN — POTASSIUM CHLORIDE 10 MEQ: 7.46 INJECTION, SOLUTION INTRAVENOUS at 12:05

## 2024-05-01 RX ADMIN — HYDROMORPHONE HYDROCHLORIDE 1 MG: 1 INJECTION, SOLUTION INTRAMUSCULAR; INTRAVENOUS; SUBCUTANEOUS at 10:05

## 2024-05-01 RX ADMIN — SODIUM CHLORIDE SOLN NEBU 3% 4 ML: 3 NEBU SOLN at 10:05

## 2024-05-01 NOTE — PROGRESS NOTES
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 She initially presented to [facility] Emergency Department with six days of epigastric pain with associated nausea, vomiting, and difficulty eating due to pain. However on presentation, she was noted to have a large necrotic and purulent wound located on her right neck with complaints of associated difficulty speaking, swallowing and shortness of breath. She was noted to be hypoxic 88% on room air which improved with 2L NC. Labs were notable for leukocytosis 14, hypokalemia 2.4, hypochloremia 89, CO2 36, hypercalcemia 13.5, Phos 2.5. . Troponin 0.046. Lactate 2.3. CT Soft Tissue Neck was concerning for 9.5 x 8.2 x 10 cm large lobulated mass in the right anterolateral neck extending to the deep spaces of the neck and abutting the right prevertebral space and paravertebral musculature, left midline shift, multiple bilateral necrotic cervical lymph nodes. Case was discussed and decision was made to transfer to [facility] for higher level of care.     Of additional note, she recently transitioned her care to [place] and underwent stem-cell transplant approximately 2 weeks ago in [place], has not been seen by a US physician since 2023.      On transfer: she was afebrile, mildly hypertensive /109, HR 98, RR 20, satting 96% on room  air. Complaining of mild headache and poor appetite associated with nausea; denies fever, chills, chest pain, palpitations, SOB, abdominal pain, dysuria. States wound has been draining for the past week initially thick white now more liquid. Mild dysphonia is apparently chronic from left vocal cord paralysis and at baseline. Some dysphagia with solids, none with pureed soft or liquids, denies odynophagia. Dressing on neck CDI, ENT consulted will be here shortly to assess patient.        Overview/Hospital Course:  Evaluated 4/12 in MICU by ENT, who do not feel patient is a candidate for surgical intervention. Palliative care and oncology consulted. Pt declines chemo or XRT. Failed swallow study, SLP recommending NPO 4/13. Patient continues to protect airway. Stepped down from MICU on 4/13.  ENT attempted awake trach but could not get one. Gen surgery rec consulting IR for PEG as they can't put one w/o a trach. Now s/p PEG placement by IR on 4/26. Started on TF. Unable to tolerate Impact peptide TF. Discussed with RD, rec switching to [employer] peptide 1.5. Added bentyl. Abdominal Xray w/o distention, PEG in place. On d/c will need Any.DO Peptide 1.5 (bolus) - 4 cans/day.    No new subjective & objective note has been filed under this hospital service since the last note was generated.      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 Underwent repeat bx after failed tx (1/24/22) that showed moderately differentiated squamous cell carcinoma w/ suspicion for perineural invasion. CT soft tissue neck (Jan 2022) w/o e/o metastatic dx. PET-CT (Feb 2022) w/o e/o active local or distant dx- no abnormal activity within the tongue, no signs of cervical lymph node or distant metastasis. Declined chemotherapy &/or XRT. Underwent selective neck dissection levels 1, 2, 3, and partial glossectomy w/  "split-thickness skin graft (3/3/2022 per Dr. Clif Olson).  Surgical pathology w/ unifocal squamous cell carcinoma on the dorsal surface of the tongue on left side measuring 1 cm, moderately differentiated, 6 mm depth of invasion, no lymphovascular invasion, positive for perineural invasion, margins negative, 0 of 39 lymph nodes with metastasis, pT2 pN0 noted.  Postop course complicated by infection requiring I&D and antibiotics.  Declined adjuvant XRT. Repeat imaging (June 2023) w/ 2.2 cm lesion deep to the R SCM c/f necrotic LN w/ few adjacent pathological appearing LNs suspicious for metastasis. Underwent R neck FNA (7/7/23) w/ pathology suspicious for metastatic SCC. PET-CT (8/21/2023) w/ large necrotic mass the angle of mandible posterior to submandibular gland on the right measuring 4.4 x 4.2 cm with SUV 9.78, just superior to the mass is a 2 cm hypermetabolic lymph node and no evidence of distant metastatic disease. Subsequently transferred care to [place] where she reportedly recently abx,"detox", hyperbaric chamber, & stem-cell transplant approximately 2 weeks ago PTA in [place]. Now presenting w/ progressive neck wound. CT neck soft tissue (4/10/24) w/ large bulky lobulated appearing mass right lateral and anterolateral neck extending to the D spaces of the neck having overall dimensions of approximately 9.5 x 8.2 x 10 cm, multiple addn'l b/l cervical necrotic nodes, & assoc mass effect results in displacement of midline structures to the patient's left. CT C/A/P with diffuse metastatic disease including mediastinal & hilar LAD, bilateral lungs, peritoneum, lumbar vertebrae, & likely liver.     - ENT consulted; appreciate recs - not surgical candidate for debridement  - Medical & Radiation Oncology consulted - pt declines chemo or XRT  - Palliative following; appreciate assistance  - Multimodal analgesia   - SLP following,remains NPO  - Gen surgery and ENT consulted. Unable to do do tracheostomy. Rec " consulting IR for PEG  - Consulted IR for PEG placement.      PEG (percutaneous endoscopic gastrostomy) status  Unable to tolerate TF  Patient noted to have a percutaneous endoscopic gastrostomy tube in place. I have personally inspected the tube.Tube was placed on this admission, with date of procedure- 4/26  There are no signs of drainage or infection around the site. Routine care to be done by wound care and nursing staff.     Patient unable to tolerate Impact peptide TF. Discussed with RD, rec switching to [employer] peptide 1.5. However patient again vomited on it. Holding TF for now. Adding bentyl and will get an Abdominal Xray.        Sinus tachycardia  Metoprolol recently discontinued, possible withdrawal  Resume IV metoprolol  Correct electrolyte derangements  Pain control          Severe malnutrition  PEG tube placement is being discussed. IR and GI deferred the procedure. Surgery depending on anesthesia assessment. ENT updated with recs. Awaiting final recs.       Nutrition consulted. Most recent weight and BMI monitored-     Measurements:  Wt Readings from Last 1 Encounters:   04/24/24 86.2 kg (190 lb 0.6 oz)   Body mass index is 30.67 kg/m².    Patient has been screened and assessed by RD.    Malnutrition Type:  Context: chronic illness  Level: severe    Malnutrition Characteristic Summary:  Weight Loss (Malnutrition): greater than 10% in 6 months  Energy Intake (Malnutrition): less than or equal to 50% for greater than or equal to 1 month      Hypercalcemia  On arrival to OSH, Ca 13.5 --> 11.0 on arrival to [facility]. Continued subsequent uptrend peaking at 13.1 on 4/15 (corrected Ca 14.5). Likely 2/2 malignancy.   - IVF  - Calcitonin BID x2 days  - Ionized Ca 1.59 4/21  - Monitor CMP  - IV hydration and cont to trend Ca level   - Ionized calcium WNL. Cont to monitor    - Serum calcium again noted to be 13.3 on 4/29. Received IVF, Calcitonin and zolidronic acid. Continue IVF.   - Consider D/c home calcium  "and vit D supplementation on d/c    Palliative care encounter  Daily GOC discussions  Pt and family would like to proceed with aggressive care  Cont FULL code    Hypokalemia  Patient has hypokalemia which is Acute and currently controlled. Most recent potassium levels reviewed-   Lab Results   Component Value Date    K 3.5 04/24/2024   . Will continue potassium replacement per protocol and recheck repeat levels after replacement completed.     Open neck wound  Pt with extensive hx of H/N cancer. Presents w/ continued/worsening drainage from the neck for months with recent worsening of symptoms for past 2 weeks w/ N/V. Pt returned from [place] where she received antibiotics, "detox" and hyperbaric chamber as treatment. Endorses difficulty with swallowing 2/2 to oral trush. Large neck mass on imaging w/ large central collection of air which appears to extend superficially to the skin surface right mid neck anterior laterally; unable to r/o superimposed abscess. Meeting 2/4 SIRS (HR & WBC) w/ elevated lactate, but nml BP. Blood cx negative. Pt not surgical candidate for debridement per ENT evaluation.   - ID consulted; appreciate recs  - Surgical debridement is not an option. Has completed course of antibiotics.      Sepsis  On arrival to [facility], meeting 2/4 SIRS (requiring 2L NC w/ RR 20s & WBC 13). Likely soft tissue/neck source. Received IVF at OSH prior to transfer (for lactate 2.3 at that time). Started on broad-spectrum abx. Blood cx negative.   - ID consulted; appreciate recs  - Monitor CBC & fever curve  - Mgmt of neck mass/wound as below   - Has completed treatment. Discontinue vancomycin + Flagyl + fluconazole     Postprocedural hypoparathyroidism  Last PTHi nml at 26 (2016). Home regimen includes calcium carbonate & vitamin D. Symptomatic hypercalcemia (Ca 13.5) on presentation to OSH w/ abdominal pain & N/V. Improved w/ NS ggt (although query if pt's hypercalcemia was re: hx of hypoparathyroidism vs " hypercalcemia of malignancy.   - Holding home calcium carbonate  - Resume home vitamin D as PO tolerance improves   - Monitor CMP      Postsurgical hypothyroidism  Last TSH elevated at 5.264. Home regimen includes levothyroxine 175mcg daily & liothyronine 5mcg daily  - Continue home liothyronine & levothyroxine       HTN (hypertension)  Hx of essential HTN; home regimen includes metoprolol 25mg BID. On arrival to [facility], SBP 140s-150s.   PRN labetalol/hydralazine      VTE Risk Mitigation (From admission, onward)           Ordered     enoxaparin injection 40 mg  Every 24 hours         04/12/24 1536     IP VTE HIGH RISK PATIENT  Once         04/12/24 1324     Place sequential compression device  Until discontinued         04/12/24 1324                    Discharge Planning   ANDRES: 5/2/2024     Code Status: Full Code   Is the patient medically ready for discharge?: No    Reason for patient still in hospital (select all that apply):   Discharge Plan A: Hospice/home                  Carissa Young MD  Department of Hospital Medicine   [facility] - Telemetry Stepdown

## 2024-05-01 NOTE — OP NOTE
Surgery aborted due to inability to obtain airway.      Jodie Coffey MD, FACS  General Surgery and Surgical Critical Care  [facility]

## 2024-05-02 LAB
ANION GAP SERPL CALC-SCNC: 8 MMOL/L (ref 8–16)
ANION GAP SERPL CALC-SCNC: 9 MMOL/L (ref 8–16)
BASOPHILS # BLD AUTO: 0.05 K/UL (ref 0–0.2)
BASOPHILS NFR BLD: 0.3 % (ref 0–1.9)
BUN SERPL-MCNC: 14 MG/DL (ref 6–20)
BUN SERPL-MCNC: 15 MG/DL (ref 6–20)
CALCIUM SERPL-MCNC: 7.6 MG/DL (ref 8.7–10.5)
CALCIUM SERPL-MCNC: 8.1 MG/DL (ref 8.7–10.5)
CHLORIDE SERPL-SCNC: 92 MMOL/L (ref 95–110)
CHLORIDE SERPL-SCNC: 94 MMOL/L (ref 95–110)
CO2 SERPL-SCNC: 34 MMOL/L (ref 23–29)
CO2 SERPL-SCNC: 35 MMOL/L (ref 23–29)
CREAT SERPL-MCNC: 0.6 MG/DL (ref 0.5–1.4)
CREAT SERPL-MCNC: 0.7 MG/DL (ref 0.5–1.4)
DIFFERENTIAL METHOD BLD: ABNORMAL
EOSINOPHIL # BLD AUTO: 0 K/UL (ref 0–0.5)
EOSINOPHIL NFR BLD: 0 % (ref 0–8)
ERYTHROCYTE [DISTWIDTH] IN BLOOD BY AUTOMATED COUNT: 15.2 % (ref 11.5–14.5)
EST. GFR  (NO RACE VARIABLE): >60 ML/MIN/1.73 M^2
EST. GFR  (NO RACE VARIABLE): >60 ML/MIN/1.73 M^2
GLUCOSE SERPL-MCNC: 118 MG/DL (ref 70–110)
GLUCOSE SERPL-MCNC: 138 MG/DL (ref 70–110)
HCT VFR BLD AUTO: 37.3 % (ref 37–48.5)
HGB BLD-MCNC: 12.1 G/DL (ref 12–16)
IMM GRANULOCYTES # BLD AUTO: 0.12 K/UL (ref 0–0.04)
IMM GRANULOCYTES NFR BLD AUTO: 0.7 % (ref 0–0.5)
LYMPHOCYTES # BLD AUTO: 2.8 K/UL (ref 1–4.8)
LYMPHOCYTES NFR BLD: 15.9 % (ref 18–48)
MAGNESIUM SERPL-MCNC: 1.3 MG/DL (ref 1.6–2.6)
MAGNESIUM SERPL-MCNC: 2.1 MG/DL (ref 1.6–2.6)
MCH RBC QN AUTO: 27.7 PG (ref 27–31)
MCHC RBC AUTO-ENTMCNC: 32.4 G/DL (ref 32–36)
MCV RBC AUTO: 85 FL (ref 82–98)
MONOCYTES # BLD AUTO: 1.1 K/UL (ref 0.3–1)
MONOCYTES NFR BLD: 6.3 % (ref 4–15)
NEUTROPHILS # BLD AUTO: 13.4 K/UL (ref 1.8–7.7)
NEUTROPHILS NFR BLD: 76.8 % (ref 38–73)
NRBC BLD-RTO: 0 /100 WBC
PHOSPHATE SERPL-MCNC: 2.6 MG/DL (ref 2.7–4.5)
PHOSPHATE SERPL-MCNC: <1 MG/DL (ref 2.7–4.5)
PLATELET # BLD AUTO: 337 K/UL (ref 150–450)
PMV BLD AUTO: 10 FL (ref 9.2–12.9)
POTASSIUM SERPL-SCNC: 3.2 MMOL/L (ref 3.5–5.1)
POTASSIUM SERPL-SCNC: 3.3 MMOL/L (ref 3.5–5.1)
RBC # BLD AUTO: 4.37 M/UL (ref 4–5.4)
SODIUM SERPL-SCNC: 135 MMOL/L (ref 136–145)
SODIUM SERPL-SCNC: 137 MMOL/L (ref 136–145)
WBC # BLD AUTO: 17.4 K/UL (ref 3.9–12.7)

## 2024-05-02 PROCEDURE — 63600175 PHARM REV CODE 636 W HCPCS: Performed by: STUDENT IN AN ORGANIZED HEALTH CARE EDUCATION/TRAINING PROGRAM

## 2024-05-02 PROCEDURE — 25000003 PHARM REV CODE 250: Performed by: STUDENT IN AN ORGANIZED HEALTH CARE EDUCATION/TRAINING PROGRAM

## 2024-05-02 PROCEDURE — 80048 BASIC METABOLIC PNL TOTAL CA: CPT | Mod: 91 | Performed by: STUDENT IN AN ORGANIZED HEALTH CARE EDUCATION/TRAINING PROGRAM

## 2024-05-02 PROCEDURE — 63600175 PHARM REV CODE 636 W HCPCS: Performed by: INTERNAL MEDICINE

## 2024-05-02 PROCEDURE — 84100 ASSAY OF PHOSPHORUS: CPT | Mod: 91 | Performed by: STUDENT IN AN ORGANIZED HEALTH CARE EDUCATION/TRAINING PROGRAM

## 2024-05-02 PROCEDURE — 85025 COMPLETE CBC W/AUTO DIFF WBC: CPT | Performed by: STUDENT IN AN ORGANIZED HEALTH CARE EDUCATION/TRAINING PROGRAM

## 2024-05-02 PROCEDURE — 83735 ASSAY OF MAGNESIUM: CPT | Mod: 91 | Performed by: STUDENT IN AN ORGANIZED HEALTH CARE EDUCATION/TRAINING PROGRAM

## 2024-05-02 PROCEDURE — 84100 ASSAY OF PHOSPHORUS: CPT | Performed by: STUDENT IN AN ORGANIZED HEALTH CARE EDUCATION/TRAINING PROGRAM

## 2024-05-02 PROCEDURE — 83735 ASSAY OF MAGNESIUM: CPT | Performed by: STUDENT IN AN ORGANIZED HEALTH CARE EDUCATION/TRAINING PROGRAM

## 2024-05-02 PROCEDURE — 20600001 HC STEP DOWN PRIVATE ROOM

## 2024-05-02 PROCEDURE — A4216 STERILE WATER/SALINE, 10 ML: HCPCS | Performed by: STUDENT IN AN ORGANIZED HEALTH CARE EDUCATION/TRAINING PROGRAM

## 2024-05-02 PROCEDURE — 36415 COLL VENOUS BLD VENIPUNCTURE: CPT | Performed by: STUDENT IN AN ORGANIZED HEALTH CARE EDUCATION/TRAINING PROGRAM

## 2024-05-02 PROCEDURE — 80048 BASIC METABOLIC PNL TOTAL CA: CPT | Performed by: STUDENT IN AN ORGANIZED HEALTH CARE EDUCATION/TRAINING PROGRAM

## 2024-05-02 PROCEDURE — 25000003 PHARM REV CODE 250: Performed by: INTERNAL MEDICINE

## 2024-05-02 RX ORDER — MAGNESIUM SULFATE HEPTAHYDRATE 40 MG/ML
2 INJECTION, SOLUTION INTRAVENOUS ONCE
Status: COMPLETED | OUTPATIENT
Start: 2024-05-02 | End: 2024-05-02

## 2024-05-02 RX ORDER — POTASSIUM CHLORIDE 20 MEQ/1
40 TABLET, EXTENDED RELEASE ORAL ONCE
Status: COMPLETED | OUTPATIENT
Start: 2024-05-02 | End: 2024-05-02

## 2024-05-02 RX ORDER — POTASSIUM CHLORIDE 7.45 MG/ML
10 INJECTION INTRAVENOUS
Status: COMPLETED | OUTPATIENT
Start: 2024-05-02 | End: 2024-05-02

## 2024-05-02 RX ORDER — DICYCLOMINE HYDROCHLORIDE 10 MG/1
10 CAPSULE ORAL 4 TIMES DAILY
Start: 2024-05-02 | End: 2024-05-17

## 2024-05-02 RX ORDER — POTASSIUM CHLORIDE 7.45 MG/ML
10 INJECTION INTRAVENOUS ONCE
Status: COMPLETED | OUTPATIENT
Start: 2024-05-02 | End: 2024-05-02

## 2024-05-02 RX ADMIN — DICYCLOMINE HYDROCHLORIDE 10 MG: 10 CAPSULE ORAL at 04:05

## 2024-05-02 RX ADMIN — HYDROMORPHONE HYDROCHLORIDE 1 MG: 1 INJECTION, SOLUTION INTRAMUSCULAR; INTRAVENOUS; SUBCUTANEOUS at 11:05

## 2024-05-02 RX ADMIN — LEVOTHYROXINE SODIUM 175 MCG: 150 TABLET ORAL at 05:05

## 2024-05-02 RX ADMIN — SENNOSIDES 8.6 MG: 8.6 TABLET, FILM COATED ORAL at 08:05

## 2024-05-02 RX ADMIN — HYDROMORPHONE HYDROCHLORIDE 1 MG: 1 INJECTION, SOLUTION INTRAMUSCULAR; INTRAVENOUS; SUBCUTANEOUS at 08:05

## 2024-05-02 RX ADMIN — POTASSIUM CHLORIDE 40 MEQ: 1500 TABLET, EXTENDED RELEASE ORAL at 05:05

## 2024-05-02 RX ADMIN — LIOTHYRONINE SODIUM 5 MCG: 5 TABLET ORAL at 05:05

## 2024-05-02 RX ADMIN — SENNOSIDES 8.6 MG: 8.6 TABLET, FILM COATED ORAL at 09:05

## 2024-05-02 RX ADMIN — Medication 10 ML: at 06:05

## 2024-05-02 RX ADMIN — POTASSIUM CHLORIDE 10 MEQ: 7.46 INJECTION, SOLUTION INTRAVENOUS at 02:05

## 2024-05-02 RX ADMIN — Medication 10 ML: at 01:05

## 2024-05-02 RX ADMIN — HYDROMORPHONE HYDROCHLORIDE 1 MG: 1 INJECTION, SOLUTION INTRAMUSCULAR; INTRAVENOUS; SUBCUTANEOUS at 07:05

## 2024-05-02 RX ADMIN — POTASSIUM CHLORIDE 10 MEQ: 7.46 INJECTION, SOLUTION INTRAVENOUS at 05:05

## 2024-05-02 RX ADMIN — MAGNESIUM SULFATE HEPTAHYDRATE 2 G: 40 INJECTION, SOLUTION INTRAVENOUS at 05:05

## 2024-05-02 RX ADMIN — DICYCLOMINE HYDROCHLORIDE 10 MG: 10 CAPSULE ORAL at 08:05

## 2024-05-02 RX ADMIN — HYDROMORPHONE HYDROCHLORIDE 1 MG: 1 INJECTION, SOLUTION INTRAMUSCULAR; INTRAVENOUS; SUBCUTANEOUS at 05:05

## 2024-05-02 RX ADMIN — LORAZEPAM 2 MG: 2 INJECTION INTRAMUSCULAR; INTRAVENOUS at 01:05

## 2024-05-02 RX ADMIN — PROCHLORPERAZINE EDISYLATE 5 MG: 5 INJECTION INTRAMUSCULAR; INTRAVENOUS at 07:05

## 2024-05-02 RX ADMIN — PROCHLORPERAZINE EDISYLATE 5 MG: 5 INJECTION INTRAMUSCULAR; INTRAVENOUS at 08:05

## 2024-05-02 RX ADMIN — POTASSIUM PHOSPHATE, MONOBASIC AND POTASSIUM PHOSPHATE, DIBASIC 30 MMOL: 224; 236 INJECTION, SOLUTION, CONCENTRATE INTRAVENOUS at 10:05

## 2024-05-02 RX ADMIN — ENOXAPARIN SODIUM 40 MG: 40 INJECTION SUBCUTANEOUS at 04:05

## 2024-05-02 RX ADMIN — PROMETHAZINE HYDROCHLORIDE 12.5 MG: 25 INJECTION INTRAMUSCULAR; INTRAVENOUS at 11:05

## 2024-05-02 RX ADMIN — DICYCLOMINE HYDROCHLORIDE 10 MG: 10 CAPSULE ORAL at 01:05

## 2024-05-02 RX ADMIN — MAGNESIUM SULFATE HEPTAHYDRATE 2 G: 40 INJECTION, SOLUTION INTRAVENOUS at 09:05

## 2024-05-02 RX ADMIN — HYDROMORPHONE HYDROCHLORIDE 1 MG: 1 INJECTION, SOLUTION INTRAMUSCULAR; INTRAVENOUS; SUBCUTANEOUS at 10:05

## 2024-05-02 RX ADMIN — Medication 10 ML: at 11:05

## 2024-05-02 RX ADMIN — METOPROLOL TARTRATE 25 MG: 25 TABLET, FILM COATED ORAL at 09:05

## 2024-05-02 RX ADMIN — METOPROLOL TARTRATE 25 MG: 25 TABLET, FILM COATED ORAL at 08:05

## 2024-05-02 RX ADMIN — POTASSIUM CHLORIDE 10 MEQ: 7.46 INJECTION, SOLUTION INTRAVENOUS at 04:05

## 2024-05-02 RX ADMIN — DICYCLOMINE HYDROCHLORIDE 10 MG: 10 CAPSULE ORAL at 09:05

## 2024-05-02 NOTE — PROGRESS NOTES
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 She initially presented to [facility] Emergency Department with six days of epigastric pain with associated nausea, vomiting, and difficulty eating due to pain. However on presentation, she was noted to have a large necrotic and purulent wound located on her right neck with complaints of associated difficulty speaking, swallowing and shortness of breath. She was noted to be hypoxic 88% on room air which improved with 2L NC. Labs were notable for leukocytosis 14, hypokalemia 2.4, hypochloremia 89, CO2 36, hypercalcemia 13.5, Phos 2.5. . Troponin 0.046. Lactate 2.3. CT Soft Tissue Neck was concerning for 9.5 x 8.2 x 10 cm large lobulated mass in the right anterolateral neck extending to the deep spaces of the neck and abutting the right prevertebral space and paravertebral musculature, left midline shift, multiple bilateral necrotic cervical lymph nodes. Case was discussed and decision was made to transfer [place] [facility] [place] higher level of care.     Of additional note, she recently transitioned her care to [place] and underwent stem-cell transplant approximately 2 weeks ago in [place], has not been seen by a [place] physician since 2023.      On transfer: she was afebrile, mildly hypertensive /109, HR 98, RR 20, satting 96% on room  air. Complaining of mild headache and poor appetite associated with nausea; denies fever, chills, chest pain, palpitations, SOB, abdominal pain, dysuria. States wound has been draining for the past week initially thick white now more liquid. Mild dysphonia is apparently chronic from left vocal cord paralysis and at baseline. Some dysphagia with solids, none with pureed soft or liquids, denies odynophagia. Dressing on neck CDI, ENT consulted will be here shortly to assess patient.        Overview/Hospital Course:  Evaluated 4/12 in MICU by ENT, who do not feel patient is a candidate for surgical intervention. Palliative care and oncology consulted. Pt declines chemo or XRT. Failed swallow study, SLP recommending NPO 4/13. Patient continues to protect airway. Stepped down from MICU on 4/13.  ENT attempted awake trach but could not get one. Gen surgery rec consulting IR for PEG as they can't put one w/o a trach. Now s/p PEG placement by IR on 4/26. Started on TF. Unable to tolerate Impact peptide TF. Discussed with RD, rec switching to [employer] peptide 1.5. Added bentyl. Abdominal Xray w/o distention, PEG in place. On d/c will need [employer] Peptide 1.5 (bolus) - 4 cans/day. Per CM will need a few days to arrange for [employer] cans.     Interval History:   Patient tolerating  [employer] peptide 1.5 TF well. Noted to be have low K , mag and phos with am labs. Repleteing. Repeat labs in afternoon. Patient is medically ready to go home with hospice but per CM it will take a few days to arrange for [employer] Tf as Op for her.     Objective:     Vital Signs (Most Recent):  Temp: 98.5 °F (36.9 °C) (05/02/24 1138)  Pulse: 102 (05/02/24 1138)  Resp: 20 (05/02/24 1138)  BP: 127/69 (05/02/24 1138)  SpO2: (!) 94 % (05/02/24 1138) Vital Signs (24h Range):  Temp:  [97.2 °F (36.2 °C)-98.8 °F (37.1 °C)] 98.5 °F (36.9 °C)  Pulse:  [] 102  Resp:  [18-25] 20  SpO2:  [90 %-99 %] 94 %  BP: (115-136)/(69-82) 127/69     Weight:  82.6 kg (182 lb 1.6 oz)  Body mass index is 29.39 kg/m².    Intake/Output Summary (Last 24 hours) at 5/2/2024 1357  Last data filed at 5/1/2024 2000  Gross per 24 hour   Intake 90 ml   Output 25 ml   Net 65 ml         Physical Exam  Vitals and nursing note reviewed.   Constitutional:       General: She is not in acute distress.     Appearance: She is ill-appearing. She is not toxic-appearing.   Eyes:      Conjunctiva/sclera: Conjunctivae normal.   Neck:      Comments: Dressing in place over entire circumference of neck. Dressing c/d/i  Cardiovascular:      Rate and Rhythm: Regular rhythm. Tachycardia present.      Heart sounds: Normal heart sounds.   Pulmonary:      Effort: Pulmonary effort is normal. No respiratory distress.      Breath sounds: Normal breath sounds. No wheezing.   Abdominal:      General: Bowel sounds are normal. There is no distension.      Palpations: Abdomen is soft.      Tenderness: There is no abdominal tenderness. There is no guarding.      Comments: PEG tube in place   Skin:     General: Skin is warm and dry.   Neurological:      General: No focal deficit present.      Mental Status: She is alert and oriented to person, place, and time. Mental status is at baseline.   Psychiatric:         Mood and Affect: Mood normal.         Behavior: Behavior normal.             Significant Labs: All pertinent labs within the past 24 hours have been reviewed.  CBC:   Recent Labs   Lab 05/01/24  0556 05/02/24  0244   WBC 15.68* 17.40*   HGB 11.8* 12.1   HCT 36.0* 37.3    337     CMP:   Recent Labs   Lab 05/01/24  0556 05/02/24  0244 05/02/24  1253    137 135*   K 2.4* 3.2* 3.3*   CL 98 94* 92*   CO2 34* 34* 35*    118* 138*   BUN 15 14 15   CREATININE 0.6 0.7 0.6   CALCIUM 8.5* 8.1* 7.6*   PROT 5.7*  --   --    ALBUMIN 2.4*  --   --    BILITOT 0.5  --   --    ALKPHOS 191*  --   --    AST 28  --   --    ALT 41  --   --    ANIONGAP 8 9 8       Significant Imaging: I have reviewed all  "pertinent imaging results/findings within the past 24 hours.      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 Underwent repeat bx after failed tx (1/24/22) that showed moderately differentiated squamous cell carcinoma w/ suspicion for perineural invasion. CT soft tissue neck (Jan 2022) w/o e/o metastatic dx. PET-CT (Feb 2022) w/o e/o active local or distant dx- no abnormal activity within the tongue, no signs of cervical lymph node or distant metastasis. Declined chemotherapy &/or XRT. Underwent selective neck dissection levels 1, 2, 3, and partial glossectomy w/ split-thickness skin graft (3/3/2022 per Dr. Clif Olson).  Surgical pathology w/ unifocal squamous cell carcinoma on the dorsal surface of the tongue on left side measuring 1 cm, moderately differentiated, 6 mm depth of invasion, no lymphovascular invasion, positive for perineural invasion, margins negative, 0 of 39 lymph nodes with metastasis, pT2 pN0 noted.  Postop course complicated by infection requiring I&D and antibiotics.  Declined adjuvant XRT. Repeat imaging (June 2023) w/ 2.2 cm lesion deep to the R SCM c/f necrotic LN w/ few adjacent pathological appearing LNs suspicious for metastasis. Underwent R neck FNA (7/7/23) w/ pathology suspicious for metastatic SCC. PET-CT (8/21/2023) w/ large necrotic mass the angle of mandible posterior to submandibular gland on the right measuring 4.4 x 4.2 cm with SUV 9.78, just superior to the mass is a 2 cm hypermetabolic lymph node and no evidence of distant metastatic disease. Subsequently transferred care to [place] where she reportedly recently abx,"detox", hyperbaric chamber, & stem-cell transplant approximately 2 weeks ago PTA in [place]. Now presenting w/ progressive neck wound. CT neck soft tissue (4/10/24) w/ large bulky lobulated appearing mass right lateral and " anterolateral neck extending to the D spaces of the neck having overall dimensions of approximately 9.5 x 8.2 x 10 cm, multiple addn'l b/l cervical necrotic nodes, & assoc mass effect results in displacement of midline structures to the patient's left. CT C/A/P with diffuse metastatic disease including mediastinal & hilar LAD, bilateral lungs, peritoneum, lumbar vertebrae, & likely liver.     - ENT consulted; appreciate recs - not surgical candidate for debridement  - Medical & Radiation Oncology consulted - pt declines chemo or XRT  - Palliative following; appreciate assistance  - Multimodal analgesia   - SLP following,remains NPO  - Gen surgery and ENT consulted. Unable to do do tracheostomy. Rec consulting IR for PEG  - Consulted IR for PEG placement.      PEG (percutaneous endoscopic gastrostomy) status  Unable to tolerate TF  Patient noted to have a percutaneous endoscopic gastrostomy tube in place. I have personally inspected the tube.Tube was placed on this admission, with date of procedure- 4/26  There are no signs of drainage or infection around the site. Routine care to be done by wound care and nursing staff.     Patient unable to tolerate Impact peptide TF. Discussed with RD, rec switching to [employer] peptide 1.5 which patient is tolerating well so are.   Added bentyl    Abdominal Xray w/o distention, PEG in place.   On d/c will need [employer] Peptide 1.5 (bolus) - 4 cans/day but per CM it will take a few days to arrange for [employer] Tf as Op for her.         Sinus tachycardia  Metoprolol recently discontinued, possible withdrawal  Resume IV metoprolol  Correct electrolyte derangements  Pain control          Severe malnutrition  PEG tube placement is being discussed. IR and GI deferred the procedure. Surgery depending on anesthesia assessment. ENT updated with recs. Awaiting final recs.       Nutrition consulted. Most recent weight and BMI monitored-     Measurements:  Wt Readings from Last 1  "Encounters:   04/24/24 86.2 kg (190 lb 0.6 oz)   Body mass index is 30.67 kg/m².    Patient has been screened and assessed by RD.    Malnutrition Type:  Context: chronic illness  Level: severe    Malnutrition Characteristic Summary:  Weight Loss (Malnutrition): greater than 10% in 6 months  Energy Intake (Malnutrition): less than or equal to 50% for greater than or equal to 1 month        Hypercalcemia  On arrival to OSH, Ca 13.5 --> 11.0 on arrival to [facility]. Continued subsequent uptrend peaking at 13.1 on 4/15 (corrected Ca 14.5). Likely 2/2 malignancy.   - IVF  - Calcitonin BID x2 days  - Ionized Ca 1.59 4/21  - Monitor CMP  - IV hydration and cont to trend Ca level   - Ionized calcium WNL. Cont to monitor    - Serum calcium again noted to be 13.3 on 4/29. Received IVF, Calcitonin and zolidronic acid. Continue IVF.   - D/c home calcium and vit D supplementation on d/c      Palliative care encounter  Daily GOC discussions  Pt and family would like to proceed with aggressive care  Cont FULL code    Hypokalemia  Patient has hypokalemia which is Acute and currently controlled. Most recent potassium levels reviewed-   Lab Results   Component Value Date    K 3.5 04/24/2024   . Will continue potassium replacement per protocol and recheck repeat levels after replacement completed.     Open neck wound  Pt with extensive hx of H/N cancer. Presents w/ continued/worsening drainage from the neck for months with recent worsening of symptoms for past 2 weeks w/ N/V. Pt returned from [place] where she received antibiotics, "detox" and hyperbaric chamber as treatment. Endorses difficulty with swallowing 2/2 to oral trush. Large neck mass on imaging w/ large central collection of air which appears to extend superficially to the skin surface right mid neck anterior laterally; unable to r/o superimposed abscess. Meeting 2/4 SIRS (HR & WBC) w/ elevated lactate, but nml BP. Blood cx negative. Pt not surgical candidate for debridement per " ENT evaluation.   - ID consulted; appreciate recs  - Surgical debridement is not an option. Has completed course of antibiotics.      Sepsis  On arrival to [facility], meeting 2/4 SIRS (requiring 2L NC w/ RR 20s & WBC 13). Likely soft tissue/neck source. Received IVF at OSH prior to transfer (for lactate 2.3 at that time). Started on broad-spectrum abx. Blood cx negative.   - ID consulted; appreciate recs  - Monitor CBC & fever curve  - Mgmt of neck mass/wound as below   - Has completed treatment. Discontinue vancomycin + Flagyl + fluconazole     Postprocedural hypoparathyroidism  Last PTHi nml at 26 (2016). Home regimen includes calcium carbonate & vitamin D. Symptomatic hypercalcemia (Ca 13.5) on presentation to OSH w/ abdominal pain & N/V. Improved w/ NS ggt (although query if pt's hypercalcemia was re: hx of hypoparathyroidism vs hypercalcemia of malignancy.   - Holding home calcium carbonate  - Resume home vitamin D as PO tolerance improves   - Monitor CMP    Postsurgical hypothyroidism  Last TSH elevated at 5.264. Home regimen includes levothyroxine 175mcg daily & liothyronine 5mcg daily  - Continue home liothyronine & levothyroxine       HTN (hypertension)  Hx of essential HTN; home regimen includes metoprolol 25mg BID. On arrival to [facility], SBP 140s-150s.   PRN labetalol/hydralazine      VTE Risk Mitigation (From admission, onward)           Ordered     enoxaparin injection 40 mg  Every 24 hours         04/12/24 1536     IP VTE HIGH RISK PATIENT  Once         04/12/24 1324     Place sequential compression device  Until discontinued         04/12/24 1324                    Discharge Planning   ANDRES: 5/2/2024     Code Status: Full Code   Is the patient medically ready for discharge?: No    Reason for patient still in hospital (select all that apply): Trending condition   Discharge Plan A: Hospice/home                  Carissa Young MD  Department of Hospital Medicine   Con Nguyen - Telemetry Stepdown

## 2024-05-02 NOTE — PLAN OF CARE
Call received from [facility] informing this CM that patient's ordered TF's ([employer] Peptide 1.5) is not in stock and will need to be ordered. MD updated. CM met with patient and her  to up date them on the above conversation and he expressed understanding. Will continue to follow.    Nisha Hightower RN  Ext [phone]

## 2024-05-02 NOTE — ASSESSMENT & PLAN NOTE
Unable to tolerate TF  Patient noted to have a percutaneous endoscopic gastrostomy tube in place. I have personally inspected the tube.Tube was placed on this admission, with date of procedure- 4/26  There are no signs of drainage or infection around the site. Routine care to be done by wound care and nursing staff.     Patient unable to tolerate Impact peptide TF. Discussed with RD, rec switching to [employer] peptide 1.5 which patient is tolerating well so are.   Added bentyl    Abdominal Xray w/o distention, PEG in place.   On d/c will need [employer] Peptide 1.5 (bolus) - 4 cans/day but per CM it will take a few days to arrange for [employer] Tf as Op for her.

## 2024-05-02 NOTE — PLAN OF CARE
Problem: Adult Inpatient Plan of Care  Goal: Plan of Care Review  Outcome: Progressing  Goal: Patient-Specific Goal (Individualized)  Outcome: Progressing  Goal: Absence of Hospital-Acquired Illness or Injury  Outcome: Progressing  Goal: Optimal Comfort and Wellbeing  Outcome: Progressing       No acute distress noted this time. Family member at bed side. Tube feeding continuous 40 mL/ hr. Call light within reach. Patient care ongoing.

## 2024-05-02 NOTE — PLAN OF CARE
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 Transferred to MICU at [facility] for ENT evaluation. No stridor, change in chronic mild dysphonia, able to tolerate liquids and pureed with subjective mild dysphagia of solids. Pt taking pills and liquids this morning at OSH, tolerated well. No stridor, normal breath sounds on exam.    Plan:  -- Will let ENT evaluate, possibly speech for MBSS  -- Low concern for airway compromise at this time        Hypokalemia [E87.6]  Yes    Sepsis [A41.9]  Yes    Open neck wound [S11.90XA]  Yes    Postprocedural hypoparathyroidism [E89.2]  Yes    Postsurgical hypothyroidism [E89.0]  Yes    GERD (gastroesophageal reflux disease) [K21.9]  Yes    HTN (hypertension) [I10]  Yes      Resolved Hospital Problems   No resolved problems to display.       Hospice Qualifying Diagnoses      Patient has a life expectancy < 6 months due to:  Primary Hospice Diagnosis:    SCC of the tongue   Comorbid Conditions Contributing to Decline:    left vocal cord paralysis, GERD, Hypertension, obesity, post-surgical hypothyroidism and hypoparathyroidism      Vital Signs: Routine per Hospice Protocol.     Code Status:  FULL    Allergies:   Review of patient's allergies indicates:   Allergen Reactions    Ciprofloxacin Swelling    Codeine Swelling    Opioids - morphine analogues     Pcn [penicillins] Hives     Tolerated cefepime 04/2024    Percocet [oxycodone-acetaminophen] Swelling       Diet:   [employer] peptide 1.5.   Take 4 cans per day vis PEG.    Activities: As tolerated    Goals of Care Treatment Preferences:  Code Status: Full Code             Nursing: Per Hospice Routine.      PEG Care:  Clean site daily.  Monitor skin integrity.    Routine Skin for Bedridden Patients: Apply moisture barrier cream to all skin folds and   wet areas in perineal area daily and after baths and all bowel movements.  Medications:        Medication List        START taking these medications      dicyclomine 10 MG capsule  Commonly known as: BENTYL  1 capsule (10 mg total) by Per G Tube route 4 (four) times daily. for 15 days            CONTINUE taking these medications      flaxseed 1,000 mg Cap  Take by mouth Daily.     HYDROcodone-acetaminophen 5-325 mg per tablet  Commonly known as: NORCO     levothyroxine 175 MCG tablet  Commonly known as: SYNTHROID  Take 1 tablet (175 mcg total) by mouth before breakfast. Take on an empty stomach. Wait 4 hours after taking LT4 to take any multivitamins or nutritional supplements and wait 1 hour to take other medications.     liothyronine 5 MCG Tab  Commonly known as: CYTOMEL  Take 1 tablet (5 mcg total) by mouth before breakfast. Take on an empty stomach. Wait 4 hours after taking T3 to take any multivitamins or nutritional supplements and wait 1 hour to take other medications.     metoprolol tartrate 25 MG tablet  Commonly known as: LOPRESSOR  Take 1 tablet (25 mg total) by mouth 2 (two) times daily.     multivitamin capsule  Take 1 capsule by mouth once daily.     ondansetron 4 MG Tbdl  Commonly known as: ZOFRAN-ODT  DISSOLVE 1 TABLET (4 MG TOTAL) BY MOUTH EVERY 8 (EIGHT) HOURS AS NEEDED.            STOP  taking these medications      calcium carbonate 300 mg (750 mg) Chew  Commonly known as: CALCIUM ANTACID     cholecalciferol (vitamin D3) 25 mcg (1,000 unit) capsule  Commonly known as: VITAMIN D3              Future Orders:  Hospice Medical Director may dictate new orders for comfortable care measures & sign death certificate.        _________________________________  Carissa Young MD  05/02/2024

## 2024-05-02 NOTE — PROGRESS NOTES
Con Nguyen - Telemetry Stepdown  Adult Nutrition  Progress Note    SUMMARY       Recommendations    As tolerated, increase TF rate (of [employer] Peptide 1.5) to 50 mL/hr = 1846 kcals, 89 g of protein, 840 mL fluid.  Bolus TF recommendations: Appetite+ Peptide 1.4 - 4 cans/day = 2000 kcals, 96 g of protein, 912 mL fluid.  RD to monitor & follow-up.    Goals: Meet % EEN, EPN by RD f/u date  Nutrition Goal Status: progressing towards goal  Communication of RD Recs: reviewed with RN    Assessment and Plan    Severe malnutrition    Malnutrition Type:  Context: chronic illness  Level: severe    Related to (etiology):   Inability to consume sufficient energy     Signs and Symptoms (as evidenced by):   Weight loss, inadequate energy intake    Malnutrition Characteristic Summary:  Weight Loss (Malnutrition): greater than 10% in 6 months  Energy Intake (Malnutrition): less than or equal to 50% for greater than or equal to 1 month    Interventions/Recommendations (treatment strategy):  Collaboration of nutrition care w/ other providers  EN    Nutrition Diagnosis Status:   Continues     Malnutrition Assessment    Malnutrition Context: chronic illness  Malnutrition Level: severe    Weight Loss (Malnutrition): greater than 10% in 6 months  Energy Intake (Malnutrition): less than or equal to 50% for greater than or equal to 1 month     Reason for Assessment    Reason For Assessment: RD follow-up  Diagnosis: other (see comments) (Sepsis)  Relevant Medical History: 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 Severe malnutrition diagnosis continues; please see PES statement for details.   Nutrition Discharge Planning: Adequate nutrition    Nutrition/Diet History    Spiritual, Cultural Beliefs, [employer]ian Practices, Values that Affect Care: no  Factors  "Affecting Nutritional Intake: NPO    Anthropometrics    Temp: 98.5 °F (36.9 °C)  Height Method: Stated  Height: 5' 6" (167.6 cm)  Height (inches): 66 in  Weight Method: Bed Scale  Weight: 82.6 kg (182 lb 1.6 oz)  Weight (lb): 182.1 lb  Ideal Body Weight (IBW), Female: 130 lb  % Ideal Body Weight, Female (lb): 140.08 %  BMI (Calculated): 29.4  BMI Grade: 25 - 29.9 - overweight  Usual Body Weight (UBW), k kg  % Usual Body Weight: 78.42  % Weight Change From Usual Weight: -21.74 %    Lab/Procedures/Meds    Pertinent Labs Reviewed: reviewed  Pertinent Labs Comments: -  Pertinent Medications Reviewed: reviewed  Pertinent Medications Comments: -    Estimated/Assessed Needs    Weight Used For Calorie Calculations: 82.6 kg (182 lb 1.6 oz)    Energy Calorie Requirements (kcal): 1714 kcal/d  Energy Need Method: [place]-St Jeor (1.2 PAL)    Protein Requirements: 108 g/d (1.3 g/kg)  Weight Used For Protein Calculations: 82.6 kg (182 lb 1.6 oz)    Estimated Fluid Requirement Method: other (see comments) (Per MD)  RDA Method (mL): 1714    CHO Requirement: 214g    Nutrition Prescription Ordered    Current Diet Order: NPO  Current Nutrition Support Formula Ordered: [employer] Peptide 1.5  Current Nutrition Support Rate Ordered: 40 mL/hr    Evaluation of Received Nutrient/Fluid Intake    Enteral Calories (kcal): 1476  Enteral Protein (gm): 71  Enteral (Free Water) Fluid (mL): 672    % Kcal Needs: 86%  % Protein Needs: 66%    I/O: +5.5L since     Energy Calories Required: meeting needs  Protein Required: not meeting needs  Fluid Required: other (see comments) (Per MD)    Comments: LBM:     Tolerance: tolerating    Nutrition Risk    Level of Risk/Frequency of Follow-up:  (1x/week)     Monitor and Evaluation    Food and Nutrient Intake: enteral nutrition intake, parenteral nutrition intake  Food and Nutrient Adminstration: enteral and parenteral nutrition administration  Physical Activity and Function: nutrition-related " ADLs and IADLs  Anthropometric Measurements: weight, weight change  Biochemical Data, Medical Tests and Procedures: glucose/endocrine profile, lipid profile, inflammatory profile, electrolyte and renal panel, gastrointestinal profile  Nutrition-Focused Physical Findings: overall appearance     Nutrition Follow-Up    RD Follow-up?: Yes

## 2024-05-02 NOTE — SUBJECTIVE & OBJECTIVE
Interval History:   Patient tolerating  [employer] peptide 1.5 TF well. Noted to be have low K , mag and phos with am labs. Repleteing. Repeat labs in afternoon. Patient is medically ready to go home with hospice but per CM it will take a few days to arrange for [facility] as Op for her.     Objective:     Vital Signs (Most Recent):  Temp: 98.5 °F (36.9 °C) (05/02/24 1138)  Pulse: 102 (05/02/24 1138)  Resp: 20 (05/02/24 1138)  BP: 127/69 (05/02/24 1138)  SpO2: (!) 94 % (05/02/24 1138) Vital Signs (24h Range):  Temp:  [97.2 °F (36.2 °C)-98.8 °F (37.1 °C)] 98.5 °F (36.9 °C)  Pulse:  [] 102  Resp:  [18-25] 20  SpO2:  [90 %-99 %] 94 %  BP: (115-136)/(69-82) 127/69     Weight: 82.6 kg (182 lb 1.6 oz)  Body mass index is 29.39 kg/m².    Intake/Output Summary (Last 24 hours) at 5/2/2024 1357  Last data filed at 5/1/2024 2000  Gross per 24 hour   Intake 90 ml   Output 25 ml   Net 65 ml         Physical Exam  Vitals and nursing note reviewed.   Constitutional:       General: She is not in acute distress.     Appearance: She is ill-appearing. She is not toxic-appearing.   Eyes:      Conjunctiva/sclera: Conjunctivae normal.   Neck:      Comments: Dressing in place over entire circumference of neck. Dressing c/d/i  Cardiovascular:      Rate and Rhythm: Regular rhythm. Tachycardia present.      Heart sounds: Normal heart sounds.   Pulmonary:      Effort: Pulmonary effort is normal. No respiratory distress.      Breath sounds: Normal breath sounds. No wheezing.   Abdominal:      General: Bowel sounds are normal. There is no distension.      Palpations: Abdomen is soft.      Tenderness: There is no abdominal tenderness. There is no guarding.      Comments: PEG tube in place   Skin:     General: Skin is warm and dry.   Neurological:      General: No focal deficit present.      Mental Status: She is alert and oriented to person, place, and time. Mental status is at baseline.   Psychiatric:         Mood and Affect: Mood  normal.         Behavior: Behavior normal.             Significant Labs: All pertinent labs within the past 24 hours have been reviewed.  CBC:   Recent Labs   Lab 05/01/24  0556 05/02/24  0244   WBC 15.68* 17.40*   HGB 11.8* 12.1   HCT 36.0* 37.3    337     CMP:   Recent Labs   Lab 05/01/24  0556 05/02/24  0244 05/02/24  1253    137 135*   K 2.4* 3.2* 3.3*   CL 98 94* 92*   CO2 34* 34* 35*    118* 138*   BUN 15 14 15   CREATININE 0.6 0.7 0.6   CALCIUM 8.5* 8.1* 7.6*   PROT 5.7*  --   --    ALBUMIN 2.4*  --   --    BILITOT 0.5  --   --    ALKPHOS 191*  --   --    AST 28  --   --    ALT 41  --   --    ANIONGAP 8 9 8       Significant Imaging: I have reviewed all pertinent imaging results/findings within the past 24 hours.

## 2024-05-03 PROBLEM — J98.8 AIRWAY COMPROMISE: Status: RESOLVED | Noted: 2024-04-12 | Resolved: 2024-05-03

## 2024-05-03 PROBLEM — C76.0 HEAD AND NECK CANCER: Status: RESOLVED | Noted: 2024-04-18 | Resolved: 2024-05-03

## 2024-05-03 PROBLEM — Z71.89 ACP (ADVANCE CARE PLANNING): Status: RESOLVED | Noted: 2024-04-16 | Resolved: 2024-05-03

## 2024-05-03 PROBLEM — R00.0 SINUS TACHYCARDIA: Status: RESOLVED | Noted: 2024-04-24 | Resolved: 2024-05-03

## 2024-05-03 PROBLEM — R52 PAIN: Status: RESOLVED | Noted: 2024-04-15 | Resolved: 2024-05-03

## 2024-05-03 LAB
ANION GAP SERPL CALC-SCNC: 9 MMOL/L (ref 8–16)
BASOPHILS # BLD AUTO: 0.03 K/UL (ref 0–0.2)
BASOPHILS NFR BLD: 0.2 % (ref 0–1.9)
BUN SERPL-MCNC: 19 MG/DL (ref 6–20)
CALCIUM SERPL-MCNC: 7.4 MG/DL (ref 8.7–10.5)
CHLORIDE SERPL-SCNC: 92 MMOL/L (ref 95–110)
CO2 SERPL-SCNC: 32 MMOL/L (ref 23–29)
CREAT SERPL-MCNC: 0.7 MG/DL (ref 0.5–1.4)
DIFFERENTIAL METHOD BLD: ABNORMAL
EOSINOPHIL # BLD AUTO: 0 K/UL (ref 0–0.5)
EOSINOPHIL NFR BLD: 0.1 % (ref 0–8)
ERYTHROCYTE [DISTWIDTH] IN BLOOD BY AUTOMATED COUNT: 15.7 % (ref 11.5–14.5)
EST. GFR  (NO RACE VARIABLE): >60 ML/MIN/1.73 M^2
GLUCOSE SERPL-MCNC: 121 MG/DL (ref 70–110)
HCT VFR BLD AUTO: 36.5 % (ref 37–48.5)
HGB BLD-MCNC: 11.5 G/DL (ref 12–16)
IMM GRANULOCYTES # BLD AUTO: 0.1 K/UL (ref 0–0.04)
IMM GRANULOCYTES NFR BLD AUTO: 0.8 % (ref 0–0.5)
LYMPHOCYTES # BLD AUTO: 2.3 K/UL (ref 1–4.8)
LYMPHOCYTES NFR BLD: 17.3 % (ref 18–48)
MAGNESIUM SERPL-MCNC: 1.4 MG/DL (ref 1.6–2.6)
MCH RBC QN AUTO: 27.8 PG (ref 27–31)
MCHC RBC AUTO-ENTMCNC: 31.5 G/DL (ref 32–36)
MCV RBC AUTO: 88 FL (ref 82–98)
MONOCYTES # BLD AUTO: 0.8 K/UL (ref 0.3–1)
MONOCYTES NFR BLD: 6.3 % (ref 4–15)
NEUTROPHILS # BLD AUTO: 10.1 K/UL (ref 1.8–7.7)
NEUTROPHILS NFR BLD: 75.3 % (ref 38–73)
NRBC BLD-RTO: 0 /100 WBC
PHOSPHATE SERPL-MCNC: 1.5 MG/DL (ref 2.7–4.5)
PLATELET # BLD AUTO: 294 K/UL (ref 150–450)
PMV BLD AUTO: 9.9 FL (ref 9.2–12.9)
POTASSIUM SERPL-SCNC: 3.4 MMOL/L (ref 3.5–5.1)
RBC # BLD AUTO: 4.14 M/UL (ref 4–5.4)
SODIUM SERPL-SCNC: 133 MMOL/L (ref 136–145)
WBC # BLD AUTO: 13.33 K/UL (ref 3.9–12.7)

## 2024-05-03 PROCEDURE — 94640 AIRWAY INHALATION TREATMENT: CPT

## 2024-05-03 PROCEDURE — 84100 ASSAY OF PHOSPHORUS: CPT | Performed by: STUDENT IN AN ORGANIZED HEALTH CARE EDUCATION/TRAINING PROGRAM

## 2024-05-03 PROCEDURE — 27000221 HC OXYGEN, UP TO 24 HOURS

## 2024-05-03 PROCEDURE — A4216 STERILE WATER/SALINE, 10 ML: HCPCS | Performed by: STUDENT IN AN ORGANIZED HEALTH CARE EDUCATION/TRAINING PROGRAM

## 2024-05-03 PROCEDURE — 63600175 PHARM REV CODE 636 W HCPCS: Performed by: STUDENT IN AN ORGANIZED HEALTH CARE EDUCATION/TRAINING PROGRAM

## 2024-05-03 PROCEDURE — 25000003 PHARM REV CODE 250: Performed by: STUDENT IN AN ORGANIZED HEALTH CARE EDUCATION/TRAINING PROGRAM

## 2024-05-03 PROCEDURE — 25000003 PHARM REV CODE 250: Performed by: INTERNAL MEDICINE

## 2024-05-03 PROCEDURE — 63600175 PHARM REV CODE 636 W HCPCS: Performed by: INTERNAL MEDICINE

## 2024-05-03 PROCEDURE — 85025 COMPLETE CBC W/AUTO DIFF WBC: CPT | Performed by: STUDENT IN AN ORGANIZED HEALTH CARE EDUCATION/TRAINING PROGRAM

## 2024-05-03 PROCEDURE — 20600001 HC STEP DOWN PRIVATE ROOM

## 2024-05-03 PROCEDURE — 80048 BASIC METABOLIC PNL TOTAL CA: CPT | Performed by: STUDENT IN AN ORGANIZED HEALTH CARE EDUCATION/TRAINING PROGRAM

## 2024-05-03 PROCEDURE — 83735 ASSAY OF MAGNESIUM: CPT | Performed by: STUDENT IN AN ORGANIZED HEALTH CARE EDUCATION/TRAINING PROGRAM

## 2024-05-03 PROCEDURE — 25000242 PHARM REV CODE 250 ALT 637 W/ HCPCS: Performed by: STUDENT IN AN ORGANIZED HEALTH CARE EDUCATION/TRAINING PROGRAM

## 2024-05-03 PROCEDURE — 36415 COLL VENOUS BLD VENIPUNCTURE: CPT | Performed by: STUDENT IN AN ORGANIZED HEALTH CARE EDUCATION/TRAINING PROGRAM

## 2024-05-03 PROCEDURE — 94761 N-INVAS EAR/PLS OXIMETRY MLT: CPT

## 2024-05-03 RX ORDER — MAGNESIUM SULFATE HEPTAHYDRATE 40 MG/ML
2 INJECTION, SOLUTION INTRAVENOUS ONCE
Status: COMPLETED | OUTPATIENT
Start: 2024-05-03 | End: 2024-05-03

## 2024-05-03 RX ORDER — IPRATROPIUM BROMIDE AND ALBUTEROL SULFATE 2.5; .5 MG/3ML; MG/3ML
3 SOLUTION RESPIRATORY (INHALATION) ONCE
Status: COMPLETED | OUTPATIENT
Start: 2024-05-03 | End: 2024-05-03

## 2024-05-03 RX ADMIN — PROCHLORPERAZINE EDISYLATE 5 MG: 5 INJECTION INTRAMUSCULAR; INTRAVENOUS at 10:05

## 2024-05-03 RX ADMIN — HYDROMORPHONE HYDROCHLORIDE 1 MG: 1 INJECTION, SOLUTION INTRAMUSCULAR; INTRAVENOUS; SUBCUTANEOUS at 05:05

## 2024-05-03 RX ADMIN — HYDROMORPHONE HYDROCHLORIDE 1 MG: 1 INJECTION, SOLUTION INTRAMUSCULAR; INTRAVENOUS; SUBCUTANEOUS at 01:05

## 2024-05-03 RX ADMIN — METOPROLOL TARTRATE 25 MG: 25 TABLET, FILM COATED ORAL at 08:05

## 2024-05-03 RX ADMIN — DICYCLOMINE HYDROCHLORIDE 10 MG: 10 CAPSULE ORAL at 12:05

## 2024-05-03 RX ADMIN — Medication 10 ML: at 12:05

## 2024-05-03 RX ADMIN — DICYCLOMINE HYDROCHLORIDE 10 MG: 10 CAPSULE ORAL at 08:05

## 2024-05-03 RX ADMIN — PROCHLORPERAZINE EDISYLATE 5 MG: 5 INJECTION INTRAMUSCULAR; INTRAVENOUS at 01:05

## 2024-05-03 RX ADMIN — MAGNESIUM SULFATE HEPTAHYDRATE 2 G: 40 INJECTION, SOLUTION INTRAVENOUS at 08:05

## 2024-05-03 RX ADMIN — HYDROMORPHONE HYDROCHLORIDE 1 MG: 1 INJECTION, SOLUTION INTRAMUSCULAR; INTRAVENOUS; SUBCUTANEOUS at 07:05

## 2024-05-03 RX ADMIN — PROMETHAZINE HYDROCHLORIDE 12.5 MG: 25 INJECTION INTRAMUSCULAR; INTRAVENOUS at 05:05

## 2024-05-03 RX ADMIN — SENNOSIDES 8.6 MG: 8.6 TABLET, FILM COATED ORAL at 08:05

## 2024-05-03 RX ADMIN — PROCHLORPERAZINE EDISYLATE 5 MG: 5 INJECTION INTRAMUSCULAR; INTRAVENOUS at 05:05

## 2024-05-03 RX ADMIN — LIOTHYRONINE SODIUM 5 MCG: 5 TABLET ORAL at 05:05

## 2024-05-03 RX ADMIN — Medication 10 ML: at 06:05

## 2024-05-03 RX ADMIN — POTASSIUM BICARBONATE 50 MEQ: 978 TABLET, EFFERVESCENT ORAL at 08:05

## 2024-05-03 RX ADMIN — ENOXAPARIN SODIUM 40 MG: 40 INJECTION SUBCUTANEOUS at 05:05

## 2024-05-03 RX ADMIN — Medication 10 ML: at 11:05

## 2024-05-03 RX ADMIN — Medication 10 ML: at 05:05

## 2024-05-03 RX ADMIN — DICYCLOMINE HYDROCHLORIDE 10 MG: 10 CAPSULE ORAL at 05:05

## 2024-05-03 RX ADMIN — HYDROMORPHONE HYDROCHLORIDE 1 MG: 1 INJECTION, SOLUTION INTRAMUSCULAR; INTRAVENOUS; SUBCUTANEOUS at 10:05

## 2024-05-03 RX ADMIN — SODIUM PHOSPHATE, MONOBASIC, MONOHYDRATE AND SODIUM PHOSPHATE, DIBASIC, ANHYDROUS 20.01 MMOL: 142; 276 INJECTION, SOLUTION INTRAVENOUS at 09:05

## 2024-05-03 RX ADMIN — LEVOTHYROXINE SODIUM 175 MCG: 150 TABLET ORAL at 05:05

## 2024-05-03 RX ADMIN — IPRATROPIUM BROMIDE AND ALBUTEROL SULFATE 3 ML: 2.5; .5 SOLUTION RESPIRATORY (INHALATION) at 11:05

## 2024-05-03 NOTE — PLAN OF CARE
[facility] - Telemetry Stepdown  Discharge Reassessment    Primary Care Provider: James Coronel MD    Expected Discharge Date: 5/6/2024    Reassessment (most recent)       Discharge Reassessment - 05/03/24 1408          Discharge Reassessment    Assessment Type Discharge Planning Reassessment     Did the patient's condition or plan change since previous assessment? No     Discharge Plan discussed with: Patient;Spouse/sig other     Communicated ANDRES with patient/caregiver Yes     Discharge Plan A Hospice/home     Discharge Plan B Home with family     DME Needed Upon Discharge  other (see comments)   TBD    Transition of Care Barriers None     Why the patient remains in the hospital --   Waiting on TF supplies.       Post-Acute Status    Post-Acute Authorization Hospice     Hospice Status Set-up Complete/Auth obtained   [facility]                  Discharge Plan A and Plan B have been determined by review of patient's clinical status, future medical and therapeutic needs, and coverage/benefits for post-acute care in coordination with multidisciplinary team members.     Nisha Hightower RN  Ext [phone]

## 2024-05-03 NOTE — PROGRESS NOTES
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 She initially presented to [facility] Emergency Department with six days of epigastric pain with associated nausea, vomiting, and difficulty eating due to pain. However on presentation, she was noted to have a large necrotic and purulent wound located on her right neck with complaints of associated difficulty speaking, swallowing and shortness of breath. She was noted to be hypoxic 88% on room air which improved with 2L NC. Labs were notable for leukocytosis 14, hypokalemia 2.4, hypochloremia 89, CO2 36, hypercalcemia 13.5, Phos 2.5. . Troponin 0.046. Lactate 2.3. CT Soft Tissue Neck was concerning for 9.5 x 8.2 x 10 cm large lobulated mass in the right anterolateral neck extending to the deep spaces of the neck and abutting the right prevertebral space and paravertebral musculature, left midline shift, multiple bilateral necrotic cervical lymph nodes. Case was discussed and decision was made to [place] [facility] [place] of [place].     Of additional note, she recently transitioned her care to [place] and underwent stem-cell transplant approximately 2 weeks ago in [place], has not been seen by a [place] physician since 2023.      On transfer: she was afebrile, mildly hypertensive /109, HR 98, RR 20, satting 96% on room air.  Complaining of mild headache and poor appetite associated with nausea; denies fever, chills, chest pain, palpitations, SOB, abdominal pain, dysuria. States wound has been draining for the past week initially thick white now more liquid. Mild dysphonia is apparently chronic from left vocal cord paralysis and at baseline. Some dysphagia with solids, none with pureed soft or liquids, denies odynophagia. Dressing on neck CDI, ENT consulted will be here shortly to assess patient.        Overview/Hospital Course:  Evaluated 4/12 in MICU by ENT, who do not feel patient is a candidate for surgical intervention. Palliative care and oncology consulted. Pt declines chemo or XRT. Failed swallow study, SLP recommending NPO 4/13. Patient continues to protect airway. Stepped down from MICU on 4/13.  ENT attempted awake trach but could not get one. Gen surgery rec consulting IR for PEG as they can't put one w/o a trach. Now s/p PEG placement by IR on 4/26. Started on TF. Unable to tolerate Impact peptide TF. Discussed with RD, rec switching to [employer] peptide 1.5. Added bentyl. Abdominal Xray w/o distention, PEG in place. On d/c will need [employer] Peptide 1.5 (bolus) - 4 cans/day. Per CM will need a few days to arrange for [employer] cans. Continues to have electrolyte abnormalities.    Interval History:   NAEO. Continues to have electrolyte abnormalities. Spouse requesting breathing treatment.     Review of Systems   Unable to perform ROS: Other     Objective:     Vital Signs (Most Recent):  Temp: 98.6 °F (37 °C) (05/03/24 1144)  Pulse: 104 (05/03/24 1153)  Resp: 20 (05/03/24 1153)  BP: 119/73 (05/03/24 1144)  SpO2: 99 % (05/03/24 1153) Vital Signs (24h Range):  Temp:  [98.1 °F (36.7 °C)-99.8 °F (37.7 °C)] 98.6 °F (37 °C)  Pulse:  [101-121] 104  Resp:  [18-25] 20  SpO2:  [91 %-99 %] 99 %  BP: (101-138)/(66-85) 119/73     Weight: 82.6 kg (182 lb 1.6 oz)  Body mass index is 29.39 kg/m².    Intake/Output Summary (Last 24 hours)  at 5/3/2024 1302  Last data filed at 5/3/2024 0639  Gross per 24 hour   Intake 660 ml   Output --   Net 660 ml         Physical Exam  Vitals and nursing note reviewed.   Constitutional:       General: She is not in acute distress.     Appearance: She is ill-appearing. She is not toxic-appearing.   Neck:      Comments: Dressing in place over entire circumference of neck. Dressing c/d/i  Cardiovascular:      Rate and Rhythm: Regular rhythm. Tachycardia present.      Heart sounds: Normal heart sounds.   Pulmonary:      Effort: Pulmonary effort is normal. No respiratory distress.      Breath sounds: Normal breath sounds. No wheezing.   Abdominal:      General: Bowel sounds are normal. There is no distension.      Palpations: Abdomen is soft.      Tenderness: There is no abdominal tenderness. There is no guarding.      Comments: PEG tube in place   Skin:     General: Skin is warm and dry.   Neurological:      Mental Status: She is alert. Mental status is at baseline.       Significant Labs: All pertinent labs within the past 24 hours have been reviewed.      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 Underwent repeat bx after failed tx (1/24/22) that showed moderately differentiated squamous cell carcinoma w/ suspicion for perineural invasion. CT soft tissue neck (Jan 2022) w/o e/o metastatic dx. PET-CT (Feb 2022) w/o e/o active local or distant dx- no abnormal activity within the tongue, no signs of cervical lymph node or distant metastasis. Declined chemotherapy &/or XRT. Underwent selective neck dissection levels 1, 2, 3, and partial glossectomy w/ split-thickness skin graft (3/3/2022 per Dr. Clif Olson).  Surgical pathology w/ unifocal squamous cell carcinoma on the dorsal surface of the tongue on left side measuring 1 cm, moderately differentiated, 6 mm depth of invasion, no  "lymphovascular invasion, positive for perineural invasion, margins negative, 0 of 39 lymph nodes with metastasis, pT2 pN0 noted.  Postop course complicated by infection requiring I&D and antibiotics.  Declined adjuvant XRT. Repeat imaging (June 2023) w/ 2.2 cm lesion deep to the R SCM c/f necrotic LN w/ few adjacent pathological appearing LNs suspicious for metastasis. Underwent R neck FNA (7/7/23) w/ pathology suspicious for metastatic SCC. PET-CT (8/21/2023) w/ large necrotic mass the angle of mandible posterior to submandibular gland on the right measuring 4.4 x 4.2 cm with SUV 9.78, just superior to the mass is a 2 cm hypermetabolic lymph node and no evidence of distant metastatic disease. Subsequently transferred care to [place] where she reportedly recently abx,"detox", hyperbaric chamber, & stem-cell transplant approximately 2 weeks ago PTA in [place]. Now presenting w/ progressive neck wound. CT neck soft tissue (4/10/24) w/ large bulky lobulated appearing mass right lateral and anterolateral neck extending to the D spaces of the neck having overall dimensions of approximately 9.5 x 8.2 x 10 cm, multiple addn'l b/l cervical necrotic nodes, & assoc mass effect results in displacement of midline structures to the patient's left. CT C/A/P with diffuse metastatic disease including mediastinal & hilar LAD, bilateral lungs, peritoneum, lumbar vertebrae, & likely liver.     - ENT consulted; appreciate recs - not surgical candidate for debridement  - Medical & Radiation Oncology consulted - pt declines chemo or XRT  - Palliative following; appreciate assistance  - Multimodal analgesia   - SLP following,remains NPO  - Gen surgery and ENT consulted. Unable to do do tracheostomy. Rec consulting IR for PEG  - Consulted IR for PEG placement. G tube placed 4/25.     PEG (percutaneous endoscopic gastrostomy) status  Unable to tolerate TF  Patient noted to have a percutaneous endoscopic gastrostomy tube in place. I have " personally inspected the tube.Tube was placed on this admission, with date of procedure- 4/26  There are no signs of drainage or infection around the site. Routine care to be done by wound care and nursing staff.     Patient unable to tolerate Impact peptide TF. Discussed with RD, rec switching to [employer] peptide 1.5 which patient is tolerating well so are.   Added bentyl    Abdominal Xray w/o distention, PEG in place.   On d/c will need [employer] Peptide 1.5 (bolus) - 4 cans/day but per CM it will take a few days to arrange for [employer] [facility] as Op for her.         Severe malnutrition  PEG tube placement is being discussed. IR and GI deferred the procedure. Surgery depending on anesthesia assessment. ENT updated with recs. Awaiting final recs.       Nutrition consulted. Most recent weight and BMI monitored-     Measurements:  Wt Readings from Last 1 Encounters:   05/02/24 82.6 kg (182 lb 1.6 oz)   Body mass index is 29.39 kg/m².    Patient has been screened and assessed by RD.    Malnutrition Type:  Context: chronic illness  Level: severe    Malnutrition Characteristic Summary:  Weight Loss (Malnutrition): greater than 10% in 6 months  Energy Intake (Malnutrition): less than or equal to 50% for greater than or equal to 1 month    Interventions/Recommendations (treatment strategy):  1.      Hypercalcemia  On arrival to OSH, Ca 13.5 --> 11.0 on arrival to [facility]. Continued subsequent uptrend peaking at 13.1 on 4/15 (corrected Ca 14.5). Likely 2/2 malignancy.   - IVF  - Calcitonin BID x2 days  - Ionized Ca 1.59 4/21  - Monitor CMP  - IV hydration and cont to trend Ca level   - Ionized calcium WNL. Cont to monitor    - Serum calcium again noted to be 13.3 on 4/29. Received IVF, Calcitonin and zolidronic acid. Continue IVF.   - D/c home calcium and vit D supplementation on d/c    Palliative care encounter  Daily GOC discussions  Pt and family would like to proceed with aggressive care  Cont FULL code. Re-discussed code  "status with , would like to remain a full code. States "she is going to make it."      Hypokalemia  Patient has hypokalemia which is Acute and currently controlled. Most recent potassium levels reviewed-   Lab Results   Component Value Date    K 3.4 (L) 05/03/2024   . Will continue potassium replacement per protocol and recheck repeat levels after replacement completed.     Open neck wound  Pt with extensive hx of H/N cancer. Presents w/ continued/worsening drainage from the neck for months with recent worsening of symptoms for past 2 weeks w/ N/V. Pt returned from [place] where she received antibiotics, "detox" and hyperbaric chamber as treatment. Endorses difficulty with swallowing 2/2 to oral trush. Large neck mass on imaging w/ large central collection of air which appears to extend superficially to the skin surface right mid neck anterior laterally; unable to r/o superimposed abscess. Meeting 2/4 SIRS (HR & WBC) w/ elevated lactate, but nml BP. Blood cx negative. Pt not surgical candidate for debridement per ENT evaluation.     - ID consulted; appreciate recs  - Surgical debridement is not an option. Has completed course of antibiotics.      Sepsis  On arrival to [facility], meeting 2/4 SIRS (requiring 2L NC w/ RR 20s & WBC 13). Likely soft tissue/neck source. Received IVF at OSH prior to transfer (for lactate 2.3 at that time). Started on broad-spectrum abx. Blood cx negative.     - ID consulted; appreciate recs  - Monitor CBC & fever curve  - Mgmt of neck mass/wound as below   - Has completed treatment. Discontinue vancomycin + Flagyl + fluconazole     Resolved.    Postsurgical hypothyroidism  Last TSH elevated at 5.264. Home regimen includes levothyroxine 175mcg daily & liothyronine 5mcg daily  - Continue home liothyronine & levothyroxine       HTN (hypertension)  Hx of essential HTN; home regimen includes metoprolol 25mg BID. On arrival to [facility], SBP 140s-150s.     -Continue Lopressor at current dose.      VTE " Risk Mitigation (From admission, onward)           Ordered     enoxaparin injection 40 mg  Every 24 hours         04/12/24 1536     IP VTE HIGH RISK PATIENT  Once         04/12/24 1324     Place sequential compression device  Until discontinued         04/12/24 1324                    Discharge Planning   ANDRES: 5/6/2024     Code Status: Full Code   Is the patient medically ready for discharge?: No    Reason for patient still in hospital (select all that apply): Patient trending condition, Laboratory test, and Pending disposition  Discharge Plan A: Hospice/home            Jennifer Dominguez MD  Department of Hospital Medicine   [facility] - Telemetry Stepdown

## 2024-05-03 NOTE — PLAN OF CARE
Pt AAOx4, c/o of pain on her neck which controlled with current pain regimen. Pt is on 2L NC maintaining o2 sat>92%. Tolerated TF with Lanette knowles at 40ml/hr which  refused increase rate at goal @50ml/hr, he stated that he already discussed it with MD. Neck dressing CDI. VSS, no acute distress noted. POC on going.    Problem: Adult Inpatient Plan of Care  Goal: Plan of Care Review  Outcome: Progressing  Goal: Absence of Hospital-Acquired Illness or Injury  Outcome: Progressing  Goal: Optimal Comfort and Wellbeing  Outcome: Progressing  Goal: Readiness for Transition of Care  Outcome: Progressing     Problem: Adjustment to Illness (Sepsis/Septic Shock)  Goal: Optimal Coping  Outcome: Progressing     Problem: Skin Injury Risk Increased  Goal: Skin Health and Integrity  Outcome: Progressing     Problem: Coping Ineffective  Goal: Effective Coping  Outcome: Progressing

## 2024-05-03 NOTE — SUBJECTIVE & OBJECTIVE
Interval History:   NAEO. Continues to have electrolyte abnormalities. Spouse requesting breathing treatment.     Review of Systems   Unable to perform ROS: Other     Objective:     Vital Signs (Most Recent):  Temp: 98.6 °F (37 °C) (05/03/24 1144)  Pulse: 104 (05/03/24 1153)  Resp: 20 (05/03/24 1153)  BP: 119/73 (05/03/24 1144)  SpO2: 99 % (05/03/24 1153) Vital Signs (24h Range):  Temp:  [98.1 °F (36.7 °C)-99.8 °F (37.7 °C)] 98.6 °F (37 °C)  Pulse:  [101-121] 104  Resp:  [18-25] 20  SpO2:  [91 %-99 %] 99 %  BP: (101-138)/(66-85) 119/73     Weight: 82.6 kg (182 lb 1.6 oz)  Body mass index is 29.39 kg/m².    Intake/Output Summary (Last 24 hours) at 5/3/2024 1302  Last data filed at 5/3/2024 0639  Gross per 24 hour   Intake 660 ml   Output --   Net 660 ml         Physical Exam  Vitals and nursing note reviewed.   Constitutional:       General: She is not in acute distress.     Appearance: She is ill-appearing. She is not toxic-appearing.   Neck:      Comments: Dressing in place over entire circumference of neck. Dressing c/d/i  Cardiovascular:      Rate and Rhythm: Regular rhythm. Tachycardia present.      Heart sounds: Normal heart sounds.   Pulmonary:      Effort: Pulmonary effort is normal. No respiratory distress.      Breath sounds: Normal breath sounds. No wheezing.   Abdominal:      General: Bowel sounds are normal. There is no distension.      Palpations: Abdomen is soft.      Tenderness: There is no abdominal tenderness. There is no guarding.      Comments: PEG tube in place   Skin:     General: Skin is warm and dry.   Neurological:      Mental Status: She is alert. Mental status is at baseline.       Significant Labs: All pertinent labs within the past 24 hours have been reviewed.

## 2024-05-03 NOTE — ASSESSMENT & PLAN NOTE
Hx of essential HTN; home regimen includes metoprolol 25mg BID. On arrival to [facility], SBP 140s-150s.     -Continue Lopressor at current dose.

## 2024-05-03 NOTE — ASSESSMENT & PLAN NOTE
Unable to tolerate TF  Patient noted to have a percutaneous endoscopic gastrostomy tube in place. I have personally inspected the tube.Tube was placed on this admission, with date of procedure- 4/26  There are no signs of drainage or infection around the site. Routine care to be done by wound care and nursing staff.     Patient unable to tolerate Impact peptide TF. Discussed with RD, rec switching to [employer] peptide 1.5 which patient is tolerating well so are.   Added bentyl    Abdominal Xray w/o distention, PEG in place.   On d/c will need [employer] Peptide 1.5 (bolus) - 4 cans/day but per CM it will take a few days to arrange for [employer] Tf as Op for her.

## 2024-05-03 NOTE — PLAN OF CARE
Problem: Adult Inpatient Plan of Care  Goal: Plan of Care Review  Outcome: Progressing  Goal: Patient-Specific Goal (Individualized)  Outcome: Progressing  Goal: Absence of Hospital-Acquired Illness or Injury  Outcome: Progressing  Goal: Optimal Comfort and Wellbeing  Outcome: Progressing       No acute distress noted this time. Family  member at bedside. Call light within reach. Will continue to monitor.

## 2024-05-03 NOTE — ASSESSMENT & PLAN NOTE
"Initial bx of ventral surface of tongue (12/24/21) showed atypia w/o diagnostic e/o dysplasia. Underwent repeat bx after failed tx (1/24/22) that showed moderately differentiated squamous cell carcinoma w/ suspicion for perineural invasion. CT soft tissue neck (Jan 2022) w/o e/o metastatic dx. PET-CT (Feb 2022) w/o e/o active local or distant dx- no abnormal activity within the tongue, no signs of cervical lymph node or distant metastasis. Declined chemotherapy &/or XRT. Underwent selective neck dissection levels 1, 2, 3, and partial glossectomy w/ split-thickness skin graft (3/3/2022 per Dr. Clif Olson).  Surgical pathology w/ unifocal squamous cell carcinoma on the dorsal surface of the tongue on left side measuring 1 cm, moderately differentiated, 6 mm depth of invasion, no lymphovascular invasion, positive for perineural invasion, margins negative, 0 of 39 lymph nodes with metastasis, pT2 pN0 noted.  Postop course complicated by infection requiring I&D and antibiotics.  Declined adjuvant XRT. Repeat imaging (June 2023) w/ 2.2 cm lesion deep to the R SCM c/f necrotic LN w/ few adjacent pathological appearing LNs suspicious for metastasis. Underwent R neck FNA (7/7/23) w/ pathology suspicious for metastatic SCC. PET-CT (8/21/2023) w/ large necrotic mass the angle of mandible posterior to submandibular gland on the right measuring 4.4 x 4.2 cm with SUV 9.78, just superior to the mass is a 2 cm hypermetabolic lymph node and no evidence of distant metastatic disease. Subsequently transferred care to [place] where she reportedly recently abx,"detox", hyperbaric chamber, & stem-cell transplant approximately 2 weeks ago PTA in [place]. Now presenting w/ progressive neck wound. CT neck soft tissue (4/10/24) w/ large bulky lobulated appearing mass right lateral and anterolateral neck extending to the D spaces of the neck having overall dimensions of approximately 9.5 x 8.2 x 10 cm, multiple addn'l b/l cervical " necrotic nodes, & assoc mass effect results in displacement of midline structures to the patient's left. CT C/A/P with diffuse metastatic disease including mediastinal & hilar LAD, bilateral lungs, peritoneum, lumbar vertebrae, & likely liver.     - ENT consulted; appreciate recs - not surgical candidate for debridement  - Medical & Radiation Oncology consulted - pt declines chemo or XRT  - Palliative following; appreciate assistance  - Multimodal analgesia   - SLP following,remains NPO  - Gen surgery and ENT consulted. Unable to do do tracheostomy. Rec consulting IR for PEG  - Consulted IR for PEG placement. G tube placed 4/25.

## 2024-05-03 NOTE — ASSESSMENT & PLAN NOTE
Patient has hypokalemia which is Acute and currently controlled. Most recent potassium levels reviewed-   Lab Results   Component Value Date    K 3.4 (L) 05/03/2024   . Will continue potassium replacement per protocol and recheck repeat levels after replacement completed.

## 2024-05-03 NOTE — ASSESSMENT & PLAN NOTE
PEG tube placement is being discussed. IR and GI deferred the procedure. Surgery depending on anesthesia assessment. ENT updated with recs. Awaiting final recs.       Nutrition consulted. Most recent weight and BMI monitored-     Measurements:  Wt Readings from Last 1 Encounters:   05/02/24 82.6 kg (182 lb 1.6 oz)   Body mass index is 29.39 kg/m².    Patient has been screened and assessed by RD.    Malnutrition Type:  Context: chronic illness  Level: severe    Malnutrition Characteristic Summary:  Weight Loss (Malnutrition): greater than 10% in 6 months  Energy Intake (Malnutrition): less than or equal to 50% for greater than or equal to 1 month    Interventions/Recommendations (treatment strategy):  1.

## 2024-05-03 NOTE — ASSESSMENT & PLAN NOTE
"Daily GOC discussions  Pt and family would like to proceed with aggressive care  Cont FULL code. Re-discussed code status with , would like to remain a full code. States "she is going to make it."    "

## 2024-05-03 NOTE — ASSESSMENT & PLAN NOTE
[place] [facility] [place] 2/4 SIRS (requiring 2L NC w/ RR 20s & WBC 13). Likely soft tissue/neck source. Received IVF at OSH prior to transfer (for lactate 2.3 at that time). Started on broad-spectrum abx. Blood cx negative.     - ID consulted; appreciate recs  - Monitor CBC & fever curve  - Mgmt of neck mass/wound as below   - Has completed treatment. Discontinue vancomycin + Flagyl + fluconazole     Resolved.

## 2024-05-03 NOTE — ASSESSMENT & PLAN NOTE
"Pt with extensive hx of H/N cancer. Presents w/ continued/worsening drainage from the neck for months with recent worsening of symptoms for past 2 weeks w/ N/V. Pt returned from [place] where she received antibiotics, "detox" and hyperbaric chamber as treatment. Endorses difficulty with swallowing 2/2 to oral trush. Large neck mass on imaging w/ large central collection of air which appears to extend superficially to the skin surface right mid neck anterior laterally; unable to r/o superimposed abscess. Meeting 2/4 SIRS (HR & WBC) w/ elevated lactate, but nml BP. Blood cx negative. Pt not surgical candidate for debridement per ENT evaluation.     - ID consulted; appreciate recs  - Surgical debridement is not an option. Has completed course of antibiotics.    "

## 2024-05-04 LAB
ALBUMIN SERPL BCP-MCNC: 2.4 G/DL (ref 3.5–5.2)
ALP SERPL-CCNC: 220 U/L (ref 55–135)
ALT SERPL W/O P-5'-P-CCNC: 34 U/L (ref 10–44)
ANION GAP SERPL CALC-SCNC: 10 MMOL/L (ref 8–16)
AST SERPL-CCNC: 25 U/L (ref 10–40)
BASOPHILS # BLD AUTO: 0.03 K/UL (ref 0–0.2)
BASOPHILS NFR BLD: 0.2 % (ref 0–1.9)
BILIRUB SERPL-MCNC: 0.3 MG/DL (ref 0.1–1)
BUN SERPL-MCNC: 17 MG/DL (ref 6–20)
CALCIUM SERPL-MCNC: 7.2 MG/DL (ref 8.7–10.5)
CHLORIDE SERPL-SCNC: 89 MMOL/L (ref 95–110)
CO2 SERPL-SCNC: 36 MMOL/L (ref 23–29)
CREAT SERPL-MCNC: 0.6 MG/DL (ref 0.5–1.4)
DIFFERENTIAL METHOD BLD: ABNORMAL
EOSINOPHIL # BLD AUTO: 0 K/UL (ref 0–0.5)
EOSINOPHIL NFR BLD: 0 % (ref 0–8)
ERYTHROCYTE [DISTWIDTH] IN BLOOD BY AUTOMATED COUNT: 15.7 % (ref 11.5–14.5)
EST. GFR  (NO RACE VARIABLE): >60 ML/MIN/1.73 M^2
GLUCOSE SERPL-MCNC: 113 MG/DL (ref 70–110)
HCT VFR BLD AUTO: 37 % (ref 37–48.5)
HGB BLD-MCNC: 11.7 G/DL (ref 12–16)
IMM GRANULOCYTES # BLD AUTO: 0.18 K/UL (ref 0–0.04)
IMM GRANULOCYTES NFR BLD AUTO: 1.2 % (ref 0–0.5)
LYMPHOCYTES # BLD AUTO: 2.8 K/UL (ref 1–4.8)
LYMPHOCYTES NFR BLD: 18.3 % (ref 18–48)
MAGNESIUM SERPL-MCNC: 1.4 MG/DL (ref 1.6–2.6)
MCH RBC QN AUTO: 28 PG (ref 27–31)
MCHC RBC AUTO-ENTMCNC: 31.6 G/DL (ref 32–36)
MCV RBC AUTO: 89 FL (ref 82–98)
MONOCYTES # BLD AUTO: 1.1 K/UL (ref 0.3–1)
MONOCYTES NFR BLD: 6.9 % (ref 4–15)
NEUTROPHILS # BLD AUTO: 11.2 K/UL (ref 1.8–7.7)
NEUTROPHILS NFR BLD: 73.4 % (ref 38–73)
NRBC BLD-RTO: 0 /100 WBC
PHOSPHATE SERPL-MCNC: 2.9 MG/DL (ref 2.7–4.5)
PLATELET # BLD AUTO: 316 K/UL (ref 150–450)
PMV BLD AUTO: 10 FL (ref 9.2–12.9)
POTASSIUM SERPL-SCNC: 3.6 MMOL/L (ref 3.5–5.1)
PROT SERPL-MCNC: 5.8 G/DL (ref 6–8.4)
RBC # BLD AUTO: 4.18 M/UL (ref 4–5.4)
SODIUM SERPL-SCNC: 135 MMOL/L (ref 136–145)
WBC # BLD AUTO: 15.2 K/UL (ref 3.9–12.7)

## 2024-05-04 PROCEDURE — 20600001 HC STEP DOWN PRIVATE ROOM

## 2024-05-04 PROCEDURE — 80053 COMPREHEN METABOLIC PANEL: CPT | Performed by: STUDENT IN AN ORGANIZED HEALTH CARE EDUCATION/TRAINING PROGRAM

## 2024-05-04 PROCEDURE — 25000003 PHARM REV CODE 250: Performed by: INTERNAL MEDICINE

## 2024-05-04 PROCEDURE — 63600175 PHARM REV CODE 636 W HCPCS: Performed by: INTERNAL MEDICINE

## 2024-05-04 PROCEDURE — 25000003 PHARM REV CODE 250: Performed by: STUDENT IN AN ORGANIZED HEALTH CARE EDUCATION/TRAINING PROGRAM

## 2024-05-04 PROCEDURE — 93010 ELECTROCARDIOGRAM REPORT: CPT | Mod: ,,, | Performed by: INTERNAL MEDICINE

## 2024-05-04 PROCEDURE — 84100 ASSAY OF PHOSPHORUS: CPT | Performed by: STUDENT IN AN ORGANIZED HEALTH CARE EDUCATION/TRAINING PROGRAM

## 2024-05-04 PROCEDURE — A4216 STERILE WATER/SALINE, 10 ML: HCPCS | Performed by: STUDENT IN AN ORGANIZED HEALTH CARE EDUCATION/TRAINING PROGRAM

## 2024-05-04 PROCEDURE — 93005 ELECTROCARDIOGRAM TRACING: CPT

## 2024-05-04 PROCEDURE — 63600175 PHARM REV CODE 636 W HCPCS: Performed by: STUDENT IN AN ORGANIZED HEALTH CARE EDUCATION/TRAINING PROGRAM

## 2024-05-04 PROCEDURE — 83735 ASSAY OF MAGNESIUM: CPT | Performed by: STUDENT IN AN ORGANIZED HEALTH CARE EDUCATION/TRAINING PROGRAM

## 2024-05-04 PROCEDURE — 36415 COLL VENOUS BLD VENIPUNCTURE: CPT | Performed by: STUDENT IN AN ORGANIZED HEALTH CARE EDUCATION/TRAINING PROGRAM

## 2024-05-04 PROCEDURE — 85025 COMPLETE CBC W/AUTO DIFF WBC: CPT | Performed by: STUDENT IN AN ORGANIZED HEALTH CARE EDUCATION/TRAINING PROGRAM

## 2024-05-04 RX ORDER — LACTULOSE 10 G/15ML
20 SOLUTION ORAL ONCE
Status: COMPLETED | OUTPATIENT
Start: 2024-05-04 | End: 2024-05-04

## 2024-05-04 RX ORDER — MAGNESIUM SULFATE 1 G/100ML
1 INJECTION INTRAVENOUS ONCE
Status: COMPLETED | OUTPATIENT
Start: 2024-05-04 | End: 2024-05-04

## 2024-05-04 RX ADMIN — METOPROLOL TARTRATE 25 MG: 25 TABLET, FILM COATED ORAL at 09:05

## 2024-05-04 RX ADMIN — PROCHLORPERAZINE EDISYLATE 5 MG: 5 INJECTION INTRAMUSCULAR; INTRAVENOUS at 06:05

## 2024-05-04 RX ADMIN — Medication 10 ML: at 06:05

## 2024-05-04 RX ADMIN — HYDROMORPHONE HYDROCHLORIDE 1 MG: 1 INJECTION, SOLUTION INTRAMUSCULAR; INTRAVENOUS; SUBCUTANEOUS at 08:05

## 2024-05-04 RX ADMIN — HYDROMORPHONE HYDROCHLORIDE 1 MG: 1 INJECTION, SOLUTION INTRAMUSCULAR; INTRAVENOUS; SUBCUTANEOUS at 06:05

## 2024-05-04 RX ADMIN — HYDROMORPHONE HYDROCHLORIDE 1 MG: 1 INJECTION, SOLUTION INTRAMUSCULAR; INTRAVENOUS; SUBCUTANEOUS at 05:05

## 2024-05-04 RX ADMIN — Medication 10 ML: at 05:05

## 2024-05-04 RX ADMIN — DICYCLOMINE HYDROCHLORIDE 10 MG: 10 CAPSULE ORAL at 01:05

## 2024-05-04 RX ADMIN — HYDROXYZINE PAMOATE 25 MG: 25 CAPSULE ORAL at 09:05

## 2024-05-04 RX ADMIN — DICYCLOMINE HYDROCHLORIDE 10 MG: 10 CAPSULE ORAL at 08:05

## 2024-05-04 RX ADMIN — LIOTHYRONINE SODIUM 5 MCG: 5 TABLET ORAL at 05:05

## 2024-05-04 RX ADMIN — MAGNESIUM SULFATE HEPTAHYDRATE 1 G: 500 INJECTION, SOLUTION INTRAMUSCULAR; INTRAVENOUS at 02:05

## 2024-05-04 RX ADMIN — HYDROMORPHONE HYDROCHLORIDE 1 MG: 1 INJECTION, SOLUTION INTRAMUSCULAR; INTRAVENOUS; SUBCUTANEOUS at 09:05

## 2024-05-04 RX ADMIN — DICYCLOMINE HYDROCHLORIDE 10 MG: 10 CAPSULE ORAL at 09:05

## 2024-05-04 RX ADMIN — PROCHLORPERAZINE EDISYLATE 5 MG: 5 INJECTION INTRAMUSCULAR; INTRAVENOUS at 09:05

## 2024-05-04 RX ADMIN — PROMETHAZINE HYDROCHLORIDE 12.5 MG: 25 INJECTION INTRAMUSCULAR; INTRAVENOUS at 08:05

## 2024-05-04 RX ADMIN — HYDROMORPHONE HYDROCHLORIDE 1 MG: 1 INJECTION, SOLUTION INTRAMUSCULAR; INTRAVENOUS; SUBCUTANEOUS at 01:05

## 2024-05-04 RX ADMIN — SENNOSIDES 8.6 MG: 8.6 TABLET, FILM COATED ORAL at 09:05

## 2024-05-04 RX ADMIN — DICYCLOMINE HYDROCHLORIDE 10 MG: 10 CAPSULE ORAL at 06:05

## 2024-05-04 RX ADMIN — PROMETHAZINE HYDROCHLORIDE 12.5 MG: 25 INJECTION INTRAMUSCULAR; INTRAVENOUS at 12:05

## 2024-05-04 RX ADMIN — LACTULOSE 20 G: 20 SOLUTION ORAL at 06:05

## 2024-05-04 RX ADMIN — Medication 10 ML: at 12:05

## 2024-05-04 RX ADMIN — PROCHLORPERAZINE EDISYLATE 5 MG: 5 INJECTION INTRAMUSCULAR; INTRAVENOUS at 05:05

## 2024-05-04 RX ADMIN — LEVOTHYROXINE SODIUM 175 MCG: 150 TABLET ORAL at 05:05

## 2024-05-04 RX ADMIN — METOPROLOL TARTRATE 25 MG: 25 TABLET, FILM COATED ORAL at 08:05

## 2024-05-04 RX ADMIN — ENOXAPARIN SODIUM 40 MG: 40 INJECTION SUBCUTANEOUS at 06:05

## 2024-05-04 RX ADMIN — HYDROMORPHONE HYDROCHLORIDE 1 MG: 1 INJECTION, SOLUTION INTRAMUSCULAR; INTRAVENOUS; SUBCUTANEOUS at 12:05

## 2024-05-04 NOTE — ASSESSMENT & PLAN NOTE
Patient has hypokalemia which is Acute and currently controlled. Most recent potassium levels reviewed-   Lab Results   Component Value Date    K 3.6 05/04/2024   . Will continue potassium replacement per protocol and recheck repeat levels after replacement completed.

## 2024-05-04 NOTE — ASSESSMENT & PLAN NOTE
[place] [facility] [place] meeting 2/4 SIRS (requiring 2L NC w/ RR 20s & WBC 13). Likely soft tissue/neck source. Received IVF at OSH prior to transfer (for lactate 2.3 at that time). Started on broad-spectrum abx. Blood cx negative.     - ID consulted; appreciate recs  - Monitor CBC & fever curve  - Mgmt of neck mass/wound as below   - Has completed treatment. Discontinue vancomycin + Flagyl + fluconazole     Resolved.

## 2024-05-04 NOTE — PLAN OF CARE
Pt AAOx4, c/o of neck pain, PRN Dilaudid given per request. Pt tolerated [employer] @40 ml/hr, residual was only 5ml. Neck dressing CDI, care done by significant other. VSS, no acute distress noted. POC on going    Hospital-Acquired Illness or Injury  Outcome: Progressing  Goal: Optimal Comfort and Wellbeing  Outcome: Progressing  Goal: Readiness for Transition of Care  Outcome: Progressing     Problem: Adjustment to Illness (Sepsis/Septic Shock)  Goal: Optimal Coping  Outcome: Progressing     Problem: Skin Injury Risk Increased  Goal: Skin Health and Integrity  Outcome: Progressing     Problem: Coping Ineffective  Goal: Effective Coping  Outcome: Progressing

## 2024-05-04 NOTE — ASSESSMENT & PLAN NOTE
Unable to tolerate TF  Patient noted to have a percutaneous endoscopic gastrostomy tube in place. I have personally inspected the tube.Tube was placed on this admission, with date of procedure- 4/26  There are no signs of drainage or infection around the site. Routine care to be done by wound care and nursing staff.     Patient unable to tolerate Impact peptide TF. Discussed with RD, rec switching to [employer] peptide 1.5 which patient is tolerating well so are.   Added bentyl    Abdominal Xray w/o distention, PEG in place.   On d/c will need [employer] Peptide 1.5 (bolus) - 4 cans/day but per CM it will take a few days to arrange for [employer] Tf as Op for her.

## 2024-05-04 NOTE — ASSESSMENT & PLAN NOTE
"Initial bx of ventral surface of tongue (12/24/21) showed atypia w/o diagnostic e/o dysplasia. Underwent repeat bx after failed tx (1/24/22) that showed moderately differentiated squamous cell carcinoma w/ suspicion for perineural invasion. CT soft tissue neck (Jan 2022) w/o e/o metastatic dx. PET-CT (Feb 2022) w/o e/o active local or distant dx- no abnormal activity within the tongue, no signs of cervical lymph node or distant metastasis. Declined chemotherapy &/or XRT. Underwent selective neck dissection levels 1, 2, 3, and partial glossectomy w/ split-thickness skin graft (3/3/2022 per Dr. Clif Olson).  Surgical pathology w/ unifocal squamous cell carcinoma on the dorsal surface of the tongue on left side measuring 1 cm, moderately differentiated, 6 mm depth of invasion, no lymphovascular invasion, positive for perineural invasion, margins negative, 0 of 39 lymph nodes with metastasis, pT2 pN0 noted.  Postop course complicated by infection requiring I&D and antibiotics.  Declined adjuvant XRT. Repeat imaging (June 2023) w/ 2.2 cm lesion deep to the R SCM c/f necrotic LN w/ few adjacent pathological appearing LNs suspicious for metastasis. Underwent R neck FNA (7/7/23) w/ pathology suspicious for metastatic SCC. PET-CT (8/21/2023) w/ large necrotic mass the angle of mandible posterior to submandibular gland on the right measuring 4.4 x 4.2 cm with SUV 9.78, just superior to the mass is a 2 cm hypermetabolic lymph node and no evidence of distant metastatic disease. Subsequently transferred care to [place] where she reportedly recently abx,"detox", hyperbaric chamber, & stem-cell transplant approximately 2 weeks ago PTA in [place]. Now presenting w/ progressive neck wound. CT neck soft tissue (4/10/24) w/ large bulky lobulated appearing mass right lateral and anterolateral neck extending to the D spaces of the neck having overall dimensions of approximately 9.5 x 8.2 x 10 cm, multiple addn'l b/l cervical " necrotic nodes, & assoc mass effect results in displacement of midline structures to the patient's left. CT C/A/P with diffuse metastatic disease including mediastinal & hilar LAD, bilateral lungs, peritoneum, lumbar vertebrae, & likely liver.     - ENT consulted; appreciate recs - not surgical candidate for debridement  - Medical & Radiation Oncology consulted - pt declines chemo or XRT  - Palliative following; appreciate assistance  - Multimodal analgesia   - SLP following,remains NPO  - Gen surgery and ENT consulted. Unable to do do tracheostomy. Rec consulting IR for PEG  - Consulted IR for PEG placement. G tube placed 4/25.

## 2024-05-04 NOTE — CLINICAL REVIEW
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    Last VS: BP (!) 129/58 (BP Location: Left arm, Patient Position: Lying)   Pulse (!) 120   Temp 99.1 °F (37.3 °C) (Oral)   Resp (!) 21   Ht 5' 6" (1.676 m)   Wt 82.6 kg (182 lb 1.6 oz)   LMP  (LMP Unknown)   SpO2 96%   Breastfeeding No   BMI 29.39 kg/m²     24H Vital Sign Range:  Temp:  [98.4 °F (36.9 °C)-99.6 °F (37.6 °C)]   Pulse:  []   Resp:  [17-25]   BP: (116-136)/(58-76)   SpO2:  [92 %-99 %]     Level of Consciousness (AVPU): alert    Recent Labs     05/02/24  0244 05/03/24  0419 05/04/24  0236   WBC 17.40* 13.33* 15.20*   HGB 12.1 11.5* 11.7*   HCT 37.3 36.5* 37.0    294 316       Recent Labs     05/02/24  1253 05/03/24  0419 05/04/24  0236   * 133* 135*   K 3.3* 3.4* 3.6   CL 92* 92* 89*   CO2 35* 32* 36*   BUN 15 19 17   CREATININE 0.6 0.7 0.6   * 121* 113*   PHOS 2.6* 1.5* 2.9   MG 2.1 1.4* 1.4*        No results for input(s): "PH", "PCO2", "PO2", "HCO3", "POCSATURATED", "BE" in the last 72 hours.     OXYGEN:  Flow (L/min) (Oxygen Therapy): 2  Oxygen Concentration (%): 40       MEWS score: 2    Rounding completed w/ charge MABEL Donnelly.  Discussed sinus tachycardia w/ HR in the 120s. Continuous telemetry monitoring in place. PO metoprolol given per MD orders. Dr. Antoine contacted to discuss electrolyte abnormalities. No additional concerns verbalized at this time. Instructed to call [phone] for further concerns or assistance.    Jaylin Mckeon RN       "

## 2024-05-04 NOTE — NURSING
Dr Mckeon notified that pts face is red and flushed, no c/o of itching, no blister noted. VSS. Pt just c/o of being warmed. No new order received. Pictures taken. Will cont to monitor.

## 2024-05-04 NOTE — RESPIRATORY THERAPY
RAPID RESPONSE RESPIRATORY CHART CHECK       Chart check completed. Patient being charted on HFNC, but on 2L. Please call [phone] for further concerns or assistance.

## 2024-05-04 NOTE — ASSESSMENT & PLAN NOTE
"Pt with extensive hx of H/N cancer. Presents w/ continued/worsening drainage from the neck for months with recent worsening of symptoms for past 2 weeks w/ N/V. Pt returned from [place] where she received antibiotics, "detox" and hyperbaric chamber as treatment. Endorses difficulty with swallowing 2/2 to oral trush. Large neck mass on imaging w/ large central collection of air which appears to extend superficially to the skin surface right mid neck anterior laterally; unable to r/o superimposed abscess. Meeting 2/4 SIRS (HR & WBC) w/ elevated lactate, but nml BP. Blood cx negative. Pt not surgical candidate for debridement per ENT evaluation.     - ID consulted; appreciate recs  - Surgical debridement is not an option. Has completed course of antibiotics.    "

## 2024-05-04 NOTE — PROGRESS NOTES
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 She initially presented to [facility] Emergency Department with six days of epigastric pain with associated nausea, vomiting, and difficulty eating due to pain. However on presentation, she was noted to have a large necrotic and purulent wound located on her right neck with complaints of associated difficulty speaking, swallowing and shortness of breath. She was noted to be hypoxic 88% on room air which improved with 2L NC. Labs were notable for leukocytosis 14, hypokalemia 2.4, hypochloremia 89, CO2 36, hypercalcemia 13.5, Phos 2.5. . Troponin 0.046. Lactate 2.3. CT Soft Tissue Neck was concerning for 9.5 x 8.2 x 10 cm large lobulated mass in the right anterolateral neck extending to the deep spaces of the neck and abutting the right prevertebral space and paravertebral musculature, left midline shift, multiple bilateral necrotic cervical lymph nodes. Case was discussed and [place] [facility] [place]     Of additional note, she recently transitioned her care to [place] and underwent stem-cell transplant approximately 2 weeks ago in [place], has not been seen by a US physician since 2023.      On transfer: she was afebrile, mildly hypertensive /109, HR 98, RR 20, satting 96% on room  air. Complaining of mild headache and poor appetite associated with nausea; denies fever, chills, chest pain, palpitations, SOB, abdominal pain, dysuria. States wound has been draining for the past week initially thick white now more liquid. Mild dysphonia is apparently chronic from left vocal cord paralysis and at baseline. Some dysphagia with solids, none with pureed soft or liquids, denies odynophagia. Dressing on neck CDI, ENT consulted will be here shortly to assess patient.        Overview/Hospital Course:  Evaluated 4/12 in MICU by ENT, who do not feel patient is a candidate for surgical intervention. Palliative care and oncology consulted. Pt declines chemo or XRT. Failed swallow study, SLP recommending NPO 4/13. Patient continues to protect airway. Stepped down from MICU on 4/13.  ENT attempted awake trach but could not get one. Gen surgery rec consulting IR for PEG as they can't put one w/o a trach. Now s/p PEG placement by IR on 4/26. Started on TF. Unable to tolerate Impact peptide TF. Discussed with RD, rec switching to [employer] peptide 1.5. Added bentyl. Abdominal Xray w/o distention, PEG in place. On d/c will need [employer] Peptide 1.5 (bolus) - 4 cans/day. Per CM will need a few days to arrange for [employer] cans. Continues to have electrolyte abnormalities.    Interval History:   NAEO. Continues to have electrolyte abnormalities. Plan of care discussed with spouse     Review of Systems   Unable to perform ROS: Other     Objective:     Vital Signs (Most Recent):  Temp: 98.7 °F (37.1 °C) (05/04/24 1124)  Pulse: (!) 126 (05/04/24 1124)  Resp: 19 (05/04/24 1303)  BP: 125/79 (05/04/24 1124)  SpO2: 97 % (05/04/24 1124) Vital Signs (24h Range):  Temp:  [98.4 °F (36.9 °C)-99.6 °F (37.6 °C)] 98.7 °F (37.1 °C)  Pulse:  [] 126  Resp:  [17-24] 19  SpO2:  [92 %-98 %] 97 %  BP: (116-136)/(58-79) 125/79     Weight: 82.6 kg (182 lb 1.6 oz)  Body mass index is 29.39 kg/m².    Intake/Output Summary (Last 24  hours) at 5/4/2024 1458  Last data filed at 5/4/2024 0610  Gross per 24 hour   Intake 730 ml   Output --   Net 730 ml         Physical Exam  Vitals and nursing note reviewed.   Constitutional:       General: She is not in acute distress.     Appearance: She is ill-appearing. She is not toxic-appearing.   Neck:      Comments: Dressing in place over entire circumference of neck. Dressing c/d/i  Cardiovascular:      Rate and Rhythm: Regular rhythm. Tachycardia present.      Heart sounds: Normal heart sounds.   Pulmonary:      Effort: Pulmonary effort is normal. No respiratory distress.      Breath sounds: Normal breath sounds. No wheezing.   Abdominal:      General: Bowel sounds are normal. There is no distension.      Palpations: Abdomen is soft.      Tenderness: There is no abdominal tenderness. There is no guarding.      Comments: PEG tube in place   Skin:     General: Skin is warm and dry.   Neurological:      Mental Status: She is alert. Mental status is at baseline.       Significant Labs: All pertinent labs within the past 24 hours have been reviewed.      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 Underwent repeat bx after failed tx (1/24/22) that showed moderately differentiated squamous cell carcinoma w/ suspicion for perineural invasion. CT soft tissue neck (Jan 2022) w/o e/o metastatic dx. PET-CT (Feb 2022) w/o e/o active local or distant dx- no abnormal activity within the tongue, no signs of cervical lymph node or distant metastasis. Declined chemotherapy &/or XRT. Underwent selective neck dissection levels 1, 2, 3, and partial glossectomy w/ split-thickness skin graft (3/3/2022 per Dr. Clif Olson).  Surgical pathology w/ unifocal squamous cell carcinoma on the dorsal surface of the tongue on left side measuring 1 cm, moderately differentiated, 6 mm depth of invasion,  "no lymphovascular invasion, positive for perineural invasion, margins negative, 0 of 39 lymph nodes with metastasis, pT2 pN0 noted.  Postop course complicated by infection requiring I&D and antibiotics.  Declined adjuvant XRT. Repeat imaging (June 2023) w/ 2.2 cm lesion deep to the R SCM c/f necrotic LN w/ few adjacent pathological appearing LNs suspicious for metastasis. Underwent R neck FNA (7/7/23) w/ pathology suspicious for metastatic SCC. PET-CT (8/21/2023) w/ large necrotic mass the angle of mandible posterior to submandibular gland on the right measuring 4.4 x 4.2 cm with SUV 9.78, just superior to the mass is a 2 cm hypermetabolic lymph node and no evidence of distant metastatic disease. Subsequently transferred care to [place] where she reportedly recently abx,"detox", hyperbaric chamber, & stem-cell transplant approximately 2 weeks ago PTA in [place]. Now presenting w/ progressive neck wound. CT neck soft tissue (4/10/24) w/ large bulky lobulated appearing mass right lateral and anterolateral neck extending to the D spaces of the neck having overall dimensions of approximately 9.5 x 8.2 x 10 cm, multiple addn'l b/l cervical necrotic nodes, & assoc mass effect results in displacement of midline structures to the patient's left. CT C/A/P with diffuse metastatic disease including mediastinal & hilar LAD, bilateral lungs, peritoneum, lumbar vertebrae, & likely liver.     - ENT consulted; appreciate recs - not surgical candidate for debridement  - Medical & Radiation Oncology consulted - pt declines chemo or XRT  - Palliative following; appreciate assistance  - Multimodal analgesia   - SLP following,remains NPO  - Gen surgery and ENT consulted. Unable to do do tracheostomy. Rec consulting IR for PEG  - Consulted IR for PEG placement. G tube placed 4/25.     PEG (percutaneous endoscopic gastrostomy) status  Unable to tolerate TF  Patient noted to have a percutaneous endoscopic gastrostomy tube in place. I have " personally inspected the tube.Tube was placed on this admission, with date of procedure- 4/26  There are no signs of drainage or infection around the site. Routine care to be done by wound care and nursing staff.     Patient unable to tolerate Impact peptide TF. Discussed with RD, rec switching to [employer] peptide 1.5 which patient is tolerating well so are.   Added bentyl    Abdominal Xray w/o distention, PEG in place.   On d/c will need [employer] Peptide 1.5 (bolus) - 4 cans/day but per CM it will take a few days to arrange for [employer] Tf as Op for her.         Severe malnutrition  PEG tube placement is being discussed. IR and GI deferred the procedure. Surgery depending on anesthesia assessment. ENT updated with recs. Awaiting final recs.       Nutrition consulted. Most recent weight and BMI monitored-     Measurements:  Wt Readings from Last 1 Encounters:   05/02/24 82.6 kg (182 lb 1.6 oz)   Body mass index is 29.39 kg/m².    Patient has been screened and assessed by RD.    Malnutrition Type:  Context: chronic illness  Level: severe    Malnutrition Characteristic Summary:  Weight Loss (Malnutrition): greater than 10% in 6 months  Energy Intake (Malnutrition): less than or equal to 50% for greater than or equal to 1 month    Interventions/Recommendations (treatment strategy):  1.      Hypercalcemia  On arrival to OSH, Ca 13.5 --> 11.0 on arrival to [facility]. Continued subsequent uptrend peaking at 13.1 on 4/15 (corrected Ca 14.5). Likely 2/2 malignancy.   - IVF  - Calcitonin BID x2 days  - Ionized Ca 1.59 4/21  - Monitor CMP  - IV hydration and cont to trend Ca level   - Ionized calcium WNL. Cont to monitor    - Serum calcium again noted to be 13.3 on 4/29. Received IVF, Calcitonin and zolidronic acid. Continue IVF.   - D/c home calcium and vit D supplementation on d/c    Palliative care encounter  Daily GOC discussions  Pt and family would like to proceed with aggressive care  Cont FULL code. Re-discussed code  "status with , would like to remain a full code. States "she is going to make it."      Hypokalemia  Patient has hypokalemia which is Acute and currently controlled. Most recent potassium levels reviewed-   Lab Results   Component Value Date    K 3.6 05/04/2024   . Will continue potassium replacement per protocol and recheck repeat levels after replacement completed.     Open neck wound  Pt with extensive hx of H/N cancer. Presents w/ continued/worsening drainage from the neck for months with recent worsening of symptoms for past 2 weeks w/ N/V. Pt returned from [place] where she received antibiotics, "detox" and hyperbaric chamber as treatment. Endorses difficulty with swallowing 2/2 to oral trush. Large neck mass on imaging w/ large central collection of air which appears to extend superficially to the skin surface right mid neck anterior laterally; unable to r/o superimposed abscess. Meeting 2/4 SIRS (HR & WBC) w/ elevated lactate, but nml BP. Blood cx negative. Pt not surgical candidate for debridement per ENT evaluation.     - ID consulted; appreciate recs  - Surgical debridement is not an option. Has completed course of antibiotics.      Sepsis  On arrival to [facility], meeting 2/4 SIRS (requiring 2L NC w/ RR 20s & WBC 13). Likely soft tissue/neck source. Received IVF at OSH prior to transfer (for lactate 2.3 at that time). Started on broad-spectrum abx. Blood cx negative.     - ID consulted; appreciate recs  - Monitor CBC & fever curve  - Mgmt of neck mass/wound as below   - Has completed treatment. Discontinue vancomycin + Flagyl + fluconazole     Resolved.    Postsurgical hypothyroidism  Last TSH elevated at 5.264. Home regimen includes levothyroxine 175mcg daily & liothyronine 5mcg daily  - Continue home liothyronine & levothyroxine       HTN (hypertension)  Hx of essential HTN; home regimen includes metoprolol 25mg BID. On arrival to [facility], SBP 140s-150s.     -Continue Lopressor at current dose.      VTE " Risk Mitigation (From admission, onward)           Ordered     enoxaparin injection 40 mg  Every 24 hours         04/12/24 1536     IP VTE HIGH RISK PATIENT  Once         04/12/24 1324     Place sequential compression device  Until discontinued         04/12/24 1324                    Discharge Planning   ANDRES: 5/6/2024     Code Status: Full Code   Is the patient medically ready for discharge?: No    Reason for patient still in hospital (select all that apply): Treatment and Pending disposition  Discharge Plan A: Hospice/home                  Jessenia Antoine MD  Department of Hospital Medicine   [facility] - Telemetry Stepdown

## 2024-05-04 NOTE — ASSESSMENT & PLAN NOTE
Hx of essential HTN; home regimen includes metoprolol 25mg BID. On arrival to [facility], SBP 140s-150s.     -Continue Lopressor at current dose.

## 2024-05-04 NOTE — NURSING
Dr Gutierrez notified that pt hasn't had a BM since 04/26, pt currently on Senna BID. Pt NPO just on tube feeding @40ml/hr.

## 2024-05-04 NOTE — NURSING
Dr Gutierrez notified pt required a switch from NC to venturi mask tonight because pt is a mouth breather. Pt maintained 94% on 40% venturi mask, increased from 28% currently, will try to wean her back down to 2L NC when able, Pt just received Dilaudid and Phenergan PRN. Pt required bumped up on oxygen to 4L-5L HFNC the night before as well but able to wean her back down to 2L in am. Will keep MD updated. Cont to monitor.

## 2024-05-04 NOTE — SUBJECTIVE & OBJECTIVE
Interval History:   AGGIEO. Continues to have electrolyte abnormalities. Plan of care discussed with spouse     Review of Systems   Unable to perform ROS: Other     Objective:     Vital Signs (Most Recent):  Temp: 98.7 °F (37.1 °C) (05/04/24 1124)  Pulse: (!) 126 (05/04/24 1124)  Resp: 19 (05/04/24 1303)  BP: 125/79 (05/04/24 1124)  SpO2: 97 % (05/04/24 1124) Vital Signs (24h Range):  Temp:  [98.4 °F (36.9 °C)-99.6 °F (37.6 °C)] 98.7 °F (37.1 °C)  Pulse:  [] 126  Resp:  [17-24] 19  SpO2:  [92 %-98 %] 97 %  BP: (116-136)/(58-79) 125/79     Weight: 82.6 kg (182 lb 1.6 oz)  Body mass index is 29.39 kg/m².    Intake/Output Summary (Last 24 hours) at 5/4/2024 1458  Last data filed at 5/4/2024 0610  Gross per 24 hour   Intake 730 ml   Output --   Net 730 ml         Physical Exam  Vitals and nursing note reviewed.   Constitutional:       General: She is not in acute distress.     Appearance: She is ill-appearing. She is not toxic-appearing.   Neck:      Comments: Dressing in place over entire circumference of neck. Dressing c/d/i  Cardiovascular:      Rate and Rhythm: Regular rhythm. Tachycardia present.      Heart sounds: Normal heart sounds.   Pulmonary:      Effort: Pulmonary effort is normal. No respiratory distress.      Breath sounds: Normal breath sounds. No wheezing.   Abdominal:      General: Bowel sounds are normal. There is no distension.      Palpations: Abdomen is soft.      Tenderness: There is no abdominal tenderness. There is no guarding.      Comments: PEG tube in place   Skin:     General: Skin is warm and dry.   Neurological:      Mental Status: She is alert. Mental status is at baseline.       Significant Labs: All pertinent labs within the past 24 hours have been reviewed.

## 2024-05-05 LAB
ALBUMIN SERPL BCP-MCNC: 2.4 G/DL (ref 3.5–5.2)
ALP SERPL-CCNC: 224 U/L (ref 55–135)
ALT SERPL W/O P-5'-P-CCNC: 29 U/L (ref 10–44)
ANION GAP SERPL CALC-SCNC: 11 MMOL/L (ref 8–16)
AST SERPL-CCNC: 21 U/L (ref 10–40)
BASOPHILS # BLD AUTO: 0.03 K/UL (ref 0–0.2)
BASOPHILS NFR BLD: 0.2 % (ref 0–1.9)
BILIRUB SERPL-MCNC: 0.3 MG/DL (ref 0.1–1)
BUN SERPL-MCNC: 19 MG/DL (ref 6–20)
CALCIUM SERPL-MCNC: 7.2 MG/DL (ref 8.7–10.5)
CHLORIDE SERPL-SCNC: 86 MMOL/L (ref 95–110)
CO2 SERPL-SCNC: 38 MMOL/L (ref 23–29)
CREAT SERPL-MCNC: 0.6 MG/DL (ref 0.5–1.4)
DIFFERENTIAL METHOD BLD: ABNORMAL
EOSINOPHIL # BLD AUTO: 0 K/UL (ref 0–0.5)
EOSINOPHIL NFR BLD: 0 % (ref 0–8)
ERYTHROCYTE [DISTWIDTH] IN BLOOD BY AUTOMATED COUNT: 15.5 % (ref 11.5–14.5)
EST. GFR  (NO RACE VARIABLE): >60 ML/MIN/1.73 M^2
GLUCOSE SERPL-MCNC: 113 MG/DL (ref 70–110)
HCT VFR BLD AUTO: 36.6 % (ref 37–48.5)
HGB BLD-MCNC: 11.6 G/DL (ref 12–16)
IMM GRANULOCYTES # BLD AUTO: 0.11 K/UL (ref 0–0.04)
IMM GRANULOCYTES NFR BLD AUTO: 0.8 % (ref 0–0.5)
LYMPHOCYTES # BLD AUTO: 2.8 K/UL (ref 1–4.8)
LYMPHOCYTES NFR BLD: 20.2 % (ref 18–48)
MAGNESIUM SERPL-MCNC: 1.4 MG/DL (ref 1.6–2.6)
MCH RBC QN AUTO: 27.4 PG (ref 27–31)
MCHC RBC AUTO-ENTMCNC: 31.7 G/DL (ref 32–36)
MCV RBC AUTO: 87 FL (ref 82–98)
MONOCYTES # BLD AUTO: 1.1 K/UL (ref 0.3–1)
MONOCYTES NFR BLD: 7.7 % (ref 4–15)
NEUTROPHILS # BLD AUTO: 9.7 K/UL (ref 1.8–7.7)
NEUTROPHILS NFR BLD: 71.1 % (ref 38–73)
NRBC BLD-RTO: 0 /100 WBC
OHS QRS DURATION: 74 MS
OHS QTC CALCULATION: 460 MS
PHOSPHATE SERPL-MCNC: 2.4 MG/DL (ref 2.7–4.5)
PLATELET # BLD AUTO: 295 K/UL (ref 150–450)
PMV BLD AUTO: 9.9 FL (ref 9.2–12.9)
POTASSIUM SERPL-SCNC: 3.6 MMOL/L (ref 3.5–5.1)
PROT SERPL-MCNC: 5.9 G/DL (ref 6–8.4)
RBC # BLD AUTO: 4.23 M/UL (ref 4–5.4)
SODIUM SERPL-SCNC: 135 MMOL/L (ref 136–145)
WBC # BLD AUTO: 13.67 K/UL (ref 3.9–12.7)

## 2024-05-05 PROCEDURE — 63600175 PHARM REV CODE 636 W HCPCS: Performed by: INTERNAL MEDICINE

## 2024-05-05 PROCEDURE — 99900035 HC TECH TIME PER 15 MIN (STAT)

## 2024-05-05 PROCEDURE — A4216 STERILE WATER/SALINE, 10 ML: HCPCS | Performed by: STUDENT IN AN ORGANIZED HEALTH CARE EDUCATION/TRAINING PROGRAM

## 2024-05-05 PROCEDURE — 84100 ASSAY OF PHOSPHORUS: CPT | Performed by: STUDENT IN AN ORGANIZED HEALTH CARE EDUCATION/TRAINING PROGRAM

## 2024-05-05 PROCEDURE — 36415 COLL VENOUS BLD VENIPUNCTURE: CPT | Performed by: STUDENT IN AN ORGANIZED HEALTH CARE EDUCATION/TRAINING PROGRAM

## 2024-05-05 PROCEDURE — 25000242 PHARM REV CODE 250 ALT 637 W/ HCPCS: Performed by: STUDENT IN AN ORGANIZED HEALTH CARE EDUCATION/TRAINING PROGRAM

## 2024-05-05 PROCEDURE — 25000003 PHARM REV CODE 250: Performed by: STUDENT IN AN ORGANIZED HEALTH CARE EDUCATION/TRAINING PROGRAM

## 2024-05-05 PROCEDURE — 63600175 PHARM REV CODE 636 W HCPCS: Performed by: STUDENT IN AN ORGANIZED HEALTH CARE EDUCATION/TRAINING PROGRAM

## 2024-05-05 PROCEDURE — 25000003 PHARM REV CODE 250: Performed by: INTERNAL MEDICINE

## 2024-05-05 PROCEDURE — 94640 AIRWAY INHALATION TREATMENT: CPT

## 2024-05-05 PROCEDURE — 27000221 HC OXYGEN, UP TO 24 HOURS

## 2024-05-05 PROCEDURE — 83735 ASSAY OF MAGNESIUM: CPT | Performed by: STUDENT IN AN ORGANIZED HEALTH CARE EDUCATION/TRAINING PROGRAM

## 2024-05-05 PROCEDURE — 80053 COMPREHEN METABOLIC PANEL: CPT | Performed by: STUDENT IN AN ORGANIZED HEALTH CARE EDUCATION/TRAINING PROGRAM

## 2024-05-05 PROCEDURE — 20600001 HC STEP DOWN PRIVATE ROOM

## 2024-05-05 PROCEDURE — 94761 N-INVAS EAR/PLS OXIMETRY MLT: CPT

## 2024-05-05 PROCEDURE — 85025 COMPLETE CBC W/AUTO DIFF WBC: CPT | Performed by: STUDENT IN AN ORGANIZED HEALTH CARE EDUCATION/TRAINING PROGRAM

## 2024-05-05 RX ORDER — IPRATROPIUM BROMIDE AND ALBUTEROL SULFATE 2.5; .5 MG/3ML; MG/3ML
3 SOLUTION RESPIRATORY (INHALATION) ONCE
Status: COMPLETED | OUTPATIENT
Start: 2024-05-05 | End: 2024-05-05

## 2024-05-05 RX ORDER — MAGNESIUM SULFATE 1 G/100ML
1 INJECTION INTRAVENOUS ONCE
Status: COMPLETED | OUTPATIENT
Start: 2024-05-05 | End: 2024-05-05

## 2024-05-05 RX ADMIN — Medication 10 ML: at 07:05

## 2024-05-05 RX ADMIN — SENNOSIDES 8.6 MG: 8.6 TABLET, FILM COATED ORAL at 08:05

## 2024-05-05 RX ADMIN — HYDROMORPHONE HYDROCHLORIDE 1 MG: 1 INJECTION, SOLUTION INTRAMUSCULAR; INTRAVENOUS; SUBCUTANEOUS at 06:05

## 2024-05-05 RX ADMIN — PROMETHAZINE HYDROCHLORIDE 12.5 MG: 25 INJECTION INTRAMUSCULAR; INTRAVENOUS at 06:05

## 2024-05-05 RX ADMIN — PROCHLORPERAZINE EDISYLATE 5 MG: 5 INJECTION INTRAMUSCULAR; INTRAVENOUS at 08:05

## 2024-05-05 RX ADMIN — METOPROLOL TARTRATE 25 MG: 25 TABLET, FILM COATED ORAL at 08:05

## 2024-05-05 RX ADMIN — PROCHLORPERAZINE EDISYLATE 5 MG: 5 INJECTION INTRAMUSCULAR; INTRAVENOUS at 10:05

## 2024-05-05 RX ADMIN — DICYCLOMINE HYDROCHLORIDE 10 MG: 10 CAPSULE ORAL at 07:05

## 2024-05-05 RX ADMIN — ACETAMINOPHEN 650 MG: 650 SOLUTION ORAL at 09:05

## 2024-05-05 RX ADMIN — PROMETHAZINE HYDROCHLORIDE 12.5 MG: 25 INJECTION INTRAMUSCULAR; INTRAVENOUS at 04:05

## 2024-05-05 RX ADMIN — PROCHLORPERAZINE EDISYLATE 5 MG: 5 INJECTION INTRAMUSCULAR; INTRAVENOUS at 01:05

## 2024-05-05 RX ADMIN — Medication 10 ML: at 12:05

## 2024-05-05 RX ADMIN — HYDROMORPHONE HYDROCHLORIDE 1 MG: 1 INJECTION, SOLUTION INTRAMUSCULAR; INTRAVENOUS; SUBCUTANEOUS at 03:05

## 2024-05-05 RX ADMIN — LIOTHYRONINE SODIUM 5 MCG: 5 TABLET ORAL at 05:05

## 2024-05-05 RX ADMIN — DICYCLOMINE HYDROCHLORIDE 10 MG: 10 CAPSULE ORAL at 08:05

## 2024-05-05 RX ADMIN — HYDROMORPHONE HYDROCHLORIDE 1 MG: 1 INJECTION, SOLUTION INTRAMUSCULAR; INTRAVENOUS; SUBCUTANEOUS at 10:05

## 2024-05-05 RX ADMIN — ENOXAPARIN SODIUM 40 MG: 40 INJECTION SUBCUTANEOUS at 07:05

## 2024-05-05 RX ADMIN — DICYCLOMINE HYDROCHLORIDE 10 MG: 10 CAPSULE ORAL at 09:05

## 2024-05-05 RX ADMIN — HYDROXYZINE PAMOATE 25 MG: 25 CAPSULE ORAL at 08:05

## 2024-05-05 RX ADMIN — DICYCLOMINE HYDROCHLORIDE 10 MG: 10 CAPSULE ORAL at 12:05

## 2024-05-05 RX ADMIN — MAGNESIUM SULFATE HEPTAHYDRATE 1 G: 500 INJECTION, SOLUTION INTRAMUSCULAR; INTRAVENOUS at 09:05

## 2024-05-05 RX ADMIN — IPRATROPIUM BROMIDE AND ALBUTEROL SULFATE 3 ML: 2.5; .5 SOLUTION RESPIRATORY (INHALATION) at 08:05

## 2024-05-05 RX ADMIN — PROCHLORPERAZINE EDISYLATE 5 MG: 5 INJECTION INTRAMUSCULAR; INTRAVENOUS at 02:05

## 2024-05-05 RX ADMIN — Medication 10 ML: at 05:05

## 2024-05-05 RX ADMIN — SENNOSIDES 8.6 MG: 8.6 TABLET, FILM COATED ORAL at 09:05

## 2024-05-05 RX ADMIN — HYDROMORPHONE HYDROCHLORIDE 1 MG: 1 INJECTION, SOLUTION INTRAMUSCULAR; INTRAVENOUS; SUBCUTANEOUS at 01:05

## 2024-05-05 RX ADMIN — HYDROMORPHONE HYDROCHLORIDE 1 MG: 1 INJECTION, SOLUTION INTRAMUSCULAR; INTRAVENOUS; SUBCUTANEOUS at 12:05

## 2024-05-05 RX ADMIN — METOPROLOL TARTRATE 25 MG: 25 TABLET, FILM COATED ORAL at 09:05

## 2024-05-05 RX ADMIN — LEVOTHYROXINE SODIUM 175 MCG: 150 TABLET ORAL at 05:05

## 2024-05-05 RX ADMIN — HYDROMORPHONE HYDROCHLORIDE 1 MG: 1 INJECTION, SOLUTION INTRAMUSCULAR; INTRAVENOUS; SUBCUTANEOUS at 08:05

## 2024-05-05 NOTE — ASSESSMENT & PLAN NOTE
Unable to tolerate TF  Patient noted to have a percutaneous endoscopic gastrostomy tube in place. I have personally inspected the tube.Tube was placed on this admission, with date of procedure- 4/26  There are no signs of drainage or infection around the site. Routine care to be done by wound care and nursing staff.     Patient unable to tolerate Impact peptide TF. Discussed with RD, rec switching to [employer] peptide 1.5 which patient is tolerating well so are.   Added bentyl    Abdominal Xray w/o distention, PEG in place.   On d/c will need Cometa Peptide 1.5 (bolus) - 4 cans/day but per CM it will take a few days to arrange for [employer] T[employer] as Op for her.

## 2024-05-05 NOTE — ASSESSMENT & PLAN NOTE
"Pt with extensive hx of H/N cancer. Presents w/ continued/worsening drainage from the neck for months with recent worsening of symptoms for past 2 weeks w/ N/V. Pt returned from [place] where she received antibiotics, "detox" and hyperbaric chamber as treatment. Endorses difficulty with swallowing 2/2 to oral trush. Large neck mass on imaging w/ large central collection of air which appears to extend superficially to the skin surface right mid neck anterior laterally; unable to r/o superimposed abscess. Meeting 2/4 SIRS (HR & WBC) w/ elevated lactate, but nml BP. Blood cx negative. Pt not surgical candidate for debridement per ENT evaluation.     - ID consulted; appreciate recs  - Surgical debridement is not an option. Has completed course of antibiotics.    "

## 2024-05-05 NOTE — ASSESSMENT & PLAN NOTE
Patient has hypokalemia which is Acute and currently controlled. Most recent potassium levels reviewed-   Lab Results   Component Value Date    K 3.6 05/05/2024   . Will continue potassium replacement per protocol and recheck repeat levels after replacement completed.

## 2024-05-05 NOTE — ASSESSMENT & PLAN NOTE
[place] [facility] 2/4 SIRS (requiring 2L NC w/ RR 20s & WBC 13). Likely soft tissue/neck source. Received IVF at OSH prior to transfer (for lactate 2.3 at that time). Started on broad-spectrum abx. Blood cx negative.     - ID consulted; appreciate recs  - Monitor CBC & fever curve  - Mgmt of neck mass/wound as below   - Has completed treatment. Discontinue vancomycin + Flagyl + fluconazole     Resolved.

## 2024-05-05 NOTE — PROGRESS NOTES
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 She initially presented to [facility] Emergency Department with six days of epigastric pain with associated nausea, vomiting, and difficulty eating due to pain. However on presentation, she was noted to have a large necrotic and purulent wound located on her right neck with complaints of associated difficulty speaking, swallowing and shortness of breath. She was noted to be hypoxic 88% on room air which improved with 2L NC. Labs were notable for leukocytosis 14, hypokalemia 2.4, hypochloremia 89, CO2 36, hypercalcemia 13.5, Phos 2.5. . Troponin 0.046. Lactate 2.3. CT Soft Tissue Neck was concerning for 9.5 x 8.2 x 10 cm large lobulated mass in the right anterolateral neck extending to the deep spaces of the neck and abutting the right prevertebral space and paravertebral musculature, left midline shift, multiple bilateral necrotic cervical lymph nodes. Case was discussed and decision was made to transfer to [facility] for higher level of care.     Of additional note, she recently transitioned her care to [place] and underwent stem-cell transplant approximately 2 weeks ago in [place], has not been seen by a [place] physician since 2023.      On transfer: she was afebrile, mildly hypertensive /109, HR 98, RR 20, satting 96% on room  air. Complaining of mild headache and poor appetite associated with nausea; denies fever, chills, chest pain, palpitations, SOB, abdominal pain, dysuria. States wound has been draining for the past week initially thick white now more liquid. Mild dysphonia is apparently chronic from left vocal cord paralysis and at baseline. Some dysphagia with solids, none with pureed soft or liquids, denies odynophagia. Dressing on neck CDI, ENT consulted will be here shortly to assess patient.        Overview/Hospital Course:  Evaluated 4/12 in MICU by ENT, who do not feel patient is a candidate for surgical intervention. Palliative care and oncology consulted. Pt declines chemo or XRT. Failed swallow study, SLP recommending NPO 4/13. Patient continues to protect airway. Stepped down from MICU on 4/13.  ENT attempted awake trach but could not get one. Gen surgery rec consulting IR for PEG as they can't put one w/o a trach. Now s/p PEG placement by IR on 4/26. Started on TF. Unable to tolerate Impact peptide TF. Discussed with RD, rec switching to [employer] peptide 1.5. Added bentyl. Abdominal Xray w/o distention, PEG in place. On d/c will need LayerBoom Peptide 1.5 (bolus) - 4 cans/day. Per CM will need a few days to arrange for [employer] cans. Continues to have electrolyte abnormalities.    Interval History:   MICHEAL. Continues to have electrolyte abnormalities. She denies any complaints this am , replacing low mag     Review of Systems   Unable to perform ROS: Other     Objective:     Vital Signs (Most Recent):  Temp: 99.5 °F (37.5 °C) (05/05/24 0906)  Pulse: 104 (05/05/24 1103)  Resp: 18 (05/05/24 0858)  BP: 110/69 (05/05/24 1103)  SpO2: 95 % (05/05/24 1103) Vital Signs (24h Range):  Temp:  [97.8 °F (36.6 °C)-99.5 °F (37.5 °C)] 99.5 °F (37.5 °C)  Pulse:  [] 104  Resp:  [16-20] 18  SpO2:  [93 %-100 %] 95 %  BP: (107-143)/(58-85) 110/69     Weight: 82.6 kg (182 lb 1.6 oz)  Body mass index is 29.39 kg/m².    Intake/Output  Summary (Last 24 hours) at 5/5/2024 1106  Last data filed at 5/5/2024 1005  Gross per 24 hour   Intake 680 ml   Output --   Net 680 ml         Physical Exam  Vitals and nursing note reviewed.   Constitutional:       General: She is not in acute distress.     Appearance: She is ill-appearing. She is not toxic-appearing.   Neck:      Comments: Dressing in place over entire circumference of neck. Dressing c/d/i  Cardiovascular:      Rate and Rhythm: Regular rhythm. Tachycardia present.      Heart sounds: Normal heart sounds.   Pulmonary:      Effort: Pulmonary effort is normal. No respiratory distress.      Breath sounds: Normal breath sounds. No wheezing.   Abdominal:      General: Bowel sounds are normal. There is no distension.      Palpations: Abdomen is soft.      Tenderness: There is no abdominal tenderness. There is no guarding.      Comments: PEG tube in place   Skin:     General: Skin is warm and dry.   Neurological:      Mental Status: She is alert. Mental status is at baseline.       Significant Labs: All pertinent labs within the past 24 hours have been reviewed.      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 Underwent repeat bx after failed tx (1/24/22) that showed moderately differentiated squamous cell carcinoma w/ suspicion for perineural invasion. CT soft tissue neck (Jan 2022) w/o e/o metastatic dx. PET-CT (Feb 2022) w/o e/o active local or distant dx- no abnormal activity within the tongue, no signs of cervical lymph node or distant metastasis. Declined chemotherapy &/or XRT. Underwent selective neck dissection levels 1, 2, 3, and partial glossectomy w/ split-thickness skin graft (3/3/2022 per Dr. Clif Olson).  Surgical pathology w/ unifocal squamous cell carcinoma on the dorsal surface of the tongue on left side measuring 1 cm, moderately differentiated, 6 mm  "depth of invasion, no lymphovascular invasion, positive for perineural invasion, margins negative, 0 of 39 lymph nodes with metastasis, pT2 pN0 noted.  Postop course complicated by infection requiring I&D and antibiotics.  Declined adjuvant XRT. Repeat imaging (June 2023) w/ 2.2 cm lesion deep to the R SCM c/f necrotic LN w/ few adjacent pathological appearing LNs suspicious for metastasis. Underwent R neck FNA (7/7/23) w/ pathology suspicious for metastatic SCC. PET-CT (8/21/2023) w/ large necrotic mass the angle of mandible posterior to submandibular gland on the right measuring 4.4 x 4.2 cm with SUV 9.78, just superior to the mass is a 2 cm hypermetabolic lymph node and no evidence of distant metastatic disease. Subsequently transferred care to [place] where she reportedly recently abx,"detox", hyperbaric chamber, & stem-cell transplant approximately 2 weeks ago PTA in [place]. Now presenting w/ progressive neck wound. CT neck soft tissue (4/10/24) w/ large bulky lobulated appearing mass right lateral and anterolateral neck extending to the D spaces of the neck having overall dimensions of approximately 9.5 x 8.2 x 10 cm, multiple addn'l b/l cervical necrotic nodes, & assoc mass effect results in displacement of midline structures to the patient's left. CT C/A/P with diffuse metastatic disease including mediastinal & hilar LAD, bilateral lungs, peritoneum, lumbar vertebrae, & likely liver.     - ENT consulted; appreciate recs - not surgical candidate for debridement  - Medical & Radiation Oncology consulted - pt declines chemo or XRT  - Palliative following; appreciate assistance  - Multimodal analgesia   - SLP following,remains NPO  - Gen surgery and ENT consulted. Unable to do do tracheostomy. Rec consulting IR for PEG  - Consulted IR for PEG placement. G tube placed 4/25.     PEG (percutaneous endoscopic gastrostomy) status  Unable to tolerate TF  Patient noted to have a percutaneous endoscopic gastrostomy tube " "in place. I have personally inspected the tube.Tube was placed on this admission, with date of procedure- 4/26  There are no signs of drainage or infection around the site. Routine care to be done by wound care and nursing staff.     Patient unable to tolerate Impact peptide TF. Discussed with RD, rec switching to [employer] peptide 1.5 which patient is tolerating well so are.   Added bentyl    Abdominal Xray w/o distention, PEG in place.   On d/c will need [employer] Peptide 1.5 (bolus) - 4 cans/day but per CM it will take a few days to arrange for [employer] as Op for her.         Severe malnutrition  PEG tube placement is being discussed. IR and GI deferred the procedure. Surgery depending on anesthesia assessment. ENT updated with recs. Awaiting final recs.       Nutrition consulted. Most recent weight and BMI monitored-     Measurements:  Wt Readings from Last 1 Encounters:   05/02/24 82.6 kg (182 lb 1.6 oz)   Body mass index is 29.39 kg/m².    Patient has been screened and assessed by RD.    Malnutrition Type:  Context: chronic illness  Level: severe    Malnutrition Characteristic Summary:  Weight Loss (Malnutrition): greater than 10% in 6 months  Energy Intake (Malnutrition): less than or equal to 50% for greater than or equal to 1 month    Interventions/Recommendations (treatment strategy):  1.      Hypercalcemia  Resolved     Palliative care encounter  Daily GOC discussions  Pt and family would like to proceed with aggressive care  Cont FULL code. Re-discussed code status with , would like to remain a full code. States "she is going to make it."      Hypokalemia  Patient has hypokalemia which is Acute and currently controlled. Most recent potassium levels reviewed-   Lab Results   Component Value Date    K 3.6 05/05/2024   . Will continue potassium replacement per protocol and recheck repeat levels after replacement completed.     Open neck wound  Pt with extensive hx of H/N cancer. Presents w/ " "continued/worsening drainage from the neck for months with recent worsening of symptoms for past 2 weeks w/ N/V. Pt returned from [place] where she received antibiotics, "detox" and hyperbaric chamber as treatment. Endorses difficulty with swallowing 2/2 to oral trush. Large neck mass on imaging w/ large central collection of air which appears to extend superficially to the skin surface right mid neck anterior laterally; unable to r/o superimposed abscess. Meeting 2/4 SIRS (HR & WBC) w/ elevated lactate, but nml BP. Blood cx negative. Pt not surgical candidate for debridement per ENT evaluation.     - ID consulted; appreciate recs  - Surgical debridement is not an option. Has completed course of antibiotics.      Sepsis  On arrival to [facility], meeting 2/4 SIRS (requiring 2L NC w/ RR 20s & WBC 13). Likely soft tissue/neck source. Received IVF at OSH prior to transfer (for lactate 2.3 at that time). Started on broad-spectrum abx. Blood cx negative.     - ID consulted; appreciate recs  - Monitor CBC & fever curve  - Mgmt of neck mass/wound as below   - Has completed treatment. Discontinue vancomycin + Flagyl + fluconazole     Resolved.    Postsurgical hypothyroidism  Last TSH elevated at 5.264. Home regimen includes levothyroxine 175mcg daily & liothyronine 5mcg daily  - Continue home liothyronine & levothyroxine       HTN (hypertension)  Hx of essential HTN; home regimen includes metoprolol 25mg BID. On arrival to [facility], SBP 140s-150s.     -Continue Lopressor at current dose.      VTE Risk Mitigation (From admission, onward)           Ordered     enoxaparin injection 40 mg  Every 24 hours         04/12/24 1536     IP VTE HIGH RISK PATIENT  Once         04/12/24 1324     Place sequential compression device  Until discontinued         04/12/24 1324                    Discharge Planning   ANDRES: 5/6/2024     Code Status: Full Code   Is the patient medically ready for discharge?: No    Reason for patient still in hospital (select " all that apply): Treatment  Discharge Plan A: Hospice/home                  Jessenia Antoine MD  Department of Hospital Medicine   [facility] - Telemetry Stepdown

## 2024-05-05 NOTE — ASSESSMENT & PLAN NOTE
-mid-moderate disease  -no symptoms  -statin  -on eliquis   Hx of essential HTN; home regimen includes metoprolol 25mg BID. On arrival to [facility], SBP 140s-150s.     -Continue Lopressor at current dose.

## 2024-05-05 NOTE — TREATMENT PLAN
ENT Note:    Neck staples remove this afternoon. Incision is healing well, well approximated without drainage. Dressing was not removed, was able to remove staples without doing so.     -no further ent intervention  -rest of care per primary    Eleuterio Tierney MD  ENT PGY3

## 2024-05-05 NOTE — SUBJECTIVE & OBJECTIVE
Interval History:   ROSENDO Continues to have electrolyte abnormalities. She denies any complaints this am , replacing low mag     Review of Systems   Unable to perform ROS: Other     Objective:     Vital Signs (Most Recent):  Temp: 99.5 °F (37.5 °C) (05/05/24 0906)  Pulse: 104 (05/05/24 1103)  Resp: 18 (05/05/24 0858)  BP: 110/69 (05/05/24 1103)  SpO2: 95 % (05/05/24 1103) Vital Signs (24h Range):  Temp:  [97.8 °F (36.6 °C)-99.5 °F (37.5 °C)] 99.5 °F (37.5 °C)  Pulse:  [] 104  Resp:  [16-20] 18  SpO2:  [93 %-100 %] 95 %  BP: (107-143)/(58-85) 110/69     Weight: 82.6 kg (182 lb 1.6 oz)  Body mass index is 29.39 kg/m².    Intake/Output Summary (Last 24 hours) at 5/5/2024 1106  Last data filed at 5/5/2024 1005  Gross per 24 hour   Intake 680 ml   Output --   Net 680 ml         Physical Exam  Vitals and nursing note reviewed.   Constitutional:       General: She is not in acute distress.     Appearance: She is ill-appearing. She is not toxic-appearing.   Neck:      Comments: Dressing in place over entire circumference of neck. Dressing c/d/i  Cardiovascular:      Rate and Rhythm: Regular rhythm. Tachycardia present.      Heart sounds: Normal heart sounds.   Pulmonary:      Effort: Pulmonary effort is normal. No respiratory distress.      Breath sounds: Normal breath sounds. No wheezing.   Abdominal:      General: Bowel sounds are normal. There is no distension.      Palpations: Abdomen is soft.      Tenderness: There is no abdominal tenderness. There is no guarding.      Comments: PEG tube in place   Skin:     General: Skin is warm and dry.   Neurological:      Mental Status: She is alert. Mental status is at baseline.       Significant Labs: All pertinent labs within the past 24 hours have been reviewed.

## 2024-05-05 NOTE — ASSESSMENT & PLAN NOTE
"Initial bx of ventral surface of tongue (12/24/21) showed atypia w/o diagnostic e/o dysplasia. Underwent repeat bx after failed tx (1/24/22) that showed moderately differentiated squamous cell carcinoma w/ suspicion for perineural invasion. CT soft tissue neck (Jan 2022) w/o e/o metastatic dx. PET-CT (Feb 2022) w/o e/o active local or distant dx- no abnormal activity within the tongue, no signs of cervical lymph node or distant metastasis. Declined chemotherapy &/or XRT. Underwent selective neck dissection levels 1, 2, 3, and partial glossectomy w/ split-thickness skin graft (3/3/2022 per Dr. Clif Olson).  Surgical pathology w/ unifocal squamous cell carcinoma on the dorsal surface of the tongue on left side measuring 1 cm, moderately differentiated, 6 mm depth of invasion, no lymphovascular invasion, positive for perineural invasion, margins negative, 0 of 39 lymph nodes with metastasis, pT2 pN0 noted.  Postop course complicated by infection requiring I&D and antibiotics.  Declined adjuvant XRT. Repeat imaging (June 2023) w/ 2.2 cm lesion deep to the R SCM c/f necrotic LN w/ few adjacent pathological appearing LNs suspicious for metastasis. Underwent R neck FNA (7/7/23) w/ pathology suspicious for metastatic SCC. PET-CT (8/21/2023) w/ large necrotic mass the angle of mandible posterior to submandibular gland on the right measuring 4.4 x 4.2 cm with SUV 9.78, just superior to the mass is a 2 cm hypermetabolic lymph node and no evidence of distant metastatic disease. Subsequently transferred care to [place] where she reportedly recently abx,"detox", hyperbaric chamber, & stem-cell transplant approximately 2 weeks ago PTA in [place]. Now presenting w/ progressive neck wound. CT neck soft tissue (4/10/24) w/ large bulky lobulated appearing mass right lateral and anterolateral neck extending to the D spaces of the neck having overall dimensions of approximately 9.5 x 8.2 x 10 cm, multiple addn'l b/l cervical " necrotic nodes, & assoc mass effect results in displacement of midline structures to the patient's left. CT C/A/P with diffuse metastatic disease including mediastinal & hilar LAD, bilateral lungs, peritoneum, lumbar vertebrae, & likely liver.     - ENT consulted; appreciate recs - not surgical candidate for debridement  - Medical & Radiation Oncology consulted - pt declines chemo or XRT  - Palliative following; appreciate assistance  - Multimodal analgesia   - SLP following,remains NPO  - Gen surgery and ENT consulted. Unable to do do tracheostomy. Rec consulting IR for PEG  - Consulted IR for PEG placement. G tube placed 4/25.

## 2024-05-05 NOTE — PLAN OF CARE
Problem: Adult Inpatient Plan of Care  Goal: Plan of Care Review  Outcome: Progressing     Problem: Adult Inpatient Plan of Care  Goal: Absence of Hospital-Acquired Illness or Injury  Outcome: Progressing     Problem: Adult Inpatient Plan of Care  Goal: Optimal Comfort and Wellbeing  Outcome: Progressing     Problem: Adult Inpatient Plan of Care  Goal: Readiness for Transition of Care  Outcome: Progressing     Problem: Adjustment to Illness (Sepsis/Septic Shock)  Goal: Optimal Coping  Outcome: Progressing     Problem: Infection Progression (Sepsis/Septic Shock)  Goal: Absence of Infection Signs and Symptoms  Outcome: Progressing     Problem: Nutrition Impaired (Sepsis/Septic Shock)  Goal: Optimal Nutrition Intake  Outcome: Progressing     Problem: Skin Injury Risk Increased  Goal: Skin Health and Integrity  Outcome: Progressing     Pt is Aox4. She is receiving 2L via NC maintaining in 90s for O2. All lung sounds are clear and unlabored. All safety measures were maintained.

## 2024-05-06 LAB
ALBUMIN SERPL BCP-MCNC: 2.4 G/DL (ref 3.5–5.2)
ALP SERPL-CCNC: 217 U/L (ref 55–135)
ALT SERPL W/O P-5'-P-CCNC: 27 U/L (ref 10–44)
ANION GAP SERPL CALC-SCNC: 10 MMOL/L (ref 8–16)
AST SERPL-CCNC: 20 U/L (ref 10–40)
BASOPHILS # BLD AUTO: 0.04 K/UL (ref 0–0.2)
BASOPHILS NFR BLD: 0.3 % (ref 0–1.9)
BILIRUB SERPL-MCNC: 0.3 MG/DL (ref 0.1–1)
BUN SERPL-MCNC: 20 MG/DL (ref 6–20)
CALCIUM SERPL-MCNC: 7.6 MG/DL (ref 8.7–10.5)
CHLORIDE SERPL-SCNC: 86 MMOL/L (ref 95–110)
CO2 SERPL-SCNC: 39 MMOL/L (ref 23–29)
CREAT SERPL-MCNC: 0.6 MG/DL (ref 0.5–1.4)
DIFFERENTIAL METHOD BLD: ABNORMAL
EOSINOPHIL # BLD AUTO: 0 K/UL (ref 0–0.5)
EOSINOPHIL NFR BLD: 0 % (ref 0–8)
ERYTHROCYTE [DISTWIDTH] IN BLOOD BY AUTOMATED COUNT: 14.9 % (ref 11.5–14.5)
EST. GFR  (NO RACE VARIABLE): >60 ML/MIN/1.73 M^2
GLUCOSE SERPL-MCNC: 126 MG/DL (ref 70–110)
HCT VFR BLD AUTO: 36.6 % (ref 37–48.5)
HGB BLD-MCNC: 11.5 G/DL (ref 12–16)
IMM GRANULOCYTES # BLD AUTO: 0.12 K/UL (ref 0–0.04)
IMM GRANULOCYTES NFR BLD AUTO: 0.8 % (ref 0–0.5)
LYMPHOCYTES # BLD AUTO: 2.3 K/UL (ref 1–4.8)
LYMPHOCYTES NFR BLD: 16.3 % (ref 18–48)
MAGNESIUM SERPL-MCNC: 1.4 MG/DL (ref 1.6–2.6)
MCH RBC QN AUTO: 27.8 PG (ref 27–31)
MCHC RBC AUTO-ENTMCNC: 31.4 G/DL (ref 32–36)
MCV RBC AUTO: 89 FL (ref 82–98)
MONOCYTES # BLD AUTO: 0.9 K/UL (ref 0.3–1)
MONOCYTES NFR BLD: 6.5 % (ref 4–15)
NEUTROPHILS # BLD AUTO: 10.9 K/UL (ref 1.8–7.7)
NEUTROPHILS NFR BLD: 76.1 % (ref 38–73)
NRBC BLD-RTO: 0 /100 WBC
PHOSPHATE SERPL-MCNC: 2.3 MG/DL (ref 2.7–4.5)
PLATELET # BLD AUTO: 320 K/UL (ref 150–450)
PMV BLD AUTO: 9.9 FL (ref 9.2–12.9)
POTASSIUM SERPL-SCNC: 3.3 MMOL/L (ref 3.5–5.1)
PROT SERPL-MCNC: 6 G/DL (ref 6–8.4)
RBC # BLD AUTO: 4.13 M/UL (ref 4–5.4)
SODIUM SERPL-SCNC: 135 MMOL/L (ref 136–145)
WBC # BLD AUTO: 14.37 K/UL (ref 3.9–12.7)

## 2024-05-06 PROCEDURE — 63600175 PHARM REV CODE 636 W HCPCS: Performed by: INTERNAL MEDICINE

## 2024-05-06 PROCEDURE — 25000003 PHARM REV CODE 250: Performed by: STUDENT IN AN ORGANIZED HEALTH CARE EDUCATION/TRAINING PROGRAM

## 2024-05-06 PROCEDURE — 85025 COMPLETE CBC W/AUTO DIFF WBC: CPT | Performed by: STUDENT IN AN ORGANIZED HEALTH CARE EDUCATION/TRAINING PROGRAM

## 2024-05-06 PROCEDURE — 84100 ASSAY OF PHOSPHORUS: CPT | Performed by: STUDENT IN AN ORGANIZED HEALTH CARE EDUCATION/TRAINING PROGRAM

## 2024-05-06 PROCEDURE — 63600175 PHARM REV CODE 636 W HCPCS: Mod: JZ,JG | Performed by: STUDENT IN AN ORGANIZED HEALTH CARE EDUCATION/TRAINING PROGRAM

## 2024-05-06 PROCEDURE — A4216 STERILE WATER/SALINE, 10 ML: HCPCS | Performed by: STUDENT IN AN ORGANIZED HEALTH CARE EDUCATION/TRAINING PROGRAM

## 2024-05-06 PROCEDURE — 63600175 PHARM REV CODE 636 W HCPCS: Performed by: STUDENT IN AN ORGANIZED HEALTH CARE EDUCATION/TRAINING PROGRAM

## 2024-05-06 PROCEDURE — 25000003 PHARM REV CODE 250: Performed by: INTERNAL MEDICINE

## 2024-05-06 PROCEDURE — 80053 COMPREHEN METABOLIC PANEL: CPT | Performed by: STUDENT IN AN ORGANIZED HEALTH CARE EDUCATION/TRAINING PROGRAM

## 2024-05-06 PROCEDURE — 20600001 HC STEP DOWN PRIVATE ROOM

## 2024-05-06 PROCEDURE — 83735 ASSAY OF MAGNESIUM: CPT | Performed by: STUDENT IN AN ORGANIZED HEALTH CARE EDUCATION/TRAINING PROGRAM

## 2024-05-06 RX ORDER — MAGNESIUM SULFATE HEPTAHYDRATE 40 MG/ML
2 INJECTION, SOLUTION INTRAVENOUS ONCE
Status: COMPLETED | OUTPATIENT
Start: 2024-05-06 | End: 2024-05-06

## 2024-05-06 RX ADMIN — SENNOSIDES 8.6 MG: 8.6 TABLET, FILM COATED ORAL at 09:05

## 2024-05-06 RX ADMIN — DICYCLOMINE HYDROCHLORIDE 10 MG: 10 CAPSULE ORAL at 08:05

## 2024-05-06 RX ADMIN — METOPROLOL TARTRATE 25 MG: 25 TABLET, FILM COATED ORAL at 09:05

## 2024-05-06 RX ADMIN — LEVOTHYROXINE SODIUM 175 MCG: 150 TABLET ORAL at 06:05

## 2024-05-06 RX ADMIN — MAGNESIUM SULFATE HEPTAHYDRATE 2 G: 40 INJECTION, SOLUTION INTRAVENOUS at 10:05

## 2024-05-06 RX ADMIN — PROMETHAZINE HYDROCHLORIDE 12.5 MG: 25 INJECTION INTRAMUSCULAR; INTRAVENOUS at 04:05

## 2024-05-06 RX ADMIN — HYDROMORPHONE HYDROCHLORIDE 1 MG: 1 INJECTION, SOLUTION INTRAMUSCULAR; INTRAVENOUS; SUBCUTANEOUS at 03:05

## 2024-05-06 RX ADMIN — DICYCLOMINE HYDROCHLORIDE 10 MG: 10 CAPSULE ORAL at 09:05

## 2024-05-06 RX ADMIN — HYDROMORPHONE HYDROCHLORIDE 1 MG: 1 INJECTION, SOLUTION INTRAMUSCULAR; INTRAVENOUS; SUBCUTANEOUS at 06:05

## 2024-05-06 RX ADMIN — Medication 10 ML: at 12:05

## 2024-05-06 RX ADMIN — POTASSIUM BICARBONATE 50 MEQ: 978 TABLET, EFFERVESCENT ORAL at 10:05

## 2024-05-06 RX ADMIN — ENOXAPARIN SODIUM 40 MG: 40 INJECTION SUBCUTANEOUS at 06:05

## 2024-05-06 RX ADMIN — Medication 10 ML: at 01:05

## 2024-05-06 RX ADMIN — HYDROMORPHONE HYDROCHLORIDE 1 MG: 1 INJECTION, SOLUTION INTRAMUSCULAR; INTRAVENOUS; SUBCUTANEOUS at 12:05

## 2024-05-06 RX ADMIN — LIOTHYRONINE SODIUM 5 MCG: 5 TABLET ORAL at 06:05

## 2024-05-06 RX ADMIN — MAGNESIUM SULFATE HEPTAHYDRATE 2 G: 40 INJECTION, SOLUTION INTRAVENOUS at 03:05

## 2024-05-06 RX ADMIN — HYDROMORPHONE HYDROCHLORIDE 1 MG: 1 INJECTION, SOLUTION INTRAMUSCULAR; INTRAVENOUS; SUBCUTANEOUS at 09:05

## 2024-05-06 RX ADMIN — PROCHLORPERAZINE EDISYLATE 5 MG: 5 INJECTION INTRAMUSCULAR; INTRAVENOUS at 10:05

## 2024-05-06 RX ADMIN — PROCHLORPERAZINE EDISYLATE 5 MG: 5 INJECTION INTRAMUSCULAR; INTRAVENOUS at 09:05

## 2024-05-06 RX ADMIN — PROMETHAZINE HYDROCHLORIDE 12.5 MG: 25 INJECTION INTRAMUSCULAR; INTRAVENOUS at 03:05

## 2024-05-06 RX ADMIN — Medication 10 ML: at 06:05

## 2024-05-06 RX ADMIN — METOPROLOL TARTRATE 25 MG: 25 TABLET, FILM COATED ORAL at 08:05

## 2024-05-06 RX ADMIN — SENNOSIDES 8.6 MG: 8.6 TABLET, FILM COATED ORAL at 08:05

## 2024-05-06 RX ADMIN — ALTEPLASE 2 MG: 2.2 INJECTION, POWDER, LYOPHILIZED, FOR SOLUTION INTRAVENOUS at 01:05

## 2024-05-06 RX ADMIN — HYDROMORPHONE HYDROCHLORIDE 1 MG: 1 INJECTION, SOLUTION INTRAMUSCULAR; INTRAVENOUS; SUBCUTANEOUS at 08:05

## 2024-05-06 RX ADMIN — DICYCLOMINE HYDROCHLORIDE 10 MG: 10 CAPSULE ORAL at 01:05

## 2024-05-06 RX ADMIN — DICYCLOMINE HYDROCHLORIDE 10 MG: 10 CAPSULE ORAL at 06:05

## 2024-05-06 NOTE — NURSING
Notifed DO Farhana that the red port on pt's PICC line would not flush. DO Farhana ordered alteplase. RN administered alteplase. Pt in bed with the call light in reach and the bed alarm on.     1332  RN returned to pt's room. RN pulled back and discarded 10 mL of blood from both the red and blue port on pt's PICC. RN able to flush both ports successfully. Notified DO Farhana that both ports are now able to draw back blood and are able to be flushed. Pt remains in bed with the call light in reach and the bed alarm on.

## 2024-05-06 NOTE — ASSESSMENT & PLAN NOTE
[place] [facility] 2/4 SIRS (requiring 2L NC w/ RR 20s & WBC 13). Likely soft tissue/neck source. Received IVF at OSH prior to transfer (for lactate 2.3 at that time). Started on broad-spectrum abx. Blood cx negative.     - ID consulted; appreciate recs  - Monitor CBC & fever curve  - Mgmt of neck mass/wound as below   - Has completed treatment. Discontinue vancomycin + Flagyl + fluconazole     RESOLVED

## 2024-05-06 NOTE — ASSESSMENT & PLAN NOTE
Unable to tolerate initial TF's  Patient noted to have a percutaneous endoscopic gastrostomy tube in place. I have personally inspected the tube.Tube was placed on this admission, with date of procedure- 4/26  There are no signs of drainage or infection around the site. Routine care to be done by wound care and nursing staff.     Patient unable to tolerate Impact peptide TF. Discussed with RD, rec switching to [employer] peptide 1.5 which patient is tolerating well so are.   Added bentyl    Abdominal Xray w/o distention, PEG in place.   On d/c will need [employer] Peptide 1.5 (bolus) - 4 cans/day but per CM it will take a few days to arrange for [employer] Tf as Op for her.   Appreciate CM assistance and diligence on this matter as time at home will be critical to the patient's care plan.

## 2024-05-06 NOTE — ASSESSMENT & PLAN NOTE
"Pt with extensive hx of H/N cancer. Presents w/ continued/worsening drainage from the neck for months with recent worsening of symptoms for past 2 weeks w/ N/V. Pt returned from [place] where she received antibiotics, "detox" and hyperbaric chamber as treatment. Endorses difficulty with swallowing 2/2 to oral trush. Large neck mass on imaging w/ large central collection of air which appears to extend superficially to the skin surface right mid neck anterior laterally; unable to r/o superimposed abscess. Meeting 2/4 SIRS (HR & WBC) w/ elevated lactate, but nml BP. Blood cx negative. Pt not surgical candidate for debridement per ENT evaluation.     - ID consulted; appreciate recs  - Surgical debridement is not an option. Has completed course of antibiotics.    "

## 2024-05-06 NOTE — ASSESSMENT & PLAN NOTE
"Daily GOC discussions  Pt and family would like to proceed with aggressive care  Cont FULL code. Re-discussed code status with , would like to remain a full code. States "she is going to make it."    They are hopeful that God and their mercedes will get them through this. But they do understand that everyone dies. We did discuss that there is something to be said for dying with dignity and that the care team at [facility] would do everything in their power to help facilitate whatever that would look like for them during this difficult time. They are appreciative of the support they have received. Was not interested in additional  services as their Ep[employer]op[employer] leaders are involved and come every other day to pray with them.   "

## 2024-05-06 NOTE — ASSESSMENT & PLAN NOTE
Patient has hypokalemia which is Acute and currently controlled. Most recent potassium levels reviewed-   Lab Results   Component Value Date    K 3.3 (L) 05/06/2024   . Will continue potassium replacement per protocol and recheck repeat levels after replacement completed.

## 2024-05-06 NOTE — PLAN OF CARE
Problem: Adult Inpatient Plan of Care  Goal: Plan of Care Review  Outcome: Progressing     Problem: Adult Inpatient Plan of Care  Goal: Absence of Hospital-Acquired Illness or Injury  Outcome: Progressing     Problem: Adult Inpatient Plan of Care  Goal: Readiness for Transition of Care  Outcome: Progressing     Problem: Infection Progression (Sepsis/Septic Shock)  Goal: Absence of Infection Signs and Symptoms  Outcome: Progressing     Problem: Impaired Wound Healing  Goal: Optimal Wound Healing  Outcome: Not Progressing     Problem: Skin Injury Risk Increased  Goal: Skin Health and Integrity  Outcome: Progressing     Problem: Coping Ineffective  Goal: Effective Coping  Outcome: Progressing    Pt is Aox4. Pt lungs are clear. She is receiving O2 at 3L via NC maintaining 94-98% SpO2. All safety measures were maintained.

## 2024-05-06 NOTE — SUBJECTIVE & OBJECTIVE
Interval History:     NAEON. Patient is doing well this am. Care plan discussed with the patient and the . They are practicing Qu[employer]'s and feel very supported by their Congregation leadership and family. They are aware that TF's are the rate limiting step at present to discharging home with hospice with [facility].      Review of Systems   Unable to perform ROS: Other     Objective:     Vital Signs (Most Recent):  Temp: 98.6 °F (37 °C) (05/06/24 1141)  Pulse: (!) 114 (05/06/24 1138)  Resp: 18 (05/06/24 1209)  BP: 139/67 (05/06/24 0821)  SpO2: (!) 93 % (05/06/24 1138) Vital Signs (24h Range):  Temp:  [97.3 °F (36.3 °C)-98.6 °F (37 °C)] 98.6 °F (37 °C)  Pulse:  [100-122] 114  Resp:  [16-22] 18  SpO2:  [93 %-98 %] 93 %  BP: (118-142)/(62-78) 139/67     Weight: 82.6 kg (182 lb 1.6 oz)  Body mass index is 29.39 kg/m².    Intake/Output Summary (Last 24 hours) at 5/6/2024 1211  Last data filed at 5/6/2024 0639  Gross per 24 hour   Intake 1391.46 ml   Output --   Net 1391.46 ml         Physical Exam  Vitals and nursing note reviewed.   Constitutional:       General: She is not in acute distress.     Appearance: She is ill-appearing. She is not toxic-appearing.   Neck:      Comments: Dressing in place over entire circumference of neck. Dressing c/d/i  Cardiovascular:      Rate and Rhythm: Regular rhythm. Tachycardia present.      Heart sounds: Normal heart sounds.   Pulmonary:      Effort: Pulmonary effort is normal. No respiratory distress.      Breath sounds: Normal breath sounds. No wheezing.   Abdominal:      General: Bowel sounds are normal. There is no distension.      Palpations: Abdomen is soft.      Tenderness: There is no abdominal tenderness. There is no guarding.      Comments: PEG tube in place   Skin:     General: Skin is warm and dry.   Neurological:      Mental Status: She is alert. Mental status is at baseline.       Significant Labs: All pertinent labs within the past 24 hours have been  reviewed.

## 2024-05-06 NOTE — ASSESSMENT & PLAN NOTE
Hx of essential HTN; home regimen includes metoprolol 25mg BID. On arrival to [facility], [place] 140s-150s.     -Continue Lopressor at current dose.

## 2024-05-06 NOTE — PROGRESS NOTES
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 She initially presented to [facility] Emergency Department with six days of epigastric pain with associated nausea, vomiting, and difficulty eating due to pain. However on presentation, she was noted to have a large necrotic and purulent wound located on her right neck with complaints of associated difficulty speaking, swallowing and shortness of breath. She was noted to be hypoxic 88% on room air which improved with 2L NC. Labs were notable for leukocytosis 14, hypokalemia 2.4, hypochloremia 89, CO2 36, hypercalcemia 13.5, Phos 2.5. . Troponin 0.046. Lactate 2.3. CT Soft Tissue Neck was concerning for 9.5 x 8.2 x 10 cm large lobulated mass in the right anterolateral neck extending to the deep spaces of the neck and abutting the right prevertebral space and paravertebral musculature, left midline shift, multiple bilateral necrotic cervical lymph nodes. Case was discussed and decision was [place] [facility] [place].     Of additional note, she recently transitioned her care to [place] and underwent stem-cell transplant approximately 2 weeks ago in [place], has not been seen by a US physician since 2023.      On transfer: she was afebrile, mildly hypertensive /109, HR 98, RR 20, satting 96% on room  air. Complaining of mild headache and poor appetite associated with nausea; denies fever, chills, chest pain, palpitations, SOB, abdominal pain, dysuria. States wound has been draining for the past week initially thick white now more liquid. Mild dysphonia is apparently chronic from left vocal cord paralysis and at baseline. Some dysphagia with solids, none with pureed soft or liquids, denies odynophagia. Dressing on neck CDI, ENT consulted will be here shortly to assess patient.        Overview/Hospital Course:  Evaluated 4/12 in MICU by ENT, who do not feel patient is a candidate for surgical intervention. Palliative care and oncology consulted. Pt declines chemo or XRT. Failed swallow study, SLP recommending NPO 4/13. Patient continues to protect airway. Stepped down from MICU on 4/13.  ENT attempted awake trach but could not get one. Gen surgery rec consulting IR for PEG as they can't put one w/o a trach. Now s/p PEG placement by IR on 4/26. Started on TF. Unable to tolerate Impact peptide TF. Discussed with RD, rec switching to [employer] peptide 1.5. Added bentyl. Abdominal Xray w/o distention, PEG in place. On d/c will need [employer] Peptide 1.5 (bolus) - 4 cans/day. Per CM will need a few days to arrange for [employer] cans.     Discharge is pending delivery of [employer] Tube Feeds to home hospice company.     Interval History:     NAEON. Patient is doing well this am. Care plan discussed with the patient and the . They are practicing Jehovah's witness's and feel very supported by their Worship leadership and family. They are aware that TF's are the rate limiting step at present to discharging home with hospice with [facility].      Review of Systems   Unable to perform ROS: Other     Objective:     Vital Signs (Most Recent):  Temp: 98.6 °F (37 °C) (05/06/24 1141)  Pulse: (!) 114 (05/06/24 1138)  Resp: 18 (05/06/24 1209)  BP: 139/67 (05/06/24 0821)  SpO2: (!) 93 % (05/06/24 1138) Vital Signs  (24h Range):  Temp:  [97.3 °F (36.3 °C)-98.6 °F (37 °C)] 98.6 °F (37 °C)  Pulse:  [100-122] 114  Resp:  [16-22] 18  SpO2:  [93 %-98 %] 93 %  BP: (118-142)/(62-78) 139/67     Weight: 82.6 kg (182 lb 1.6 oz)  Body mass index is 29.39 kg/m².    Intake/Output Summary (Last 24 hours) at 5/6/2024 1211  Last data filed at 5/6/2024 0639  Gross per 24 hour   Intake 1391.46 ml   Output --   Net 1391.46 ml         Physical Exam  Vitals and nursing note reviewed.   Constitutional:       General: She is not in acute distress.     Appearance: She is ill-appearing. She is not toxic-appearing.   Neck:      Comments: Dressing in place over entire circumference of neck. Dressing c/d/i  Cardiovascular:      Rate and Rhythm: Regular rhythm. Tachycardia present.      Heart sounds: Normal heart sounds.   Pulmonary:      Effort: Pulmonary effort is normal. No respiratory distress.      Breath sounds: Normal breath sounds. No wheezing.   Abdominal:      General: Bowel sounds are normal. There is no distension.      Palpations: Abdomen is soft.      Tenderness: There is no abdominal tenderness. There is no guarding.      Comments: PEG tube in place   Skin:     General: Skin is warm and dry.   Neurological:      Mental Status: She is alert. Mental status is at baseline.       Significant Labs: All pertinent labs within the past 24 hours have been reviewed.      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 Underwent repeat bx after failed tx (1/24/22) that showed moderately differentiated squamous cell carcinoma w/ suspicion for perineural invasion. CT soft tissue neck (Jan 2022) w/o e/o metastatic dx. PET-CT (Feb 2022) w/o e/o active local or distant dx- no abnormal activity within the tongue, no signs of cervical lymph node or distant metastasis. Declined chemotherapy &/or XRT. Underwent selective neck dissection  "levels 1, 2, 3, and partial glossectomy w/ split-thickness skin graft (3/3/2022 per Dr. Clif Olson).  Surgical pathology w/ unifocal squamous cell carcinoma on the dorsal surface of the tongue on left side measuring 1 cm, moderately differentiated, 6 mm depth of invasion, no lymphovascular invasion, positive for perineural invasion, margins negative, 0 of 39 lymph nodes with metastasis, pT2 pN0 noted.  Postop course complicated by infection requiring I&D and antibiotics.  Declined adjuvant XRT. Repeat imaging (June 2023) w/ 2.2 cm lesion deep to the R SCM c/f necrotic LN w/ few adjacent pathological appearing LNs suspicious for metastasis. Underwent R neck FNA (7/7/23) w/ pathology suspicious for metastatic SCC. PET-CT (8/21/2023) w/ large necrotic mass the angle of mandible posterior to submandibular gland on the right measuring 4.4 x 4.2 cm with SUV 9.78, just superior to the mass is a 2 cm hypermetabolic lymph node and no evidence of distant metastatic disease. Subsequently transferred care to [place] where she reportedly recently abx,"detox", hyperbaric chamber, & stem-cell transplant approximately 2 weeks ago PTA in [place]. Now presenting w/ progressive neck wound. CT neck soft tissue (4/10/24) w/ large bulky lobulated appearing mass right lateral and anterolateral neck extending to the D spaces of the neck having overall dimensions of approximately 9.5 x 8.2 x 10 cm, multiple addn'l b/l cervical necrotic nodes, & assoc mass effect results in displacement of midline structures to the patient's left. CT C/A/P with diffuse metastatic disease including mediastinal & hilar LAD, bilateral lungs, peritoneum, lumbar vertebrae, & likely liver.     - ENT consulted; appreciate recs - not surgical candidate for debridement  - Medical & Radiation Oncology consulted - pt declines chemo or XRT  - Palliative following; appreciate assistance  - Multimodal analgesia   - SLP following,remains NPO  - Gen surgery and ENT " "consulted. Unable to do do tracheostomy. Rec consulting IR for PEG  - Consulted IR for PEG placement. G tube placed 4/25 with IR assistance     PEG (percutaneous endoscopic gastrostomy) status  Unable to tolerate initial TF's  Patient noted to have a percutaneous endoscopic gastrostomy tube in place. I have personally inspected the tube.Tube was placed on this admission, with date of procedure- 4/26  There are no signs of drainage or infection around the site. Routine care to be done by wound care and nursing staff.     Patient unable to tolerate Impact peptide TF. Discussed with RD, rec switching to [employer] peptide 1.5 which patient is tolerating well so are.   Added bentyl    Abdominal Xray w/o distention, PEG in place.   On d/c will need [employer] Peptide 1.5 (bolus) - 4 cans/day but per CM it will take a few days to arrange for [employer] Tf as Op for her.   Appreciate CM assistance and diligence on this matter as time at home will be critical to the patient's care plan.     Severe malnutrition  Continue TF's.     Hypercalcemia  Resolved     Palliative care encounter  Daily GOC discussions  Pt and family would like to proceed with aggressive care  Cont FULL code. Re-discussed code status with , would like to remain a full code. States "she is going to make it."    They are hopeful that God and their mercedes will get them through this. But they do understand that everyone dies. We did discuss that there is something to be said for dying with dignity and that the care team at [facility] would do everything in their power to help facilitate whatever that would look like for them during this difficult time. They are appreciative of the support they have received. Was not interested in additional  services as their Yazdan[employer] leaders are involved and come every other day to pray with them.     Hypokalemia  Patient has hypokalemia which is Acute and currently controlled. Most recent potassium levels " "reviewed-   Lab Results   Component Value Date    K 3.3 (L) 05/06/2024   . Will continue potassium replacement per protocol and recheck repeat levels after replacement completed.     Open neck wound  Pt with extensive hx of H/N cancer. Presents w/ continued/worsening drainage from the neck for months with recent worsening of symptoms for past 2 weeks w/ N/V. Pt returned from [place] where she received antibiotics, "detox" and hyperbaric chamber as treatment. Endorses difficulty with swallowing 2/2 to oral trush. Large neck mass on imaging w/ large central collection of air which appears to extend superficially to the skin surface right mid neck anterior laterally; unable to r/o superimposed abscess. Meeting 2/4 SIRS (HR & WBC) w/ elevated lactate, but nml BP. Blood cx negative. Pt not surgical candidate for debridement per ENT evaluation.     - ID consulted; appreciate recs  - Surgical debridement is not an option. Has completed course of antibiotics.      Sepsis  On [place] [facility] meeting 2/4 SIRS (requiring 2L NC w/ RR 20s & WBC 13). Likely soft tissue/neck source. Received IVF at OSH prior to transfer (for lactate 2.3 at that time). Started on broad-spectrum abx. Blood cx negative.     - ID consulted; appreciate recs  - Monitor CBC & fever curve  - Mgmt of neck mass/wound as below   - Has completed treatment. Discontinue vancomycin + Flagyl + fluconazole     RESOLVED     Postsurgical hypothyroidism  Last TSH elevated at 5.264. Home regimen includes levothyroxine 175mcg daily & liothyronine 5mcg daily  - Continue home liothyronine & levothyroxine       HTN (hypertension)  Hx of essential HTN; home regimen includes metoprolol 25mg BID. On arrival to [facility], SBP 140s-150s.     -Continue Lopressor at current dose.      VTE Risk Mitigation (From admission, onward)           Ordered     enoxaparin injection 40 mg  Every 24 hours         04/12/24 1536     IP VTE HIGH RISK PATIENT  Once         04/12/24 1324     Place " sequential compression device  Until discontinued         04/12/24 1324                    Discharge Planning   ANDRES: 5/7/2024     Code Status: Full Code   Is the patient medically ready for discharge?: No    Reason for patient still in hospital (select all that apply): Pending disposition  Discharge Plan A: Hospice/home        Jocelyn Delcid DO  Department of Hospital Medicine   [facility] - Telemetry Stepdown

## 2024-05-06 NOTE — ASSESSMENT & PLAN NOTE
"Initial bx of ventral surface of tongue (12/24/21) showed atypia w/o diagnostic e/o dysplasia. Underwent repeat bx after failed tx (1/24/22) that showed moderately differentiated squamous cell carcinoma w/ suspicion for perineural invasion. CT soft tissue neck (Jan 2022) w/o e/o metastatic dx. PET-CT (Feb 2022) w/o e/o active local or distant dx- no abnormal activity within the tongue, no signs of cervical lymph node or distant metastasis. Declined chemotherapy &/or XRT. Underwent selective neck dissection levels 1, 2, 3, and partial glossectomy w/ split-thickness skin graft (3/3/2022 per Dr. Clif Olson).  Surgical pathology w/ unifocal squamous cell carcinoma on the dorsal surface of the tongue on left side measuring 1 cm, moderately differentiated, 6 mm depth of invasion, no lymphovascular invasion, positive for perineural invasion, margins negative, 0 of 39 lymph nodes with metastasis, pT2 pN0 noted.  Postop course complicated by infection requiring I&D and antibiotics.  Declined adjuvant XRT. Repeat imaging (June 2023) w/ 2.2 cm lesion deep to the R SCM c/f necrotic LN w/ few adjacent pathological appearing LNs suspicious for metastasis. Underwent R neck FNA (7/7/23) w/ pathology suspicious for metastatic SCC. PET-CT (8/21/2023) w/ large necrotic mass the angle of mandible posterior to submandibular gland on the right measuring 4.4 x 4.2 cm with SUV 9.78, just superior to the mass is a 2 cm hypermetabolic lymph node and no evidence of distant metastatic disease. Subsequently transferred care to [place] where she reportedly recently abx,"detox", hyperbaric chamber, & stem-cell transplant approximately 2 weeks ago PTA in [place]. Now presenting w/ progressive neck wound. CT neck soft tissue (4/10/24) w/ large bulky lobulated appearing mass right lateral and anterolateral neck extending to the D spaces of the neck having overall dimensions of approximately 9.5 x 8.2 x 10 cm, multiple addn'l b/l cervical " necrotic nodes, & assoc mass effect results in displacement of midline structures to the patient's left. CT C/A/P with diffuse metastatic disease including mediastinal & hilar LAD, bilateral lungs, peritoneum, lumbar vertebrae, & likely liver.     - ENT consulted; appreciate recs - not surgical candidate for debridement  - Medical & Radiation Oncology consulted - pt declines chemo or XRT  - Palliative following; appreciate assistance  - Multimodal analgesia   - SLP following,remains NPO  - Gen surgery and ENT consulted. Unable to do do tracheostomy. Rec consulting IR for PEG  - Consulted IR for PEG placement. G tube placed 4/25 with IR assistance

## 2024-05-06 NOTE — PLAN OF CARE
Call placed to [facility] ([phone]) to check on the status of patient's ordered TF's. This CM was informed that the TF's still have not been delivered. CM met with patient and her spouse and notified them of above conversation. MD notified of above. Will continue to follow.    Nisha Hightower RN  Ext [phone]

## 2024-05-07 VITALS
HEIGHT: 66 IN | OXYGEN SATURATION: 98 % | SYSTOLIC BLOOD PRESSURE: 120 MMHG | WEIGHT: 182.13 LBS | DIASTOLIC BLOOD PRESSURE: 72 MMHG | RESPIRATION RATE: 18 BRPM | TEMPERATURE: 98 F | BODY MASS INDEX: 29.27 KG/M2 | HEART RATE: 114 BPM

## 2024-05-07 LAB
ALBUMIN SERPL BCP-MCNC: 2.5 G/DL (ref 3.5–5.2)
ALP SERPL-CCNC: 225 U/L (ref 55–135)
ALT SERPL W/O P-5'-P-CCNC: 25 U/L (ref 10–44)
ANION GAP SERPL CALC-SCNC: 9 MMOL/L (ref 8–16)
AST SERPL-CCNC: 20 U/L (ref 10–40)
BASOPHILS # BLD AUTO: 0.03 K/UL (ref 0–0.2)
BASOPHILS NFR BLD: 0.2 % (ref 0–1.9)
BILIRUB SERPL-MCNC: 0.3 MG/DL (ref 0.1–1)
BUN SERPL-MCNC: 20 MG/DL (ref 6–20)
CALCIUM SERPL-MCNC: 7.8 MG/DL (ref 8.7–10.5)
CHLORIDE SERPL-SCNC: 86 MMOL/L (ref 95–110)
CO2 SERPL-SCNC: 41 MMOL/L (ref 23–29)
CREAT SERPL-MCNC: 0.6 MG/DL (ref 0.5–1.4)
DIFFERENTIAL METHOD BLD: ABNORMAL
EOSINOPHIL # BLD AUTO: 0 K/UL (ref 0–0.5)
EOSINOPHIL NFR BLD: 0 % (ref 0–8)
ERYTHROCYTE [DISTWIDTH] IN BLOOD BY AUTOMATED COUNT: 15.3 % (ref 11.5–14.5)
EST. GFR  (NO RACE VARIABLE): >60 ML/MIN/1.73 M^2
GLUCOSE SERPL-MCNC: 123 MG/DL (ref 70–110)
HCT VFR BLD AUTO: 36.1 % (ref 37–48.5)
HGB BLD-MCNC: 11.4 G/DL (ref 12–16)
IMM GRANULOCYTES # BLD AUTO: 0.1 K/UL (ref 0–0.04)
IMM GRANULOCYTES NFR BLD AUTO: 0.7 % (ref 0–0.5)
LYMPHOCYTES # BLD AUTO: 2.7 K/UL (ref 1–4.8)
LYMPHOCYTES NFR BLD: 19.4 % (ref 18–48)
MAGNESIUM SERPL-MCNC: 1.7 MG/DL (ref 1.6–2.6)
MCH RBC QN AUTO: 27.2 PG (ref 27–31)
MCHC RBC AUTO-ENTMCNC: 31.6 G/DL (ref 32–36)
MCV RBC AUTO: 86 FL (ref 82–98)
MONOCYTES # BLD AUTO: 1 K/UL (ref 0.3–1)
MONOCYTES NFR BLD: 6.9 % (ref 4–15)
NEUTROPHILS # BLD AUTO: 10.1 K/UL (ref 1.8–7.7)
NEUTROPHILS NFR BLD: 72.8 % (ref 38–73)
NRBC BLD-RTO: 0 /100 WBC
PHOSPHATE SERPL-MCNC: 2.3 MG/DL (ref 2.7–4.5)
PLATELET # BLD AUTO: 332 K/UL (ref 150–450)
PMV BLD AUTO: 9.7 FL (ref 9.2–12.9)
POTASSIUM SERPL-SCNC: 3.5 MMOL/L (ref 3.5–5.1)
PROT SERPL-MCNC: 6 G/DL (ref 6–8.4)
RBC # BLD AUTO: 4.19 M/UL (ref 4–5.4)
SODIUM SERPL-SCNC: 136 MMOL/L (ref 136–145)
WBC # BLD AUTO: 13.85 K/UL (ref 3.9–12.7)

## 2024-05-07 PROCEDURE — 63600175 PHARM REV CODE 636 W HCPCS: Performed by: INTERNAL MEDICINE

## 2024-05-07 PROCEDURE — 94761 N-INVAS EAR/PLS OXIMETRY MLT: CPT

## 2024-05-07 PROCEDURE — 83735 ASSAY OF MAGNESIUM: CPT | Performed by: STUDENT IN AN ORGANIZED HEALTH CARE EDUCATION/TRAINING PROGRAM

## 2024-05-07 PROCEDURE — 25000003 PHARM REV CODE 250: Performed by: STUDENT IN AN ORGANIZED HEALTH CARE EDUCATION/TRAINING PROGRAM

## 2024-05-07 PROCEDURE — A4216 STERILE WATER/SALINE, 10 ML: HCPCS | Performed by: STUDENT IN AN ORGANIZED HEALTH CARE EDUCATION/TRAINING PROGRAM

## 2024-05-07 PROCEDURE — 20600001 HC STEP DOWN PRIVATE ROOM

## 2024-05-07 PROCEDURE — 85025 COMPLETE CBC W/AUTO DIFF WBC: CPT | Performed by: STUDENT IN AN ORGANIZED HEALTH CARE EDUCATION/TRAINING PROGRAM

## 2024-05-07 PROCEDURE — 84100 ASSAY OF PHOSPHORUS: CPT | Performed by: STUDENT IN AN ORGANIZED HEALTH CARE EDUCATION/TRAINING PROGRAM

## 2024-05-07 PROCEDURE — 25000003 PHARM REV CODE 250: Performed by: INTERNAL MEDICINE

## 2024-05-07 PROCEDURE — 63600175 PHARM REV CODE 636 W HCPCS: Performed by: STUDENT IN AN ORGANIZED HEALTH CARE EDUCATION/TRAINING PROGRAM

## 2024-05-07 PROCEDURE — 80053 COMPREHEN METABOLIC PANEL: CPT | Performed by: STUDENT IN AN ORGANIZED HEALTH CARE EDUCATION/TRAINING PROGRAM

## 2024-05-07 RX ADMIN — Medication 10 ML: at 12:05

## 2024-05-07 RX ADMIN — PROMETHAZINE HYDROCHLORIDE 12.5 MG: 25 INJECTION INTRAMUSCULAR; INTRAVENOUS at 02:05

## 2024-05-07 RX ADMIN — HYDROMORPHONE HYDROCHLORIDE 1 MG: 1 INJECTION, SOLUTION INTRAMUSCULAR; INTRAVENOUS; SUBCUTANEOUS at 08:05

## 2024-05-07 RX ADMIN — LEVOTHYROXINE SODIUM 175 MCG: 150 TABLET ORAL at 05:05

## 2024-05-07 RX ADMIN — PROMETHAZINE HYDROCHLORIDE 12.5 MG: 25 INJECTION INTRAMUSCULAR; INTRAVENOUS at 03:05

## 2024-05-07 RX ADMIN — PROCHLORPERAZINE EDISYLATE 5 MG: 5 INJECTION INTRAMUSCULAR; INTRAVENOUS at 07:05

## 2024-05-07 RX ADMIN — LIOTHYRONINE SODIUM 5 MCG: 5 TABLET ORAL at 05:05

## 2024-05-07 RX ADMIN — HYDROMORPHONE HYDROCHLORIDE 1 MG: 1 INJECTION, SOLUTION INTRAMUSCULAR; INTRAVENOUS; SUBCUTANEOUS at 07:05

## 2024-05-07 RX ADMIN — PROCHLORPERAZINE EDISYLATE 5 MG: 5 INJECTION INTRAMUSCULAR; INTRAVENOUS at 01:05

## 2024-05-07 RX ADMIN — HYDROMORPHONE HYDROCHLORIDE 1 MG: 1 INJECTION, SOLUTION INTRAMUSCULAR; INTRAVENOUS; SUBCUTANEOUS at 03:05

## 2024-05-07 RX ADMIN — HYDROMORPHONE HYDROCHLORIDE 1 MG: 1 INJECTION, SOLUTION INTRAMUSCULAR; INTRAVENOUS; SUBCUTANEOUS at 01:05

## 2024-05-07 RX ADMIN — DICYCLOMINE HYDROCHLORIDE 10 MG: 10 CAPSULE ORAL at 02:05

## 2024-05-07 RX ADMIN — Medication 10 ML: at 07:05

## 2024-05-07 RX ADMIN — HYDROMORPHONE HYDROCHLORIDE 1 MG: 1 INJECTION, SOLUTION INTRAMUSCULAR; INTRAVENOUS; SUBCUTANEOUS at 11:05

## 2024-05-07 RX ADMIN — DICYCLOMINE HYDROCHLORIDE 10 MG: 10 CAPSULE ORAL at 05:05

## 2024-05-07 RX ADMIN — DICYCLOMINE HYDROCHLORIDE 10 MG: 10 CAPSULE ORAL at 08:05

## 2024-05-07 RX ADMIN — Medication 10 ML: at 06:05

## 2024-05-07 RX ADMIN — SENNOSIDES 8.6 MG: 8.6 TABLET, FILM COATED ORAL at 08:05

## 2024-05-07 RX ADMIN — Medication 10 ML: at 01:05

## 2024-05-07 RX ADMIN — METOPROLOL TARTRATE 25 MG: 25 TABLET, FILM COATED ORAL at 08:05

## 2024-05-07 NOTE — NURSING
Dr. Gallagher notified of patient getting confused this morning. She thinks she is at the store and ask for a baby. No new order received, patient is reoriented. Will continue monitor. Will notified day shift nurse.

## 2024-05-07 NOTE — TREATMENT PLAN
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.1]  Yes    PEG (percutaneous endoscopic gastrostomy) status [Z93.1]  Not Applicable    Severe malnutrition [E43]  Yes    Hypercalcemia [E83.52]  Yes    Palliative care encounter [Z51.5]  Not Applicable    Hypokalemia [E87.6]  Yes    Sepsis [A41.9]  Yes    Open neck wound [S11.90XA]  Yes    Postsurgical hypothyroidism [E89.0]  Yes    HTN (hypertension) [I10]  Yes      Resolved Hospital Problems    Diagnosis Date Resolved POA    Sinus tachycardia [R00.0] 05/03/2024 No    Head and neck cancer [C76.0] 05/03/2024 Unknown    ACP (advance care planning) [Z71.89] 05/03/2024 Not Applicable    Pain [R52] 05/03/2024 Yes    Airway compromise [J98.8] 05/03/2024 Yes     Concern for airway compromise given location of necrotizing mass on R side of neck. Transferred to MICU at [facility] for ENT evaluation. No stridor, change in chronic mild dysphonia, able to tolerate liquids and pureed with subjective mild dysphagia of solids. Pt taking pills and liquids this morning at OSH, tolerated well. No stridor, normal breath sounds on exam.    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    Code  Status: Full     Allergies:   Review of patient's allergies indicates:   Allergen Reactions    Ciprofloxacin Swelling    Codeine Swelling    Opioids - morphine analogues     Pcn [penicillins] Hives     Tolerated cefepime 04/2024    Percocet [oxycodone-acetaminophen] Swelling       Diet: NPO, Tube Feeds as below     Recommendations     As tolerated, increase TF rate (of Libox Peptide 1.5) to 50 mL/hr = 1846 kcals, 89 g of protein, 840 mL fluid.  Bolus TF recommendations: Libox Peptide 1.4 - 4 cans/day = 2000 kcals, 96 g of protein, 912 mL fluid.  RD to monitor & follow-up.     Goals: Meet % EEN, EPN by RD f/u date  Nutrition Goal Status: progressing towards goal    Activities: As tolerated    Goals of Care Treatment Preferences:  Code Status: Full Code          Goals of Care: The patient and family endorses that what is most important right now is to focus on symptom/pain control and quality of life, even if it means sacrificing a little time     Accordingly, we have decided that the best plan to meet the patient's goals includes continuing with limited treatment, while exploring other options for treatment.       Nursing: Per Hospice Routine.    PEG Care:  Clean site daily.  Monitor skin integrity.    Routine Skin for Bedridden Patients: Apply moisture barrier cream to all skin folds and wet areas in perineal area daily and after baths and all bowel movements.    PICC Care:   Scrub the Hub: Prior to accessing the line, always perform a 30 second alcohol scrub  Each lumen of the central line is to be flushed at least daily with 10 mL Normal Saline and 3 mL Heparin flush (100 units/mL)  Skilled Nurse (SN) may draw blood from IV access    Oxygen: Low Flow Nasal Canula          Medication List        START taking these medications      dicyclomine 10 MG capsule  Commonly known as: BENTYL  1 capsule (10 mg total) by Per G Tube route 4 (four) times daily. for 15 days            CONTINUE taking these  medications      flaxseed 1,000 mg Cap  Take by mouth Daily.     HYDROcodone-acetaminophen 5-325 mg per tablet  Commonly known as: NORCO     levothyroxine 175 MCG tablet  Commonly known as: SYNTHROID  Take 1 tablet (175 mcg total) by mouth before breakfast. Take on an empty stomach. Wait 4 hours after taking LT4 to take any multivitamins or nutritional supplements and wait 1 hour to take other medications.     liothyronine 5 MCG Tab  Commonly known as: CYTOMEL  Take 1 tablet (5 mcg total) by mouth before breakfast. Take on an empty stomach. Wait 4 hours after taking T3 to take any multivitamins or nutritional supplements and wait 1 hour to take other medications.     metoprolol tartrate 25 MG tablet  Commonly known as: LOPRESSOR  Take 1 tablet (25 mg total) by mouth 2 (two) times daily.     multivitamin capsule  Take 1 capsule by mouth once daily.     ondansetron 4 MG Tbdl  Commonly known as: ZOFRAN-ODT  DISSOLVE 1 TABLET (4 MG TOTAL) BY MOUTH EVERY 8 (EIGHT) HOURS AS NEEDED.            STOP taking these medications      calcium carbonate 300 mg (750 mg) Chew  Commonly known as: CALCIUM ANTACID     cholecalciferol (vitamin D3) 25 mcg (1,000 unit) capsule  Commonly known as: VITAMIN D3              Future Orders:  Hospice Medical Director may dictate new orders for comfortable care measures & sign death certificate.        _________________________________  Jocelyn Delcid DO  05/07/2024

## 2024-05-07 NOTE — PLAN OF CARE
Call placed to [facility] ([phone]) to check on the status of patient's ordered TF's. They informed this CM that the TF's are scheduled to be delivered this afternoon. Will continue to follow.    13:20PM  CM spoke with Lydia from [facility] who informed this CM that the TF's have not been delivered yet but all the equipment has been delivered and that is the only thing we are waiting on. Met with patient and her spouse and they were updated on above conversation. They expressed understanding.    14:15PM  Message received from [facility] informing this CM that they have received the TF's. Met with patient and her spouse to update them. Patient is currently on 2L's NC and was not able to be weaned. W/C van transportation arranged for an estimated pick-up time of 4:00PM so she can be safely discharged home. Updated hospice orders sent via [facility] to [facility] .    Nisha Hightower RN  Ext [phone]

## 2024-05-07 NOTE — NURSING
Patient and spouse refused pulse ox. I observed the  with the pulse ox on. I educated the patient and spouse of the importance of the pulse ox. Patient and family still refused.

## 2024-05-07 NOTE — PLAN OF CARE
Problem: Adult Inpatient Plan of Care  Goal: Plan of Care Review  Outcome: Progressing  Goal: Patient-Specific Goal (Individualized)  Outcome: Progressing  Goal: Absence of Hospital-Acquired Illness or Injury  Outcome: Progressing  Goal: Optimal Comfort and Wellbeing  Outcome: Progressing  Goal: Readiness for Transition of Care  Outcome: Progressing     Problem: Adjustment to Illness (Sepsis/Septic Shock)  Goal: Optimal Coping  Outcome: Progressing     Problem: Bleeding (Sepsis/Septic Shock)  Goal: Absence of Bleeding  Outcome: Progressing     Problem: Glycemic Control Impaired (Sepsis/Septic Shock)  Goal: Blood Glucose Level Within Desired Range  Outcome: Progressing     Problem: Infection Progression (Sepsis/Septic Shock)  Goal: Absence of Infection Signs and Symptoms  Outcome: Progressing     Problem: Nutrition Impaired (Sepsis/Septic Shock)  Goal: Optimal Nutrition Intake  Outcome: Progressing     Problem: Impaired Wound Healing  Goal: Optimal Wound Healing  Outcome: Progressing     Problem: Skin Injury Risk Increased  Goal: Skin Health and Integrity  Outcome: Progressing     Problem: Coping Ineffective  Goal: Effective Coping  Outcome: Progressing     Problem: Infection  Goal: Absence of Infection Signs and Symptoms  Outcome: Progressing     Pt awaiting for wheelchair van to arrive so that she can be transported home. Pt remained free of falls or injuries during shift. No signs of acute distress noted during shift.

## 2024-05-07 NOTE — PLAN OF CARE
Problem: Adult Inpatient Plan of Care  Goal: Plan of Care Review  Outcome: Progressing     Problem: Adult Inpatient Plan of Care  Goal: Readiness for Transition of Care  Outcome: Progressing     Problem: Skin Injury Risk Increased  Goal: Skin Health and Integrity  Outcome: Progressing     Problem: Coping Ineffective  Goal: Effective Coping  Outcome: Progressing     Problem: Infection  Goal: Absence of Infection Signs and Symptoms  Outcome: Progressing     Pt is Aox4. All lungs sounds are clear.  remains at bedside. All safety measures were maintained.

## 2024-05-07 NOTE — PLAN OF CARE
[facility] - Telemetry Stepdown  Discharge Final Note    Primary Care Provider: James Coronel MD    Expected Discharge Date: 5/7/2024      Future Appointments   Date Time Provider Department Center   7/10/2024  8:00 AM Oralia Newby MD [facility] ENDOCRN [facility]I [facility]          Final Discharge Note (most recent)       Final Note - 05/07/24 1504          Final Note    Assessment Type Final Discharge Note     Anticipated Discharge Disposition Hospice/Home     What phone number can be called within the next 1-3 days to see how you are doing after discharge? [phone]     Hospital Resources/Appts/Education Provided Appointments scheduled and added to AVS        Post-Acute Status    Post-Acute Authorization Hospice     Hospice Status Set-up Complete/Auth obtained   [facility]                    Important Message from Medicare             Contact Info       James Coronel MD   Specialty: Internal Medicine   Relationship: PCP - General    [address] [facility] [address] [facility]  [place] [address]   Phone: [phone]       Next Steps: Follow up in 3 day(s)    Instructions: The clinic nurse will contact pt with an appointment for her hospital f/u visit.    [facility]   Specialty: Home Hospice, Respite Care    [address] [place] [address]  [place] [address]   Phone: [phone]       Next Steps: Follow up            Nisha Hightower RN  Ext [phone]

## 2024-05-07 NOTE — CLINICAL REVIEW
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    Last VS: /72 (BP Location: Left arm, Patient Position: Lying)   Pulse 110   Temp 98 °F (36.7 °C) (Axillary)   Resp 20   Ht 5' 6" (1.676 m)   Wt 82.6 kg (182 lb 1.6 oz)   LMP  (LMP Unknown)   SpO2 100%   Breastfeeding No   BMI 29.39 kg/m²     24H Vital Sign Range:  Temp:  [97.8 °F (36.6 °C)-98.6 °F (37 °C)]   Pulse:  []   Resp:  [16-30]   BP: (112-139)/(64-72)   SpO2:  [82 %-100 %]     Level of Consciousness (AVPU): alert    Recent Labs     05/05/24 0630 05/06/24 0229   WBC 13.67* 14.37*   HGB 11.6* 11.5*   HCT 36.6* 36.6*    320       Recent Labs     05/05/24 0630 05/06/24 0229   * 135*   K 3.6 3.3*   CL 86* 86*   CO2 38* 39*   BUN 19 20   CREATININE 0.6 0.6   * 126*   PHOS 2.4* 2.3*   MG 1.4* 1.4*        No results for input(s): "PH", "PCO2", "PO2", "HCO3", "POCSATURATED", "BE" in the last 72 hours.     OXYGEN:  Flow (L/min) (Oxygen Therapy): 2  Oxygen Concentration (%): 40       MEWS score: 2    Rounding completed with charge MABEL cruz NAD. No additional concerns verbalized at this time. Instructed to call [phone] for further concerns or assistance.    Jannie Hogue RN       "

## 2024-05-07 NOTE — DISCHARGE SUMMARY
[facility] - Telemetry [facility] Medicine  Discharge Summary      Patient Name: Marysol Cash  MRN: [id number]  URIEL: [id number]  Patient Class: IP- Inpatient  Admission Date: 4/12/2024  Hospital Length of Stay: 25 days  Discharge Date and Time:  05/07/2024 11:41 AM  Attending Physician: Jocelyn Delcid DO   Discharging Provider: Jocelyn Delcid DO  Primary Care Provider: James Coronel MD  [facility] Medicine Team: [facility]  Jocelyn Delcid DO  Primary Care Team: [facility]    HPI:   Ms. 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 She initially presented to [facility] Emergency Department with six days of epigastric pain with associated nausea, vomiting, and difficulty eating due to pain. However on presentation, she was noted to have a large necrotic and purulent wound located on her right neck with complaints of associated difficulty speaking, swallowing and shortness of breath. She was noted to be hypoxic 88% on room air which improved with 2L NC. Labs were notable for leukocytosis 14, hypokalemia 2.4, hypochloremia 89, CO2 36, hypercalcemia 13.5, Phos 2.5. . Troponin 0.046. Lactate 2.3. CT Soft Tissue Neck was concerning for 9.5 x 8.2 x 10 cm large lobulated mass in the right anterolateral neck extending to the deep spaces of the neck and abutting the right prevertebral space and paravertebral musculature, left midline shift, multiple bilateral necrotic cervical lymph nodes. Case was discussed and decision was made to [place] [facility] [place].     Of additional note, she recently transitioned her care to [place] and underwent stem-cell transplant approximately 2 weeks ago in [place], has not been seen by a   physician since 2023.      On transfer: she was afebrile, mildly hypertensive /109, HR 98, RR 20, satting 96% on room air. Complaining of mild headache and poor appetite associated with nausea; denies fever, chills, chest pain, palpitations, SOB, abdominal pain, dysuria. States wound has been draining for the past week initially thick white now more liquid. Mild dysphonia is apparently chronic from left vocal cord paralysis and at baseline. Some dysphagia with solids, none with pureed soft or liquids, denies odynophagia. Dressing on neck CDI, ENT consulted will be here shortly to assess patient.        Procedure(s) (LRB):  EXPLORATION, NECK (N/A)      Hospital Course:   Evaluated 4/12 in MICU by ENT, who do not feel patient is a candidate for surgical intervention. Palliative care and oncology consulted. Pt declines chemo or XRT. Failed swallow study, SLP recommending NPO 4/13. Patient continues to protect airway. Stepped down from MICU on 4/13.  ENT attempted awake trach but could not get one. Gen surgery rec consulting IR for PEG as they can't put one w/o a trach. Now s/p PEG placement by IR on 4/26. Started on TF. Unable to tolerate Impact peptide TF. Discussed with RD, rec switching to Isael Wellogix peptide 1.5. Added bentyl. Abdominal Xray w/o distention, PEG in place. On d/c will need Isael Bonfaire Peptide 1.5 (bolus) - 4 cans/day. Per CM will need a few days to arrange for isael farm cans. Tube Feeds to be delivered to home on 5/7. Patient medically ready to discharge home with hospice.     Per palliative:   Goals of Care: The patient and family endorses that what is most important right now is to focus on symptom/pain control and quality of life, even if it means sacrificing a little time     Accordingly, we have decided that the best plan to meet the patient's goals includes continuing with limited treatment, while exploring other options for treatment.      Goals of Care Treatment Preferences:  Code Status:  Full Code    Physical Exam  Vitals and nursing note reviewed.   Constitutional:       General: She is not in acute distress.     Appearance: She is ill-appearing. She is not toxic-appearing.   Neck:      Comments: Dressing in place over entire circumference of neck. Dressing c/d/i  Cardiovascular:      Rate and Rhythm: Regular rhythm. Tachycardia present.      Heart sounds: Normal heart sounds.   Pulmonary:      Effort: Pulmonary effort is normal. No respiratory distress.      Breath sounds: Normal breath sounds. No wheezing.   Abdominal:      General: Bowel sounds are normal. There is no distension.      Palpations: Abdomen is soft.      Tenderness: There is no abdominal tenderness. There is no guarding.      Comments: PEG tube in place   Skin:     General: Skin is warm and dry.   Neurological:      Mental Status: She is alert. Mental status is at baseline.        Consults:   Consults (From admission, onward)          Status Ordering Provider     Inpatient consult to Registered Dietitian/Nutritionist  Once        Provider:  (Not yet assigned)    Completed DALI, SEEMAL     Inpatient consult to Interventional Radiology  Once        Provider:  (Not yet assigned)    Completed DALI, SEEMAL     Inpatient consult to PICC team ([facility])  Once        Provider:  (Not yet assigned)    Completed YUSUF BOYKINAAMAR     Inpatient consult to General Surgery  Once        Provider:  (Not yet assigned)    Completed BALDAWI, MUAAMAR     Inpatient consult to Interventional Radiology  Once        Provider:  (Not yet assigned)    Completed BALJOSHUA MUAAMAR     Inpatient consult to Infectious Diseases  Once        Provider:  (Not yet assigned)    Completed NORMA CARABALLO     Inpatient consult to Radiation Oncology  Once        Provider:  (Not yet assigned)    Completed NORMA CARABALLO     Inpatient consult to Hematology/Oncology  Once        Provider:  (Not yet assigned)    Completed HELGA MCGRATH     Inpatient consult to Palliative Care   Once        Provider:  (Not yet assigned)    Completed VANESSA RIVAS     Inpatient consult to ENT  Once        Provider:  (Not yet assigned)    Completed HELGA MCGRATH            No new Assessment & Plan notes have been filed under this hospital service since the last note was generated.  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] 08/28/2023 Yes    PEG (percutaneous endoscopic gastrostomy) status [Z93.1] 04/27/2024 Not Applicable    Severe malnutrition [E43] 04/18/2024 Yes    Hypercalcemia [E83.52] 04/16/2024 Yes    Palliative care encounter [Z51.5] 04/15/2024 Not Applicable    Hypokalemia [E87.6] 04/11/2024 Yes    Sepsis [A41.9] 04/10/2024 Yes    Open neck wound [S11.90XA] 04/10/2024 Yes    Postsurgical hypothyroidism [E89.0] 10/20/2014 Yes    HTN (hypertension) [I10] 08/12/2014 Yes      Problems Resolved During this Admission:    Diagnosis Date Noted Date Resolved POA    Sinus tachycardia [R00.0] 04/24/2024 05/03/2024 No    Head and neck cancer [C76.0] 04/18/2024 05/03/2024 Unknown    ACP (advance care planning) [Z71.89] 04/16/2024 05/03/2024 Not Applicable    Pain [R52] 04/15/2024 05/03/2024 Yes    Airway compromise [J98.8] 04/12/2024 05/03/2024 Yes    Postprocedural hypoparathyroidism [E89.2] 11/10/2015 05/03/2024 Yes    GERD (gastroesophageal reflux disease) [K21.9] 10/20/2014 05/03/2024 Yes       Discharged Condition: stable    Disposition:     Follow Up:   Follow-up Information       James Coronel MD Follow up in 3 day(s).    Specialty: Internal Medicine  Contact information:  [address] [place] [address]  [phone]                           Patient Instructions:   No discharge procedures on file.    Significant Diagnostic Studies: Labs: CMP   Recent Labs   Lab 05/06/24  0229 05/07/24  0650   * 136   K 3.3* 3.5   CL 86* 86*   CO2 39* 41*   * 123*   BUN  20 20   CREATININE 0.6 0.6   CALCIUM 7.6* 7.8*   PROT 6.0 6.0   ALBUMIN 2.4* 2.5*   BILITOT 0.3 0.3   ALKPHOS 217* 225*   AST 20 20   ALT 27 25   ANIONGAP 10 9    and CBC   Recent Labs   Lab 05/06/24  0229 05/07/24  0650   WBC 14.37* 13.85*   HGB 11.5* 11.4*   HCT 36.6* 36.1*    332       Pending Diagnostic Studies:       None           Medications:  Reconciled Home Medications:      Medication List        START taking these medications      dicyclomine 10 MG capsule  Commonly known as: BENTYL  1 capsule (10 mg total) by Per G Tube route 4 (four) times daily. for 15 days            CONTINUE taking these medications      flaxseed 1,000 mg Cap  Take by mouth Daily.     HYDROcodone-acetaminophen 5-325 mg per tablet  Commonly known as: NORCO     levothyroxine 175 MCG tablet  Commonly known as: SYNTHROID  Take 1 tablet (175 mcg total) by mouth before breakfast. Take on an empty stomach. Wait 4 hours after taking LT4 to take any multivitamins or nutritional supplements and wait 1 hour to take other medications.     liothyronine 5 MCG Tab  Commonly known as: CYTOMEL  Take 1 tablet (5 mcg total) by mouth before breakfast. Take on an empty stomach. Wait 4 hours after taking T3 to take any multivitamins or nutritional supplements and wait 1 hour to take other medications.     metoprolol tartrate 25 MG tablet  Commonly known as: LOPRESSOR  Take 1 tablet (25 mg total) by mouth 2 (two) times daily.     multivitamin capsule  Take 1 capsule by mouth once daily.     ondansetron 4 MG Tbdl  Commonly known as: ZOFRAN-ODT  DISSOLVE 1 TABLET (4 MG TOTAL) BY MOUTH EVERY 8 (EIGHT) HOURS AS NEEDED.            STOP taking these medications      calcium carbonate 300 mg (750 mg) Chew  Commonly known as: CALCIUM ANTACID     cholecalciferol (vitamin D3) 25 mcg (1,000 unit) capsule  Commonly known as: VITAMIN D3              Indwelling Lines/Drains at time of discharge:   Lines/Drains/Airways       Peripherally Inserted Central Catheter  Line  Duration             PICC Double Lumen 04/20/24 1652 right brachial 16 days              Drain  Duration                  Gastrostomy/Enterostomy 04/26/24 0945 Gastrostomy tube w/ balloon LUQ feeding 11 days                    Time spent on the discharge of patient: 45 minutes         Jocelyn Delcid DO  Department of Hospital Medicine  [facility] - Telemetry Stepdown

## 2024-05-07 NOTE — AI DETERIORATION ALERT
"RAPID RESPONSE NURSE AI ALERT       AI alert received.    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    Last VS: /67   Pulse (!) 121   Temp 97.8 °F (36.6 °C) (Axillary)   Resp (!) 30   Ht 5' 6" (1.676 m)   Wt 82.6 kg (182 lb 1.6 oz)   LMP  (LMP Unknown)   SpO2 99%   Breastfeeding No   BMI 29.39 kg/m²     24H Vital Sign Range:  Temp:  [97.8 °F (36.6 °C)-98.6 °F (37 °C)]   Pulse:  []   Resp:  [16-30]   BP: (112-142)/(64-72)   SpO2:  [82 %-99 %]     Level of Consciousness (AVPU): alert    Recent Labs     05/05/24  0630 05/06/24  0229   WBC 13.67* 14.37*   HGB 11.6* 11.5*   HCT 36.6* 36.6*    320       Recent Labs     05/05/24 0630 05/06/24 0229   * 135*   K 3.6 3.3*   CL 86* 86*   CO2 38* 39*   BUN 19 20   CREATININE 0.6 0.6   * 126*   PHOS 2.4* 2.3*   MG 1.4* 1.4*        No results for input(s): "PH", "PCO2", "PO2", "HCO3", "POCSATURATED", "BE" in the last 72 hours.     OXYGEN:  Flow (L/min) (Oxygen Therapy): 3  Oxygen Concentration (%): 40       MEWS score: 2    Bedside RNPaulina contacted for low O2 sat reports pt frequently takes off the probe and has also seen the  with the probe on his finger. Currently the O 2 sat is 98%. No additional concerns verbalized at this time. Instructed to call [facility] for further concerns or assistance.    Ania Duke RN        "

## 2024-05-08 NOTE — PROGRESS NOTES
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    History:     Past Medical History:   Diagnosis Date    GERD (gastroesophageal reflux disease)     Heart valve problem     Hypertension     Obesity (BMI 30-39.9) 10/20/2014    Thyroid disease        Social History     Socioeconomic History    Marital status:    Tobacco Use    Smoking status: Never    Smokeless tobacco: Never   Substance and Sexual Activity    Alcohol use: No     Alcohol/week: 0.0 standard drinks of alcohol    Drug use: No    Sexual activity: Yes     Partners: Male     Birth control/protection: See Surgical Hx     Social Determinants of Health     Financial Resource Strain: Low Risk  (4/12/2024)    Overall Financial Resource Strain (CARDIA)     Difficulty of Paying Living Expenses: Not very hard   Food Insecurity: No Food Insecurity (4/12/2024)    Hunger Vital Sign     Worried About Running Out of Food in the Last Year: Never true     Ran Out of Food in the Last Year: Never true   Transportation Needs: No Transportation Needs (4/12/2024)    PRAPARE - Transportation     Lack of Transportation (Medical): No     Lack of Transportation (Non-Medical): No   Physical Activity: Inactive (4/12/2024)    Exercise Vital Sign     Days of Exercise per Week: 0 days     Minutes of Exercise per Session: 0 min   Stress: Stress Concern Present (4/12/2024)    [employer] - Occupational Stress Questionnaire     Feeling of Stress : Rather much   Housing Stability: Low Risk  (4/12/2024)    Housing Stability Vital Sign     Unable to Pay for Housing in the Last Year: No     Number of Places Lived in the Last Year: 1     Unstable Housing in the Last Year: No       Precautions:     Allergies as of 04/10/2024 - Reviewed 04/10/2024   Allergen Reaction Noted    Ciprofloxacin Swelling 07/08/2014    Codeine Swelling  07/08/2014    Pcn [penicillins] Hives 07/08/2014    Percocet [oxycodone-acetaminophen] Swelling 07/08/2014       WOC Assessment Details/Treatment   Patient seen for wound care follow up for right neck wound.    Reviewed chart for this encounter.   See Flow Sheet for findings.    Pt resting in bed in NAD. AAOx3.  present for dressing change. Current dressing removed with moderate serous drainage noted. Wound stable. Red, maroon, and gray discoloration noted. Moist wound. Cleaned with Vashe antimicrobial wound cleanser. Applied Aquacel ag, Drawtex, and covered with Mextra superabsorbant dressing. Wrapped with Kerlex. Pt tolerated dressing change with no complaints of pain.     RECOMMENDATIONS:  Continue dressing change as directed BID and PRN soilage--clean area with Vashe antimicrobial wound cleanser.  Apply ABD pads with Mextra super absorbant dressing.  Wrap in place with conform wrap.    Discussed POC with patient and primary nurse.   See EMR for orders & patient education.    Discussed nutrition and the role of protein in wound healing with the patient. Instructed patient to optimize protein for wound healing.    Bedside nursing to continue care & monitoring.  Bedside nursing to maintain pressure injury prevention interventions.     05/07/24 1025   WOCN Assessment   WOCN Total Time (mins) 40   Visit Date 05/07/24   Visit Time 1025   Consult Type Follow Up   WOCN Speciality Wound   Wound surgical   Intervention assessed;changed;applied;chart review;coordination of care   Teaching on-going   Skin Interventions   Device Skin Pressure Protection absorbent pad utilized/changed;adhesive use limited;positioning supports utilized   Pressure Reduction Devices positioning supports utilized   Pressure Reduction Techniques frequent weight shift encouraged   Skin Protection skin sealant/moisture barrier applied   Positioning   Body Position position changed independently   Head of Bed (HOB) Positioning HOB at 30  degrees   Positioning/Transfer Devices pillows        Wound 04/24/24 1229 Incision anterior Neck   Date First Assessed/Time First Assessed: 04/24/24 1229   Present on Original Admission: No  Primary Wound Type: Incision  Orientation: anterior  Location: Neck  Is this injury device related?: (c)   Closure Method: Staples  Additional Comments: bacitr...   Wound Image    Dressing Appearance Dry;Moist drainage   Drainage Amount Moderate   Drainage Characteristics/Odor Serous   Appearance Red;Maroon;Moist;Ecchymotic   Periwound Area Denuded;Ecchymotic;Excoriated;Moist;Redness   Wound Edges Undefined   Care Cleansed with:;Antimicrobial agent;Wound cleanser   Dressing Absorptive Pad;Gauze;Rolled gauze   Periwound Care Absorptive dressing applied;Cleansed with pH balanced cleanser;Dry periwound area maintained;Skin barrier film applied     Taina Pete RN, BSN, CWON  05/07/2024

## 2024-05-08 NOTE — NURSING
Patient given pain and nausea medicine per request. PICC line flushed 10cc each lumen. Blood return observed and flushed with no difficulty. Pt awaiting transport.

## 2024-05-08 NOTE — NURSING
Patient d/c home via wheelchair van at 2252. All belongings were taken by spouse. No acute distress observed at discharge.

## (undated) DEVICE — TRAY CATH 1-LYR URIMTR 16FR

## (undated) DEVICE — TUBING SUC UNIV W/CONN 12FT

## (undated) DEVICE — SUT 1 36IN PDS II VIO MONO

## (undated) DEVICE — DRESSING ABSRBNT ISLAND 3.6X8

## (undated) DEVICE — ELECTRODE REM PLYHSV RETURN 9

## (undated) DEVICE — GAUZE SPONGE PEANUT STRL

## (undated) DEVICE — URINARY DRAINAGE BAG

## (undated) DEVICE — SUT SILK 3-0 SH 18IN BLACK

## (undated) DEVICE — TOWEL OR DISP STRL BLUE 4/PK

## (undated) DEVICE — SPONGE COTTON TRAY 4X4IN

## (undated) DEVICE — LUBRICANT SURGILUBE 2 OZ

## (undated) DEVICE — SUT ETHILON 2-0 PSLX 30IN

## (undated) DEVICE — SUT CHROMIC 3-0 SH 27IN GUT

## (undated) DEVICE — GOWN SURGICAL X-LARGE

## (undated) DEVICE — SUT VICRYL 3-0 27 SH

## (undated) DEVICE — CORD BIPOLAR 12 FOOT

## (undated) DEVICE — SUT VICRYL PLUS 3-0 SH 18IN

## (undated) DEVICE — Device

## (undated) DEVICE — ELECTRODE BLADE INSULATED 1 IN

## (undated) DEVICE — TRAY MINOR GEN SURG OMC

## (undated) DEVICE — BOWL STERILE LARGE 32OZ

## (undated) DEVICE — DRAPE ABDOMINAL TIBURON 14X11

## (undated) DEVICE — GOWN POLY REINF BRTH SLV XL

## (undated) DEVICE — SPONGE LAP 18X18 PREWASHED

## (undated) DEVICE — STAPLER SKIN ROTATING HEAD

## (undated) DEVICE — NDL HYPO REG 25G X 1 1/2

## (undated) DEVICE — TRAY SKIN SCRUB WET PREMIUM

## (undated) DEVICE — DRAPE EENT SPLIT STERILE

## (undated) DEVICE — APPLICATOR CHLORAPREP ORN 26ML

## (undated) DEVICE — SUT SILK 2-0 SH 18IN BLACK